# Patient Record
Sex: MALE | Race: WHITE | Employment: OTHER | URBAN - METROPOLITAN AREA
[De-identification: names, ages, dates, MRNs, and addresses within clinical notes are randomized per-mention and may not be internally consistent; named-entity substitution may affect disease eponyms.]

---

## 2017-01-05 ENCOUNTER — HOSPITAL ENCOUNTER (OUTPATIENT)
Dept: VASCULAR LAB | Age: 72
Discharge: HOME | End: 2017-01-05
Attending: FAMILY MEDICINE | Admitting: FAMILY MEDICINE

## 2017-01-05 DIAGNOSIS — I35.9 NONRHEUMATIC AORTIC VALVE DISORDER: ICD-10-CM

## 2017-01-05 LAB
LV EF: 55 %
LVEF MODALITY: NORMAL

## 2017-01-19 ENCOUNTER — OFFICE VISIT (OUTPATIENT)
Dept: CARDIOLOGY CLINIC | Age: 72
End: 2017-01-19

## 2017-01-19 VITALS
SYSTOLIC BLOOD PRESSURE: 122 MMHG | HEART RATE: 67 BPM | BODY MASS INDEX: 31.56 KG/M2 | DIASTOLIC BLOOD PRESSURE: 76 MMHG | WEIGHT: 233 LBS | HEIGHT: 72 IN | OXYGEN SATURATION: 97 %

## 2017-01-19 DIAGNOSIS — J44.9 CHRONIC AIRWAY OBSTRUCTION, NOT ELSEWHERE CLASSIFIED: Chronic | ICD-10-CM

## 2017-01-19 DIAGNOSIS — I35.0 NONRHEUMATIC AORTIC VALVE STENOSIS: Primary | ICD-10-CM

## 2017-01-19 DIAGNOSIS — I10 ESSENTIAL HYPERTENSION: ICD-10-CM

## 2017-01-19 PROCEDURE — 99204 OFFICE O/P NEW MOD 45 MIN: CPT | Performed by: INTERNAL MEDICINE

## 2017-02-03 ENCOUNTER — HOSPITAL ENCOUNTER (OUTPATIENT)
Dept: PULMONOLOGY | Age: 72
Discharge: HOME | End: 2017-02-03
Attending: INTERNAL MEDICINE | Admitting: INTERNAL MEDICINE

## 2017-02-03 VITALS — HEART RATE: 56 BPM | OXYGEN SATURATION: 96 %

## 2017-02-03 DIAGNOSIS — I35.0 NONRHEUMATIC AORTIC VALVE STENOSIS: ICD-10-CM

## 2017-02-03 RX ORDER — ALBUTEROL SULFATE 90 UG/1
4 AEROSOL, METERED RESPIRATORY (INHALATION) ONCE
Status: COMPLETED | OUTPATIENT
Start: 2017-02-03 | End: 2017-02-03

## 2017-02-03 RX ADMIN — ALBUTEROL SULFATE 4 PUFF: 90 AEROSOL, METERED RESPIRATORY (INHALATION) at 09:01

## 2017-03-06 ENCOUNTER — OFFICE VISIT (OUTPATIENT)
Dept: FAMILY MEDICINE CLINIC | Age: 72
End: 2017-03-06

## 2017-03-06 ENCOUNTER — HOSPITAL ENCOUNTER (OUTPATIENT)
Dept: OTHER | Age: 72
Discharge: HOME | End: 2017-03-06
Attending: FAMILY MEDICINE | Admitting: FAMILY MEDICINE

## 2017-03-06 VITALS
BODY MASS INDEX: 32.41 KG/M2 | TEMPERATURE: 97.8 F | SYSTOLIC BLOOD PRESSURE: 130 MMHG | WEIGHT: 239 LBS | DIASTOLIC BLOOD PRESSURE: 90 MMHG

## 2017-03-06 DIAGNOSIS — L70.9 ACNE, UNSPECIFIED ACNE TYPE: Primary | ICD-10-CM

## 2017-03-06 LAB
A/G RATIO: 1.2 (ref 1.1–2.2)
ALBUMIN SERPL-MCNC: 3.8 G/DL (ref 3.4–5)
ALP BLD-CCNC: 105 U/L (ref 40–129)
ALT SERPL-CCNC: 20 U/L (ref 10–40)
ANION GAP SERPL CALCULATED.3IONS-SCNC: 13 MMOL/L (ref 3–16)
AST SERPL-CCNC: 18 U/L (ref 15–37)
BILIRUB SERPL-MCNC: 0.6 MG/DL (ref 0–1)
BUN BLDV-MCNC: 12 MG/DL (ref 7–20)
CALCIUM SERPL-MCNC: 9.4 MG/DL (ref 8.3–10.6)
CHLORIDE BLD-SCNC: 103 MMOL/L (ref 99–110)
CHOLESTEROL, TOTAL: 195 MG/DL (ref 0–199)
CO2: 25 MMOL/L (ref 21–32)
CREAT SERPL-MCNC: 1 MG/DL (ref 0.8–1.3)
GFR AFRICAN AMERICAN: >60
GFR NON-AFRICAN AMERICAN: >60
GLOBULIN: 3.3 G/DL
GLUCOSE BLD-MCNC: 105 MG/DL (ref 70–99)
HDLC SERPL-MCNC: 57 MG/DL (ref 40–60)
LDL CHOLESTEROL CALCULATED: 112 MG/DL
POTASSIUM SERPL-SCNC: 4.9 MMOL/L (ref 3.5–5.1)
SODIUM BLD-SCNC: 141 MMOL/L (ref 136–145)
TOTAL PROTEIN: 7.1 G/DL (ref 6.4–8.2)
TRIGL SERPL-MCNC: 129 MG/DL (ref 0–150)
VLDLC SERPL CALC-MCNC: 26 MG/DL

## 2017-03-06 PROCEDURE — 99213 OFFICE O/P EST LOW 20 MIN: CPT | Performed by: PHYSICIAN ASSISTANT

## 2017-03-06 RX ORDER — SILDENAFIL 100 MG/1
100 TABLET, FILM COATED ORAL PRN
Qty: 5 TABLET | Refills: 5 | Status: SHIPPED | OUTPATIENT
Start: 2017-03-06 | End: 2018-03-05

## 2017-03-06 RX ORDER — SULFAMETHOXAZOLE AND TRIMETHOPRIM 800; 160 MG/1; MG/1
1 TABLET ORAL 2 TIMES DAILY
Qty: 20 TABLET | Refills: 0 | Status: SHIPPED | OUTPATIENT
Start: 2017-03-06 | End: 2017-03-16

## 2017-03-06 ASSESSMENT — ENCOUNTER SYMPTOMS
TROUBLE SWALLOWING: 0
SHORTNESS OF BREATH: 0
CHEST TIGHTNESS: 0

## 2017-05-24 RX ORDER — BENAZEPRIL HYDROCHLORIDE 40 MG/1
TABLET, FILM COATED ORAL
Qty: 30 TABLET | Refills: 0 | Status: SHIPPED | OUTPATIENT
Start: 2017-05-24 | End: 2017-09-08 | Stop reason: SDUPTHER

## 2017-07-07 RX ORDER — ATORVASTATIN CALCIUM 80 MG/1
TABLET, FILM COATED ORAL
Qty: 90 TABLET | Refills: 3 | Status: SHIPPED | OUTPATIENT
Start: 2017-07-07 | End: 2018-10-01 | Stop reason: SDUPTHER

## 2017-07-20 ENCOUNTER — HOSPITAL ENCOUNTER (OUTPATIENT)
Dept: NON INVASIVE DIAGNOSTICS | Age: 72
Discharge: HOME | End: 2017-07-20
Attending: INTERNAL MEDICINE | Admitting: INTERNAL MEDICINE

## 2017-07-20 ENCOUNTER — OFFICE VISIT (OUTPATIENT)
Dept: CARDIOLOGY CLINIC | Age: 72
End: 2017-07-20

## 2017-07-20 VITALS
WEIGHT: 232 LBS | BODY MASS INDEX: 31.42 KG/M2 | SYSTOLIC BLOOD PRESSURE: 138 MMHG | HEART RATE: 57 BPM | DIASTOLIC BLOOD PRESSURE: 68 MMHG | HEIGHT: 72 IN

## 2017-07-20 DIAGNOSIS — I35.9 AORTIC VALVE DISORDER: Primary | Chronic | ICD-10-CM

## 2017-07-20 DIAGNOSIS — I35.0 NONRHEUMATIC AORTIC VALVE STENOSIS: ICD-10-CM

## 2017-07-20 LAB
LV EF: 60 %
LVEF MODALITY: NORMAL

## 2017-07-20 PROCEDURE — 99214 OFFICE O/P EST MOD 30 MIN: CPT | Performed by: INTERNAL MEDICINE

## 2017-08-14 ENCOUNTER — TELEPHONE (OUTPATIENT)
Dept: FAMILY MEDICINE CLINIC | Age: 72
End: 2017-08-14

## 2017-08-15 DIAGNOSIS — R73.9 HYPERGLYCEMIA: ICD-10-CM

## 2017-08-15 DIAGNOSIS — Z12.5 SCREENING FOR PROSTATE CANCER: Primary | ICD-10-CM

## 2017-08-15 DIAGNOSIS — I10 ESSENTIAL HYPERTENSION: ICD-10-CM

## 2017-08-15 DIAGNOSIS — E78.5 OTHER AND UNSPECIFIED HYPERLIPIDEMIA: Chronic | ICD-10-CM

## 2017-08-18 RX ORDER — METRONIDAZOLE 7.5 MG/G
GEL TOPICAL
Refills: 0 | OUTPATIENT
Start: 2017-08-18

## 2017-09-05 ENCOUNTER — HOSPITAL ENCOUNTER (OUTPATIENT)
Dept: OTHER | Age: 72
Discharge: HOME | End: 2017-09-05
Attending: FAMILY MEDICINE | Admitting: FAMILY MEDICINE

## 2017-09-05 DIAGNOSIS — R73.9 HYPERGLYCEMIA: ICD-10-CM

## 2017-09-05 DIAGNOSIS — I10 ESSENTIAL HYPERTENSION: ICD-10-CM

## 2017-09-05 DIAGNOSIS — Z12.5 SCREENING FOR PROSTATE CANCER: ICD-10-CM

## 2017-09-05 DIAGNOSIS — E78.5 OTHER AND UNSPECIFIED HYPERLIPIDEMIA: Chronic | ICD-10-CM

## 2017-09-05 LAB
A/G RATIO: 1.1 (ref 1.1–2.2)
ALBUMIN SERPL-MCNC: 3.9 G/DL (ref 3.4–5)
ALP BLD-CCNC: 103 U/L (ref 40–129)
ALT SERPL-CCNC: 23 U/L (ref 10–40)
ANION GAP SERPL CALCULATED.3IONS-SCNC: 14 MMOL/L (ref 3–16)
AST SERPL-CCNC: 18 U/L (ref 15–37)
BILIRUB SERPL-MCNC: 0.8 MG/DL (ref 0–1)
BUN BLDV-MCNC: 13 MG/DL (ref 7–20)
CALCIUM SERPL-MCNC: 9.1 MG/DL (ref 8.3–10.6)
CHLORIDE BLD-SCNC: 101 MMOL/L (ref 99–110)
CHOLESTEROL, TOTAL: 166 MG/DL (ref 0–199)
CO2: 24 MMOL/L (ref 21–32)
CREAT SERPL-MCNC: 1 MG/DL (ref 0.8–1.3)
ESTIMATED AVERAGE GLUCOSE: 125.5 MG/DL
GFR AFRICAN AMERICAN: >60
GFR NON-AFRICAN AMERICAN: >60
GLOBULIN: 3.4 G/DL
GLUCOSE BLD-MCNC: 98 MG/DL (ref 70–99)
HBA1C MFR BLD: 6 %
HDLC SERPL-MCNC: 53 MG/DL (ref 40–60)
LDL CHOLESTEROL CALCULATED: 94 MG/DL
POTASSIUM SERPL-SCNC: 4.6 MMOL/L (ref 3.5–5.1)
PROSTATE SPECIFIC ANTIGEN: 1.24 NG/ML (ref 0–4)
SODIUM BLD-SCNC: 139 MMOL/L (ref 136–145)
TOTAL PROTEIN: 7.3 G/DL (ref 6.4–8.2)
TRIGL SERPL-MCNC: 97 MG/DL (ref 0–150)
VLDLC SERPL CALC-MCNC: 19 MG/DL

## 2017-09-08 ENCOUNTER — TELEPHONE (OUTPATIENT)
Dept: FAMILY MEDICINE CLINIC | Age: 72
End: 2017-09-08

## 2017-09-08 ENCOUNTER — OFFICE VISIT (OUTPATIENT)
Dept: FAMILY MEDICINE CLINIC | Age: 72
End: 2017-09-08

## 2017-09-08 VITALS
DIASTOLIC BLOOD PRESSURE: 74 MMHG | WEIGHT: 231 LBS | BODY MASS INDEX: 31.33 KG/M2 | OXYGEN SATURATION: 98 % | HEART RATE: 63 BPM | SYSTOLIC BLOOD PRESSURE: 130 MMHG

## 2017-09-08 DIAGNOSIS — R29.6 FREQUENT FALLS: ICD-10-CM

## 2017-09-08 DIAGNOSIS — I10 ESSENTIAL HYPERTENSION: ICD-10-CM

## 2017-09-08 DIAGNOSIS — J44.9 CHRONIC OBSTRUCTIVE PULMONARY DISEASE, UNSPECIFIED COPD TYPE (HCC): Primary | ICD-10-CM

## 2017-09-08 DIAGNOSIS — I73.9 PERIPHERAL VASCULAR DISEASE (HCC): ICD-10-CM

## 2017-09-08 DIAGNOSIS — M20.12 HALLUX VALGUS WITH BUNIONS, LEFT: ICD-10-CM

## 2017-09-08 DIAGNOSIS — M21.612 HALLUX VALGUS WITH BUNIONS, LEFT: ICD-10-CM

## 2017-09-08 PROCEDURE — G0008 ADMIN INFLUENZA VIRUS VAC: HCPCS | Performed by: FAMILY MEDICINE

## 2017-09-08 PROCEDURE — 99214 OFFICE O/P EST MOD 30 MIN: CPT | Performed by: FAMILY MEDICINE

## 2017-09-08 PROCEDURE — 90662 IIV NO PRSV INCREASED AG IM: CPT | Performed by: FAMILY MEDICINE

## 2017-09-08 RX ORDER — BENAZEPRIL HYDROCHLORIDE 40 MG/1
TABLET, FILM COATED ORAL
Qty: 90 TABLET | Refills: 1 | Status: SHIPPED | OUTPATIENT
Start: 2017-09-08 | End: 2018-04-19 | Stop reason: SDUPTHER

## 2017-09-08 RX ORDER — METRONIDAZOLE 7.5 MG/G
GEL TOPICAL DAILY
COMMUNITY
End: 2017-09-08 | Stop reason: SDUPTHER

## 2017-09-08 RX ORDER — METRONIDAZOLE 7.5 MG/G
GEL TOPICAL DAILY
Qty: 1 TUBE | Refills: 2 | Status: SHIPPED | OUTPATIENT
Start: 2017-09-08 | End: 2018-09-11

## 2017-09-22 ENCOUNTER — HOSPITAL ENCOUNTER (OUTPATIENT)
Dept: MRI IMAGING | Age: 72
Discharge: HOME | End: 2017-09-22
Attending: FAMILY MEDICINE | Admitting: FAMILY MEDICINE

## 2017-09-22 DIAGNOSIS — R29.6 REPEATED FALLS: ICD-10-CM

## 2017-09-22 DIAGNOSIS — R29.6 FREQUENT FALLS: ICD-10-CM

## 2017-09-25 ENCOUNTER — TELEPHONE (OUTPATIENT)
Dept: FAMILY MEDICINE CLINIC | Age: 72
End: 2017-09-25

## 2017-09-25 DIAGNOSIS — G91.2 NPH (NORMAL PRESSURE HYDROCEPHALUS) (HCC): Primary | ICD-10-CM

## 2017-10-10 ENCOUNTER — OFFICE VISIT (OUTPATIENT)
Dept: NEUROLOGY | Age: 72
End: 2017-10-10

## 2017-10-10 VITALS
HEART RATE: 63 BPM | HEIGHT: 72 IN | BODY MASS INDEX: 31.56 KG/M2 | SYSTOLIC BLOOD PRESSURE: 134 MMHG | WEIGHT: 233 LBS | DIASTOLIC BLOOD PRESSURE: 72 MMHG

## 2017-10-10 DIAGNOSIS — R27.0 ATAXIA: ICD-10-CM

## 2017-10-10 DIAGNOSIS — G60.9 IDIOPATHIC PERIPHERAL NEUROPATHY: ICD-10-CM

## 2017-10-10 DIAGNOSIS — R90.89 ABNORMAL FINDING ON MRI OF BRAIN: ICD-10-CM

## 2017-10-10 DIAGNOSIS — G91.2 NORMAL PRESSURE HYDROCEPHALUS (HCC): Primary | ICD-10-CM

## 2017-10-10 PROCEDURE — 99205 OFFICE O/P NEW HI 60 MIN: CPT | Performed by: PSYCHIATRY & NEUROLOGY

## 2017-10-10 NOTE — LETTER
[facility] Neurology  [address] [facility] [address]  Phone: [phone]  Fax: [phone]    Marielos Allen MD        October 10, 2017       Patient: Yanira Lin   MR Number: [id number]   YOB: 1945   Date of Visit: 10/10/2017       Dear Dr. Radha Min: Thank you for the request for consultation for Tatianna Montes to me for the evaluation of Ataxia and possible NPH. Below are the relevant portions of my assessment and plan of care. NEUROLOGY CONSULTATION     Chief Complaint   Patient presents with    Neurologic Problem     Patient is here today to establish care. Patient had a recent MRI which was abnormal. Patient has been having an abnormal gait as well. HISTORY OF PRESENT ILLNESS :    Tatianna Montes is a 73 y.o. male who is referred by Dr. Radha Min   History was obtained from patient And his wife.   Patient was referred for evaluation of abnormal gait and a possibly abnormal MRI brain  Symptoms started several months ago  Onset was gradual and the symptoms of persistent  According to the patient and his wife he has balance difficulties and tends to stumble a lot  He was seen by a podiatrist who felt that his gait was abnormal and this led to the MRI brain  Patient denies any urinary symptoms  Patient and his family have noticed mild memory impairment especially for short-term events  There is no focal weakness numbness true vertigo or diplopia      REVIEW OF SYSTEMS    Constitutional:     Chills     Fatigue     Fevers     Malaise     Weight loss      Denies all of the above    Eyes:    Double vision     Blurry vision      Denies all of the above    Ears, nose, mouth, throat, and face:    Hearing loss       Hoarseness        Snoring      Tinnitus        Denies all of the above     Respiratory:     Cough      Shortness of breath          Denies all of the above Cardiovascular:     Chest pain      Exertional chest pressure/discomfort            Palpitations      Syncope      Denies all of the above    Gastrointestinal:     Abdominal pain     Constipation      Diarrhea       Dysphagia                       Denies all of the above    Genitourinary:        Frequency     Hematuria       Urinary incontinence            Denies all of the above     Hematologic/lymphatic:    Bleeding      Easy bruising     Anemia   Denies all of the above     Musculoskeletal:    Back pain         Myalgias      Neck pain            Denies all of the above    Neurological: As noted in HPI    Behavioral/Psych:     Anxiety      Depression       Mood swings      Denies all of the above     Endocrine:     Temperature intolerance      Fatigue       Denies all of the above     Allergic/Immunologic:    Hay fever     Denies all of the above     Past Medical History:   Diagnosis Date    Allergic rhinitis     Aortic valve disorders     COPD (chronic obstructive pulmonary disease) (HCC)     Hyperlipidemia     Hypertension     Hypertrophy of prostate without urinary obstruction and other lower urinary tract symptoms (LUTS)     Irritable bowel syndrome     Pancreatitis     Peptic ulcer, unspecified site, unspecified as acute or chronic, without mention of hemorrhage, perforation, or obstruction     Peripheral vascular disease ([facility]      Family History   Problem Relation Age of Onset    Heart Disease Father     Diabetes Father     Alcohol Abuse Father     Alcohol Abuse Mother      Social History     Social History    Marital status:      Spouse name: N/A    Number of children: N/A    Years of education: N/A     Social History Main Topics    Smoking status: Former Smoker     Packs/day: 1.50     Years: 30.00     Types: Cigarettes     Quit date: 12/17/2001    Smokeless tobacco: Never Used      Comment: H.O.smoking at age 25 / smoked up to 1.5p.p.d /quit      Alcohol use 0.0 oz/week

## 2017-10-10 NOTE — PROGRESS NOTES
NEUROLOGY CONSULTATION     Chief Complaint   Patient presents with    Neurologic Problem     Patient is here today to establish care. Patient had a recent MRI which was abnormal. Patient has been having an abnormal gait as well. HISTORY OF PRESENT ILLNESS :    Vlad Flores is a 73 y.o. male who is referred by Dr. Ewelina Hernandez   History was obtained from patient And his wife.   Patient was referred for evaluation of abnormal gait and a possibly abnormal MRI brain  Symptoms started several months ago  Onset was gradual and the symptoms of persistent  According to the patient and his wife he has balance difficulties and tends to stumble a lot  He was seen by a podiatrist who felt that his gait was abnormal and this led to the MRI brain  Patient denies any urinary symptoms  Patient and his family have noticed mild memory impairment especially for short-term events  There is no focal weakness numbness true vertigo or diplopia      REVIEW OF SYSTEMS    Constitutional:  []   Chills   []  Fatigue   []  Fevers   []  Malaise   []  Weight loss     [x] Denies all of the above    Eyes:  []  Double vision   []  Blurry vision     [x] Denies all of the above    Ears, nose, mouth, throat, and face:   [] Hearing loss    []   Hoarseness      []  Snoring    []  Tinnitus       [x] Denies all of the above     Respiratory:   []  Cough    []  Shortness of breath         [x] Denies all of the above     Cardiovascular:   []  Chest pain    []  Exertional chest pressure/discomfort           [] Palpitations    []  Syncope     [x] Denies all of the above    Gastrointestinal:   []  Abdominal pain   []  Constipation    []  Diarrhea    []   Dysphagia                      [x] Denies all of the above    Genitourinary:      []  Frequency   []  Hematuria     []  Urinary incontinence           [x] Denies all of the above     Hematologic/lymphatic:  []  Bleeding    []  Easy bruising   []  Anemia  [x] Denies all of the above     Musculoskeletal:   [] Back pain       []  Myalgias    []  Neck pain           [x] Denies all of the above    Neurological: As noted in HPI    Behavioral/Psych:   [] Anxiety    []  Depression     []  Mood swings     [x] Denies all of the above     Endocrine:   []  Temperature intolerance     [] Fatigue      [x] Denies all of the above     Allergic/Immunologic:   [] Hay fever    [x] Denies all of the above     Past Medical History:   Diagnosis Date    Allergic rhinitis     Aortic valve disorders     [facility]     Hyperlipidemia     Hypertension     Hypertrophy of prostate without urinary obstruction and other lower urinary tract symptoms (LUTS)     Irritable bowel syndrome     Pancreatitis     Peptic ulcer, unspecified site, unspecified as acute or chronic, without mention of hemorrhage, perforation, or obstruction     Peripheral vascular disease ([facility]      Family History   Problem Relation Age of Onset    Heart Disease Father     Diabetes Father     Alcohol Abuse Father     Alcohol Abuse Mother      Social History     Social History    Marital status:      Spouse name: N/A    Number of children: N/A    Years of education: N/A     Social History Main Topics    Smoking status: Former Smoker     Packs/day: 1.50     Years: 30.00     Types: Cigarettes     Quit date: 12/17/2001    Smokeless tobacco: Never Used      Comment: H.O.smoking at age 25 / smoked up to 1.5p.p.d /quit      Alcohol use 0.0 oz/week      Comment: occ    Drug use: No    Sexual activity: Not Asked     Other Topics Concern    None     Social History Narrative    None       PHYSICAL EXAMINATION:  /72   Pulse 63   Ht 6' (1.829 m)   Wt 233 lb (105.7 kg)   BMI 31.60 kg/m²   Appearance: Well appearing, well nourished and in no distress  Mental Status Exam: Patient is alert, oriented to person, place and time.    Recent and

## 2017-10-16 ENCOUNTER — PRE-PROCEDURE TELEPHONE (OUTPATIENT)
Dept: INTERVENTIONAL RADIOLOGY/VASCULAR | Age: 72
End: 2017-10-16

## 2017-10-16 NOTE — PROGRESS NOTES
Message left instructing on procedure, arrival time, npo status, meds with restrictions, and transportation. Office number left for pt to return call.

## 2017-10-23 ENCOUNTER — HOSPITAL ENCOUNTER (OUTPATIENT)
Dept: GENERAL RADIOLOGY | Age: 72
Discharge: HOME | End: 2017-10-23
Admitting: PSYCHIATRY & NEUROLOGY

## 2017-10-23 VITALS
OXYGEN SATURATION: 98 % | BODY MASS INDEX: 31.18 KG/M2 | DIASTOLIC BLOOD PRESSURE: 71 MMHG | WEIGHT: 230.2 LBS | HEART RATE: 58 BPM | RESPIRATION RATE: 14 BRPM | TEMPERATURE: 97 F | SYSTOLIC BLOOD PRESSURE: 158 MMHG | HEIGHT: 72 IN

## 2017-10-23 DIAGNOSIS — G91.2 NORMAL PRESSURE HYDROCEPHALUS (HCC): ICD-10-CM

## 2017-10-23 DIAGNOSIS — G91.2 (IDIOPATHIC) NORMAL PRESSURE HYDROCEPHALUS (HCC): ICD-10-CM

## 2017-10-23 LAB
APPEARANCE CSF: ABNORMAL
APTT: 34 SEC (ref 24.1–34.9)
BASO CSF: 2 %
CLOT EVALUATION CSF: ABNORMAL
COLOR CSF: ABNORMAL
EOS CSF: 6 %
GLUCOSE, CSF: 57 MG/DL (ref 40–80)
INR BLD: 1.06 (ref 0.85–1.15)
LYMPHS CSF: 23 % (ref 40–80)
MONOCYTE, CSF: 8 % (ref 15–45)
NEUTROPHILS, CSF: 61 % (ref 0–6)
NUMBER OF CELLS CSF: 100
PROTEIN CSF: 66 MG/DL (ref 15–45)
PROTHROMBIN TIME: 12 SEC (ref 9.6–13)
RBC CSF: ABNORMAL /CUMM
TUBE NUMBER CSF: ABNORMAL
VOLUME CSF: 4 ML
WBC CSF: 74 /CUMM (ref 0–5)

## 2017-10-23 RX ORDER — BENAZEPRIL HYDROCHLORIDE 10 MG/1
40 TABLET ORAL ONCE
Status: COMPLETED | OUTPATIENT
Start: 2017-10-23 | End: 2017-10-23

## 2017-10-23 RX ORDER — LIDOCAINE HYDROCHLORIDE 10 MG/ML
5 INJECTION, SOLUTION EPIDURAL; INFILTRATION; INTRACAUDAL; PERINEURAL ONCE
Status: COMPLETED | OUTPATIENT
Start: 2017-10-23 | End: 2017-10-23

## 2017-10-23 RX ADMIN — BENAZEPRIL HYDROCHLORIDE 40 MG: 10 TABLET ORAL at 14:33

## 2017-10-23 RX ADMIN — Medication 0.5 MICRO CURIE: at 15:50

## 2017-10-23 RX ADMIN — LIDOCAINE HYDROCHLORIDE 5 ML: 10 INJECTION, SOLUTION EPIDURAL; INFILTRATION; INTRACAUDAL; PERINEURAL at 11:52

## 2017-10-23 ASSESSMENT — PAIN - FUNCTIONAL ASSESSMENT: PAIN_FUNCTIONAL_ASSESSMENT: 0-10

## 2017-10-23 NOTE — PROGRESS NOTES
Pt transferred to PACU from Radiology, vss, pt instructed on lying flat, puncture site with bandaid and intact.

## 2017-10-24 ENCOUNTER — POST-OP TELEPHONE (OUTPATIENT)
Dept: INTERVENTIONAL RADIOLOGY/VASCULAR | Age: 72
End: 2017-10-24

## 2017-10-24 ENCOUNTER — HOSPITAL ENCOUNTER (OUTPATIENT)
Dept: NUCLEAR MEDICINE | Age: 72
Discharge: HOME | End: 2017-10-24
Admitting: PSYCHIATRY & NEUROLOGY

## 2017-10-25 ENCOUNTER — HOSPITAL ENCOUNTER (OUTPATIENT)
Dept: NUCLEAR MEDICINE | Age: 72
Discharge: HOME | End: 2017-10-25
Admitting: PSYCHIATRY & NEUROLOGY

## 2017-11-01 ENCOUNTER — TELEPHONE (OUTPATIENT)
Dept: NEUROLOGY | Age: 72
End: 2017-11-01

## 2017-11-03 ENCOUNTER — NURSE ONLY (OUTPATIENT)
Dept: FAMILY MEDICINE CLINIC | Age: 72
End: 2017-11-03

## 2017-11-03 ENCOUNTER — TELEPHONE (OUTPATIENT)
Dept: FAMILY MEDICINE CLINIC | Age: 72
End: 2017-11-03

## 2017-11-03 VITALS — HEART RATE: 60 BPM | SYSTOLIC BLOOD PRESSURE: 138 MMHG | DIASTOLIC BLOOD PRESSURE: 62 MMHG

## 2017-11-03 DIAGNOSIS — I10 ESSENTIAL HYPERTENSION: Primary | ICD-10-CM

## 2017-11-03 RX ORDER — ALPRAZOLAM 0.25 MG/1
0.25 TABLET ORAL 3 TIMES DAILY PRN
Qty: 30 TABLET | Refills: 0 | OUTPATIENT
Start: 2017-11-03 | End: 2018-12-15

## 2017-11-03 NOTE — TELEPHONE ENCOUNTER
Patient is here for a BP check.   Patient had a recent loss of a close family member and is requesting a small dose of Xanax to get through the visitation as well as the .  Please advise

## 2017-11-07 ENCOUNTER — PROCEDURE VISIT (OUTPATIENT)
Dept: NEUROLOGY | Age: 72
End: 2017-11-07

## 2017-11-07 ENCOUNTER — HOSPITAL ENCOUNTER (OUTPATIENT)
Dept: OTHER | Age: 72
Discharge: HOME | End: 2017-11-07
Attending: PSYCHIATRY & NEUROLOGY | Admitting: PSYCHIATRY & NEUROLOGY

## 2017-11-07 ENCOUNTER — OFFICE VISIT (OUTPATIENT)
Dept: NEUROLOGY | Age: 72
End: 2017-11-07

## 2017-11-07 VITALS
HEART RATE: 54 BPM | BODY MASS INDEX: 31.69 KG/M2 | HEIGHT: 72 IN | WEIGHT: 234 LBS | SYSTOLIC BLOOD PRESSURE: 136 MMHG | DIASTOLIC BLOOD PRESSURE: 72 MMHG

## 2017-11-07 DIAGNOSIS — G91.2 NORMAL PRESSURE HYDROCEPHALUS (HCC): Primary | ICD-10-CM

## 2017-11-07 DIAGNOSIS — G60.9 IDIOPATHIC PERIPHERAL NEUROPATHY: ICD-10-CM

## 2017-11-07 DIAGNOSIS — G60.9 IDIOPATHIC PERIPHERAL NEUROPATHY: Primary | ICD-10-CM

## 2017-11-07 LAB
TSH SERPL DL<=0.05 MIU/L-ACNC: 1.82 UIU/ML (ref 0.27–4.2)
VITAMIN B-12: 413 PG/ML (ref 211–911)

## 2017-11-07 PROCEDURE — 95910 NRV CNDJ TEST 7-8 STUDIES: CPT | Performed by: PSYCHIATRY & NEUROLOGY

## 2017-11-07 PROCEDURE — 99213 OFFICE O/P EST LOW 20 MIN: CPT | Performed by: PSYCHIATRY & NEUROLOGY

## 2017-11-07 PROCEDURE — 95886 MUSC TEST DONE W/N TEST COMP: CPT | Performed by: PSYCHIATRY & NEUROLOGY

## 2017-11-07 NOTE — PROGRESS NOTES
Sandy Champagne M.D. [facility]) Physicians/[place] [facility]  Board Certified in [address] [facility] [address] [facility], [address]    EMG / NERVE CONDUCTION STUDY    PATIENT:     Fiorella Orellana OF EM2017    YOB: 1945       REASON FOR EMG:  Ataxia, probable peripheral neuropathy      REFERRING PHYSICIAN:  Sandy Champagne M.D.    Beti Jonnathan:  Bilateral peroneal motor nerve studies had low amplitude and slow conduction velocities. Bilateral posterior tibial motor nerve studies also had low amplitudes and slow conduction velocities. Bilateral superficial peroneal sensory studies were not recordable  The right sural sensory nerve study was not recordable  Needle EMG of several muscles in both lower extremities was normal     CLINICAL DIAGNOSIS:  Unspecified neuropathy     EMG RESULTS:   This patient has a mild-to-moderate generalized sensorimotor mixed polyneuropathy. _____________________________  Sandy Champagne M.D.   Electromyographer/Neurologist

## 2017-11-07 NOTE — PROGRESS NOTES
Radha Mireles   Neurology followup    Subjective:   CC/HP  History was obtained from the patient and his wife.   Patient is here for follow-up visit and EMG studies   Patient was referred for evaluation of abnormal gait and a possibly abnormal MRI brain  Symptoms started several months ago  Onset was gradual and the symptoms of persistent  According to the patient and his wife he has balance difficulties and tends to stumble a lot  He was seen by a podiatrist who felt that his gait was abnormal and this led to the MRI brain  Patient denies any urinary symptoms  Patient and his family have noticed mild memory impairment especially for short-term events  There is no focal weakness numbness true vertigo or diplopia  Interval history:  Patient states that his gait is improved after the lumbar puncture and cisternogram    REVIEW OF SYSTEMS    Constitutional:  []   Chills   []  Fatigue   []  Fevers   []  Malaise   []  Weight loss     [x] Denies all of the above    Respiratory:   []  Cough    []  Shortness of breath         [x] Denies all of the above     Cardiovascular:   []  Chest pain    []  Exertional chest pressure/discomfort           [] Palpitations    []  Syncope     [x] Denies all of the above        Past Medical History:   Diagnosis Date    Allergic rhinitis     Aortic valve disorders     COPD (chronic obstructive pulmonary disease) ([address]     Hyperlipidemia     Hypertension     Hypertrophy of prostate without urinary obstruction and other lower urinary tract symptoms (LUTS)     Irritable bowel syndrome     Pancreatitis     Peptic ulcer, unspecified site, unspecified as acute or chronic, without mention of hemorrhage, perforation, or obstruction     Peripheral vascular disease ([address]      Family History   Problem Relation Age of Onset    Heart Disease Father     Diabetes Father     Alcohol Abuse Father     Alcohol Abuse Mother      Social History     Social History    Marital status:

## 2017-11-07 NOTE — PATIENT INSTRUCTIONS
Please call with any questions or concerns:   [facility]  @ [phone]. LAB RESULTS:  Please obtain any labs or diagnostic tests as discussed today. You may call the office to check the results. Please allow  3 to 7 days for us to get these results. MEDICATION LIST:  Please bring an accurate list of your medications to every visit. APPOINTMENT CONFIRMATION:  We will call you the day before your scheduled appointment to confirm. If we are unable to reach you, you MUST call back by the end of the day to confirm the appointment or we may be forced to cancel.

## 2017-11-08 LAB
ALBUMIN SERPL-MCNC: 3.2 G/DL (ref 3.1–4.9)
ALPHA-1-GLOBULIN: 0.3 G/DL (ref 0.2–0.4)
ALPHA-2-GLOBULIN: 0.8 G/DL (ref 0.4–1.1)
BETA GLOBULIN: 1.2 G/DL (ref 0.9–1.6)
GAMMA GLOBULIN: 1.1 G/DL (ref 0.6–1.8)
SPE/IFE INTERPRETATION: NORMAL
TOTAL PROTEIN: 6.6 G/DL (ref 6.4–8.2)

## 2017-12-05 ENCOUNTER — OFFICE VISIT (OUTPATIENT)
Dept: FAMILY MEDICINE CLINIC | Age: 72
End: 2017-12-05

## 2017-12-05 VITALS
WEIGHT: 232 LBS | TEMPERATURE: 98.1 F | BODY MASS INDEX: 31.42 KG/M2 | OXYGEN SATURATION: 98 % | SYSTOLIC BLOOD PRESSURE: 130 MMHG | DIASTOLIC BLOOD PRESSURE: 74 MMHG | HEART RATE: 50 BPM | HEIGHT: 72 IN

## 2017-12-05 DIAGNOSIS — G89.29 CHRONIC LEFT SHOULDER PAIN: Primary | ICD-10-CM

## 2017-12-05 DIAGNOSIS — J06.9 UPPER RESPIRATORY TRACT INFECTION, UNSPECIFIED TYPE: ICD-10-CM

## 2017-12-05 DIAGNOSIS — M25.512 CHRONIC LEFT SHOULDER PAIN: Primary | ICD-10-CM

## 2017-12-05 PROCEDURE — 99214 OFFICE O/P EST MOD 30 MIN: CPT | Performed by: PHYSICIAN ASSISTANT

## 2017-12-05 RX ORDER — CEPHALEXIN 500 MG/1
500 CAPSULE ORAL 3 TIMES DAILY
Qty: 30 CAPSULE | Refills: 0 | Status: ON HOLD | OUTPATIENT
Start: 2017-12-05 | End: 2018-01-24 | Stop reason: ALTCHOICE

## 2017-12-05 ASSESSMENT — ENCOUNTER SYMPTOMS
WHEEZING: 0
TROUBLE SWALLOWING: 0
VOMITING: 0
COUGH: 1
EYE PAIN: 0
VOICE CHANGE: 0
NAUSEA: 0
SORE THROAT: 1

## 2017-12-05 NOTE — PROGRESS NOTES
Subjective:      Patient ID: Aravind Coates is a 73 y.o. male. HPI Patient is here today with a 2 year history of left shoulder pain. No known injury but has been worsening lately. No radiating pain or numbness/tingling in UE's. He has taken ibuprofen for it and using a tens unit. Movement worsens it. Resting has no pain. He also has cough and post nasal drip. Cough is dry. No HA, SOB, ear pain, n/v/d. He has a ST as well. This has been going on for 4 days. He has not taken anything otc. Review of Systems   Constitutional: Negative for activity change, fatigue and fever. HENT: Positive for postnasal drip and sore throat. Negative for congestion, ear pain, trouble swallowing and voice change. Eyes: Negative for pain. Respiratory: Positive for cough (dry cough). Negative for wheezing. Cardiovascular: Negative for chest pain. Gastrointestinal: Negative for nausea and vomiting. Musculoskeletal: Positive for myalgias. Negative for arthralgias (left shoulder). Skin: Negative for rash. Neurological: Negative for dizziness, weakness, numbness and headaches. Objective:   Physical Exam   Constitutional: He is oriented to person, place, and time. Vital signs are normal. He appears well-developed and well-nourished. He is cooperative. HENT:   Head: Normocephalic. Right Ear: Tympanic membrane, external ear and ear canal normal.   Left Ear: Tympanic membrane, external ear and ear canal normal.   Nose: Mucosal edema present. Right sinus exhibits no maxillary sinus tenderness and no frontal sinus tenderness. Left sinus exhibits no maxillary sinus tenderness and no frontal sinus tenderness. Mouth/Throat: Uvula is midline, oropharynx is clear and moist and mucous membranes are normal.   Neck: Neck supple. Cardiovascular: Normal rate, regular rhythm and normal heart sounds. Pulmonary/Chest: Effort normal and breath sounds normal. No respiratory distress. He has no decreased breath sounds.

## 2018-01-02 ENCOUNTER — TELEPHONE (OUTPATIENT)
Dept: FAMILY MEDICINE CLINIC | Age: 73
End: 2018-01-02

## 2018-01-02 DIAGNOSIS — I73.9 PERIPHERAL VASCULAR DISEASE (HCC): Primary | ICD-10-CM

## 2018-01-02 NOTE — TELEPHONE ENCOUNTER
Patient wife calling about her  and seeing what they have to do about scheduling a doppler on left leg. He had this done before and not sure if needs to get this done again. He is having cramping in the leg. cb them to discuss.

## 2018-01-05 DIAGNOSIS — R60.0 EDEMA OF BOTH LEGS: ICD-10-CM

## 2018-01-08 RX ORDER — FUROSEMIDE 40 MG/1
TABLET ORAL
Qty: 90 TABLET | Refills: 0 | Status: SHIPPED | OUTPATIENT
Start: 2018-01-08 | End: 2018-04-24 | Stop reason: SDUPTHER

## 2018-01-12 ENCOUNTER — HOSPITAL ENCOUNTER (OUTPATIENT)
Dept: VASCULAR LAB | Age: 73
Discharge: HOME | End: 2018-01-12
Attending: FAMILY MEDICINE | Admitting: FAMILY MEDICINE

## 2018-01-12 DIAGNOSIS — I73.9 PERIPHERAL VASCULAR DISEASE (HCC): Primary | ICD-10-CM

## 2018-01-12 DIAGNOSIS — I65.29 OCCLUSION AND STENOSIS OF CAROTID ARTERY: ICD-10-CM

## 2018-01-18 ENCOUNTER — HOSPITAL ENCOUNTER (OUTPATIENT)
Dept: NON INVASIVE DIAGNOSTICS | Age: 73
Discharge: HOME | End: 2018-01-18
Attending: INTERNAL MEDICINE | Admitting: INTERNAL MEDICINE

## 2018-01-18 ENCOUNTER — OFFICE VISIT (OUTPATIENT)
Dept: CARDIOLOGY CLINIC | Age: 73
End: 2018-01-18

## 2018-01-18 VITALS
BODY MASS INDEX: 31.29 KG/M2 | HEIGHT: 72 IN | DIASTOLIC BLOOD PRESSURE: 61 MMHG | SYSTOLIC BLOOD PRESSURE: 130 MMHG | WEIGHT: 231 LBS | HEART RATE: 61 BPM

## 2018-01-18 DIAGNOSIS — E78.00 HYPERCHOLESTEREMIA: ICD-10-CM

## 2018-01-18 DIAGNOSIS — I35.0 NONRHEUMATIC AORTIC VALVE STENOSIS: ICD-10-CM

## 2018-01-18 DIAGNOSIS — I10 ESSENTIAL HYPERTENSION: ICD-10-CM

## 2018-01-18 DIAGNOSIS — I35.9 NONRHEUMATIC AORTIC VALVE DISORDER: ICD-10-CM

## 2018-01-18 DIAGNOSIS — I35.9 AORTIC VALVE DISORDER: Chronic | ICD-10-CM

## 2018-01-18 DIAGNOSIS — I35.0 NONRHEUMATIC AORTIC VALVE STENOSIS: Primary | ICD-10-CM

## 2018-01-18 LAB
LV EF: 60 %
LVEF MODALITY: NORMAL

## 2018-01-18 PROCEDURE — 99214 OFFICE O/P EST MOD 30 MIN: CPT | Performed by: INTERNAL MEDICINE

## 2018-01-22 ENCOUNTER — TELEPHONE (OUTPATIENT)
Dept: CARDIOLOGY CLINIC | Age: 73
End: 2018-01-22

## 2018-01-25 ENCOUNTER — OFFICE VISIT (OUTPATIENT)
Dept: NEUROLOGY | Age: 73
End: 2018-01-25

## 2018-01-25 VITALS
HEIGHT: 72 IN | WEIGHT: 233 LBS | SYSTOLIC BLOOD PRESSURE: 138 MMHG | HEART RATE: 66 BPM | BODY MASS INDEX: 31.56 KG/M2 | DIASTOLIC BLOOD PRESSURE: 60 MMHG

## 2018-01-25 DIAGNOSIS — R27.0 ATAXIA: ICD-10-CM

## 2018-01-25 DIAGNOSIS — G31.84 MILD COGNITIVE IMPAIRMENT: ICD-10-CM

## 2018-01-25 DIAGNOSIS — G91.2 NORMAL PRESSURE HYDROCEPHALUS (HCC): Primary | ICD-10-CM

## 2018-01-25 PROCEDURE — 99214 OFFICE O/P EST MOD 30 MIN: CPT | Performed by: PSYCHIATRY & NEUROLOGY

## 2018-01-25 NOTE — PROGRESS NOTES
[facility]   Neurology followup    Subjective:   CC/HP  History was obtained from the patient and his wife.     Patient is here for follow-up visit   Patient was referred for evaluation of abnormal gait and a possibly abnormal MRI brain  Symptoms started several months ago  Onset was gradual and the symptoms of persistent  According to the patient and his wife he has balance difficulties and tends to stumble a lot  He was seen by a podiatrist who felt that his gait was abnormal and this led to the MRI brain  Patient denies any urinary symptoms  Patient and his family have noticed mild memory impairment especially for short-term events  There is no focal weakness numbness true vertigo or diplopia  Interval history:  Patient still has some gait difficulty and some memory problems and cognitive issues as well  He improved somewhat after the last cisternogram and release of spinal fluid    REVIEW OF SYSTEMS    Constitutional:  []   Chills   []  Fatigue   []  Fevers   []  Malaise   []  Weight loss     [x] Denies all of the above    Respiratory:   []  Cough    []  Shortness of breath         [x] Denies all of the above     Cardiovascular:   []  Chest pain    []  Exertional chest pressure/discomfort           [] Palpitations    []  Syncope     [x] Denies all of the above        Past Medical History:   Diagnosis Date    Allergic rhinitis     Aortic valve disorders     COPD (chronic obstructive pulmonary disease) ([address]     Hyperlipidemia     Hypertension     Hypertrophy of prostate without urinary obstruction and other lower urinary tract symptoms (LUTS)     Irritable bowel syndrome     Pancreatitis     Peptic ulcer, unspecified site, unspecified as acute or chronic, without mention of hemorrhage, perforation, or obstruction     Peripheral vascular disease ([address]      Family History   Problem Relation Age of Onset    Heart Disease Father     Diabetes Father     Alcohol Abuse Father     Alcohol Abuse Mother CALCIUM 9.1 09/05/2017    GFRAA >60 01/24/2018    GFRAA >60 10/05/2011    LABGLOM 60 01/24/2018    LABGLOM 78 12/20/2013    GLUCOSE 98 09/05/2017     RADIOLOGY REVIEW:  I have reviewed radiology image(s) and reports(s) of:  MRI brain    Impression :  Normal pressure hydrocephalus  MRI brain images showed enlarged ventricles  Isotope cisternogram was also positive for NPH  EMG and nerve conduction studies showed peripheral neuropathy, etiology not clear  TSH B12 and serum protein immunofixation were normal    Plan :  Discussed with patient and his wife  Patient apparently has aortic valve disease and needs valve replacement surgery  They are considering open-heart surgery versus the less invasive TAVR  Procedure  Given his gait difficulties and cognitive impairment because of the NPH, if both procedures are equally effective then from a neurological standpoint I would favor the less invasive procedure. They will talk further with the cardiac surgeon regarding this. Return in 4 months     Please note a portion of  this chart was generated using dragon dictation software. Although every effort was made to ensure the accuracy of this automated transcription, some errors in transcription may have occurred.

## 2018-01-29 ENCOUNTER — OFFICE VISIT (OUTPATIENT)
Dept: FAMILY MEDICINE CLINIC | Age: 73
End: 2018-01-29

## 2018-01-29 VITALS
DIASTOLIC BLOOD PRESSURE: 76 MMHG | HEART RATE: 62 BPM | SYSTOLIC BLOOD PRESSURE: 130 MMHG | OXYGEN SATURATION: 98 % | BODY MASS INDEX: 31.41 KG/M2 | WEIGHT: 231.6 LBS

## 2018-01-29 DIAGNOSIS — I35.9 NONRHEUMATIC AORTIC VALVE DISORDER: Primary | Chronic | ICD-10-CM

## 2018-01-29 DIAGNOSIS — I73.9 PERIPHERAL VASCULAR DISEASE (HCC): ICD-10-CM

## 2018-01-29 PROCEDURE — 99213 OFFICE O/P EST LOW 20 MIN: CPT | Performed by: FAMILY MEDICINE

## 2018-01-29 NOTE — PROGRESS NOTES
Subjective:      Patient ID: Mike Blanton is a 81 y.o. male. HPI Patient is here to discuss different options for heart surgery. Patient is been found to have significant aortic stenosis and is here with his wife to discuss options for his valve replacement. He has in addition normal pressure hydrocephalus. He has mild PAD. Has mild cognitive impairment likely from the NPH. Review of Systems    Objective:   Physical Exam    Assessment:      1. Nonrheumatic aortic valve disorder     2. Peripheral vascular disease ([address]             Plan:      I reviewed the chart in regard to the findings of the cardiac catheter the echocardiogram the consults from Dr. Anaya Arreola. I discussed with patient that a TAVR be most appropriate for him. Counseling was the totality of the 20 minute visit with patient and his wife.  Also reviewed his recent arterial studies

## 2018-02-08 ENCOUNTER — OFFICE VISIT (OUTPATIENT)
Dept: CARDIOTHORACIC SURGERY | Age: 73
End: 2018-02-08

## 2018-02-08 VITALS
DIASTOLIC BLOOD PRESSURE: 78 MMHG | OXYGEN SATURATION: 97 % | HEIGHT: 72 IN | WEIGHT: 231 LBS | BODY MASS INDEX: 31.29 KG/M2 | HEART RATE: 58 BPM | SYSTOLIC BLOOD PRESSURE: 122 MMHG | TEMPERATURE: 97.7 F

## 2018-02-08 DIAGNOSIS — I35.9 NONRHEUMATIC AORTIC VALVE DISORDER: Primary | ICD-10-CM

## 2018-02-08 PROCEDURE — 99202 OFFICE O/P NEW SF 15 MIN: CPT | Performed by: THORACIC SURGERY (CARDIOTHORACIC VASCULAR SURGERY)

## 2018-02-12 ENCOUNTER — TELEPHONE (OUTPATIENT)
Dept: CARDIOLOGY | Age: 73
End: 2018-02-12

## 2018-02-12 DIAGNOSIS — I35.9 NONRHEUMATIC AORTIC VALVE DISORDER: Primary | Chronic | ICD-10-CM

## 2018-02-12 NOTE — PROGRESS NOTES
dorsalis pedis N/A, Right posterior tibial 2, Left Posterior tibial N/A, Right Femoral 2, Left Femoral N/A, Right radial 2, and Left radial 2; bilat groin scars c/w h/o ABF bypass; good cappillary refill in left foot    Abdomen:  Soft, normal bowel sounds, non-tender, no hepatosplenomegaly, aorta normal and bruits absent    Musculoskeletal:  Back is straight and non-tender, full ROM of upper and lower extremities. Extremities:   No clubbing, cyanosis, or edema     Skin: warm and normal turgor, no ulcers, infections, or rashes, no jaundice    Neurological: awake, alert and oriented x 3, slightly slowed verbal responses, gait wide, motor 5/5 bilateral upper and lower extremities, sensation grossly intact    Psychiatric: Mood and affect appear appropriate      ASSESSMENT AND PLAN:    Severe symptomatic AS, PVD with h/o ABF with likely diseased left limb, moderate COPD, HTN, HLD, NPH    Patient has an STS mortality of ~3.4% for SAVR. With his unsteady gait, best option is likely TAVR unless access options are limited by his PVD in which case SAVR could still be considered. I discussed this at length with the patient and his wife. We will continue TAVR work-up especially in regard to vascular access.     Elke Yang MD

## 2018-02-16 ENCOUNTER — HOSPITAL ENCOUNTER (OUTPATIENT)
Dept: CT IMAGING | Age: 73
Discharge: HOME | End: 2018-02-16
Attending: FAMILY MEDICINE | Admitting: INTERNAL MEDICINE

## 2018-02-16 DIAGNOSIS — I35.0 NONRHEUMATIC AORTIC VALVE STENOSIS: ICD-10-CM

## 2018-02-16 DIAGNOSIS — I35.9 NONRHEUMATIC AORTIC VALVE DISORDER: Chronic | ICD-10-CM

## 2018-03-05 ENCOUNTER — OFFICE VISIT (OUTPATIENT)
Dept: CARDIOTHORACIC SURGERY | Age: 73
End: 2018-03-05

## 2018-03-05 VITALS
SYSTOLIC BLOOD PRESSURE: 142 MMHG | BODY MASS INDEX: 33.05 KG/M2 | TEMPERATURE: 97.5 F | HEIGHT: 72 IN | HEART RATE: 60 BPM | WEIGHT: 244 LBS | DIASTOLIC BLOOD PRESSURE: 76 MMHG | OXYGEN SATURATION: 98 %

## 2018-03-05 DIAGNOSIS — I35.9 NONRHEUMATIC AORTIC VALVE DISORDER: ICD-10-CM

## 2018-03-05 DIAGNOSIS — J44.9 CHRONIC OBSTRUCTIVE PULMONARY DISEASE, UNSPECIFIED COPD TYPE (HCC): ICD-10-CM

## 2018-03-05 DIAGNOSIS — I73.9 PERIPHERAL VASCULAR DISEASE (HCC): ICD-10-CM

## 2018-03-05 DIAGNOSIS — G31.84 MILD COGNITIVE IMPAIRMENT: ICD-10-CM

## 2018-03-05 DIAGNOSIS — R27.0 ATAXIA: Primary | ICD-10-CM

## 2018-03-05 PROCEDURE — 99214 OFFICE O/P EST MOD 30 MIN: CPT | Performed by: THORACIC SURGERY (CARDIOTHORACIC VASCULAR SURGERY)

## 2018-03-06 NOTE — PROGRESS NOTES
Consultation H&P    Date of Admission:  No admission date for patient encounter. Date of Consultation:  3/6/2018    PCP:  Julieta Sales MD    Cardiologist: Arthuro Button  Chief Complaint:     History of Present Illness: We are asked to see this patient in consultation by Dr. Ginger Casanova regarding 2nd TAVR nicky Baltazar is a 81 y.o. male who has a history of normal pressure hydrocephalus, peripheral vascular disease, moderate COPD whose had increasing for this fatigue and shortness of breath and aortic stenosis over the past several months. He does have some gait instability and fell at [place] time. He walks with a cane occasionally. Symptom Y/N Episodes Duration Severity Modifying Assoc Sx Other   CP                SOB           Dizzy           Syncope                Palpitations                Other                         Past Medical History:  Past Medical History:   Diagnosis Date    Allergic rhinitis     Aortic valve disorders     COPD (chronic obstructive pulmonary disease) (HCC)     Hydrocephalus     normal pressure,    Hyperlipidemia     Hypertension     Hypertrophy of prostate without urinary obstruction and other lower urinary tract symptoms (LUTS)     Irritable bowel syndrome     Pancreatitis     Peptic ulcer, unspecified site, unspecified as acute or chronic, without mention of hemorrhage, perforation, or obstruction     Peripheral vascular disease ([address]        Past Surgical History:  Past Surgical History:   Procedure Laterality Date    ABDOMINAL AORTIC ANEURYSM REPAIR N/A 2001    ANGIOPLASTY      RT femoral artery    CHOLECYSTECTOMY      FEMORAL BYPASS      bifemoral bypass    FOOT SURGERY Right 12/2/15    HERNIA REPAIR Left     OTHER SURGICAL HISTORY  10/23/2017    lumbar puncture    TONSILLECTOMY         Home Medications:   Prior to Admission medications    Medication Sig Start Date End Date Taking?  Authorizing Provider   furosemide (LASIX) 40 deviated septum. Integumentary:  No dermatitis, itching, rash. Pos rosacea  Cardiovascular:  No arrhythmias, previous MI. Respiratory:  No SOB, emphysema, asthma. GI:  No PUD, heartburn. :  No kidney stones, frequent UTIs  Vascular:  Pos claudication, varicosities. Hematologic:  No bleeding, pos easy bruising. Immunologic:  No known cancer, steroid therapies. Musculoskeletal:  No arthritis, gout. Endocrine: No diabetes, thyroid issues. Physical Examination:    BP (!) 142/76 (Site: Left Arm, Position: Sitting, Cuff Size: Medium Adult)   Pulse 60   Temp 97.5 °F (36.4 °C) (Oral)   Ht 6' (1.829 m)   Wt 244 lb (110.7 kg)   SpO2 98%   BMI 33.09 kg/m²    BP RUE:  BP LUE:   Admission Weight: 244 lb (110.7 kg)   Hand dominance:    General appearance: NAD  Eyes: PERRLA  Neck: no JVD, no lymphadenopathy. Respiratory: effort is unlabored, no crackles, wheezes or rubs. Cardiovascular: regular, no murmur. No carotid bruits. No edema or varicosities. Abdominal aorta cannot be appreciated given body habitus. Pulses:    carotid brachial radial femoral popliteal DP PT   RIGHT 2 2 2 2 2 2 2   LEFT 2 2 2 2 2 2 2   GI: abdomen soft, nondistended, no organomegaly. Musculoskeletal: strength and tone normal.  Extremities: warm and pink. Skin: no dermatitis or ulceration. Neuro/psychiatric: grossly intact. MEDICAL DECISION MAKING/TESTING Personally reviewed    Cath:1/24/18   LM       Short, normal              LAD     40% mid               Cx        20% distal              RCA     Nondominant, normal              LVG     Not performed              EDP     Not obtained    Echo: 1/18/18     Conclusions      Summary     Normal left ventricle size and systolic function with an EF of 60%.     Mild concentric left ventricular hypertrophy.     Severe aortic stenosis with mean gradient of 45mmHg     Mild aortic insufficiency. Pressure half time of 548msec.     CXR:     CT:   EXAMINATION:   CTA OF THE CHEST, ABDOMEN AND NPH and unsteady gait    Plan:   His STS risk stratification is not high, however, given his neurcognitive issues, namely his unsteady gait and mild forgetfulness, I would consider him an appropriate TAVR candidate. Pt and family are very motivated to fix his AoV via this technique. Discused all risks, benefits,  and alternatives to both TAVR and SAVR with pt. Will plan to discuss the potential pitfalls of transfemoral access in the face of his ABF graft with the group. Typical periop/postop course reviewed including initial limitations on driving/heavy lifting. Risks, benefits and postoperative complications discussed including bleeding, infection, stroke, death, postop pulmonary and renal issues.

## 2018-03-15 ENCOUNTER — OFFICE VISIT (OUTPATIENT)
Dept: CARDIOLOGY CLINIC | Age: 73
End: 2018-03-15

## 2018-03-15 ENCOUNTER — HOSPITAL ENCOUNTER (OUTPATIENT)
Dept: OTHER | Age: 73
Discharge: HOME | End: 2018-03-15
Attending: INTERNAL MEDICINE | Admitting: INTERNAL MEDICINE

## 2018-03-15 VITALS
BODY MASS INDEX: 32.37 KG/M2 | SYSTOLIC BLOOD PRESSURE: 130 MMHG | HEART RATE: 60 BPM | WEIGHT: 239 LBS | DIASTOLIC BLOOD PRESSURE: 74 MMHG | HEIGHT: 72 IN

## 2018-03-15 DIAGNOSIS — E78.00 HYPERCHOLESTEREMIA: ICD-10-CM

## 2018-03-15 DIAGNOSIS — I35.0 NONRHEUMATIC AORTIC VALVE STENOSIS: Primary | ICD-10-CM

## 2018-03-15 DIAGNOSIS — I10 ESSENTIAL HYPERTENSION: ICD-10-CM

## 2018-03-15 DIAGNOSIS — Z01.811 PRE-OP CHEST EXAM: ICD-10-CM

## 2018-03-15 LAB
ABO/RH: NORMAL
ALBUMIN SERPL-MCNC: 4 G/DL (ref 3.4–5)
ALP BLD-CCNC: 105 U/L (ref 40–129)
ALT SERPL-CCNC: 17 U/L (ref 10–40)
ANION GAP SERPL CALCULATED.3IONS-SCNC: 10 MMOL/L (ref 3–16)
ANTIBODY SCREEN: NORMAL
AST SERPL-CCNC: 15 U/L (ref 15–37)
BASOPHILS ABSOLUTE: 0.1 K/UL (ref 0–0.2)
BASOPHILS RELATIVE PERCENT: 1.3 %
BILIRUB SERPL-MCNC: 0.7 MG/DL (ref 0–1)
BILIRUBIN DIRECT: <0.2 MG/DL (ref 0–0.3)
BILIRUBIN URINE: NEGATIVE
BILIRUBIN, INDIRECT: NORMAL MG/DL (ref 0–1)
BLOOD, URINE: NEGATIVE
BUN BLDV-MCNC: 12 MG/DL (ref 7–20)
CALCIUM SERPL-MCNC: 9.4 MG/DL (ref 8.3–10.6)
CHLORIDE BLD-SCNC: 104 MMOL/L (ref 99–110)
CLARITY: CLEAR
CO2: 27 MMOL/L (ref 21–32)
COLOR: YELLOW
CREAT SERPL-MCNC: 0.9 MG/DL (ref 0.8–1.3)
EOSINOPHILS ABSOLUTE: 0.6 K/UL (ref 0–0.6)
EOSINOPHILS RELATIVE PERCENT: 7.3 %
GFR AFRICAN AMERICAN: >60
GFR NON-AFRICAN AMERICAN: >60
GLUCOSE BLD-MCNC: 89 MG/DL (ref 70–99)
GLUCOSE URINE: NEGATIVE MG/DL
HCT VFR BLD CALC: 42.3 % (ref 40.5–52.5)
HEMOGLOBIN: 14.3 G/DL (ref 13.5–17.5)
INR BLD: 1.04 (ref 0.85–1.15)
KETONES, URINE: NEGATIVE MG/DL
LEUKOCYTE ESTERASE, URINE: NEGATIVE
LYMPHOCYTES ABSOLUTE: 1.8 K/UL (ref 1–5.1)
LYMPHOCYTES RELATIVE PERCENT: 20.7 %
MAGNESIUM: 2.1 MG/DL (ref 1.8–2.4)
MCH RBC QN AUTO: 30 PG (ref 26–34)
MCHC RBC AUTO-ENTMCNC: 33.7 G/DL (ref 31–36)
MCV RBC AUTO: 89 FL (ref 80–100)
MICROSCOPIC EXAMINATION: NORMAL
MONOCYTES ABSOLUTE: 0.7 K/UL (ref 0–1.3)
MONOCYTES RELATIVE PERCENT: 8 %
NEUTROPHILS ABSOLUTE: 5.3 K/UL (ref 1.7–7.7)
NEUTROPHILS RELATIVE PERCENT: 62.7 %
NITRITE, URINE: NEGATIVE
PDW BLD-RTO: 14.7 % (ref 12.4–15.4)
PH UA: 5.5
PLATELET # BLD: 229 K/UL (ref 135–450)
PMV BLD AUTO: 9.4 FL (ref 5–10.5)
POTASSIUM SERPL-SCNC: 3.9 MMOL/L (ref 3.5–5.1)
PRO-BNP: 311 PG/ML (ref 0–124)
PROTEIN UA: NEGATIVE MG/DL
PROTHROMBIN TIME: 11.8 SEC (ref 9.6–13)
RBC # BLD: 4.76 M/UL (ref 4.2–5.9)
SODIUM BLD-SCNC: 141 MMOL/L (ref 136–145)
SPECIFIC GRAVITY UA: 1.01
TOTAL PROTEIN: 6.9 G/DL (ref 6.4–8.2)
TROPONIN: <0.01 NG/ML
URINE REFLEX TO CULTURE: NORMAL
URINE TYPE: NORMAL
UROBILINOGEN, URINE: 0.2 E.U./DL
WBC # BLD: 8.5 K/UL (ref 4–11)

## 2018-03-15 PROCEDURE — 99214 OFFICE O/P EST MOD 30 MIN: CPT | Performed by: INTERNAL MEDICINE

## 2018-03-16 LAB
EKG ATRIAL RATE: 56 BPM
EKG DIAGNOSIS: NORMAL
EKG P AXIS: 0 DEGREES
EKG P-R INTERVAL: 232 MS
EKG Q-T INTERVAL: 388 MS
EKG QRS DURATION: 88 MS
EKG QTC CALCULATION (BAZETT): 374 MS
EKG R AXIS: 52 DEGREES
EKG T AXIS: 106 DEGREES
EKG VENTRICULAR RATE: 56 BPM

## 2018-03-16 PROCEDURE — 93010 ELECTROCARDIOGRAM REPORT: CPT | Performed by: INTERNAL MEDICINE

## 2018-03-20 PROBLEM — I35.0 NONRHEUMATIC AORTIC VALVE STENOSIS: Status: ACTIVE | Noted: 2018-03-20

## 2018-03-22 ENCOUNTER — CARE COORDINATION (OUTPATIENT)
Dept: CASE MANAGEMENT | Age: 73
End: 2018-03-22

## 2018-03-22 DIAGNOSIS — I35.0 NONRHEUMATIC AORTIC VALVE STENOSIS: Primary | ICD-10-CM

## 2018-03-22 PROCEDURE — 1111F DSCHRG MED/CURRENT MED MERGE: CPT | Performed by: FAMILY MEDICINE

## 2018-03-23 DIAGNOSIS — Z98.890 STATUS POST SURGERY: Primary | ICD-10-CM

## 2018-03-23 RX ORDER — OXYCODONE HYDROCHLORIDE AND ACETAMINOPHEN 5; 325 MG/1; MG/1
1 TABLET ORAL EVERY 4 HOURS PRN
Qty: 30 TABLET | Refills: 0 | Status: SHIPPED | OUTPATIENT
Start: 2018-03-23 | End: 2018-04-23

## 2018-03-29 ENCOUNTER — CARE COORDINATION (OUTPATIENT)
Dept: CASE MANAGEMENT | Age: 73
End: 2018-03-29

## 2018-03-29 NOTE — CARE COORDINATION
[facility] Transitions Follow Up Call    3/29/2018    Patient: Hola Roth  Patient : 1945   MRN: [id number]  Reason for Admission: TAVR  Discharge Date: 3/21/18 RARS: Risk Score: 17.5       Spoke with: pt's wife    Care Transitions Subsequent and Final Call    Subsequent and Final Calls  Do you have any ongoing symptoms?:  No  Have your medications changed?:  No  Do you have any questions related to your medications?:  No  Do you currently have any active services?:  No  Do you have any needs or concerns that I can assist you with?:  No  Identified Barriers:  None  Care Transitions Interventions  No Identified Needs  Other Interventions:          Pt's wife states pt is doing pretty well, finally getting strength back. Sees Dr. Irina Rivera next week. . Agreed to more CTC f/u calls      Follow Up  Future Appointments  Date Time Provider Pawan Renner   2018 11:30 AM Uziel Arevalo MD FF Cardio [facility]   2018 10:15 AM Anabel Worthy MD CVTS [facility]   2018 2:20 PM Stevan Sanchez MD FF NEURO [facility]       Cole Briseno RN

## 2018-04-05 ENCOUNTER — OFFICE VISIT (OUTPATIENT)
Dept: CARDIOLOGY CLINIC | Age: 73
End: 2018-04-05

## 2018-04-05 ENCOUNTER — CARE COORDINATION (OUTPATIENT)
Dept: CASE MANAGEMENT | Age: 73
End: 2018-04-05

## 2018-04-05 VITALS
WEIGHT: 230 LBS | HEART RATE: 68 BPM | HEIGHT: 72 IN | BODY MASS INDEX: 31.15 KG/M2 | SYSTOLIC BLOOD PRESSURE: 130 MMHG | DIASTOLIC BLOOD PRESSURE: 70 MMHG

## 2018-04-05 DIAGNOSIS — E78.00 HYPERCHOLESTEREMIA: ICD-10-CM

## 2018-04-05 DIAGNOSIS — I73.9 PERIPHERAL VASCULAR DISEASE (HCC): ICD-10-CM

## 2018-04-05 DIAGNOSIS — Z95.3 STATUS POST TRANSCATHETER AORTIC VALVE REPLACEMENT (TAVR) USING BIOPROSTHESIS: Primary | ICD-10-CM

## 2018-04-05 DIAGNOSIS — I35.0 NONRHEUMATIC AORTIC VALVE STENOSIS: ICD-10-CM

## 2018-04-05 DIAGNOSIS — I10 ESSENTIAL HYPERTENSION: ICD-10-CM

## 2018-04-05 PROCEDURE — 99214 OFFICE O/P EST MOD 30 MIN: CPT | Performed by: INTERNAL MEDICINE

## 2018-04-05 PROCEDURE — 93000 ELECTROCARDIOGRAM COMPLETE: CPT | Performed by: INTERNAL MEDICINE

## 2018-04-19 ENCOUNTER — OFFICE VISIT (OUTPATIENT)
Dept: CARDIOLOGY CLINIC | Age: 73
End: 2018-04-19

## 2018-04-19 ENCOUNTER — HOSPITAL ENCOUNTER (OUTPATIENT)
Dept: NON INVASIVE DIAGNOSTICS | Age: 73
Discharge: HOME | End: 2018-04-19
Attending: INTERNAL MEDICINE | Admitting: INTERNAL MEDICINE

## 2018-04-19 VITALS
HEIGHT: 72 IN | SYSTOLIC BLOOD PRESSURE: 110 MMHG | HEART RATE: 62 BPM | DIASTOLIC BLOOD PRESSURE: 60 MMHG | BODY MASS INDEX: 30.61 KG/M2 | WEIGHT: 226 LBS

## 2018-04-19 DIAGNOSIS — I10 ESSENTIAL HYPERTENSION: ICD-10-CM

## 2018-04-19 DIAGNOSIS — I35.0 NONRHEUMATIC AORTIC VALVE STENOSIS: ICD-10-CM

## 2018-04-19 DIAGNOSIS — I35.0 NONRHEUMATIC AORTIC VALVE STENOSIS: Primary | ICD-10-CM

## 2018-04-19 DIAGNOSIS — E78.00 HYPERCHOLESTEREMIA: ICD-10-CM

## 2018-04-19 LAB
LV EF: 60 %
LVEF MODALITY: NORMAL

## 2018-04-19 PROCEDURE — 99214 OFFICE O/P EST MOD 30 MIN: CPT | Performed by: INTERNAL MEDICINE

## 2018-04-19 RX ORDER — PRASUGREL 10 MG/1
10 TABLET, FILM COATED ORAL DAILY
Qty: 30 TABLET | Refills: 5 | Status: ON HOLD | OUTPATIENT
Start: 2018-04-19 | End: 2018-09-20 | Stop reason: HOSPADM

## 2018-04-19 RX ORDER — BENAZEPRIL HYDROCHLORIDE 20 MG/1
TABLET ORAL
Qty: 90 TABLET | Refills: 3 | Status: SHIPPED | OUTPATIENT
Start: 2018-04-19 | End: 2018-07-12 | Stop reason: ALTCHOICE

## 2018-04-19 RX ORDER — RANITIDINE 150 MG/1
150 TABLET ORAL 2 TIMES DAILY
COMMUNITY
End: 2018-09-11

## 2018-04-24 DIAGNOSIS — R60.0 EDEMA OF BOTH LEGS: ICD-10-CM

## 2018-04-24 RX ORDER — FUROSEMIDE 40 MG/1
TABLET ORAL
Qty: 90 TABLET | Refills: 0 | Status: SHIPPED | OUTPATIENT
Start: 2018-04-24 | End: 2018-08-01 | Stop reason: SDUPTHER

## 2018-05-02 ENCOUNTER — TELEPHONE (OUTPATIENT)
Dept: CARDIOLOGY CLINIC | Age: 73
End: 2018-05-02

## 2018-06-19 RX ORDER — FLUTICASONE PROPIONATE 50 MCG
SPRAY, SUSPENSION (ML) NASAL
Qty: 1 BOTTLE | Refills: 0 | Status: SHIPPED | OUTPATIENT
Start: 2018-06-19 | End: 2018-12-15

## 2018-07-11 NOTE — PATIENT INSTRUCTIONS
Stop Benazepril    You can stop the Effient after 9/20/18    Call Eloy Torres with any questions.  [phone]

## 2018-07-12 ENCOUNTER — OFFICE VISIT (OUTPATIENT)
Dept: CARDIOLOGY CLINIC | Age: 73
End: 2018-07-12

## 2018-07-12 VITALS
HEIGHT: 72 IN | WEIGHT: 227 LBS | SYSTOLIC BLOOD PRESSURE: 112 MMHG | DIASTOLIC BLOOD PRESSURE: 56 MMHG | HEART RATE: 64 BPM | BODY MASS INDEX: 30.75 KG/M2

## 2018-07-12 DIAGNOSIS — I73.9 PERIPHERAL VASCULAR DISEASE (HCC): ICD-10-CM

## 2018-07-12 DIAGNOSIS — E78.00 HYPERCHOLESTEREMIA: ICD-10-CM

## 2018-07-12 DIAGNOSIS — I35.0 NONRHEUMATIC AORTIC VALVE STENOSIS: Primary | ICD-10-CM

## 2018-07-12 DIAGNOSIS — I10 ESSENTIAL HYPERTENSION: ICD-10-CM

## 2018-07-12 PROCEDURE — 99214 OFFICE O/P EST MOD 30 MIN: CPT | Performed by: INTERNAL MEDICINE

## 2018-07-31 ENCOUNTER — NURSE ONLY (OUTPATIENT)
Dept: FAMILY MEDICINE CLINIC | Age: 73
End: 2018-07-31

## 2018-07-31 VITALS — SYSTOLIC BLOOD PRESSURE: 136 MMHG | DIASTOLIC BLOOD PRESSURE: 70 MMHG

## 2018-08-01 DIAGNOSIS — R60.0 EDEMA OF BOTH LEGS: ICD-10-CM

## 2018-08-01 RX ORDER — FUROSEMIDE 40 MG/1
TABLET ORAL
Qty: 90 TABLET | Refills: 0 | Status: SHIPPED | OUTPATIENT
Start: 2018-08-01 | End: 2018-11-06 | Stop reason: SDUPTHER

## 2018-09-11 ENCOUNTER — OFFICE VISIT (OUTPATIENT)
Dept: FAMILY MEDICINE CLINIC | Age: 73
End: 2018-09-11

## 2018-09-11 VITALS
DIASTOLIC BLOOD PRESSURE: 70 MMHG | SYSTOLIC BLOOD PRESSURE: 140 MMHG | WEIGHT: 235 LBS | HEART RATE: 71 BPM | BODY MASS INDEX: 31.87 KG/M2 | OXYGEN SATURATION: 98 %

## 2018-09-11 DIAGNOSIS — Z95.2 ENCOUNTER FOR FOLLOW-UP FOR AORTIC VALVE REPLACEMENT: ICD-10-CM

## 2018-09-11 DIAGNOSIS — Z09 ENCOUNTER FOR FOLLOW-UP FOR AORTIC VALVE REPLACEMENT: ICD-10-CM

## 2018-09-11 DIAGNOSIS — K59.04 CHRONIC IDIOPATHIC CONSTIPATION: Primary | ICD-10-CM

## 2018-09-11 DIAGNOSIS — I10 ESSENTIAL HYPERTENSION: ICD-10-CM

## 2018-09-11 PROCEDURE — 99213 OFFICE O/P EST LOW 20 MIN: CPT | Performed by: FAMILY MEDICINE

## 2018-09-11 RX ORDER — BENAZEPRIL HYDROCHLORIDE 10 MG/1
10 TABLET ORAL DAILY
Qty: 30 TABLET | Refills: 3 | Status: SHIPPED | OUTPATIENT
Start: 2018-09-11 | End: 2018-12-15 | Stop reason: SDUPTHER

## 2018-09-11 RX ORDER — AMOXICILLIN 500 MG/1
CAPSULE ORAL
Qty: 4 CAPSULE | Refills: 0 | Status: ON HOLD | OUTPATIENT
Start: 2018-09-11 | End: 2018-09-20 | Stop reason: HOSPADM

## 2018-09-11 RX ORDER — POLYETHYLENE GLYCOL 3350 17 G/17G
17 POWDER, FOR SOLUTION ORAL DAILY
Qty: 510 G | Refills: 3 | COMMUNITY
Start: 2018-09-11 | End: 2018-10-11

## 2018-09-12 ENCOUNTER — NURSE ONLY (OUTPATIENT)
Dept: FAMILY MEDICINE CLINIC | Age: 73
End: 2018-09-12

## 2018-09-12 DIAGNOSIS — Z23 NEED FOR PROPHYLACTIC VACCINATION AND INOCULATION AGAINST INFLUENZA: Primary | ICD-10-CM

## 2018-09-12 PROCEDURE — 90662 IIV NO PRSV INCREASED AG IM: CPT | Performed by: FAMILY MEDICINE

## 2018-09-12 PROCEDURE — G0008 ADMIN INFLUENZA VIRUS VAC: HCPCS | Performed by: FAMILY MEDICINE

## 2018-09-12 NOTE — PROGRESS NOTES
Vaccine Information Sheet, \"Influenza - Inactivated\"  given to Anand Mckeon, or parent/legal guardian of  Anand Mckeon and verbalized understanding. Patient responses:    Have you ever had a reaction to a flu vaccine? No  Are you able to eat eggs without adverse effects? Yes  Do you have any current illness? No  Have you ever had Guillian Orrum Syndrome? No    Flu vaccine given per order. Please see immunization tab.     Immunization(s) given during visit:  flu

## 2018-09-14 ENCOUNTER — TELEPHONE (OUTPATIENT)
Dept: FAMILY MEDICINE CLINIC | Age: 73
End: 2018-09-14

## 2018-09-14 NOTE — TELEPHONE ENCOUNTER
Would recommend appointment if not feeling better tomorrow. Sometimes flu shot can make people feel run down, but shouldn't cause fever. Needs to be seen if not better.

## 2018-09-14 NOTE — TELEPHONE ENCOUNTER
Patient's wife states if patient is not feeling better in the morning, she will call in the morning to schedule an appointment. Pt this morning seen in the activity room eating breakfast with peers.  Pt reports not sleeping well last night having trouble staying asleep.  Pt c/o mid-back pain rating 3/10 (10 worst) in which she was using stretching and deep breathing to help alleviate pain.  She denies AVH.  She reports anxiety and depression rating mood 3/10 (10 best).  She reports + SI, no plan denies HI safety plan reviewed.  She appears sad, flat, depressed making minimal conversation and poor eye contact with writer.  She is up ad viri on the unit, minimally social with peers, able to make needs known.  She did attend AM group, will continue to monitor.     05/10/17 0800    MENTAL STATUS    Mental Status Assessment WDL Except   -Mental Assessment Frequency BID   Attention 1;2   Speech Guarded   Thought Content Poor historian;Perservation/Rumination   Thought Process Circumstantial   Insight Poor   Judgment Poor   Reliability Appears to exaggerate;Appears to be untruthful   Memory Inability to learn new material   Sleep/Wake Cycle Difficulty falling asleep   Affect/Behavior Flat;Depressed;Poor eye contact;Sad;Isolative;Suicidal/suicidal ideation   Mood Depressed   Appearance/Dress Dishelved appearance

## 2018-09-18 ENCOUNTER — OFFICE VISIT (OUTPATIENT)
Dept: FAMILY MEDICINE CLINIC | Age: 73
End: 2018-09-18

## 2018-09-18 VITALS
WEIGHT: 230.8 LBS | OXYGEN SATURATION: 96 % | HEIGHT: 72 IN | SYSTOLIC BLOOD PRESSURE: 142 MMHG | BODY MASS INDEX: 31.26 KG/M2 | HEART RATE: 81 BPM | DIASTOLIC BLOOD PRESSURE: 77 MMHG | TEMPERATURE: 98.5 F

## 2018-09-18 DIAGNOSIS — K92.1 MELENA: Primary | ICD-10-CM

## 2018-09-18 PROBLEM — K92.2 GI HEMORRHAGE: Status: ACTIVE | Noted: 2018-09-18

## 2018-09-18 PROCEDURE — 99214 OFFICE O/P EST MOD 30 MIN: CPT | Performed by: FAMILY MEDICINE

## 2018-09-18 ASSESSMENT — ENCOUNTER SYMPTOMS
BLOATING: 0
CONSTIPATION: 1
BACK PAIN: 0

## 2018-09-18 NOTE — PROGRESS NOTES
the last 6 months with recent diarrhea over last week) and melena. Negative for bloating and dysphagia. Musculoskeletal: Negative for back pain. OBJECTIVE:    BP (!) 142/77 (Site: Left Upper Arm, Position: Standing, Cuff Size: Large Adult)   Pulse 81   Temp 98.5 °F (36.9 °C) (Tympanic)   Ht 6' (1.829 m)   Wt 230 lb 12.8 oz (104.7 kg)   SpO2 96%   BMI 31.30 kg/m²    Vitals:    09/18/18 1139 09/18/18 1205 09/18/18 1206 09/18/18 1208   BP: 116/70 (!) 141/76 136/69 (!) 142/77   Site: Left Upper Arm Right Upper Arm Left Upper Arm Left Upper Arm   Position: Sitting Sitting Supine Standing   Cuff Size: Large Adult Large Adult Large Adult Large Adult   Pulse: 73 70 72 81   Temp: 98.5 °F (36.9 °C)      TempSrc: Tympanic      SpO2: 98% 95% 94% 96%   Weight: 230 lb 12.8 oz (104.7 kg)      Height: 6' (1.829 m)          Physical Exam   Constitutional: He is oriented to person, place, and time. He appears well-developed and well-nourished. HENT:   Head: Normocephalic and atraumatic. Right Ear: External ear normal.   Left Ear: External ear normal.   Mouth/Throat: Oropharynx is clear and moist.   Eyes: Pupils are equal, round, and reactive to light. Conjunctivae are normal.   Neck: Normal range of motion. Neck supple. No tracheal deviation present. No thyromegaly present. Cardiovascular: Normal rate, regular rhythm, normal heart sounds and intact distal pulses. Exam reveals no gallop and no friction rub. No murmur heard. Pulmonary/Chest: Effort normal and breath sounds normal. No respiratory distress. He has no wheezes. He has no rales. He exhibits no tenderness. Abdominal: Soft. He exhibits no distension and no mass. Bowel sounds are increased. There is no tenderness. There is no rebound and no guarding. Genitourinary: Rectal exam shows guaiac positive stool (stool black in color. ). Rectal exam shows no mass, no tenderness and anal tone normal.   Musculoskeletal: Normal range of motion.  He exhibits edema (trace lower extremity edema. ). He exhibits no tenderness or deformity. Lymphadenopathy:     He has no cervical adenopathy. Neurological: He is alert and oriented to person, place, and time. He has normal reflexes. No cranial nerve deficit. Coordination normal.   Skin: Skin is warm and dry. No rash noted. No erythema. Psychiatric: He has a normal mood and affect. His behavior is normal. Judgment and thought content normal.       ASSESSMENT/PLAN:    Nayana Clayton was seen today for diarrhea. Diagnoses and all orders for this visit:    Melena    Patient is discussed with Dr. Laura Monroe with [address] With patient currently taking Effient and multiple co morbidities feel that the patient would best be served by going to the emergency room at [place] [address] to continue his evaluation and have Dr. Laura Monroe consulted from there. Return for regularly scheduled follow-up. Please note portions of this note were completed with a voice recognition program.  Efforts were made to edit the dictations but occasionally words are mis-transcribed.

## 2018-09-18 NOTE — PROGRESS NOTES
to 1.5p.p.d /quit      Alcohol use 1.2 oz/week     2 Cans of beer per week      Comment: occasional beer    Drug use: No    Sexual activity: Not on file     Other Topics Concern    Not on file     Social History Narrative    No narrative on file     No Known Allergies  Current Outpatient Prescriptions   Medication Sig Dispense Refill    benazepril (LOTENSIN) 10 MG tablet Take 1 tablet by mouth daily 30 tablet 3    furosemide (LASIX) 40 MG tablet TAKE 1 TABLET DAILY 90 tablet 0    fluticasone (FLONASE) 50 MCG/ACT nasal spray INSTILL 2 SPRAYS IN EACH NOSTRILL DAILY 1 Bottle 0    prasugrel (EFFIENT) 10 MG TABS Take 1 tablet by mouth daily 30 tablet 5    aspirin 81 MG EC tablet Take 1 tablet by mouth daily 30 tablet 3    ALPRAZolam (XANAX) 0.25 MG tablet Take 1 tablet by mouth 3 times daily as needed for Anxiety 30 tablet 0    atorvastatin (LIPITOR) 80 MG tablet TAKE 1 TABLET DAILY 90 tablet 3    polyethylene glycol (MIRALAX) powder Take 17 g by mouth daily 510 g 3    bisacodyl (DULCOLAX) 5 MG EC tablet Take 1 tablet by mouth daily as needed for Constipation      amoxicillin (AMOXIL) 500 MG capsule 4 prior to dental appt 4 capsule 0     No current facility-administered medications for this visit. Review of Systems    OBJECTIVE:    /70 (Site: Left Upper Arm, Position: Sitting, Cuff Size: Large Adult)   Pulse 73   Temp 98.5 °F (36.9 °C) (Tympanic)   Ht 6' (1.829 m)   Wt 230 lb 12.8 oz (104.7 kg)   SpO2 98%   BMI 31.30 kg/m²    Physical Exam    ASSESSMENT/PLAN:    There are no diagnoses linked to this encounter. No Follow-up on file. Please note portions of this note were completed with a voice recognition program.  Efforts were made to edit the dictations but occasionally words are mis-transcribed.

## 2018-09-19 PROBLEM — I35.0 NONRHEUMATIC AORTIC VALVE STENOSIS: Status: ACTIVE | Noted: 2018-09-19

## 2018-10-01 RX ORDER — ATORVASTATIN CALCIUM 80 MG/1
TABLET, FILM COATED ORAL
Qty: 90 TABLET | Refills: 0 | Status: SHIPPED | OUTPATIENT
Start: 2018-10-01 | End: 2019-01-02 | Stop reason: SDUPTHER

## 2018-11-06 DIAGNOSIS — R60.0 EDEMA OF BOTH LEGS: ICD-10-CM

## 2018-11-06 RX ORDER — FUROSEMIDE 40 MG/1
TABLET ORAL
Qty: 90 TABLET | Refills: 0 | Status: SHIPPED | OUTPATIENT
Start: 2018-11-06 | End: 2019-01-02 | Stop reason: SDUPTHER

## 2018-11-14 NOTE — PROGRESS NOTES
piercing jewelry must be removed. 11. If you have ___dentures, they will be removed before going to the OR; we will provide you a container. If you wear ___contact lenses or ___glasses, they will be removed; please bring a case for them. 12. Please see your family doctor/pediatrician for a history & physical and/or concerning medications. Bring any test results/reports from your physician's office. PCP__________________Phone___________H&P Appt. Date________             13 If you  have a Living Will and Durable Power of  for Healthcare, please bring in a copy. 15. Notify your Surgeon if you develop any illness between now and surgery  time, cough, cold, fever, sore throat, nausea, vomiting, etc.  Please notify your surgeon if you experience dizziness, shortness of breath or blurred vision between now & the time of your surgery             15. DO NOT shave your operative site 96 hours prior to surgery. For face & neck surgery, men may use an electric razor 48 hours prior to surgery. 16. Shower the night before surgery with ___Antibacterial soap ___Hibiclens             17. To provide excellent care visitors will be limited to one in the room at any given time. 18.  Please bring picture ID and insurance card. 19.  Visit our web site for additional information:  [email or web address]              20.During flu season no children under the age of 15 are permitted in the hospital for the safety of all patients. 21. If you take a long acting insulin in the evening only  take half of your usual  dose the night  before your procedure              22. If you use a c-pap please bring DOS if staying overnight,             23.For your convenience [address] [facility] has a pharmacy on site to fill your prescriptions.              24. If you use oxygen and have a portable tank please bring it  with you the DOS             25. Bring a complete list of all your medications with name and dose include any supplements. 26. Other__________________________________________   *Please call pre admission testing if you any further questions   [facility]   [address] [place] [address]  [phone]   [address] [place] [address]       All above information reviewed with patient in person or by phone. Patient verbalizes understanding. All questions and concerns addressed.                                                                                                  Patient/Rep____________________                                                                                                                                    PRE OP INSTRUCTIONS

## 2018-11-15 ENCOUNTER — ANESTHESIA EVENT (OUTPATIENT)
Dept: ENDOSCOPY | Age: 73
End: 2018-11-15
Payer: COMMERCIAL

## 2018-11-19 ENCOUNTER — TELEPHONE (OUTPATIENT)
Dept: FAMILY MEDICINE CLINIC | Age: 73
End: 2018-11-19

## 2018-12-04 ENCOUNTER — HOSPITAL ENCOUNTER (OUTPATIENT)
Age: 73
Setting detail: OUTPATIENT SURGERY
Discharge: HOME | End: 2018-12-04
Attending: INTERNAL MEDICINE | Admitting: INTERNAL MEDICINE
Payer: COMMERCIAL

## 2018-12-04 ENCOUNTER — ANESTHESIA (OUTPATIENT)
Dept: ENDOSCOPY | Age: 73
End: 2018-12-04
Payer: COMMERCIAL

## 2018-12-04 VITALS — SYSTOLIC BLOOD PRESSURE: 182 MMHG | OXYGEN SATURATION: 97 % | DIASTOLIC BLOOD PRESSURE: 124 MMHG

## 2018-12-04 VITALS
RESPIRATION RATE: 16 BRPM | HEIGHT: 72 IN | OXYGEN SATURATION: 100 % | TEMPERATURE: 97.8 F | BODY MASS INDEX: 31.42 KG/M2 | HEART RATE: 55 BPM | DIASTOLIC BLOOD PRESSURE: 67 MMHG | SYSTOLIC BLOOD PRESSURE: 143 MMHG | WEIGHT: 232 LBS

## 2018-12-04 PROCEDURE — 6360000002 HC RX W HCPCS: Performed by: NURSE ANESTHETIST, CERTIFIED REGISTERED

## 2018-12-04 PROCEDURE — 2500000003 HC RX 250 WO HCPCS: Performed by: NURSE ANESTHETIST, CERTIFIED REGISTERED

## 2018-12-04 PROCEDURE — 3609017100 HC EGD: Performed by: INTERNAL MEDICINE

## 2018-12-04 PROCEDURE — 3700000000 HC ANESTHESIA ATTENDED CARE: Performed by: INTERNAL MEDICINE

## 2018-12-04 PROCEDURE — 7100000011 HC PHASE II RECOVERY - ADDTL 15 MIN: Performed by: INTERNAL MEDICINE

## 2018-12-04 PROCEDURE — 7100000010 HC PHASE II RECOVERY - FIRST 15 MIN: Performed by: INTERNAL MEDICINE

## 2018-12-04 PROCEDURE — 2580000003 HC RX 258: Performed by: FAMILY MEDICINE

## 2018-12-04 PROCEDURE — 2709999900 HC NON-CHARGEABLE SUPPLY: Performed by: INTERNAL MEDICINE

## 2018-12-04 RX ORDER — SODIUM CHLORIDE 0.9 % (FLUSH) 0.9 %
10 SYRINGE (ML) INJECTION PRN
Status: DISCONTINUED | OUTPATIENT
Start: 2018-12-04 | End: 2018-12-04 | Stop reason: HOSPADM

## 2018-12-04 RX ORDER — SODIUM CHLORIDE 9 MG/ML
INJECTION, SOLUTION INTRAVENOUS CONTINUOUS
Status: DISCONTINUED | OUTPATIENT
Start: 2018-12-04 | End: 2018-12-04 | Stop reason: HOSPADM

## 2018-12-04 RX ORDER — PROPOFOL 10 MG/ML
INJECTION, EMULSION INTRAVENOUS PRN
Status: DISCONTINUED | OUTPATIENT
Start: 2018-12-04 | End: 2018-12-04 | Stop reason: SDUPTHER

## 2018-12-04 RX ORDER — ATORVASTATIN CALCIUM 40 MG/1
40 TABLET, FILM COATED ORAL DAILY
COMMUNITY
End: 2018-12-15

## 2018-12-04 RX ORDER — SODIUM CHLORIDE 0.9 % (FLUSH) 0.9 %
10 SYRINGE (ML) INJECTION EVERY 12 HOURS SCHEDULED
Status: DISCONTINUED | OUTPATIENT
Start: 2018-12-04 | End: 2018-12-04 | Stop reason: HOSPADM

## 2018-12-04 RX ORDER — LIDOCAINE HYDROCHLORIDE 20 MG/ML
INJECTION, SOLUTION EPIDURAL; INFILTRATION; INTRACAUDAL; PERINEURAL PRN
Status: DISCONTINUED | OUTPATIENT
Start: 2018-12-04 | End: 2018-12-04 | Stop reason: SDUPTHER

## 2018-12-04 RX ADMIN — PROPOFOL 10 MG: 10 INJECTION, EMULSION INTRAVENOUS at 09:33

## 2018-12-04 RX ADMIN — SODIUM CHLORIDE: 9 INJECTION, SOLUTION INTRAVENOUS at 09:23

## 2018-12-04 RX ADMIN — PROPOFOL 20 MG: 10 INJECTION, EMULSION INTRAVENOUS at 09:36

## 2018-12-04 RX ADMIN — PROPOFOL 10 MG: 10 INJECTION, EMULSION INTRAVENOUS at 09:35

## 2018-12-04 RX ADMIN — LIDOCAINE HYDROCHLORIDE 100 MG: 20 INJECTION, SOLUTION EPIDURAL; INFILTRATION; INTRACAUDAL; PERINEURAL at 09:31

## 2018-12-04 RX ADMIN — PROPOFOL 50 MG: 10 INJECTION, EMULSION INTRAVENOUS at 09:31

## 2018-12-04 RX ADMIN — PROPOFOL 10 MG: 10 INJECTION, EMULSION INTRAVENOUS at 09:34

## 2018-12-04 ASSESSMENT — PAIN SCALES - GENERAL: PAINLEVEL_OUTOF10: 0

## 2018-12-04 ASSESSMENT — PAIN - FUNCTIONAL ASSESSMENT: PAIN_FUNCTIONAL_ASSESSMENT: 0-10

## 2018-12-04 NOTE — ANESTHESIA POSTPROCEDURE EVALUATION
Department of Anesthesiology  Postprocedure Note    Patient: Rodríguez Salazar  MRN: [id number]  YOB: 1945  Date of evaluation: 12/4/2018  Time:  9:45 AM     Procedure Summary     Date:  12/04/18 Room / Location:  [facility] ENDO 02 / [facility] ENDOSCOPY    Anesthesia Start:  0930 Anesthesia Stop:  0286    Procedure:  EGD (N/A ) Diagnosis:  (DUODENAL ULCER K26.9)    Surgeon:  Pavan Vera MD Responsible Provider:  Brenden Plasencia MD    Anesthesia Type:  MAC ASA Status:  3          Anesthesia Type: MAC    Jelani Phase I: Jelani Score: 10    Jelani Phase II:      Last vitals: Reviewed and per EMR flowsheets.        Anesthesia Post Evaluation    Patient location during evaluation: PACU  Patient participation: complete - patient participated  Level of consciousness: awake  Airway patency: patent  Nausea & Vomiting: no vomiting and no nausea  Complications: no  Cardiovascular status: hemodynamically stable  Respiratory status: acceptable  Hydration status: stable

## 2018-12-04 NOTE — PROGRESS NOTES
Discharge instructions reviewed with patient/responsible adult. All home medications have been reviewed, questions answered and patient verbalized understanding. Discharge instructions signed and copies given. Patient discharged per wheelchair with belongings.

## 2018-12-04 NOTE — ANESTHESIA PRE PROCEDURE
Answered      Vital Signs (Current):   Vitals:    11/14/18 1015   Weight: 230 lb (104.3 kg)   Height: 6' (1.829 m)                                              BP Readings from Last 3 Encounters:   09/20/18 (!) 158/80   09/18/18 (!) 142/77   09/11/18 (!) 140/70       NPO Status:                                                                                 BMI:   Wt Readings from Last 3 Encounters:   11/14/18 230 lb (104.3 kg)   09/18/18 230 lb (104.3 kg)   09/18/18 230 lb 12.8 oz (104.7 kg)     Body mass index is 31.19 kg/m². CBC:   Lab Results   Component Value Date    WBC 7.9 09/20/2018    RBC 3.06 09/20/2018    HGB 9.0 09/20/2018    HCT 28.4 09/20/2018    MCV 92.5 09/20/2018    RDW 14.8 09/20/2018     09/20/2018       CMP:   Lab Results   Component Value Date     09/20/2018    K 4.1 09/20/2018    K 3.6 03/21/2018     09/20/2018    CO2 20 09/20/2018    BUN 7 09/20/2018    CREATININE 0.9 09/20/2018    GFRAA >60 09/20/2018    GFRAA >60 10/05/2011    AGRATIO 1.2 09/18/2018    LABGLOM >60 09/20/2018    LABGLOM 78 12/20/2013    GLUCOSE 96 09/20/2018    PROT 7.1 09/18/2018    PROT 7.4 11/27/2012    CALCIUM 8.4 09/20/2018    BILITOT 0.6 09/18/2018    ALKPHOS 103 09/18/2018    AST 18 09/18/2018    ALT 19 09/18/2018       POC Tests: No results for input(s): POCGLU, POCNA, POCK, POCCL, POCBUN, POCHEMO, POCHCT in the last 72 hours.     Coags:   Lab Results   Component Value Date    PROTIME 12.3 09/18/2018    INR 1.08 09/18/2018    APTT 34.0 09/18/2018       HCG (If Applicable): No results found for: PREGTESTUR, PREGSERUM, HCG, HCGQUANT     ABGs:   Lab Results   Component Value Date    PHART 7.399 03/20/2018    PO2ART 398.7 03/20/2018    YEH3UZW 37.6 03/20/2018    DLG7BSU 23.2 03/20/2018    BEART -2 03/20/2018    O6RJUUXP 100 03/20/2018        Type & Screen (If Applicable):  No results found for: LABABO, [address]    Anesthesia Evaluation  Patient summary reviewed and Nursing notes reviewed  Airway:

## 2018-12-14 ENCOUNTER — TELEPHONE (OUTPATIENT)
Dept: FAMILY MEDICINE CLINIC | Age: 73
End: 2018-12-14

## 2018-12-14 ENCOUNTER — NURSE TRIAGE (OUTPATIENT)
Dept: OTHER | Facility: CLINIC | Age: 73
End: 2018-12-14

## 2018-12-14 NOTE — TELEPHONE ENCOUNTER
Pt's spouse was transferred from [address] [place] [address]   C/O balance issues needs to be seen today per Triage Nurse and due to possible availability at the time of call may need to go to the nearest ER per Triage Nurse     Spouse states declined to go to the nearest ER and request a calll back today to be seen possible tomorrow /Saturday 12.15.18    Please advise Napoleon Raysal

## 2018-12-15 ENCOUNTER — OFFICE VISIT (OUTPATIENT)
Dept: FAMILY MEDICINE CLINIC | Age: 73
End: 2018-12-15
Payer: COMMERCIAL

## 2018-12-15 VITALS
BODY MASS INDEX: 31.19 KG/M2 | WEIGHT: 230 LBS | HEART RATE: 58 BPM | DIASTOLIC BLOOD PRESSURE: 70 MMHG | OXYGEN SATURATION: 97 % | SYSTOLIC BLOOD PRESSURE: 118 MMHG

## 2018-12-15 DIAGNOSIS — J44.9 CHRONIC OBSTRUCTIVE PULMONARY DISEASE, UNSPECIFIED COPD TYPE (HCC): Primary | ICD-10-CM

## 2018-12-15 DIAGNOSIS — R60.1 GENERALIZED EDEMA: ICD-10-CM

## 2018-12-15 DIAGNOSIS — Z12.5 SCREENING FOR MALIGNANT NEOPLASM OF PROSTATE: ICD-10-CM

## 2018-12-15 DIAGNOSIS — R27.0 ATAXIA: ICD-10-CM

## 2018-12-15 DIAGNOSIS — I10 ESSENTIAL HYPERTENSION: ICD-10-CM

## 2018-12-15 DIAGNOSIS — E78.00 HYPERCHOLESTEREMIA: Chronic | ICD-10-CM

## 2018-12-15 PROCEDURE — 99214 OFFICE O/P EST MOD 30 MIN: CPT | Performed by: FAMILY MEDICINE

## 2018-12-17 RX ORDER — BENAZEPRIL HYDROCHLORIDE 10 MG/1
TABLET ORAL
Qty: 30 TABLET | Refills: 0 | Status: SHIPPED | OUTPATIENT
Start: 2018-12-17 | End: 2019-02-15 | Stop reason: SDUPTHER

## 2018-12-19 RX ORDER — BENAZEPRIL HYDROCHLORIDE 10 MG/1
TABLET ORAL
Qty: 90 TABLET | Refills: 2 | Status: SHIPPED | OUTPATIENT
Start: 2018-12-19 | End: 2019-02-04 | Stop reason: CLARIF

## 2019-01-02 DIAGNOSIS — R60.0 EDEMA OF BOTH LEGS: ICD-10-CM

## 2019-01-02 RX ORDER — FUROSEMIDE 40 MG/1
TABLET ORAL
Qty: 90 TABLET | Refills: 0 | Status: SHIPPED | OUTPATIENT
Start: 2019-01-02 | End: 2019-02-15 | Stop reason: SDUPTHER

## 2019-01-02 RX ORDER — ATORVASTATIN CALCIUM 80 MG/1
TABLET, FILM COATED ORAL
Qty: 90 TABLET | Refills: 0 | Status: SHIPPED | OUTPATIENT
Start: 2019-01-02 | End: 2019-02-15 | Stop reason: SDUPTHER

## 2019-01-23 ENCOUNTER — TELEPHONE (OUTPATIENT)
Dept: CARDIOLOGY CLINIC | Age: 74
End: 2019-01-23

## 2019-01-23 DIAGNOSIS — I35.0 NONRHEUMATIC AORTIC VALVE STENOSIS: Primary | ICD-10-CM

## 2019-01-28 ENCOUNTER — HOSPITAL ENCOUNTER (OUTPATIENT)
Age: 74
Discharge: HOME | End: 2019-01-28
Payer: COMMERCIAL

## 2019-01-28 DIAGNOSIS — Z12.5 SCREENING FOR MALIGNANT NEOPLASM OF PROSTATE: ICD-10-CM

## 2019-01-28 DIAGNOSIS — E78.00 HYPERCHOLESTEREMIA: Chronic | ICD-10-CM

## 2019-01-28 LAB
A/G RATIO: 1.4 (ref 1.1–2.2)
ALBUMIN SERPL-MCNC: 4.2 G/DL (ref 3.4–5)
ALP BLD-CCNC: 122 U/L (ref 40–129)
ALT SERPL-CCNC: 16 U/L (ref 10–40)
ANION GAP SERPL CALCULATED.3IONS-SCNC: 11 MMOL/L (ref 3–16)
AST SERPL-CCNC: 18 U/L (ref 15–37)
BASOPHILS ABSOLUTE: 0.2 K/UL (ref 0–0.2)
BASOPHILS RELATIVE PERCENT: 1.8 %
BILIRUB SERPL-MCNC: 0.7 MG/DL (ref 0–1)
BUN BLDV-MCNC: 14 MG/DL (ref 7–20)
CALCIUM SERPL-MCNC: 9.2 MG/DL (ref 8.3–10.6)
CHLORIDE BLD-SCNC: 107 MMOL/L (ref 99–110)
CHOLESTEROL, FASTING: 151 MG/DL (ref 0–199)
CO2: 26 MMOL/L (ref 21–32)
CREAT SERPL-MCNC: 1.1 MG/DL (ref 0.8–1.3)
EOSINOPHILS ABSOLUTE: 0.9 K/UL (ref 0–0.6)
EOSINOPHILS RELATIVE PERCENT: 8.4 %
GFR AFRICAN AMERICAN: >60
GFR NON-AFRICAN AMERICAN: >60
GLOBULIN: 3 G/DL
GLUCOSE FASTING: 104 MG/DL (ref 70–99)
HCT VFR BLD CALC: 39 % (ref 40.5–52.5)
HDLC SERPL-MCNC: 48 MG/DL (ref 40–60)
HEMOGLOBIN: 12.7 G/DL (ref 13.5–17.5)
LDL CHOLESTEROL CALCULATED: 84 MG/DL
LYMPHOCYTES ABSOLUTE: 2.4 K/UL (ref 1–5.1)
LYMPHOCYTES RELATIVE PERCENT: 22.7 %
MCH RBC QN AUTO: 26 PG (ref 26–34)
MCHC RBC AUTO-ENTMCNC: 32.7 G/DL (ref 31–36)
MCV RBC AUTO: 79.6 FL (ref 80–100)
MONOCYTES ABSOLUTE: 0.9 K/UL (ref 0–1.3)
MONOCYTES RELATIVE PERCENT: 8.5 %
NEUTROPHILS ABSOLUTE: 6.3 K/UL (ref 1.7–7.7)
NEUTROPHILS RELATIVE PERCENT: 58.6 %
PDW BLD-RTO: 17.2 % (ref 12.4–15.4)
PLATELET # BLD: 311 K/UL (ref 135–450)
PMV BLD AUTO: 9.7 FL (ref 5–10.5)
POTASSIUM SERPL-SCNC: 4.9 MMOL/L (ref 3.5–5.1)
PROSTATE SPECIFIC ANTIGEN: 1.72 NG/ML (ref 0–4)
RBC # BLD: 4.9 M/UL (ref 4.2–5.9)
SODIUM BLD-SCNC: 144 MMOL/L (ref 136–145)
TOTAL PROTEIN: 7.2 G/DL (ref 6.4–8.2)
TRIGLYCERIDE, FASTING: 94 MG/DL (ref 0–150)
TSH REFLEX: 2.18 UIU/ML (ref 0.27–4.2)
VLDLC SERPL CALC-MCNC: 19 MG/DL
WBC # BLD: 10.7 K/UL (ref 4–11)

## 2019-01-28 PROCEDURE — 80053 COMPREHEN METABOLIC PANEL: CPT

## 2019-01-28 PROCEDURE — 36415 COLL VENOUS BLD VENIPUNCTURE: CPT

## 2019-01-28 PROCEDURE — 80061 LIPID PANEL: CPT

## 2019-01-28 PROCEDURE — 85025 COMPLETE CBC W/AUTO DIFF WBC: CPT

## 2019-01-28 PROCEDURE — 84443 ASSAY THYROID STIM HORMONE: CPT

## 2019-01-28 PROCEDURE — 84153 ASSAY OF PSA TOTAL: CPT

## 2019-02-04 ENCOUNTER — OFFICE VISIT (OUTPATIENT)
Dept: NEUROLOGY | Age: 74
End: 2019-02-04
Payer: COMMERCIAL

## 2019-02-04 VITALS
SYSTOLIC BLOOD PRESSURE: 126 MMHG | WEIGHT: 236 LBS | BODY MASS INDEX: 31.97 KG/M2 | DIASTOLIC BLOOD PRESSURE: 72 MMHG | HEART RATE: 67 BPM | HEIGHT: 72 IN

## 2019-02-04 DIAGNOSIS — G60.9 PERIPHERAL NEUROPATHY, IDIOPATHIC: ICD-10-CM

## 2019-02-04 DIAGNOSIS — G91.2 NORMAL PRESSURE HYDROCEPHALUS (HCC): Primary | ICD-10-CM

## 2019-02-04 DIAGNOSIS — G31.84 MILD COGNITIVE IMPAIRMENT: ICD-10-CM

## 2019-02-04 DIAGNOSIS — R27.0 ATAXIA: ICD-10-CM

## 2019-02-04 PROCEDURE — 99214 OFFICE O/P EST MOD 30 MIN: CPT | Performed by: PSYCHIATRY & NEUROLOGY

## 2019-02-15 ENCOUNTER — OFFICE VISIT (OUTPATIENT)
Dept: FAMILY MEDICINE CLINIC | Age: 74
End: 2019-02-15
Payer: COMMERCIAL

## 2019-02-15 VITALS
DIASTOLIC BLOOD PRESSURE: 80 MMHG | SYSTOLIC BLOOD PRESSURE: 120 MMHG | OXYGEN SATURATION: 97 % | HEART RATE: 64 BPM | WEIGHT: 237 LBS | BODY MASS INDEX: 32.14 KG/M2

## 2019-02-15 DIAGNOSIS — Z23 NEED FOR SHINGLES VACCINE: Primary | ICD-10-CM

## 2019-02-15 DIAGNOSIS — R60.0 EDEMA OF BOTH LEGS: ICD-10-CM

## 2019-02-15 DIAGNOSIS — R73.9 HYPERGLYCEMIA: ICD-10-CM

## 2019-02-15 DIAGNOSIS — I10 ESSENTIAL HYPERTENSION: ICD-10-CM

## 2019-02-15 DIAGNOSIS — E78.00 HYPERCHOLESTEREMIA: Chronic | ICD-10-CM

## 2019-02-15 PROCEDURE — 99214 OFFICE O/P EST MOD 30 MIN: CPT | Performed by: FAMILY MEDICINE

## 2019-02-15 RX ORDER — ATORVASTATIN CALCIUM 80 MG/1
TABLET, FILM COATED ORAL
Qty: 90 TABLET | Refills: 3 | Status: SHIPPED | OUTPATIENT
Start: 2019-02-15 | End: 2020-04-14

## 2019-02-15 RX ORDER — FUROSEMIDE 40 MG/1
TABLET ORAL
Qty: 90 TABLET | Refills: 3 | Status: SHIPPED | OUTPATIENT
Start: 2019-02-15 | End: 2020-03-13

## 2019-02-15 RX ORDER — BENAZEPRIL HYDROCHLORIDE 10 MG/1
TABLET ORAL
Qty: 90 TABLET | Refills: 3 | Status: SHIPPED | OUTPATIENT
Start: 2019-02-15 | End: 2019-04-04

## 2019-02-15 ASSESSMENT — ENCOUNTER SYMPTOMS
DIARRHEA: 0
CONSTIPATION: 0
BACK PAIN: 0
SHORTNESS OF BREATH: 0
RHINORRHEA: 0
APNEA: 0

## 2019-04-01 NOTE — PROGRESS NOTES
[address]       H+P // CONSULT // OUTPATIENT VISIT // Kendra Gaitan     Referring Doctor Juan Phillips MD   Encounter Type Followup     CHIEF COMPLAINT     VisitType [] Acute [x] Chronic     Symptom [x] None [] CP [] SOB [] Dizzy [] Palps [] Fatigue     Problems AS, HTN, CHOL      HISTORY OF PRESENT ILLNESS     Doing well. No cp, sob. Feeling well. Symptom Y N Frequency Duration Severity Modifying Assoc Sx Other   CP [] [x]         SOB [] [x]         Dizzy [] [x]         Syncope [] [x]         Palpitations [] [x]           COMPLIANCE     Category Meds Diet Salt Exercise Tobacco Alcohol Drugs   Compliant [x] [x] [x] [] [x] [x] [x]   [x]Counseling given on all above above categories    HISTORY/ALLERGIES/ROS     MedHx:  has a past medical history of Allergic rhinitis, Aortic valve disorders, COPD (chronic obstructive pulmonary disease) ([facility] [address] Hydrocephalus, Hyperlipidemia, Hypertension, Hypertrophy of prostate without urinary obstruction and other lower urinary tract symptoms (LUTS), Irritable bowel syndrome, Pancreatitis, Peptic ulcer, unspecified site, unspecified as acute or chronic, without mention of hemorrhage, perforation, or obstruction, and Peripheral vascular disease ([facility] [address] 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 SocHx:   reports that he quit smoking about 17 years ago. His smoking use included cigarettes. He has a 45.00 pack-year smoking history. He has never used smokeless tobacco. He reports that he drinks about 1.2 oz of alcohol per week.  He reports that he does not use drugs. FamHx: family history includes Alcohol Abuse in his father; Diabetes in his father; Heart Disease in his father; Other in his mother. Allergies: Patient has no known allergies. ROS:  [x]Full ROS obtained and negative except as mentioned in HPI     MEDICATIONS      Current Outpatient Medications   Medication Sig Dispense Refill    furosemide (LASIX) 40 MG tablet TAKE 1 TABLET DAILY 90 tablet 3    atorvastatin (LIPITOR) 80 MG tablet TAKE 1 TABLET DAILY 90 tablet 3    benazepril (LOTENSIN) 10 MG tablet TAKE ONE TABLET BY MOUTH DAILY 90 tablet 3    aspirin 81 MG EC tablet Take 1 tablet by mouth daily 30 tablet 3     No current facility-administered medications for this visit.       Reviewed with patient and will remain unchanged except as mentioned in A/P  PHYSICAL EXAM     Vitals:    04/04/19 1254   BP: 120/80   Pulse: 66      Gen Alert, coop, no distress Heart  RRR, no MRG, nl apical impulse   Head NC, AT, no abnorm Abd  Soft, NT, +BS, no mass, no OM   Eyes PER, conj/corn clear Ext  Ext nl, AT, no C/C/E   Nose Nares nl, no drain, NT Pulse 2+ and symmetric   Throat Lips, mucosa, tongue nl Skin Col/text/turg nl, no vis rash/les   Neck S/S, TM, NT, no bruit/JVD Psych Nl mood and affect   Lung CTA-B, unlabored, no DTP Lymph   No cervical or axillary LA   Ch wall NT, no deform Neuro  Nl gross M/S exam     ASSESSMENT AND PLAN    ~AS   Date EF Detail   Sx   No concerning   Hx 3/18  LSC TAVR Lamas S3 26mm   NYHA   [x]I         []II           []III          []IV   [facility] 1/18  Nonobstructive   TTE 6/10  11/15  1/17  7/17  3/18  4/18  4/19 50%  60%  55%  55%  65%  60%  60% AS MG 15  AS MG 23  AS MG 35  AS MG 38  TAVR  well seated, MG 14, no AI  TAVR MG 10, no AI  TAVR MG 14   Plan   Doing well post TAVR  Continue current medications listed above   ~HTN  Today BP [x] Controlled [] Borderline [] Uncontrolled   Counseling [x] Diet/Salt [x] Exercise [x] Weight    Plan Continue current medications

## 2019-04-04 ENCOUNTER — HOSPITAL ENCOUNTER (OUTPATIENT)
Dept: NON INVASIVE DIAGNOSTICS | Age: 74
Discharge: HOME | End: 2019-04-04
Payer: COMMERCIAL

## 2019-04-04 ENCOUNTER — OFFICE VISIT (OUTPATIENT)
Dept: CARDIOLOGY CLINIC | Age: 74
End: 2019-04-04
Payer: COMMERCIAL

## 2019-04-04 VITALS
HEART RATE: 66 BPM | HEIGHT: 72 IN | SYSTOLIC BLOOD PRESSURE: 120 MMHG | BODY MASS INDEX: 31.97 KG/M2 | DIASTOLIC BLOOD PRESSURE: 80 MMHG | WEIGHT: 236 LBS

## 2019-04-04 DIAGNOSIS — E78.00 HYPERCHOLESTEREMIA: ICD-10-CM

## 2019-04-04 DIAGNOSIS — I35.0 NONRHEUMATIC AORTIC VALVE STENOSIS: Primary | ICD-10-CM

## 2019-04-04 DIAGNOSIS — I10 ESSENTIAL HYPERTENSION: ICD-10-CM

## 2019-04-04 DIAGNOSIS — I73.9 PERIPHERAL VASCULAR DISEASE (HCC): ICD-10-CM

## 2019-04-04 DIAGNOSIS — I35.0 NONRHEUMATIC AORTIC VALVE STENOSIS: ICD-10-CM

## 2019-04-04 LAB
LEFT VENTRICULAR EJECTION FRACTION HIGH VALUE: 60 %
LEFT VENTRICULAR EJECTION FRACTION MODE: NORMAL
LV EF: 60 %
LVEF MODALITY: NORMAL

## 2019-04-04 PROCEDURE — 93306 TTE W/DOPPLER COMPLETE: CPT

## 2019-04-04 PROCEDURE — 6360000004 HC RX CONTRAST MEDICATION: Performed by: INTERNAL MEDICINE

## 2019-04-04 PROCEDURE — 99214 OFFICE O/P EST MOD 30 MIN: CPT | Performed by: INTERNAL MEDICINE

## 2019-04-04 RX ORDER — BENAZEPRIL HYDROCHLORIDE 5 MG/1
TABLET, FILM COATED ORAL
Qty: 90 TABLET | Refills: 3 | Status: SHIPPED | OUTPATIENT
Start: 2019-04-04 | End: 2019-08-15

## 2019-04-04 RX ADMIN — PERFLUTREN 1.65 MG: 6.52 INJECTION, SUSPENSION INTRAVENOUS at 12:00

## 2019-05-09 ENCOUNTER — HOSPITAL ENCOUNTER (OUTPATIENT)
Age: 74
Discharge: HOME | End: 2019-05-09
Payer: COMMERCIAL

## 2019-05-09 DIAGNOSIS — R73.9 HYPERGLYCEMIA: ICD-10-CM

## 2019-05-09 DIAGNOSIS — E78.00 HYPERCHOLESTEREMIA: Chronic | ICD-10-CM

## 2019-05-09 LAB
A/G RATIO: 1.1 (ref 1.1–2.2)
ALBUMIN SERPL-MCNC: 3.7 G/DL (ref 3.4–5)
ALP BLD-CCNC: 112 U/L (ref 40–129)
ALT SERPL-CCNC: 18 U/L (ref 10–40)
ANION GAP SERPL CALCULATED.3IONS-SCNC: 11 MMOL/L (ref 3–16)
AST SERPL-CCNC: 17 U/L (ref 15–37)
BASOPHILS ABSOLUTE: 0.1 K/UL (ref 0–0.2)
BASOPHILS RELATIVE PERCENT: 1 %
BILIRUB SERPL-MCNC: 0.8 MG/DL (ref 0–1)
BUN BLDV-MCNC: 12 MG/DL (ref 7–20)
CALCIUM SERPL-MCNC: 9.3 MG/DL (ref 8.3–10.6)
CHLORIDE BLD-SCNC: 102 MMOL/L (ref 99–110)
CHOLESTEROL, FASTING: 134 MG/DL (ref 0–199)
CO2: 27 MMOL/L (ref 21–32)
CREAT SERPL-MCNC: 1.3 MG/DL (ref 0.8–1.3)
EOSINOPHILS ABSOLUTE: 0.6 K/UL (ref 0–0.6)
EOSINOPHILS RELATIVE PERCENT: 7.4 %
ESTIMATED AVERAGE GLUCOSE: 131.2 MG/DL
GFR AFRICAN AMERICAN: >60
GFR NON-AFRICAN AMERICAN: 54
GLOBULIN: 3.3 G/DL
GLUCOSE FASTING: 106 MG/DL (ref 70–99)
HBA1C MFR BLD: 6.2 %
HCT VFR BLD CALC: 42.2 % (ref 40.5–52.5)
HDLC SERPL-MCNC: 43 MG/DL (ref 40–60)
HEMOGLOBIN: 14.4 G/DL (ref 13.5–17.5)
LDL CHOLESTEROL CALCULATED: 61 MG/DL
LYMPHOCYTES ABSOLUTE: 1.9 K/UL (ref 1–5.1)
LYMPHOCYTES RELATIVE PERCENT: 24.3 %
MCH RBC QN AUTO: 29.9 PG (ref 26–34)
MCHC RBC AUTO-ENTMCNC: 34.1 G/DL (ref 31–36)
MCV RBC AUTO: 87.7 FL (ref 80–100)
MONOCYTES ABSOLUTE: 0.7 K/UL (ref 0–1.3)
MONOCYTES RELATIVE PERCENT: 9 %
NEUTROPHILS ABSOLUTE: 4.5 K/UL (ref 1.7–7.7)
NEUTROPHILS RELATIVE PERCENT: 58.3 %
PDW BLD-RTO: 16.5 % (ref 12.4–15.4)
PLATELET # BLD: 230 K/UL (ref 135–450)
PMV BLD AUTO: 9.4 FL (ref 5–10.5)
POTASSIUM SERPL-SCNC: 5 MMOL/L (ref 3.5–5.1)
RBC # BLD: 4.81 M/UL (ref 4.2–5.9)
SODIUM BLD-SCNC: 140 MMOL/L (ref 136–145)
TOTAL PROTEIN: 7 G/DL (ref 6.4–8.2)
TRIGLYCERIDE, FASTING: 148 MG/DL (ref 0–150)
VLDLC SERPL CALC-MCNC: 30 MG/DL
WBC # BLD: 7.7 K/UL (ref 4–11)

## 2019-05-09 PROCEDURE — 83036 HEMOGLOBIN GLYCOSYLATED A1C: CPT

## 2019-05-09 PROCEDURE — 80053 COMPREHEN METABOLIC PANEL: CPT

## 2019-05-09 PROCEDURE — 36415 COLL VENOUS BLD VENIPUNCTURE: CPT

## 2019-05-09 PROCEDURE — 85025 COMPLETE CBC W/AUTO DIFF WBC: CPT

## 2019-05-09 PROCEDURE — 80061 LIPID PANEL: CPT

## 2019-05-15 ENCOUNTER — OFFICE VISIT (OUTPATIENT)
Dept: FAMILY MEDICINE CLINIC | Age: 74
End: 2019-05-15
Payer: COMMERCIAL

## 2019-05-15 VITALS
OXYGEN SATURATION: 97 % | DIASTOLIC BLOOD PRESSURE: 76 MMHG | BODY MASS INDEX: 32.41 KG/M2 | WEIGHT: 239 LBS | HEART RATE: 59 BPM | SYSTOLIC BLOOD PRESSURE: 136 MMHG

## 2019-05-15 DIAGNOSIS — L43.0 LICHEN PLANUS HYPERTROPHICUS: ICD-10-CM

## 2019-05-15 DIAGNOSIS — R60.0 LOCALIZED EDEMA: Primary | ICD-10-CM

## 2019-05-15 DIAGNOSIS — G91.2 NORMAL PRESSURE HYDROCEPHALUS (HCC): ICD-10-CM

## 2019-05-15 DIAGNOSIS — I10 ESSENTIAL HYPERTENSION: ICD-10-CM

## 2019-05-15 DIAGNOSIS — R60.1 GENERALIZED EDEMA: ICD-10-CM

## 2019-05-15 PROCEDURE — G8510 SCR DEP NEG, NO PLAN REQD: HCPCS | Performed by: FAMILY MEDICINE

## 2019-05-15 PROCEDURE — 3288F FALL RISK ASSESSMENT DOCD: CPT | Performed by: FAMILY MEDICINE

## 2019-05-15 PROCEDURE — 99214 OFFICE O/P EST MOD 30 MIN: CPT | Performed by: FAMILY MEDICINE

## 2019-05-15 ASSESSMENT — ENCOUNTER SYMPTOMS
SINUS PAIN: 0
CONSTIPATION: 0
RHINORRHEA: 0
SHORTNESS OF BREATH: 0
BLOOD IN STOOL: 0
DIARRHEA: 0
ABDOMINAL PAIN: 0
EYE PAIN: 0
WHEEZING: 0
CHEST TIGHTNESS: 0
ANAL BLEEDING: 0
EYES NEGATIVE: 1
EYE REDNESS: 0
EYE DISCHARGE: 0
NAUSEA: 0

## 2019-05-15 ASSESSMENT — PATIENT HEALTH QUESTIONNAIRE - PHQ9: DEPRESSION UNABLE TO ASSESS: FUNCTIONAL CAPACITY MOTIVATION LIMITS ACCURACY

## 2019-05-15 NOTE — PROGRESS NOTES
Subjective:      Patient ID: Gurwinder Snow is a 72 y.o. male. Hyperlipidemia   This is a chronic problem. The current episode started more than 1 year ago. The problem is controlled. Recent lipid tests were reviewed and are normal. Pertinent negatives include no chest pain, focal weakness or shortness of breath. Current antihyperlipidemic treatment includes statins. The current treatment provides significant improvement of lipids. Risk factors for coronary artery disease include dyslipidemia, hypertension, male sex and a sedentary lifestyle. Anemia  Patient had a GI bleed in September of last year. Esophagogastroduodenoscopy showed 10 mm antral and 15 duodenal bulb ulcers. He was started on a PPI. He has stopped taking his Effient after seeing cardiology. Patient denies any further episodes of black stools or diarrhea. He does complain of significant fatigue. Labs done today revealed a hemoglobin of 14.4 with hematocrit of 42.2 and MCV was low at 87.7. These are improved from January after starting oral iron sulfate. Hypertension   This is a chronic problem. The current episode started more than 1 year ago. The problem is controlled. Pertinent negatives include no chest pain or shortness of breath. Risk factors for coronary artery disease include obesity. Past treatments include ACE inhibitors. The current treatment provides significant improvement. Compliance problems include exercise. Checks BP at home: No  BP numbers: 120/80 at last cardiology appointment  Taking medication daily: Yes  Side Effects of medication: yes - increased urination with lasix, otherwise none. Patient's medications, allergies, past medical, surgical, social and family histories were reviewed and updated in the EHR as appropriate.     No CP, no palpitations, no sob, no edema, no cough, no change in bowel or bladder,  no nausea, no vomiting, no abdominal pain    NPH  Patient sees Dr. Luisana Johnson, last seen 3 months ago,next appointment within next 2 months. Feels that his gait has been worse lately and more off balance. Urinary incontinence has become more frequent as well. RASH:  Itchy, red rash at base of back/top of buttocks present for 2 weeks. Has been applying alcohol to it, which patient thinks may be improving symptoms. No fevers, rash not present anywhere else. Has not experienced this before. Review of Systems   Constitutional: Positive for fatigue. HENT: Negative for congestion, ear discharge, ear pain, postnasal drip, rhinorrhea, sinus pain and sneezing. Eyes: Negative. Negative for pain, discharge and redness. Respiratory: Negative for chest tightness, shortness of breath and wheezing. Cardiovascular: Positive for leg swelling. Negative for chest pain and palpitations. Gastrointestinal: Negative for abdominal pain, anal bleeding, blood in stool, constipation, diarrhea and nausea. Endocrine: Positive for polyuria. Negative for polydipsia and polyphagia. Genitourinary: Positive for difficulty urinating, frequency and urgency. Negative for hematuria. Musculoskeletal: Negative for arthralgias and myalgias. Skin: Positive for rash (lower back for 2 weeks). Neurological: Positive for numbness and headaches. Negative for dizziness, syncope and light-headedness. Psychiatric/Behavioral: Negative. Objective:   Physical Exam    Vitals:    05/15/19 1006 05/15/19 1040   BP: (!) 140/70 136/76   Pulse: 59    SpO2: 97%    Weight: 239 lb (108.4 kg)      Wt Readings from Last 3 Encounters:   05/15/19 239 lb (108.4 kg)   04/04/19 236 lb (107 kg)   02/15/19 237 lb (107.5 kg)     Body mass index is 32.41 kg/m². GENERAL Alert and oriented x 4 NAD, no acute distress, well hydrated, well developed.   NECK:supple and non tender without mass, no thyromegaly or thyroid nodules, no cervical lymphadenopathy  HEENT: TM clear bilaterally, nares pink and moist, OP clear, PERRL, EOMI  LUNG:clear to auscultation bilaterally with normal respiratory effort  CV: Normal heart sounds, regular rate and rhythm without murmurs  ABD: soft, NT, ND, no masses, no HSM  EXTREMETY: no loss of hair, no edema, normal pedal pulses bilaterally  NEURO: CN grossly intact, moving all extremities equally, no gross deficits  SKIN: two dry, Slightly raised, erythematous patches present at top of buttocks/base of back. No excoriations    Assessment:      ASSESSMENT AND PLAN:       Bina Matthews was seen today for follow-up. Diagnoses and all orders for this visit:    Localized edema  -     Basic Metabolic Panel, Fasting; Future    Normal pressure hydrocephalus  Followed by Dr. Wu Re hypertension  Stable on current medications  Generalized edema  Continue Lasix 40 mg daily and may take an additional half tablet every 2 to 3 days. Lichen planus hypertrophicus  -     fluocinonide-emollient (LIDEX-E) 0.05 % cream; Apply topically 2 times daily. Quality & Risk Score Accuracy    Last edited 05/15/19 12:57 EDT by Aimee Lew MD         Return in about 3 months (around 8/15/2019).        Aimee Lew MD

## 2019-07-26 ENCOUNTER — OFFICE VISIT (OUTPATIENT)
Dept: NEUROLOGY | Age: 74
End: 2019-07-26
Payer: COMMERCIAL

## 2019-07-26 VITALS
WEIGHT: 242 LBS | DIASTOLIC BLOOD PRESSURE: 63 MMHG | SYSTOLIC BLOOD PRESSURE: 131 MMHG | BODY MASS INDEX: 32.82 KG/M2 | HEART RATE: 62 BPM

## 2019-07-26 DIAGNOSIS — G31.84 MILD COGNITIVE IMPAIRMENT: ICD-10-CM

## 2019-07-26 DIAGNOSIS — G60.9 PERIPHERAL NEUROPATHY, IDIOPATHIC: ICD-10-CM

## 2019-07-26 DIAGNOSIS — R27.0 ATAXIA: ICD-10-CM

## 2019-07-26 DIAGNOSIS — G91.2 NORMAL PRESSURE HYDROCEPHALUS (HCC): Primary | ICD-10-CM

## 2019-07-26 PROCEDURE — 99214 OFFICE O/P EST MOD 30 MIN: CPT | Performed by: PSYCHIATRY & NEUROLOGY

## 2019-07-26 NOTE — PROGRESS NOTES
there is any dramatic increase in the size of ventricles we will consider lumbar puncture with large volume CSF tap. Otherwise we will continue to monitor him. I will see him back in 6 months for follow-up    Please note a portion of  this chart was generated using dragon dictation software. Although every effort was made to ensure the accuracy of this automated transcription, some errors in transcription may have occurred.

## 2019-08-05 ENCOUNTER — HOSPITAL ENCOUNTER (OUTPATIENT)
Dept: MRI IMAGING | Age: 74
Discharge: HOME | End: 2019-08-05
Payer: COMMERCIAL

## 2019-08-05 DIAGNOSIS — G31.84 MILD COGNITIVE IMPAIRMENT: ICD-10-CM

## 2019-08-05 DIAGNOSIS — R27.0 ATAXIA: ICD-10-CM

## 2019-08-05 DIAGNOSIS — G91.2 NORMAL PRESSURE HYDROCEPHALUS (HCC): ICD-10-CM

## 2019-08-05 PROCEDURE — 70551 MRI BRAIN STEM W/O DYE: CPT

## 2019-08-09 ENCOUNTER — HOSPITAL ENCOUNTER (OUTPATIENT)
Age: 74
Discharge: HOME | End: 2019-08-09
Payer: COMMERCIAL

## 2019-08-09 DIAGNOSIS — R60.1 GENERALIZED EDEMA: ICD-10-CM

## 2019-08-09 LAB
ANION GAP SERPL CALCULATED.3IONS-SCNC: 12 MMOL/L (ref 3–16)
BUN BLDV-MCNC: 13 MG/DL (ref 7–20)
CALCIUM SERPL-MCNC: 9.2 MG/DL (ref 8.3–10.6)
CHLORIDE BLD-SCNC: 102 MMOL/L (ref 99–110)
CO2: 27 MMOL/L (ref 21–32)
CREAT SERPL-MCNC: 1 MG/DL (ref 0.8–1.3)
GFR AFRICAN AMERICAN: >60
GFR NON-AFRICAN AMERICAN: >60
GLUCOSE FASTING: 107 MG/DL (ref 70–99)
POTASSIUM SERPL-SCNC: 4.6 MMOL/L (ref 3.5–5.1)
SODIUM BLD-SCNC: 141 MMOL/L (ref 136–145)

## 2019-08-09 PROCEDURE — 80048 BASIC METABOLIC PNL TOTAL CA: CPT

## 2019-08-09 PROCEDURE — 36415 COLL VENOUS BLD VENIPUNCTURE: CPT

## 2019-08-12 ENCOUNTER — TELEPHONE (OUTPATIENT)
Dept: NEUROLOGY | Age: 74
End: 2019-08-12

## 2019-08-15 ENCOUNTER — OFFICE VISIT (OUTPATIENT)
Dept: FAMILY MEDICINE CLINIC | Age: 74
End: 2019-08-15
Payer: COMMERCIAL

## 2019-08-15 VITALS
DIASTOLIC BLOOD PRESSURE: 80 MMHG | HEART RATE: 63 BPM | WEIGHT: 239 LBS | BODY MASS INDEX: 32.41 KG/M2 | SYSTOLIC BLOOD PRESSURE: 128 MMHG | OXYGEN SATURATION: 97 %

## 2019-08-15 DIAGNOSIS — E78.00 HYPERCHOLESTEREMIA: Chronic | ICD-10-CM

## 2019-08-15 DIAGNOSIS — R39.9 LOWER URINARY TRACT SYMPTOMS (LUTS): ICD-10-CM

## 2019-08-15 DIAGNOSIS — R73.9 HYPERGLYCEMIA: ICD-10-CM

## 2019-08-15 DIAGNOSIS — I10 ESSENTIAL HYPERTENSION: ICD-10-CM

## 2019-08-15 DIAGNOSIS — J44.9 CHRONIC OBSTRUCTIVE PULMONARY DISEASE, UNSPECIFIED COPD TYPE (HCC): Primary | ICD-10-CM

## 2019-08-15 PROCEDURE — 99214 OFFICE O/P EST MOD 30 MIN: CPT | Performed by: FAMILY MEDICINE

## 2019-08-15 RX ORDER — BENAZEPRIL HYDROCHLORIDE 10 MG/1
10 TABLET ORAL DAILY
COMMUNITY
End: 2019-12-23 | Stop reason: SDUPTHER

## 2019-08-15 ASSESSMENT — ENCOUNTER SYMPTOMS
CONSTIPATION: 0
WHEEZING: 0
APNEA: 0
RHINORRHEA: 0
DIARRHEA: 0
ABDOMINAL PAIN: 0
SORE THROAT: 0
SHORTNESS OF BREATH: 0

## 2019-08-15 ASSESSMENT — COPD QUESTIONNAIRES: COPD: 1

## 2019-08-15 NOTE — PROGRESS NOTES
other lower urinary tract symptoms (LUTS)     Irritable bowel syndrome     Pancreatitis     Peptic ulcer, unspecified site, unspecified as acute or chronic, without mention of hemorrhage, perforation, or obstruction     Peripheral vascular disease (HCC)      Past Surgical History:   Procedure Laterality Date    ABDOMINAL AORTIC ANEURYSM REPAIR N/A     ANGIOPLASTY      RT femoral artery    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REPAIR  2018    Jordyn Atkinson    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REPAIR      Aortic valve replacement    AORTIC VALVE REPLACEMENT      CHOLECYSTECTOMY      FEMORAL BYPASS      bifemoral bypass    FOOT SURGERY Right 12/2/15    HERNIA REPAIR Left     OTHER SURGICAL HISTORY  10/23/2017    lumbar puncture    MT ESOPHAGOGASTRODUODENOSCOPY TRANSORAL DIAGNOSTIC N/A 2018    EGD performed by Windy Grimaldo MD at [id number] [facility] ENDOSCOPY  2018    WITH BIOPSY     Family History   Problem Relation Age of Onset    Heart Disease Father     Diabetes Father     Alcohol Abuse Father     Other Mother      Social History     Socioeconomic History    Marital status:      Spouse name: dalia    Number of children: 2    Years of education: Not on file    Highest education level: Not on file   Occupational History    Not on file   Social Needs    Financial resource strain: Not on file    Food insecurity:     Worry: Not on file     Inability: Not on file    Transportation needs:     Medical: Not on file     Non-medical: Not on file   Tobacco Use    Smoking status: Former Smoker     Packs/day: 1.50     Years: 30.00     Pack years: 45.00     Types: Cigarettes     Last attempt to quit: 2001     Years since quittin.6    Smokeless tobacco: Never Used    Tobacco comment: H.O.smoking at age 25 / smoked up to 1.5p.p.d /quit     Substance and Sexual Activity    Alcohol use:  Yes     Alcohol/week: 2.0 standard drinks     Types: 2 Cans of beer per week     Comment: occasional beer    Drug use: No    Sexual activity: Not on file   Lifestyle    Physical activity:     Days per week: Not on file     Minutes per session: Not on file    Stress: Not on file   Relationships    Social connections:     Talks on phone: Not on file     Gets together: Not on file     Attends Evangelical service: Not on file     Active member of club or organization: Not on file     Attends meetings of clubs or organizations: Not on file     Relationship status: Not on file    Intimate partner violence:     Fear of current or ex partner: Not on file     Emotionally abused: Not on file     Physically abused: Not on file     Forced sexual activity: Not on file   Other Topics Concern    Not on file   Social History Narrative    Not on file     No Known Allergies  Current Outpatient Medications   Medication Sig Dispense Refill    benazepril (LOTENSIN) 10 MG tablet Take 10 mg by mouth daily      fluocinonide-emollient (LIDEX-E) 0.05 % cream Apply topically 2 times daily. 60 g 1    furosemide (LASIX) 40 MG tablet TAKE 1 TABLET DAILY 90 tablet 3    atorvastatin (LIPITOR) 80 MG tablet TAKE 1 TABLET DAILY 90 tablet 3    aspirin 81 MG EC tablet Take 1 tablet by mouth daily 30 tablet 3     No current facility-administered medications for this visit. Review of Systems   Constitutional: Negative for weight loss. HENT: Negative for rhinorrhea, sneezing and sore throat. Respiratory: Negative for apnea, shortness of breath and wheezing. Cardiovascular: Negative for chest pain, dyspnea on exertion, palpitations and PND. Gastrointestinal: Negative for abdominal pain, constipation and diarrhea. Genitourinary: Positive for difficulty urinating, frequency, nocturia (x1) and urgency. Negative for incomplete emptying. Musculoskeletal: Positive for gait problem. Negative for arthralgias.        OBJECTIVE:    /80   Pulse 63   Wt 239 lb (108.4 kg)   SpO2 97%   BMI 32.41 kg/m²    Physical Exam   Constitutional: He is oriented to person, place, and time. He appears well-developed and well-nourished. HENT:   Head: Normocephalic and atraumatic. Right Ear: External ear normal.   Left Ear: External ear normal.   Nose: Nose normal.   Mouth/Throat: Oropharynx is clear and moist.   Eyes: Conjunctivae and EOM are normal.   Neck: Normal range of motion. Neck supple. No JVD present. No tracheal deviation present. No thyromegaly present. Cardiovascular: Normal rate, regular rhythm and normal heart sounds. Pulmonary/Chest: Effort normal and breath sounds normal. No respiratory distress. He has no rales. Musculoskeletal: He exhibits edema ( Bilateral lower extremity edema improved from last visit). Lymphadenopathy:     He has no cervical adenopathy. Neurological: He is alert and oriented to person, place, and time. Skin: Skin is warm and dry. Psychiatric: He has a normal mood and affect. ASSESSMENT/PLAN:    Luma Gaitan was seen today for follow-up. Diagnoses and all orders for this visit:    Chronic obstructive pulmonary disease, unspecified COPD type ([address]  Stable    Essential hypertension  Stable on current medications    Hypercholesteremia  -     Comprehensive Metabolic Panel, Fasting; Future  -     CBC Auto Differential; Future  -     Lipid, Fasting; Future    Lower urinary tract symptoms (LUTS)  -     LAUREEN Mederos MD, [facility], [facility]    Hyperglycemia  -     Hemoglobin A1C; Future        Return in about 3 months (around 11/15/2019). Please note portions of this note were completed with a voicerecognition program.  Efforts were made to edit the dictations but occasionally words are mis-transcribed.

## 2019-10-02 PROBLEM — Z95.2 S/P TAVR (TRANSCATHETER AORTIC VALVE REPLACEMENT): Status: ACTIVE | Noted: 2019-10-02

## 2019-10-10 ENCOUNTER — OFFICE VISIT (OUTPATIENT)
Dept: CARDIOLOGY CLINIC | Age: 74
End: 2019-10-10
Payer: COMMERCIAL

## 2019-10-10 VITALS
OXYGEN SATURATION: 94 % | SYSTOLIC BLOOD PRESSURE: 120 MMHG | DIASTOLIC BLOOD PRESSURE: 70 MMHG | WEIGHT: 238 LBS | HEART RATE: 72 BPM | BODY MASS INDEX: 32.23 KG/M2 | HEIGHT: 72 IN

## 2019-10-10 DIAGNOSIS — E78.00 HYPERCHOLESTEREMIA: ICD-10-CM

## 2019-10-10 DIAGNOSIS — I10 ESSENTIAL HYPERTENSION: ICD-10-CM

## 2019-10-10 DIAGNOSIS — I73.9 PERIPHERAL VASCULAR DISEASE (HCC): ICD-10-CM

## 2019-10-10 DIAGNOSIS — Z95.2 S/P TAVR (TRANSCATHETER AORTIC VALVE REPLACEMENT): ICD-10-CM

## 2019-10-10 DIAGNOSIS — I35.0 NONRHEUMATIC AORTIC VALVE STENOSIS: Primary | ICD-10-CM

## 2019-10-10 DIAGNOSIS — I73.9 PERIPHERAL VASCULAR DISEASE (HCC): Primary | ICD-10-CM

## 2019-10-10 PROCEDURE — 99214 OFFICE O/P EST MOD 30 MIN: CPT | Performed by: INTERNAL MEDICINE

## 2019-10-10 RX ORDER — AMOXICILLIN 500 MG/1
CAPSULE ORAL
Qty: 4 CAPSULE | Refills: 5 | Status: SHIPPED | OUTPATIENT
Start: 2019-10-10 | End: 2019-11-18 | Stop reason: ALTCHOICE

## 2019-10-29 ENCOUNTER — OFFICE VISIT (OUTPATIENT)
Dept: VASCULAR SURGERY | Age: 74
End: 2019-10-29
Payer: COMMERCIAL

## 2019-10-29 VITALS
DIASTOLIC BLOOD PRESSURE: 80 MMHG | WEIGHT: 240 LBS | BODY MASS INDEX: 32.51 KG/M2 | SYSTOLIC BLOOD PRESSURE: 148 MMHG | HEIGHT: 72 IN

## 2019-10-29 DIAGNOSIS — I73.9 PAD (PERIPHERAL ARTERY DISEASE) (HCC): Primary | ICD-10-CM

## 2019-10-29 DIAGNOSIS — I70.213 ATHEROSCLEROSIS OF NATIVE ARTERIES OF EXTREMITIES WITH INTERMITTENT CLAUDICATION, BILATERAL LEGS (HCC): ICD-10-CM

## 2019-10-29 PROCEDURE — 99203 OFFICE O/P NEW LOW 30 MIN: CPT | Performed by: SURGERY

## 2019-10-29 ASSESSMENT — ENCOUNTER SYMPTOMS
RESPIRATORY NEGATIVE: 1
GASTROINTESTINAL NEGATIVE: 1
ALLERGIC/IMMUNOLOGIC NEGATIVE: 1
EYES NEGATIVE: 1

## 2019-11-04 ENCOUNTER — HOSPITAL ENCOUNTER (OUTPATIENT)
Dept: VASCULAR LAB | Age: 74
Discharge: HOME | End: 2019-11-04
Payer: COMMERCIAL

## 2019-11-04 DIAGNOSIS — I70.213 ATHEROSCLEROSIS OF NATIVE ARTERY OF BOTH LOWER EXTREMITIES WITH INTERMITTENT CLAUDICATION (HCC): Primary | ICD-10-CM

## 2019-11-04 PROCEDURE — 93925 LOWER EXTREMITY STUDY: CPT

## 2019-11-13 ENCOUNTER — TELEPHONE (OUTPATIENT)
Dept: VASCULAR SURGERY | Age: 74
End: 2019-11-13

## 2019-11-18 ENCOUNTER — OFFICE VISIT (OUTPATIENT)
Dept: FAMILY MEDICINE CLINIC | Age: 74
End: 2019-11-18
Payer: COMMERCIAL

## 2019-11-18 VITALS
TEMPERATURE: 98.8 F | OXYGEN SATURATION: 98 % | WEIGHT: 240.2 LBS | SYSTOLIC BLOOD PRESSURE: 118 MMHG | BODY MASS INDEX: 32.58 KG/M2 | HEART RATE: 65 BPM | DIASTOLIC BLOOD PRESSURE: 66 MMHG

## 2019-11-18 DIAGNOSIS — J40 BRONCHITIS: Primary | ICD-10-CM

## 2019-11-18 PROCEDURE — G8510 SCR DEP NEG, NO PLAN REQD: HCPCS | Performed by: PHYSICIAN ASSISTANT

## 2019-11-18 PROCEDURE — 99213 OFFICE O/P EST LOW 20 MIN: CPT | Performed by: PHYSICIAN ASSISTANT

## 2019-11-18 RX ORDER — CEPHALEXIN 500 MG/1
500 CAPSULE ORAL 3 TIMES DAILY
Qty: 30 CAPSULE | Refills: 0 | Status: SHIPPED | OUTPATIENT
Start: 2019-11-18 | End: 2019-11-29

## 2019-11-18 ASSESSMENT — ENCOUNTER SYMPTOMS
COUGH: 1
DIARRHEA: 0
VOMITING: 0
SINUS PAIN: 1
SHORTNESS OF BREATH: 0
SORE THROAT: 0
WHEEZING: 0
EYE PAIN: 0
NAUSEA: 0

## 2019-11-18 ASSESSMENT — PATIENT HEALTH QUESTIONNAIRE - PHQ9
SUM OF ALL RESPONSES TO PHQ9 QUESTIONS 1 & 2: 0
1. LITTLE INTEREST OR PLEASURE IN DOING THINGS: 0
SUM OF ALL RESPONSES TO PHQ QUESTIONS 1-9: 0
SUM OF ALL RESPONSES TO PHQ QUESTIONS 1-9: 0
2. FEELING DOWN, DEPRESSED OR HOPELESS: 0

## 2019-11-21 ENCOUNTER — HOSPITAL ENCOUNTER (OUTPATIENT)
Age: 74
Discharge: HOME | End: 2019-11-21
Payer: COMMERCIAL

## 2019-11-21 DIAGNOSIS — R73.9 HYPERGLYCEMIA: ICD-10-CM

## 2019-11-21 DIAGNOSIS — E78.00 HYPERCHOLESTEREMIA: Chronic | ICD-10-CM

## 2019-11-21 LAB
A/G RATIO: 1.2 (ref 1.1–2.2)
ALBUMIN SERPL-MCNC: 3.8 G/DL (ref 3.4–5)
ALP BLD-CCNC: 102 U/L (ref 40–129)
ALT SERPL-CCNC: 17 U/L (ref 10–40)
ANION GAP SERPL CALCULATED.3IONS-SCNC: 11 MMOL/L (ref 3–16)
AST SERPL-CCNC: 18 U/L (ref 15–37)
BASOPHILS ABSOLUTE: 0.1 K/UL (ref 0–0.2)
BASOPHILS RELATIVE PERCENT: 1.4 %
BILIRUB SERPL-MCNC: 0.6 MG/DL (ref 0–1)
BUN BLDV-MCNC: 9 MG/DL (ref 7–20)
CALCIUM SERPL-MCNC: 9.4 MG/DL (ref 8.3–10.6)
CHLORIDE BLD-SCNC: 102 MMOL/L (ref 99–110)
CHOLESTEROL, FASTING: 135 MG/DL (ref 0–199)
CO2: 26 MMOL/L (ref 21–32)
CREAT SERPL-MCNC: 0.9 MG/DL (ref 0.8–1.3)
EOSINOPHILS ABSOLUTE: 0.8 K/UL (ref 0–0.6)
EOSINOPHILS RELATIVE PERCENT: 9.1 %
ESTIMATED AVERAGE GLUCOSE: 128.4 MG/DL
GFR AFRICAN AMERICAN: >60
GFR NON-AFRICAN AMERICAN: >60
GLOBULIN: 3.2 G/DL
GLUCOSE FASTING: 101 MG/DL (ref 70–99)
HBA1C MFR BLD: 6.1 %
HCT VFR BLD CALC: 43.5 % (ref 40.5–52.5)
HDLC SERPL-MCNC: 54 MG/DL (ref 40–60)
HEMOGLOBIN: 14.5 G/DL (ref 13.5–17.5)
LDL CHOLESTEROL CALCULATED: 58 MG/DL
LYMPHOCYTES ABSOLUTE: 1.8 K/UL (ref 1–5.1)
LYMPHOCYTES RELATIVE PERCENT: 21.3 %
MCH RBC QN AUTO: 29.8 PG (ref 26–34)
MCHC RBC AUTO-ENTMCNC: 33.3 G/DL (ref 31–36)
MCV RBC AUTO: 89.5 FL (ref 80–100)
MONOCYTES ABSOLUTE: 0.7 K/UL (ref 0–1.3)
MONOCYTES RELATIVE PERCENT: 8.3 %
NEUTROPHILS ABSOLUTE: 5.1 K/UL (ref 1.7–7.7)
NEUTROPHILS RELATIVE PERCENT: 59.9 %
PDW BLD-RTO: 13.9 % (ref 12.4–15.4)
PLATELET # BLD: 300 K/UL (ref 135–450)
PMV BLD AUTO: 9.4 FL (ref 5–10.5)
POTASSIUM SERPL-SCNC: 4.4 MMOL/L (ref 3.5–5.1)
RBC # BLD: 4.86 M/UL (ref 4.2–5.9)
SODIUM BLD-SCNC: 139 MMOL/L (ref 136–145)
TOTAL PROTEIN: 7 G/DL (ref 6.4–8.2)
TRIGLYCERIDE, FASTING: 113 MG/DL (ref 0–150)
VLDLC SERPL CALC-MCNC: 23 MG/DL
WBC # BLD: 8.5 K/UL (ref 4–11)

## 2019-11-21 PROCEDURE — 80061 LIPID PANEL: CPT

## 2019-11-21 PROCEDURE — 85025 COMPLETE CBC W/AUTO DIFF WBC: CPT

## 2019-11-21 PROCEDURE — 80053 COMPREHEN METABOLIC PANEL: CPT

## 2019-11-21 PROCEDURE — 83036 HEMOGLOBIN GLYCOSYLATED A1C: CPT

## 2019-11-21 PROCEDURE — 36415 COLL VENOUS BLD VENIPUNCTURE: CPT

## 2019-11-29 ENCOUNTER — OFFICE VISIT (OUTPATIENT)
Dept: FAMILY MEDICINE CLINIC | Age: 74
End: 2019-11-29
Payer: COMMERCIAL

## 2019-11-29 VITALS
OXYGEN SATURATION: 98 % | WEIGHT: 243.4 LBS | HEART RATE: 60 BPM | BODY MASS INDEX: 33.01 KG/M2 | DIASTOLIC BLOOD PRESSURE: 78 MMHG | SYSTOLIC BLOOD PRESSURE: 128 MMHG

## 2019-11-29 DIAGNOSIS — I10 ESSENTIAL HYPERTENSION: ICD-10-CM

## 2019-11-29 DIAGNOSIS — J44.9 CHRONIC OBSTRUCTIVE PULMONARY DISEASE, UNSPECIFIED COPD TYPE (HCC): ICD-10-CM

## 2019-11-29 DIAGNOSIS — G91.2 NORMAL PRESSURE HYDROCEPHALUS (HCC): ICD-10-CM

## 2019-11-29 DIAGNOSIS — Z12.5 SCREENING FOR MALIGNANT NEOPLASM OF PROSTATE: ICD-10-CM

## 2019-11-29 DIAGNOSIS — R73.9 HYPERGLYCEMIA: ICD-10-CM

## 2019-11-29 DIAGNOSIS — E78.00 HYPERCHOLESTEREMIA: Primary | Chronic | ICD-10-CM

## 2019-11-29 PROCEDURE — 99214 OFFICE O/P EST MOD 30 MIN: CPT | Performed by: FAMILY MEDICINE

## 2019-11-29 ASSESSMENT — ENCOUNTER SYMPTOMS
DIARRHEA: 0
SHORTNESS OF BREATH: 0
ABDOMINAL PAIN: 0
SINUS PAIN: 0
WHEEZING: 0
RHINORRHEA: 0
CHEST TIGHTNESS: 0
CONSTIPATION: 0

## 2019-12-23 ENCOUNTER — OFFICE VISIT (OUTPATIENT)
Dept: FAMILY MEDICINE CLINIC | Age: 74
End: 2019-12-23
Payer: COMMERCIAL

## 2019-12-23 VITALS
TEMPERATURE: 99.2 F | WEIGHT: 240 LBS | DIASTOLIC BLOOD PRESSURE: 72 MMHG | HEART RATE: 68 BPM | OXYGEN SATURATION: 96 % | SYSTOLIC BLOOD PRESSURE: 118 MMHG | BODY MASS INDEX: 32.55 KG/M2

## 2019-12-23 DIAGNOSIS — J40 BRONCHITIS: Primary | ICD-10-CM

## 2019-12-23 PROCEDURE — 99213 OFFICE O/P EST LOW 20 MIN: CPT | Performed by: FAMILY MEDICINE

## 2019-12-23 RX ORDER — PREDNISONE 20 MG/1
60 TABLET ORAL DAILY
Qty: 15 TABLET | Refills: 0 | Status: SHIPPED | OUTPATIENT
Start: 2019-12-23 | End: 2019-12-28

## 2019-12-23 RX ORDER — AMOXICILLIN 500 MG/1
1000 TABLET, FILM COATED ORAL 2 TIMES DAILY
Qty: 40 TABLET | Refills: 0 | Status: SHIPPED | OUTPATIENT
Start: 2019-12-23 | End: 2020-01-02

## 2019-12-23 RX ORDER — ALBUTEROL SULFATE 90 UG/1
2 AEROSOL, METERED RESPIRATORY (INHALATION) EVERY 4 HOURS PRN
Qty: 1 INHALER | Refills: 1 | Status: SHIPPED | OUTPATIENT
Start: 2019-12-23 | End: 2021-07-28 | Stop reason: SDUPTHER

## 2019-12-23 RX ORDER — BENAZEPRIL HYDROCHLORIDE 10 MG/1
10 TABLET ORAL DAILY
Qty: 90 TABLET | Refills: 3 | Status: SHIPPED | OUTPATIENT
Start: 2019-12-23 | End: 2020-02-06 | Stop reason: ALTCHOICE

## 2020-01-02 ENCOUNTER — OFFICE VISIT (OUTPATIENT)
Dept: FAMILY MEDICINE CLINIC | Age: 75
End: 2020-01-02
Payer: COMMERCIAL

## 2020-01-02 VITALS
WEIGHT: 238 LBS | TEMPERATURE: 98.8 F | DIASTOLIC BLOOD PRESSURE: 70 MMHG | SYSTOLIC BLOOD PRESSURE: 120 MMHG | OXYGEN SATURATION: 97 % | BODY MASS INDEX: 32.28 KG/M2 | HEART RATE: 68 BPM

## 2020-01-02 PROCEDURE — 99213 OFFICE O/P EST LOW 20 MIN: CPT | Performed by: FAMILY MEDICINE

## 2020-01-02 RX ORDER — PREDNISONE 10 MG/1
TABLET ORAL
Qty: 30 TABLET | Refills: 0 | Status: SHIPPED | OUTPATIENT
Start: 2020-01-02 | End: 2020-01-15

## 2020-01-02 RX ORDER — BENZONATATE 200 MG/1
200 CAPSULE ORAL 3 TIMES DAILY PRN
Qty: 30 CAPSULE | Refills: 0 | Status: SHIPPED | OUTPATIENT
Start: 2020-01-02 | End: 2020-01-09

## 2020-01-02 ASSESSMENT — ENCOUNTER SYMPTOMS
WHEEZING: 1
COUGH: 1
RHINORRHEA: 1
SHORTNESS OF BREATH: 1

## 2020-01-02 NOTE — PROGRESS NOTES
EGD performed by Rupa Silverman MD at [place] [address]. UPPER GASTROINTESTINAL ENDOSCOPY  2018    WITH BIOPSY     Family History   Problem Relation Age of Onset    Heart Disease Father     Diabetes Father     Alcohol Abuse Father     Other Mother      Social History     Tobacco Use    Smoking status: Former Smoker     Packs/day: 1.50     Years: 30.00     Pack years: 45.00     Types: Cigarettes     Last attempt to quit: 2001     Years since quittin.0    Smokeless tobacco: Never Used    Tobacco comment: H.O.smoking at age 25 / smoked up to 1.5p.p.d /quit     Substance Use Topics    Alcohol use: Yes     Alcohol/week: 2.0 standard drinks     Types: 2 Cans of beer per week     Comment: occasional beer      No Known Allergies  Current Outpatient Medications on File Prior to Visit   Medication Sig Dispense Refill    benazepril (LOTENSIN) 10 MG tablet Take 1 tablet by mouth daily 90 tablet 3    albuterol sulfate  (90 Base) MCG/ACT inhaler Inhale 2 puffs into the lungs every 4 hours as needed for Wheezing 1 Inhaler 1    Amoxicillin 500 MG TABS Take 1,000 mg by mouth 2 times daily for 10 days 40 tablet 0    furosemide (LASIX) 40 MG tablet TAKE 1 TABLET DAILY 90 tablet 3    atorvastatin (LIPITOR) 80 MG tablet TAKE 1 TABLET DAILY 90 tablet 3    aspirin 81 MG EC tablet Take 1 tablet by mouth daily 30 tablet 3     No current facility-administered medications on file prior to visit. Review of Systems   Constitutional: Negative for fever. HENT: Positive for postnasal drip and rhinorrhea. Respiratory: Positive for cough, shortness of breath and wheezing. OBJECTIVE:    /70   Pulse 68   Temp 98.8 °F (37.1 °C)   Wt 238 lb (108 kg)   SpO2 97%   BMI 32.28 kg/m²    Physical Exam  Constitutional:       General: He is not in acute distress. Appearance: Normal appearance.    HENT:      Right Ear: Tympanic membrane normal.      Left Ear: Tympanic membrane normal.      Nose: Nose normal.      Mouth/Throat:      Mouth: Mucous membranes are moist.      Pharynx: Uvula midline. No oropharyngeal exudate or posterior oropharyngeal erythema. Neck:      Musculoskeletal: Neck supple. Cardiovascular:      Rate and Rhythm: Normal rate and regular rhythm. Pulmonary:      Effort: Pulmonary effort is normal.      Breath sounds: Wheezing ( End expiratory) present. No decreased breath sounds. Musculoskeletal:      Right lower leg: No edema. Left lower leg: No edema. Lymphadenopathy:      Cervical: No cervical adenopathy. Skin:     General: Skin is warm. Findings: No rash. Neurological:      General: No focal deficit present. Mental Status: He is alert and oriented to person, place, and time. Psychiatric:         Mood and Affect: Mood normal.         Behavior: Behavior normal.         ASSESSMENT/PLAN:    Bon Roman was seen today for cough. Diagnoses and all orders for this visit:    Bronchitis  -     HYDROcodone-homatropine (HYCODAN) 5-1.5 MG/5ML syrup; Take 5 mLs by mouth 4 times daily as needed (cough) for up to 7 days. -     benzonatate (TESSALON) 200 MG capsule; Take 1 capsule by mouth 3 times daily as needed for Cough  -     predniSONE (DELTASONE) 10 MG tablet; Take 4 tablets daily for 3 days, then 3 tablets daily for 3 days, then 2 tablets daily for 3 days then 1 tablet daily until finished. Precautions are given that if symptoms worsen he is to call and an order would be sent for a chest x-ray at that time. Reassured him that I thought that what he has is of viral etiology and he needed more symptomatic treatment as well as the longer course of prednisone. Return for regularly scheduled follow-up. Please note portions of this note were completed with a voicerecognition program.  Efforts were made to edit the dictations but occasionally words are mis-transcribed.

## 2020-01-06 ENCOUNTER — TELEPHONE (OUTPATIENT)
Dept: FAMILY MEDICINE CLINIC | Age: 75
End: 2020-01-06

## 2020-01-07 ENCOUNTER — HOSPITAL ENCOUNTER (OUTPATIENT)
Dept: GENERAL RADIOLOGY | Age: 75
Discharge: HOME | End: 2020-01-07
Payer: COMMERCIAL

## 2020-01-07 ENCOUNTER — HOSPITAL ENCOUNTER (OUTPATIENT)
Age: 75
Discharge: HOME | End: 2020-01-07
Payer: COMMERCIAL

## 2020-01-07 PROCEDURE — 71046 X-RAY EXAM CHEST 2 VIEWS: CPT

## 2020-01-15 ENCOUNTER — OFFICE VISIT (OUTPATIENT)
Dept: FAMILY MEDICINE CLINIC | Age: 75
End: 2020-01-15
Payer: COMMERCIAL

## 2020-01-15 VITALS
OXYGEN SATURATION: 95 % | HEART RATE: 102 BPM | WEIGHT: 228 LBS | BODY MASS INDEX: 30.92 KG/M2 | TEMPERATURE: 98.4 F | SYSTOLIC BLOOD PRESSURE: 128 MMHG | DIASTOLIC BLOOD PRESSURE: 80 MMHG

## 2020-01-15 PROCEDURE — 99213 OFFICE O/P EST LOW 20 MIN: CPT | Performed by: FAMILY MEDICINE

## 2020-01-15 RX ORDER — BENZONATATE 100 MG/1
100-200 CAPSULE ORAL 3 TIMES DAILY PRN
Qty: 60 CAPSULE | Refills: 0 | Status: SHIPPED | OUTPATIENT
Start: 2020-01-15 | End: 2020-01-22

## 2020-01-15 RX ORDER — LEVOFLOXACIN 500 MG/1
500 TABLET, FILM COATED ORAL DAILY
Qty: 10 TABLET | Refills: 0 | Status: SHIPPED | OUTPATIENT
Start: 2020-01-15 | End: 2020-01-25

## 2020-01-15 ASSESSMENT — ENCOUNTER SYMPTOMS
SPUTUM PRODUCTION: 1
RHINORRHEA: 1
WHEEZING: 1
COUGH: 1

## 2020-01-15 ASSESSMENT — COPD QUESTIONNAIRES: COPD: 1

## 2020-01-15 NOTE — PROGRESS NOTES
appearance. HENT:      Head: Normocephalic and atraumatic. Right Ear: Tympanic membrane normal.      Left Ear: Tympanic membrane normal.      Nose: Nose normal.      Mouth/Throat:      Mouth: Mucous membranes are moist.      Pharynx: No posterior oropharyngeal erythema. Neck:      Musculoskeletal: Normal range of motion and neck supple. No muscular tenderness. Cardiovascular:      Rate and Rhythm: Normal rate and regular rhythm. Heart sounds: No murmur. Pulmonary:      Effort: Pulmonary effort is normal.      Breath sounds: Normal breath sounds. Musculoskeletal:      Right lower leg: No edema. Left lower leg: No edema. Lymphadenopathy:      Cervical: No cervical adenopathy. Neurological:      General: No focal deficit present. Mental Status: He is alert and oriented to person, place, and time. Gait: Gait abnormal.   Psychiatric:         Mood and Affect: Mood normal.         Behavior: Behavior normal.         ASSESSMENT/PLAN:    Florin Rivas was seen today for uri. Diagnoses and all orders for this visit:    Cough in adult  -     Full PFT Study With Bronchodilator; Future  -     Julianna Swann MD, Pulmonary, [facility]    Bronchitis  -     levofloxacin (LEVAQUIN) 500 MG tablet; Take 1 tablet by mouth daily for 10 days  -     benzonatate (TESSALON) 100 MG capsule; Take 1-2 capsules by mouth 3 times daily as needed for Cough        Return for regularly scheduled follow-up. Please note portions of this note were completed with a voicerecognition program.  Efforts were made to edit the dictations but occasionally words are mis-transcribed.

## 2020-01-16 ENCOUNTER — TELEPHONE (OUTPATIENT)
Dept: FAMILY MEDICINE CLINIC | Age: 75
End: 2020-01-16

## 2020-01-16 NOTE — TELEPHONE ENCOUNTER
Patient was just evaluated yesterday by Dr. Charmaine Phillips. I would recommend that we give the new antibiotic Levaquin 48 hours to be effective. If his respiratory status has significantly worsened since yesterday though he certainly can go to emergency room for additional evaluation.   He just had a chest x-ray within the last 10 days as well which was normal.

## 2020-01-16 NOTE — TELEPHONE ENCOUNTER
Wife called states patient was seen 1/15/20 and was given an antibiotic. She states he is getting worse. Patient is not eating, has a fever, dehydrated and still has a bad cough.       She wants to know if patient should go to ER      Provider out of office    Please advise

## 2020-01-20 ENCOUNTER — HOSPITAL ENCOUNTER (OUTPATIENT)
Dept: PULMONOLOGY | Age: 75
Discharge: HOME | End: 2020-01-20
Payer: COMMERCIAL

## 2020-01-20 VITALS — RESPIRATION RATE: 18 BRPM | HEART RATE: 92 BPM | OXYGEN SATURATION: 97 %

## 2020-01-20 LAB
DLCO %PRED: 74 %
DLCO PRED: NORMAL
DLCO/VA %PRED: NORMAL
DLCO/VA PRED: NORMAL
DLCO/VA: NORMAL
DLCO: NORMAL
EXPIRATORY TIME-POST: NORMAL
EXPIRATORY TIME: NORMAL
FEF 25-75% %CHNG: NORMAL
FEF 25-75% %PRED-POST: NORMAL
FEF 25-75% %PRED-PRE: NORMAL
FEF 25-75% PRED: NORMAL
FEF 25-75%-POST: NORMAL
FEF 25-75%-PRE: NORMAL
FEV1 %PRED-POST: 48 %
FEV1 %PRED-PRE: 43 %
FEV1 PRED: NORMAL
FEV1-POST: NORMAL
FEV1-PRE: NORMAL
FEV1/FVC %PRED-POST: NORMAL
FEV1/FVC %PRED-PRE: NORMAL
FEV1/FVC PRED: NORMAL
FEV1/FVC-POST: 73 %
FEV1/FVC-PRE: 76 %
FVC %PRED-POST: NORMAL
FVC %PRED-PRE: NORMAL
FVC PRED: NORMAL
FVC-POST: NORMAL
FVC-PRE: NORMAL
GAW %PRED: NORMAL
GAW PRED: NORMAL
GAW: NORMAL
IC %PRED: NORMAL
IC PRED: NORMAL
IC: NORMAL
MEP: NORMAL
MIP: NORMAL
MVV %PRED-PRE: NORMAL
MVV PRED: NORMAL
MVV-PRE: NORMAL
PEF %PRED-POST: NORMAL
PEF %PRED-PRE: NORMAL
PEF PRED: NORMAL
PEF%CHNG: NORMAL
PEF-POST: NORMAL
PEF-PRE: NORMAL
RAW %PRED: NORMAL
RAW PRED: NORMAL
RAW: NORMAL
RV %PRED: NORMAL
RV PRED: NORMAL
RV: NORMAL
SVC %PRED: NORMAL
SVC PRED: NORMAL
SVC: NORMAL
TLC %PRED: 94 %
TLC PRED: NORMAL
TLC: NORMAL
VA %PRED: NORMAL
VA PRED: NORMAL
VA: NORMAL
VTG %PRED: NORMAL
VTG PRED: NORMAL
VTG: NORMAL

## 2020-01-20 PROCEDURE — 94200 LUNG FUNCTION TEST (MBC/MVV): CPT

## 2020-01-20 PROCEDURE — 94760 N-INVAS EAR/PLS OXIMETRY 1: CPT

## 2020-01-20 PROCEDURE — 94729 DIFFUSING CAPACITY: CPT

## 2020-01-20 PROCEDURE — 6370000000 HC RX 637 (ALT 250 FOR IP): Performed by: FAMILY MEDICINE

## 2020-01-20 PROCEDURE — 94060 EVALUATION OF WHEEZING: CPT

## 2020-01-20 PROCEDURE — 94726 PLETHYSMOGRAPHY LUNG VOLUMES: CPT

## 2020-01-20 RX ORDER — ALBUTEROL SULFATE 90 UG/1
4 AEROSOL, METERED RESPIRATORY (INHALATION) ONCE
Status: COMPLETED | OUTPATIENT
Start: 2020-01-20 | End: 2020-01-20

## 2020-01-20 RX ADMIN — Medication 4 PUFF: at 10:13

## 2020-01-20 ASSESSMENT — PULMONARY FUNCTION TESTS
FEV1_PERCENT_PREDICTED_PRE: 43
FEV1/FVC_PRE: 76
FEV1_PERCENT_PREDICTED_POST: 48
FEV1/FVC_POST: 73

## 2020-01-21 NOTE — PROCEDURES
Pulmonary Function Testing      Patient name:  Joel Guerrier     [facility] Unit #:   [id number]   Date of test:  1/20/2020  Date of interpretation:   1/21/2020    Mr. Joel Guerrier is a 69y.o. year-old former smoker. The spirometry data were acceptable and reproducible. Spirometry:  Flow volume loops were normal. The FEV-1/FVC ratio was normal. The FEV-1 was 1.47 liters (43% of predicted), which was severely decreased. The FVC was 1.95 liters (41% of predicted), which was decreased. Response to inhaled bronchodilators (albuterol) was significant. Lung volumes:  Lung volumes were tested by plethysmography. The total lung capacity was 6.53 liters (94% of predicted), which was normal. The residual volume was 4.0 liters (148% of predicted), which was increased. The ratio of residual volume to total lung capacity (RV/TLC) was 61, which was increased. Diffusion capacity was found to be mildly decreased. Interpretation:  Could consider pseudorestriction due to increased RV. Compared to study done in 2/2017, FEV1 has decreased by 37%, FVC decreased by 38% and DLCO has decreased by 28%.      Comments:

## 2020-01-28 ENCOUNTER — TELEPHONE (OUTPATIENT)
Dept: CARDIOLOGY CLINIC | Age: 75
End: 2020-01-28

## 2020-01-28 NOTE — TELEPHONE ENCOUNTER
Can you get Mr. Chata Reyes scheduled for an echo prior to his OV with Dr. Lucía Nunez on 2/6/20? Same day if possible but if not, earlier in the week is fine. Please call his wife Nilda Cruz to confirm date/time for echo. She is aware of the DCE OV date/time.    Thank you, Cyndy PARKER

## 2020-02-03 ENCOUNTER — OFFICE VISIT (OUTPATIENT)
Dept: PULMONOLOGY | Age: 75
End: 2020-02-03
Payer: COMMERCIAL

## 2020-02-03 VITALS
WEIGHT: 227 LBS | HEART RATE: 71 BPM | SYSTOLIC BLOOD PRESSURE: 94 MMHG | OXYGEN SATURATION: 94 % | BODY MASS INDEX: 30.79 KG/M2 | DIASTOLIC BLOOD PRESSURE: 62 MMHG | RESPIRATION RATE: 16 BRPM

## 2020-02-03 PROCEDURE — 99203 OFFICE O/P NEW LOW 30 MIN: CPT | Performed by: INTERNAL MEDICINE

## 2020-02-03 ASSESSMENT — ENCOUNTER SYMPTOMS
SHORTNESS OF BREATH: 1
ABDOMINAL DISTENTION: 0
COUGH: 1
VOICE CHANGE: 0
CHEST TIGHTNESS: 0
RHINORRHEA: 0
BLOOD IN STOOL: 0
SINUS PRESSURE: 0
DIARRHEA: 0
ANAL BLEEDING: 0
APNEA: 0
CONSTIPATION: 0
BACK PAIN: 0
ABDOMINAL PAIN: 0
WHEEZING: 0
SORE THROAT: 0
STRIDOR: 0
CHOKING: 0

## 2020-02-03 NOTE — PROGRESS NOTES
[address]     H+P // CONSULT // OUTPATIENT VISIT // Luis Santiago     Referring Doctor Sasha Landaverde MD   Encounter Type Followup     CHIEF COMPLAINT     Visit Type Chronic   Symptoms None   Problems AS s/p TAVR, HTN, CHOL     HISTORY OF PRESENT ILLNESS      GEN - Doing well. No new concerns.  AS - s/p TAVR. Denies cp, sob, dizziness, syncope, palpitations.  HTN - Ambulatory BP readings in good range. No HA or dizziness.  CHOL - Last cholesterol reviewed and in good range. Tolerating statin without side effects.  MED - Compliant with CV meds listed below without notable side effects. HISTORY/ALLERGIES/ROS     MedHx:   has a past medical history of Allergic rhinitis, Aortic valve disorders, COPD (chronic obstructive pulmonary disease) (HCC), Hydrocephalus ([address] Hyperlipidemia, Hypertension, Hypertrophy of prostate without urinary obstruction and other lower urinary tract symptoms (LUTS), Irritable bowel syndrome, Pancreatitis, Peptic ulcer, unspecified site, unspecified as acute or chronic, without mention of hemorrhage, perforation, or obstruction, and Peripheral vascular disease ([facility] 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 SocHx:   reports that he quit smoking about 18 years ago. His smoking use included cigarettes. He has a 45.00 pack-year smoking history. He has never used smokeless tobacco. He reports current alcohol use of about 2.0 standard drinks of alcohol per week. He reports that he does not use drugs.    FamHx:  family history includes Alcohol Abuse in his father; Diabetes in his father; Heart Disease in his father; Other in his mother. Allergies: Patient has no known allergies. ROS:  [x]Full ROS obtained and negative except as mentioned in HPI     MEDICATIONS      Current Outpatient Medications   Medication Sig Dispense Refill    benazepril (LOTENSIN) 10 MG tablet Take 1 tablet by mouth daily 90 tablet 3    albuterol sulfate  (90 Base) MCG/ACT inhaler Inhale 2 puffs into the lungs every 4 hours as needed for Wheezing 1 Inhaler 1    furosemide (LASIX) 40 MG tablet TAKE 1 TABLET DAILY 90 tablet 3    atorvastatin (LIPITOR) 80 MG tablet TAKE 1 TABLET DAILY 90 tablet 3    aspirin 81 MG EC tablet Take 1 tablet by mouth daily 30 tablet 3     No current facility-administered medications for this visit.       Reviewed with patient and will remain unchanged except as mentioned in A/P  PHYSICAL EXAM     Vitals:    02/06/20 0834   BP: 132/80   Pulse: 81   SpO2: 94%      Gen Alert, coop, no distress Heart  Rrr, no mrg   Head NC, AT, no abnorm Abd  Soft, NT, +BS, no mass, no OM   Eyes PER, conj/corn clear Ext  Ext nl, AT, no C/C/E   Nose Nares nl, no drain, NT Pulse decr bilat   Throat Lips, mucosa, tongue nl Skin Col/text/turg nl, no vis rash/les   Neck S/S, TM, NT, no bruit/JVD Psych Nl mood and affect   Lung CTA-B, unlabored, no DTP Lymph   No cervical or axillary LA   Ch wall NT, no deform Neuro  Nl gross M/S exam     ASSESSMENT AND PLAN     ~AS   Date EF Detail   Sx   No concerning   Hx 3/18  LSC TAVR Lam[place] S3 26mm   NYHA   [x]I         []II           []III          []IV   [facility] 1/18  Nonobstructive   TTE 7/17  3/18  4/18  4/19  2/20 55%  65%  60%  60%  65% AS MG 38  TAVR  well seated, MG 14, no AI  TAVR MG 10, no AI  TAVR MG 14, no AI  TAVR MG 12, no AI   Plan   Doing well post TAVR  Continue current medications listed above  Echo yearly   ~HTN  Today BP Controlled   Counseling Counseled on diet/salt, exercise and ideal body

## 2020-02-03 NOTE — PROGRESS NOTES
Gastrointestinal: Negative for abdominal distention, abdominal pain, anal bleeding, blood in stool, constipation and diarrhea. Musculoskeletal: Negative for arthralgias, back pain and gait problem. Skin: Negative for pallor and rash. Allergic/Immunologic: Negative for environmental allergies. Neurological: Negative for dizziness, tremors, seizures, syncope, speech difficulty, weakness, light-headedness, numbness and headaches. Hematological: Negative for adenopathy. Does not bruise/bleed easily. Psychiatric/Behavioral: Negative for sleep disturbance. Vitals:    02/03/20 1510   BP: 94/62   Pulse: 71   Resp: 16   SpO2: 94%   Weight: 227 lb (103 kg)     Body mass index is 30.79 kg/m². Wt Readings from Last 3 Encounters:   02/03/20 227 lb (103 kg)   01/15/20 228 lb (103.4 kg)   01/02/20 238 lb (108 kg)     BP Readings from Last 3 Encounters:   02/03/20 94/62   01/15/20 128/80   01/02/20 120/70         Physical Exam  Constitutional:       General: He is not in acute distress. Appearance: He is well-developed. He is not diaphoretic. HENT:      Mouth/Throat:      Pharynx: No oropharyngeal exudate. Cardiovascular:      Rate and Rhythm: Normal rate and regular rhythm. Heart sounds: Normal heart sounds. No murmur. Pulmonary:      Effort: No respiratory distress. Breath sounds: Normal breath sounds. No wheezing or rales. Chest:      Chest wall: No tenderness. Abdominal:      General: There is no distension. Palpations: There is no mass. Tenderness: There is no abdominal tenderness. There is no guarding or rebound. Musculoskeletal:         General: Swelling present. No tenderness or deformity. Skin:     Coloration: Skin is not pale. Findings: No erythema or rash. Neurological:      Mental Status: He is alert and oriented to person, place, and time. Cranial Nerves: No cranial nerve deficit. Motor: No abnormal muscle tone.       Coordination:

## 2020-02-06 ENCOUNTER — OFFICE VISIT (OUTPATIENT)
Dept: CARDIOLOGY CLINIC | Age: 75
End: 2020-02-06
Payer: COMMERCIAL

## 2020-02-06 ENCOUNTER — HOSPITAL ENCOUNTER (OUTPATIENT)
Dept: NON INVASIVE DIAGNOSTICS | Age: 75
Discharge: HOME | End: 2020-02-06
Payer: COMMERCIAL

## 2020-02-06 VITALS
OXYGEN SATURATION: 94 % | HEIGHT: 72 IN | WEIGHT: 227 LBS | DIASTOLIC BLOOD PRESSURE: 80 MMHG | SYSTOLIC BLOOD PRESSURE: 132 MMHG | HEART RATE: 81 BPM | BODY MASS INDEX: 30.75 KG/M2

## 2020-02-06 LAB
LV EF: 65 %
LVEF MODALITY: NORMAL

## 2020-02-06 PROCEDURE — 93306 TTE W/DOPPLER COMPLETE: CPT

## 2020-02-06 PROCEDURE — 99214 OFFICE O/P EST MOD 30 MIN: CPT | Performed by: INTERNAL MEDICINE

## 2020-02-06 NOTE — LETTER
[address] Cardiology [address] [address]  Phone: [phone]  Fax: [phone]    Celestina Mao MD        February 6, 2020     Aria Jiménez MD  [address] [facility] [address]    Patient: Ranjit Vyas  MR Number: [id number]  YOB: 1945  Date of Visit: 2/6/2020    Dear Dr. Aria Jiménez:      [address]     H+P // CONSULT // OUTPATIENT VISIT // Ulysses Calkins     Referring Doctor Aria Jiménez MD   Encounter Type Followup     CHIEF COMPLAINT     Visit Type Chronic   Symptoms None   Problems AS s/p TAVR, HTN, CHOL     HISTORY OF PRESENT ILLNESS     ? GEN - Doing well. No new concerns. ? AS - s/p TAVR. Denies cp, sob, dizziness, syncope, palpitations. ? HTN - Ambulatory BP readings in good range. No HA or dizziness. ? CHOL - Last cholesterol reviewed and in good range. Tolerating statin without side effects. ? MED - Compliant with CV meds listed below without notable side effects. HISTORY/ALLERGIES/ROS     MedHx:   has a past medical history of Allergic rhinitis, Aortic valve disorders, COPD (chronic obstructive pulmonary disease) (HCC), Hydrocephalus ([address] Hyperlipidemia, Hypertension, Hypertrophy of prostate without urinary obstruction and other lower urinary tract symptoms (LUTS), Irritable bowel syndrome, Pancreatitis, Peptic ulcer, unspecified site, unspecified as acute or chronic, without mention of hemorrhage, perforation, or obstruction, and Peripheral vascular disease ([address] 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

## 2020-02-07 ENCOUNTER — HOSPITAL ENCOUNTER (OUTPATIENT)
Dept: CT IMAGING | Age: 75
Discharge: HOME | End: 2020-02-07
Payer: COMMERCIAL

## 2020-02-07 PROCEDURE — 71250 CT THORAX DX C-: CPT

## 2020-02-10 ENCOUNTER — TELEPHONE (OUTPATIENT)
Dept: PULMONOLOGY | Age: 75
End: 2020-02-10

## 2020-02-10 NOTE — TELEPHONE ENCOUNTER
Pt's wife Majel Severance called in stating that they had just missed a call from our office. No notes in Epic, CT results possibly?     Pt # [phone]

## 2020-02-10 NOTE — ADDENDUM NOTE
Addended by: [facility] on: 2/10/2020 05:55 PM     Modules accepted: Orders

## 2020-02-10 NOTE — TELEPHONE ENCOUNTER
Pt given his CT Chest results - the pt is taking Stiolto which is costing him over $100 - Anoro would only be $45 would you like to switch to the Anoro?   Please advise

## 2020-03-13 ENCOUNTER — HOSPITAL ENCOUNTER (OUTPATIENT)
Age: 75
Discharge: HOME | End: 2020-03-13
Payer: COMMERCIAL

## 2020-03-13 ENCOUNTER — OFFICE VISIT (OUTPATIENT)
Dept: FAMILY MEDICINE CLINIC | Age: 75
End: 2020-03-13
Payer: COMMERCIAL

## 2020-03-13 VITALS
SYSTOLIC BLOOD PRESSURE: 128 MMHG | OXYGEN SATURATION: 98 % | HEART RATE: 68 BPM | DIASTOLIC BLOOD PRESSURE: 58 MMHG | BODY MASS INDEX: 30.79 KG/M2 | WEIGHT: 227 LBS | TEMPERATURE: 98.9 F

## 2020-03-13 LAB
A/G RATIO: 1 (ref 1.1–2.2)
ALBUMIN SERPL-MCNC: 3.8 G/DL (ref 3.4–5)
ALP BLD-CCNC: 115 U/L (ref 40–129)
ALT SERPL-CCNC: 19 U/L (ref 10–40)
ANION GAP SERPL CALCULATED.3IONS-SCNC: 14 MMOL/L (ref 3–16)
AST SERPL-CCNC: 20 U/L (ref 15–37)
BILIRUB SERPL-MCNC: 0.7 MG/DL (ref 0–1)
BUN BLDV-MCNC: 8 MG/DL (ref 7–20)
CALCIUM SERPL-MCNC: 9.6 MG/DL (ref 8.3–10.6)
CHLORIDE BLD-SCNC: 100 MMOL/L (ref 99–110)
CO2: 27 MMOL/L (ref 21–32)
CREAT SERPL-MCNC: 0.9 MG/DL (ref 0.8–1.3)
CREATININE URINE: 85.2 MG/DL (ref 39–259)
D DIMER: 477 NG/ML DDU (ref 0–229)
GFR AFRICAN AMERICAN: >60
GFR NON-AFRICAN AMERICAN: >60
GLOBULIN: 3.8 G/DL
GLUCOSE BLD-MCNC: 93 MG/DL (ref 70–99)
MICROALBUMIN UR-MCNC: <1.2 MG/DL
MICROALBUMIN/CREAT UR-RTO: NORMAL MG/G (ref 0–30)
POTASSIUM SERPL-SCNC: 4.2 MMOL/L (ref 3.5–5.1)
PRO-BNP: 494 PG/ML (ref 0–124)
SEDIMENTATION RATE, ERYTHROCYTE: 25 MM/HR (ref 0–20)
SODIUM BLD-SCNC: 141 MMOL/L (ref 136–145)
TOTAL PROTEIN: 7.6 G/DL (ref 6.4–8.2)

## 2020-03-13 PROCEDURE — 99213 OFFICE O/P EST LOW 20 MIN: CPT | Performed by: NURSE PRACTITIONER

## 2020-03-13 PROCEDURE — 85652 RBC SED RATE AUTOMATED: CPT

## 2020-03-13 PROCEDURE — 83880 ASSAY OF NATRIURETIC PEPTIDE: CPT

## 2020-03-13 PROCEDURE — 82043 UR ALBUMIN QUANTITATIVE: CPT

## 2020-03-13 PROCEDURE — 85379 FIBRIN DEGRADATION QUANT: CPT

## 2020-03-13 PROCEDURE — 80053 COMPREHEN METABOLIC PANEL: CPT

## 2020-03-13 PROCEDURE — 82570 ASSAY OF URINE CREATININE: CPT

## 2020-03-13 RX ORDER — FUROSEMIDE 40 MG/1
TABLET ORAL
Qty: 90 TABLET | Refills: 3 | Status: SHIPPED | OUTPATIENT
Start: 2020-03-13 | End: 2020-04-29 | Stop reason: ALTCHOICE

## 2020-03-13 RX ORDER — BENAZEPRIL HYDROCHLORIDE 10 MG/1
10 TABLET ORAL
COMMUNITY
End: 2020-05-20 | Stop reason: SINTOL

## 2020-03-13 ASSESSMENT — ENCOUNTER SYMPTOMS
WHEEZING: 0
COUGH: 1
CONSTIPATION: 0
CHEST TIGHTNESS: 0
COLOR CHANGE: 0
NAUSEA: 0
SHORTNESS OF BREATH: 0
DIARRHEA: 0

## 2020-03-13 NOTE — PROGRESS NOTES
[place]  : 1945  Encounter date: 3/13/2020    This lamont 69 y.o. male who presents with  Chief Complaint   Patient presents with    Swelling     c/o swelling in both feet and ankles x 1 week. States it is itchy. painful while walking. History of present illness:    HPI Pt is 69year old male with bilateral ankles x 1 week. Reports skin is itchy and painful. Pt with COPD and essential hypertension. Pt reports seeing cardiologist and pulmonologist.  Pt reports stopping lisinopril due to low blood pressure. Pt reports dry cough. Denies significant weight gain. Pt is taking lasix 40 once daily. Pt has not received labs, due in 2020. Pt is taking 81 mg aspirin. Received ECHO 2020 EF- 65%. Pt had aortic valve replacement. Chest xray showed atelectasis with airspace consolidation. Pt received labs in 2019 showing normal kidney function, last K= 4.4. Denies recent ABX use. Current Outpatient Medications on File Prior to Visit   Medication Sig Dispense Refill    umeclidinium-vilanterol (ANORO ELLIPTA) 62.5-25 MCG/INH AEPB inhaler Inhale 1 puff into the lungs daily 1 each 2    albuterol sulfate  (90 Base) MCG/ACT inhaler Inhale 2 puffs into the lungs every 4 hours as needed for Wheezing 1 Inhaler 1    atorvastatin (LIPITOR) 80 MG tablet TAKE 1 TABLET DAILY 90 tablet 3    aspirin 81 MG EC tablet Take 1 tablet by mouth daily 30 tablet 3    benazepril (LOTENSIN) 10 MG tablet Take 10 mg by mouth       No current facility-administered medications on file prior to visit.        No Known Allergies  Past Medical History:   Diagnosis Date    Allergic rhinitis     Aortic valve disorders     COPD (chronic obstructive pulmonary disease) (HCC)     Hydrocephalus (HCC)     normal pressure,    Hyperlipidemia     Hypertension     Hypertrophy of prostate without urinary obstruction and other lower urinary tract symptoms (LUTS)     Irritable bowel syndrome     Pancreatitis Negative for color change and rash. Allergic/Immunologic: Positive for immunocompromised state. Neurological: Negative for headaches. Hematological: Bruises/bleeds easily. Objective:    BP (!) 128/58 (Site: Right Upper Arm, Position: Sitting, Cuff Size: Medium Adult)   Pulse 68   Temp 98.9 °F (37.2 °C) (Tympanic)   Wt 227 lb (103 kg)   SpO2 98%   BMI 30.79 kg/m²   Weight: 227 lb (103 kg)     BP Readings from Last 3 Encounters:   03/13/20 (!) 128/58   02/06/20 132/80   02/03/20 94/62     Wt Readings from Last 3 Encounters:   03/13/20 227 lb (103 kg)   02/06/20 227 lb (103 kg)   02/03/20 227 lb (103 kg)     BMI Readings from Last 3 Encounters:   03/13/20 30.79 kg/m²   02/06/20 30.79 kg/m²   02/03/20 30.79 kg/m²       Physical Exam  Vitals signs reviewed. Constitutional:       Appearance: Normal appearance. He is well-developed. He is obese. Cardiovascular:      Rate and Rhythm: Normal rate and regular rhythm. Heart sounds: Normal heart sounds. No murmur. Pulmonary:      Effort: Pulmonary effort is normal.      Breath sounds: Normal breath sounds. No wheezing or rhonchi. Chest:      Chest wall: No tenderness. Abdominal:      General: Bowel sounds are normal. There is distension. Palpations: Abdomen is soft. Tenderness: There is no abdominal tenderness. Musculoskeletal:         General: No tenderness or signs of injury. Right lower leg: Edema present. Left lower leg: Edema present. Skin:     General: Skin is warm and dry. Capillary Refill: Capillary refill takes less than 2 seconds. Neurological:      Mental Status: He is alert and oriented to person, place, and time. Assessment/Plan    1. Localized swelling of both lower legs  Advised compression stockings  Elevate legs  Increase lasix 40 mg BID  Limit salt intake  Follow up with cardiologist  Discussed possible imaging/doppler  - Comprehensive Metabolic Panel;  Future  - Microalbumin / Creatinine

## 2020-03-25 PROBLEM — I35.0 NONRHEUMATIC AORTIC VALVE STENOSIS: Status: RESOLVED | Noted: 2018-03-20 | Resolved: 2020-03-24

## 2020-04-14 RX ORDER — ATORVASTATIN CALCIUM 80 MG/1
TABLET, FILM COATED ORAL
Qty: 90 TABLET | Refills: 2 | Status: SHIPPED | OUTPATIENT
Start: 2020-04-14 | End: 2021-01-27

## 2020-04-27 ENCOUNTER — TELEPHONE (OUTPATIENT)
Dept: FAMILY MEDICINE CLINIC | Age: 75
End: 2020-04-27

## 2020-04-29 ENCOUNTER — OFFICE VISIT (OUTPATIENT)
Dept: CARDIOLOGY CLINIC | Age: 75
End: 2020-04-29
Payer: COMMERCIAL

## 2020-04-29 VITALS
HEART RATE: 70 BPM | SYSTOLIC BLOOD PRESSURE: 139 MMHG | BODY MASS INDEX: 32.28 KG/M2 | WEIGHT: 238 LBS | DIASTOLIC BLOOD PRESSURE: 68 MMHG

## 2020-04-29 PROCEDURE — 99214 OFFICE O/P EST MOD 30 MIN: CPT | Performed by: INTERNAL MEDICINE

## 2020-04-29 RX ORDER — TORSEMIDE 20 MG/1
40 TABLET ORAL 2 TIMES DAILY
Qty: 60 TABLET | Refills: 11 | Status: SHIPPED | OUTPATIENT
Start: 2020-04-29 | End: 2021-03-25 | Stop reason: DRUGHIGH

## 2020-04-29 RX ORDER — POTASSIUM CHLORIDE 20 MEQ/1
20 TABLET, EXTENDED RELEASE ORAL DAILY
Qty: 30 TABLET | Refills: 11 | Status: SHIPPED | OUTPATIENT
Start: 2020-04-29 | End: 2021-05-17

## 2020-04-29 NOTE — PROGRESS NOTES
alcohol per week. He reports that he does not use drugs. FamHx:  family history includes Alcohol Abuse in his father; Diabetes in his father; Heart Disease in his father; Other in his mother. Allergies: Patient has no known allergies. ROS:  [x]Full ROS obtained and negative except as mentioned in HPI     MEDICATIONS      Current Outpatient Medications   Medication Sig Dispense Refill    atorvastatin (LIPITOR) 80 MG tablet TAKE ONE TABLET BY MOUTH DAILY 90 tablet 2    benazepril (LOTENSIN) 10 MG tablet Take 10 mg by mouth      furosemide (LASIX) 40 MG tablet TAKE 1 TABLET twice daily (Patient taking differently: 40 mg 2 times daily TAKE 1 TABLET twice daily) 90 tablet 3    umeclidinium-vilanterol (ANORO ELLIPTA) 62.5-25 MCG/INH AEPB inhaler Inhale 1 puff into the lungs daily 1 each 2    albuterol sulfate  (90 Base) MCG/ACT inhaler Inhale 2 puffs into the lungs every 4 hours as needed for Wheezing 1 Inhaler 1    aspirin 81 MG EC tablet Take 1 tablet by mouth daily 30 tablet 3     No current facility-administered medications for this visit.       Reviewed with patient and will remain unchanged except as mentioned in A/P  PHYSICAL EXAM     Vitals:    04/29/20 1317   BP: 139/68   Pulse: 70      Gen Alert, coop, no distress Heart  RRR, no MRG   Head NC, AT, no abnorm Abd  Soft, NT, +BS, no mass, no OM   Eyes PER, conj/corn clear Ext  Ext nl, AT, no C/C/2+ edema   Nose Nares nl, no drain, NT Pulse 2+ and symmetric   Throat Lips, mucosa, tongue nl Skin Col/text/turg nl, no vis rash/les   Neck S/S, TM, NT, no bruit/JVD Psych Nl mood and affect   Lung CTA-B, unlabored, no DTP Lymph   No cervical or axillary LA   Ch wall NT, no deform Neuro  Nl gross M/S exam     ASSESSMENT AND PLAN     ~AS   Date EF Detail   Sx   No concerning   Hx 3/18  [facility] TAVR Lamas S3 26mm   [facility] 1/18  Nonobstructive   TTE 7/17  3/18  4/18  4/19  2/20 55%  65%  60%  60%  65% AS MG 38  TAVR  well seated, MG 14, no AI  TAVR MG 10, no

## 2020-05-01 ENCOUNTER — TELEPHONE (OUTPATIENT)
Dept: CARDIOLOGY CLINIC | Age: 75
End: 2020-05-01

## 2020-05-08 ENCOUNTER — HOSPITAL ENCOUNTER (OUTPATIENT)
Age: 75
Discharge: HOME | End: 2020-05-08
Payer: COMMERCIAL

## 2020-05-08 LAB
ALBUMIN SERPL-MCNC: 3.9 G/DL (ref 3.4–5)
ANION GAP SERPL CALCULATED.3IONS-SCNC: 11 MMOL/L (ref 3–16)
BUN BLDV-MCNC: 14 MG/DL (ref 7–20)
CALCIUM SERPL-MCNC: 9.4 MG/DL (ref 8.3–10.6)
CHLORIDE BLD-SCNC: 102 MMOL/L (ref 99–110)
CO2: 24 MMOL/L (ref 21–32)
CREAT SERPL-MCNC: 1.1 MG/DL (ref 0.8–1.3)
GFR AFRICAN AMERICAN: >60
GFR NON-AFRICAN AMERICAN: >60
GLUCOSE BLD-MCNC: 108 MG/DL (ref 70–99)
PHOSPHORUS: 3.5 MG/DL (ref 2.5–4.9)
POTASSIUM SERPL-SCNC: 4.6 MMOL/L (ref 3.5–5.1)
SODIUM BLD-SCNC: 137 MMOL/L (ref 136–145)

## 2020-05-08 PROCEDURE — 36415 COLL VENOUS BLD VENIPUNCTURE: CPT

## 2020-05-08 PROCEDURE — 80069 RENAL FUNCTION PANEL: CPT

## 2020-05-21 ENCOUNTER — OFFICE VISIT (OUTPATIENT)
Dept: CARDIOLOGY CLINIC | Age: 75
End: 2020-05-21
Payer: COMMERCIAL

## 2020-05-21 VITALS
HEIGHT: 72 IN | SYSTOLIC BLOOD PRESSURE: 154 MMHG | DIASTOLIC BLOOD PRESSURE: 84 MMHG | OXYGEN SATURATION: 96 % | BODY MASS INDEX: 31.72 KG/M2 | WEIGHT: 234.2 LBS | HEART RATE: 71 BPM

## 2020-05-21 PROCEDURE — 99214 OFFICE O/P EST MOD 30 MIN: CPT | Performed by: INTERNAL MEDICINE

## 2020-05-21 NOTE — LETTER
[address] Cardiology [facility]  [address] [address]  Phone: [phone]  Fax: [phone]    Triston Bazan MD        May 21, 2020     Marcelina Gray MD  [address] [facility] [address]    Patient: Sanaz Boles  MR Number: [id number]  YOB: 1945  Date of Visit: 5/21/2020    Dear Dr. Marcelina Gray:      [address]     H+P // CONSULT // OUTPATIENT VISIT // Wilfredmary Wilson     Referring Doctor Marcelina Gray MD   Encounter Type Followup     CHIEF COMPLAINT     Visit Type Chronic   Symptoms Edema   Problems AS s/p TAVR, HTN, CHOL, edema     HISTORY OF PRESENT ILLNESS     ? GEN - Doing much better in terms of LE edema. Taking torsemide 40qd with 20meq kcl. ? AS - s/p TAVR. Denies cp, sob, dizziness, syncope, palpitations. ? HTN - Ambulatory BP readings in good range. No HA or dizziness. ? CHOL - Last cholesterol reviewed and in good range. Tolerating statin without side effects. ? Edema - much improved on torsemide. ? MED - Compliant with CV meds listed below without notable side effects. HISTORY/ALLERGIES/ROS     MedHx:   has a past medical history of Allergic rhinitis, Aortic valve disorders, COPD (chronic obstructive pulmonary disease) (HCC), Hydrocephalus ([address] Hyperlipidemia, Hypertension, Hypertrophy of prostate without urinary obstruction and other lower urinary tract symptoms (LUTS), Irritable bowel syndrome, Pancreatitis, Peptic ulcer, unspecified site, unspecified as acute or chronic, without mention of hemorrhage, perforation, or obstruction, and Peripheral vascular disease ([address] SurgHx:  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); Anomalous venous return repair; Upper gastrointestinal endoscopy (09/19/2018); Aortic valve replacement; and pr esophagogastroduodenoscopy transoral diagnostic (N/A, 12/4/2018). SocHx:   reports that he quit smoking about 18 years ago. His smoking use included cigarettes. He has a 45.00 pack-year smoking history. He has never used smokeless tobacco. He reports current alcohol use of about 2.0 standard drinks of alcohol per week. He reports that he does not use drugs. FamHx:  family history includes Alcohol Abuse in his father; Diabetes in his father; Heart Disease in his father; Other in his mother. Allergies: Patient has no known allergies. ROS:  [x]Full ROS obtained and negative except as mentioned in HPI     MEDICATIONS      Current Outpatient Medications   Medication Sig Dispense Refill    torsemide (DEMADEX) 20 MG tablet Take 2 tablets by mouth 2 times daily 60 tablet 11    potassium chloride (KLOR-CON M) 20 MEQ extended release tablet Take 1 tablet by mouth daily 30 tablet 11    atorvastatin (LIPITOR) 80 MG tablet TAKE ONE TABLET BY MOUTH DAILY 90 tablet 2    benazepril (LOTENSIN) 10 MG tablet Take 10 mg by mouth      umeclidinium-vilanterol (ANORO ELLIPTA) 62.5-25 MCG/INH AEPB inhaler Inhale 1 puff into the lungs daily 1 each 2    albuterol sulfate  (90 Base) MCG/ACT inhaler Inhale 2 puffs into the lungs every 4 hours as needed for Wheezing 1 Inhaler 1    aspirin 81 MG EC tablet Take 1 tablet by mouth daily 30 tablet 3     No current facility-administered medications for this visit.       Reviewed with patient and will remain unchanged except as mentioned in A/P  PHYSICAL EXAM     Vitals:    05/21/20 0900   BP: (!) 146/80   Pulse: 71   SpO2: 96%      Gen Alert, coop, no distress Heart  Rrr, no mrg   Head NC, AT, no abnorm Abd  Soft, NT, +BS, no mass, no OM   Eyes PER, conj/corn clear Ext  Ext nl, AT, no C/C, mild edema R>L

## 2020-06-29 ENCOUNTER — OFFICE VISIT (OUTPATIENT)
Dept: PULMONOLOGY | Age: 75
End: 2020-06-29
Payer: COMMERCIAL

## 2020-06-29 VITALS
HEART RATE: 76 BPM | BODY MASS INDEX: 31.69 KG/M2 | HEIGHT: 72 IN | OXYGEN SATURATION: 98 % | SYSTOLIC BLOOD PRESSURE: 128 MMHG | DIASTOLIC BLOOD PRESSURE: 70 MMHG | WEIGHT: 234 LBS

## 2020-06-29 PROCEDURE — 99213 OFFICE O/P EST LOW 20 MIN: CPT | Performed by: INTERNAL MEDICINE

## 2020-06-29 RX ORDER — UMECLIDINIUM BROMIDE AND VILANTEROL TRIFENATATE 62.5; 25 UG/1; UG/1
1 POWDER RESPIRATORY (INHALATION) DAILY
Qty: 1 EACH | Refills: 3 | Status: SHIPPED | OUTPATIENT
Start: 2020-06-29 | End: 2022-03-10

## 2020-06-29 ASSESSMENT — ENCOUNTER SYMPTOMS
CHOKING: 0
CONSTIPATION: 0
VOICE CHANGE: 0
COUGH: 1
ABDOMINAL PAIN: 0
WHEEZING: 0
BACK PAIN: 0
CHEST TIGHTNESS: 0
BLOOD IN STOOL: 0
DIARRHEA: 0
APNEA: 0
STRIDOR: 0
SHORTNESS OF BREATH: 0
SORE THROAT: 0
ANAL BLEEDING: 0
ABDOMINAL DISTENTION: 0
RHINORRHEA: 0
SINUS PRESSURE: 0

## 2020-06-29 NOTE — PROGRESS NOTES
Pamela Thomas    YOB: 1945     Date of Service:  6/29/2020     Chief Complaint   Patient presents with    COPD         HPI patient has been accompanied by his wife to our office today. States that he has no shortness of breath, occasional cough only-thinks that Anoro Ellipta is working good. Leg edema better on torsemide.     No Known Allergies  Outpatient Medications Marked as Taking for the 6/29/20 encounter (Office Visit) with Konrad Dale MD   Medication Sig Dispense Refill    umeclidinium-vilanterol (ANORO ELLIPTA) 62.5-25 MCG/INH AEPB inhaler Inhale 1 puff into the lungs daily 1 each 3    torsemide (DEMADEX) 20 MG tablet Take 2 tablets by mouth 2 times daily (Patient taking differently: Take 40 mg by mouth 2 times daily taking one tab daily) 60 tablet 11    potassium chloride (KLOR-CON M) 20 MEQ extended release tablet Take 1 tablet by mouth daily 30 tablet 11    atorvastatin (LIPITOR) 80 MG tablet TAKE ONE TABLET BY MOUTH DAILY 90 tablet 2    albuterol sulfate  (90 Base) MCG/ACT inhaler Inhale 2 puffs into the lungs every 4 hours as needed for Wheezing 1 Inhaler 1    aspirin 81 MG EC tablet Take 1 tablet by mouth daily 30 tablet 3       Immunization History   Administered Date(s) Administered    Influenza Whole 10/01/2015    Influenza, High Dose (Fluzone 65 yrs and older) 11/25/2014, 09/08/2017, 09/12/2018    Influenza, Intradermal, Preservative free 12/05/2012    Pneumococcal Conjugate 13-valent (Diazuyt22) 05/04/2016    Pneumococcal Polysaccharide ([id number]) 12/19/2002, 06/24/2013    Td, unspecified formulation 12/01/2006       Past Medical History:   Diagnosis Date    Allergic rhinitis     Aortic valve disorders     COPD (chronic obstructive pulmonary disease) (HCC)     Hydrocephalus (HCC)     normal pressure,    Hyperlipidemia     Hypertension     Hypertrophy of prostate without urinary obstruction and other lower urinary tract symptoms (LUTS)     Irritable bowel syndrome     Pancreatitis     Peptic ulcer, unspecified site, unspecified as acute or chronic, without mention of hemorrhage, perforation, or obstruction     Peripheral vascular disease (HCC)      Past Surgical History:   Procedure Laterality Date    ABDOMINAL AORTIC ANEURYSM REPAIR N/A 2001    ANGIOPLASTY      RT femoral artery    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    [facility] Barragan    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REPAIR      Aortic valve replacement    AORTIC VALVE REPLACEMENT      CHOLECYSTECTOMY      FEMORAL BYPASS      bifemoral bypass    FOOT SURGERY Right 12/2/15    HERNIA REPAIR Left     OTHER SURGICAL HISTORY  10/23/2017    lumbar puncture    VT ESOPHAGOGASTRODUODENOSCOPY TRANSORAL DIAGNOSTIC N/A 12/4/2018    EGD performed by Sawyer Viramontes MD at [address] [facility] ENDOSCOPY  09/19/2018    WITH BIOPSY     Family History   Problem Relation Age of Onset    Heart Disease Father     Diabetes Father     Alcohol Abuse Father     Other Mother        Review of Systems:  Review of Systems   Constitutional: Negative for activity change, appetite change, fatigue and fever. HENT: Negative for congestion, ear discharge, ear pain, postnasal drip, rhinorrhea, sinus pressure, sneezing, sore throat, tinnitus and voice change. Respiratory: Positive for cough. Negative for apnea, choking, chest tightness, shortness of breath, wheezing and stridor. Cardiovascular: Negative for chest pain, palpitations and leg swelling. Gastrointestinal: Negative for abdominal distention, abdominal pain, anal bleeding, blood in stool, constipation and diarrhea. Musculoskeletal: Negative for arthralgias, back pain and gait problem. Skin: Negative for pallor and rash. Allergic/Immunologic: Negative for environmental allergies.    Neurological: Negative for dizziness, tremors, seizures, syncope, speech difficulty, weakness, light-headedness, numbness

## 2020-08-17 ENCOUNTER — TELEPHONE (OUTPATIENT)
Dept: CARDIOLOGY CLINIC | Age: 75
End: 2020-08-17

## 2020-08-17 NOTE — TELEPHONE ENCOUNTER
Patient and wife have been exposed to covid . 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 Patient will cancel 8/20/20 appt with DCE , when can he be seen later in the year ?

## 2020-08-18 ENCOUNTER — OFFICE VISIT (OUTPATIENT)
Dept: PRIMARY CARE CLINIC | Age: 75
End: 2020-08-18
Payer: COMMERCIAL

## 2020-08-18 PROCEDURE — 99211 OFF/OP EST MAY X REQ PHY/QHP: CPT | Performed by: NURSE PRACTITIONER

## 2020-08-18 NOTE — PROGRESS NOTES
Krystle White received a viral test for COVID-19. They were educated on isolation and quarantine as appropriate. For any symptoms, they were directed to seek care from their PCP, given contact information to establish with a doctor, directed to an urgent care or the emergency room.

## 2020-08-18 NOTE — PATIENT INSTRUCTIONS

## 2020-08-19 LAB — SARS-COV-2, NAA: NOT DETECTED

## 2020-09-29 NOTE — PROGRESS NOTES
[address]     H+P // CONSULT // OUTPATIENT VISIT // Genna Acosta     Referring Doctor Saskia Rosas MD   Encounter Type Followup     CHIEF COMPLAINT     Visit Type Chronic   Symptoms Edema   Problems AS s/p TAVR, HTN, CHOL, edema     HISTORY OF PRESENT ILLNESS      GEN - Doing great. No concerns. Edema improved. Taking torsemide 40qd.  AS - s/p TAVR. Denies cp, sob, dizziness, syncope, palpitations.  HTN - Ambulatory BP readings in good range. No HA or dizziness.  CHOL - Last cholesterol reviewed and in good range. Tolerating statin without side effects.  Edema - much improved on torsemide.  MED - Compliant with CV meds listed below without notable side effects. HISTORY/ALLERGIES/ROS     MedHx:   has a past medical history of Allergic rhinitis, Aortic valve disorders, COPD (chronic obstructive pulmonary disease) ([facility]), Hydrocephalus ([address] Hyperlipidemia, Hypertension, Hypertrophy of prostate without urinary obstruction and other lower urinary tract symptoms (LUTS), Irritable bowel syndrome, Pancreatitis, Peptic ulcer, unspecified site, unspecified as acute or chronic, without mention of hemorrhage, perforation, or obstruction, and Peripheral vascular disease ([facility] 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 SocHx:   reports that he quit smoking about 18 years ago. His smoking use included cigarettes. He has a 45.00 pack-year smoking history.  He has never used smokeless tobacco. He reports current alcohol use of about 2.0 standard drinks of alcohol per week. He reports that he does not use drugs. FamHx:  family history includes Alcohol Abuse in his father; Diabetes in his father; Heart Disease in his father; Other in his mother. Allergies: Patient has no known allergies. ROS:  [x]Full ROS obtained and negative except as mentioned in HPI     MEDICATIONS      Current Outpatient Medications   Medication Sig Dispense Refill    umeclidinium-vilanterol (ANORO ELLIPTA) 62.5-25 MCG/INH AEPB inhaler Inhale 1 puff into the lungs daily 1 each 3    torsemide (DEMADEX) 20 MG tablet Take 2 tablets by mouth 2 times daily (Patient taking differently: Take 40 mg by mouth 2 times daily taking one tab daily) 60 tablet 11    potassium chloride (KLOR-CON M) 20 MEQ extended release tablet Take 1 tablet by mouth daily 30 tablet 11    atorvastatin (LIPITOR) 80 MG tablet TAKE ONE TABLET BY MOUTH DAILY 90 tablet 2    albuterol sulfate  (90 Base) MCG/ACT inhaler Inhale 2 puffs into the lungs every 4 hours as needed for Wheezing 1 Inhaler 1    aspirin 81 MG EC tablet Take 1 tablet by mouth daily 30 tablet 3     No current facility-administered medications for this visit.       Reviewed with patient and will remain unchanged except as mentioned in A/P  PHYSICAL EXAM     Vitals:    10/01/20 0927   BP: 138/60   Pulse: 82   SpO2: 97%      Gen Alert, coop, no distress Heart  Rrr, no mrg   Head NC, AT, no abnorm Abd  Soft, NT, +BS, no mass, no OM   Eyes PER, conj/corn clear Ext  Ext nl, AT, no C/C, mild edema   Nose Nares nl, no drain, NT Pulse decr   Throat Lips, mucosa, tongue nl Skin Col/text/turg nl, no vis rash/les   Neck S/S, TM, NT, no bruit/JVD Psych Nl mood and affect   Lung CTA-B, unlabored, no DTP Lymph   No cervical or axillary LA   Ch wall NT, no deform Neuro  Nl gross M/S exam     ASSESSMENT AND PLAN     *AS   Date EF Detail   Sx   No concerning   Hx 3/18  [facility] TAVR Lamas S3 26mm   [facility] 1/18  Nonobstructive   TTE 7/17  3/18  4/18  4/19  2/20

## 2020-10-01 ENCOUNTER — HOSPITAL ENCOUNTER (OUTPATIENT)
Age: 75
Discharge: HOME | End: 2020-10-01
Payer: COMMERCIAL

## 2020-10-01 ENCOUNTER — OFFICE VISIT (OUTPATIENT)
Dept: CARDIOLOGY CLINIC | Age: 75
End: 2020-10-01
Payer: COMMERCIAL

## 2020-10-01 VITALS
WEIGHT: 242 LBS | BODY MASS INDEX: 32.78 KG/M2 | DIASTOLIC BLOOD PRESSURE: 60 MMHG | HEART RATE: 82 BPM | SYSTOLIC BLOOD PRESSURE: 138 MMHG | HEIGHT: 72 IN | OXYGEN SATURATION: 97 %

## 2020-10-01 LAB
A/G RATIO: 1.4 (ref 1.1–2.2)
ALBUMIN SERPL-MCNC: 4.1 G/DL (ref 3.4–5)
ALP BLD-CCNC: 110 U/L (ref 40–129)
ALT SERPL-CCNC: 20 U/L (ref 10–40)
ANION GAP SERPL CALCULATED.3IONS-SCNC: 8 MMOL/L (ref 3–16)
AST SERPL-CCNC: 19 U/L (ref 15–37)
BILIRUB SERPL-MCNC: 0.8 MG/DL (ref 0–1)
BUN BLDV-MCNC: 15 MG/DL (ref 7–20)
CALCIUM SERPL-MCNC: 9.5 MG/DL (ref 8.3–10.6)
CHLORIDE BLD-SCNC: 100 MMOL/L (ref 99–110)
CO2: 30 MMOL/L (ref 21–32)
CREAT SERPL-MCNC: 1.1 MG/DL (ref 0.8–1.3)
GFR AFRICAN AMERICAN: >60
GFR NON-AFRICAN AMERICAN: >60
GLOBULIN: 3 G/DL
GLUCOSE BLD-MCNC: 104 MG/DL (ref 70–99)
POTASSIUM SERPL-SCNC: 4.1 MMOL/L (ref 3.5–5.1)
SODIUM BLD-SCNC: 138 MMOL/L (ref 136–145)
TOTAL PROTEIN: 7.1 G/DL (ref 6.4–8.2)

## 2020-10-01 PROCEDURE — 99214 OFFICE O/P EST MOD 30 MIN: CPT | Performed by: INTERNAL MEDICINE

## 2020-10-01 PROCEDURE — 36415 COLL VENOUS BLD VENIPUNCTURE: CPT

## 2020-10-01 PROCEDURE — 80053 COMPREHEN METABOLIC PANEL: CPT

## 2020-10-01 NOTE — LETTER
[address] Cardiology [facility]  [address] [facility] [address]  Phone: [phone]  Fax: [phone]    Abigail Hinds MD        October 1, 2020     Peri Elizabeth MD  [address] [place] [id number]    Patient: Austin Cranker  MR Number: [id number]  YOB: 1945  Date of Visit: 10/1/2020    Dear Dr. Peri Eilzabeth:      [address]     H+P // CONSULT // OUTPATIENT VISIT // Karen Tan     Referring Doctor Peri Elizabeth MD   Encounter Type Followup     CHIEF COMPLAINT     Visit Type Chronic   Symptoms Edema   Problems AS s/p TAVR, HTN, CHOL, edema     HISTORY OF PRESENT ILLNESS     ? GEN - Doing great. No concerns. Edema improved. Taking torsemide 40qd. ? AS - s/p TAVR. Denies cp, sob, dizziness, syncope, palpitations. ? HTN - Ambulatory BP readings in good range. No HA or dizziness. ? CHOL - Last cholesterol reviewed and in good range. Tolerating statin without side effects. ? Edema - much improved on torsemide. ? MED - Compliant with CV meds listed below without notable side effects. HISTORY/ALLERGIES/ROS     MedHx:   has a past medical history of Allergic rhinitis, Aortic valve disorders, COPD (chronic obstructive pulmonary disease) (HCC), Hydrocephalus ([address] Hyperlipidemia, Hypertension, Hypertrophy of prostate without urinary obstruction and other lower urinary tract symptoms (LUTS), Irritable bowel syndrome, Pancreatitis, Peptic ulcer, unspecified site, unspecified as acute or chronic, without mention of hemorrhage, perforation, or obstruction, and Peripheral vascular disease ([address] SurgHx:  has a past surgical history that includes femoral bypass; Cholecystectomy; hernia repair (Left); Tonsillectomy; angioplasty; Abdominal aortic aneurysm repair (N/A, 2001); Foot surgery (Right, 12/2/15); other surgical history (10/23/2017);  Anomalous venous return Nose Nares nl, no drain, NT Pulse decr   Throat Lips, mucosa, tongue nl Skin Col/text/turg nl, no vis rash/les   Neck S/S, TM, NT, no bruit/JVD Psych Nl mood and affect   Lung CTA-B, unlabored, no DTP Lymph   No cervical or axillary LA   Ch wall NT, no deform Neuro  Nl gross M/S exam     ASSESSMENT AND PLAN     *AS   Date EF Detail   Sx   No concerning   Hx 3/18  [address] TAVR [place] S3 26mm   [facility] 1/18  Nonobstructive   TTE 7/17  3/18  4/18  4/19  2/20 55%  65%  60%  60%  65% AS MG 38  TAVR  well seated, MG 14, no AI  TAVR MG 10, no AI  TAVR MG 14, no AI  TAVR MG 12, no AI   Plan   Continue current medications listed above  Echo 6 months   *HTN  Status Controlled  Plan Counseled on diet/salt/exercise/weight, continue meds at doses above  *CHOL  Status  Controlled with last LDL of 58 (goal <70) and HDL of 54 (11/19)  Plan Counseled on diet/exercise/weight, continue statin, lipid/liver surveillance per PCP  *EDEMA  Status improved  Plan Continue torsemide 40mg daily with KCL   Check renal  *PVD  s/p Aortobifem  Plan Per VS  *COMPLIANCE  Status Compliant  Plan Discussed importance of compliance with meds/diet/salt/exercise; avoid tob/alc/drugs; patient verbalized understanding  *FOLLOWUP  6 months    [facility] [place] Farideh Sales, am scribing for and in the presence of Surinder Gomez MD.   SignedFarideh 09/29/20 11:46 AM   Provider Lai Candelario is working as a scribe for and in the presence of me (Surinder Gomez MD). Working as a scribe, Farideh Robert may have prepopulated components of this note with my historical  intellectual property under my direct supervision. Any additions to this intellectual property were performed in my presence and at my direction. Furthermore, the content and accuracy of this note have been reviewed by me Surinder Gomez MD). 10/1/2020 7:30 AM        If you have questions, please do not hesitate to call me. I look forward to following Kwadwo David along with you. Sincerely,        Jose F Michaud MD

## 2020-11-19 ENCOUNTER — OFFICE VISIT (OUTPATIENT)
Dept: FAMILY MEDICINE CLINIC | Age: 75
End: 2020-11-19
Payer: COMMERCIAL

## 2020-11-19 VITALS
SYSTOLIC BLOOD PRESSURE: 142 MMHG | WEIGHT: 244 LBS | TEMPERATURE: 98.4 F | BODY MASS INDEX: 33.05 KG/M2 | HEART RATE: 67 BPM | HEIGHT: 72 IN | OXYGEN SATURATION: 98 % | DIASTOLIC BLOOD PRESSURE: 82 MMHG

## 2020-11-19 PROCEDURE — 90715 TDAP VACCINE 7 YRS/> IM: CPT | Performed by: FAMILY MEDICINE

## 2020-11-19 PROCEDURE — G0438 PPPS, INITIAL VISIT: HCPCS | Performed by: FAMILY MEDICINE

## 2020-11-19 PROCEDURE — 90471 IMMUNIZATION ADMIN: CPT | Performed by: FAMILY MEDICINE

## 2020-11-19 SDOH — HEALTH STABILITY: MENTAL HEALTH: HOW OFTEN DO YOU HAVE A DRINK CONTAINING ALCOHOL?: MONTHLY OR LESS

## 2020-11-19 SDOH — HEALTH STABILITY: MENTAL HEALTH: HOW MANY STANDARD DRINKS CONTAINING ALCOHOL DO YOU HAVE ON A TYPICAL DAY?: 1 OR 2

## 2020-11-19 ASSESSMENT — PATIENT HEALTH QUESTIONNAIRE - PHQ9
2. FEELING DOWN, DEPRESSED OR HOPELESS: 0
SUM OF ALL RESPONSES TO PHQ QUESTIONS 1-9: 0
SUM OF ALL RESPONSES TO PHQ9 QUESTIONS 1 & 2: 0
SUM OF ALL RESPONSES TO PHQ QUESTIONS 1-9: 0
1. LITTLE INTEREST OR PLEASURE IN DOING THINGS: 0
SUM OF ALL RESPONSES TO PHQ QUESTIONS 1-9: 0

## 2020-11-19 NOTE — PATIENT INSTRUCTIONS
Patient Education        Well Visit, Over 72: Care Instructions  Your Care Instructions     Physical exams can help you stay healthy. Your doctor has checked your overall health and may have suggested ways to take good care of yourself. He or she also may have recommended tests. At home, you can help prevent illness with healthy eating, regular exercise, and other steps. Follow-up care is a key part of your treatment and safety. Be sure to make and go to all appointments, and call your doctor if you are having problems. It's also a good idea to know your test results and keep a list of the medicines you take. How can you care for yourself at home? · Reach and stay at a healthy weight. This will lower your risk for many problems, such as obesity, diabetes, heart disease, and high blood pressure. · Get at least 30 minutes of exercise on most days of the week. Walking is a good choice. You also may want to do other activities, such as running, swimming, cycling, or playing tennis or team sports. · Do not smoke. Smoking can make health problems worse. If you need help quitting, talk to your doctor about stop-smoking programs and medicines. These can increase your chances of quitting for good. · Protect your skin from too much sun. When you're outdoors from 10 a.m. to 4 p.m., stay in the shade or cover up with clothing and a hat with a wide brim. Wear sunglasses that block UV rays. Even when it's cloudy, put broad-spectrum sunscreen (SPF 30 or higher) on any exposed skin. · See a dentist one or two times a year for checkups and to have your teeth cleaned. · Wear a seat belt in the car. Follow your doctor's advice about when to have certain tests. These tests can spot problems early. For men and women  · Cholesterol. Your doctor will tell you how often to have this done based on your overall health and other things that can increase your risk for heart attack and stroke. · Blood pressure.  Have your blood decide whether to have this test. Some experts say that men ages 79 and older no longer need testing. · Abdominal aortic aneurysm. Ask your doctor whether you should have a test to check for an aneurysm. You may need a test if you ever smoked or if your parent, brother, sister, or child has had an aneurysm. When should you call for help? Watch closely for changes in your health, and be sure to contact your doctor if you have any problems or symptoms that concern you. Where can you learn more? Go to [email or web address] org and sign in to your [employer] account. Enter D601 in the [facility] box to learn more about \"Well Visit, Over 65: Care Instructions. \"     If you do not have an account, please click on the \"Sign Up Now\" link. Current as of: May 27, 2020               Content Version: 12.6  © 9838-7558 [employer]. Care instructions adapted under license by [facility]"[employer] [facility] ([facility]). If you have questions about a medical condition or this instruction, always ask your healthcare professional. [address] any warranty or liability for your use of this information. Exercise and Seniors  Is it safe for me to exercise? It is safe for most adults older than 72years of age to exercise. Even patients who have chronic illnesses such as heart disease, high blood pressure, diabetes and arthritis can exercise safely. Many of these conditions are improved with exercise. If you are not sure if exercise is safe for you or if you are currently inactive, ask your doctor. How do I get started? It is important to wear loose, comfortable clothing and well-fitting, sturdy shoes. Your shoes should have a good arch support, and an elevated and cushioned heel to absorb shock. If you are not already active, begin slowly. Start with exercises that you are already comfortable doing. Starting slowly makes it less likely that you will injure yourself.  Starting slowly also helps prevent soreness. The saying no pain, no gain is not true for older or elderly adults. You do not have to exercise at a high intensity to get most health benefits. For example, walking is an excellent activity to start with. As you become used to exercising, or if you are already active, you can slowly increase the intensity of your exercise program.    What type of exercise should I do? There are several types of exercise that you should do. You will want to do some type of aerobic activity for at least 30 minutes on most days of the week. Examples are walking, swimming and bicycling. You should also do resistance (also called strength training) 2 days per week. Warm up for 5 minutes before each exercise session. Walking slowly and then stretching are good warm-up activities. You should also cool down with more stretching for 5 minutes when you finish exercising. Cool down longer in warmer weather. Exercise is only good for you if you are feeling well. Wait to exercise until you feel better if you have a cold, the flu or another illness. If you miss exercise for more  than 2 weeks, be sure to start slowly again. When should I call my doctor? If your muscles or joints are sore the day after exercising, you may have done too much. Next time, exercise at a lower intensity. If the pain or discomfort persists, you should talk to your doctor. You should also talk to your doctor if you have any of the following symptoms while exercising:  -Chest pain or pressure  -Trouble breathing or excessive shortness of breath  -lightheadedness or dizziness  -difficulty with balance  -nausea    What are some specific exercises I can do? The following shows some simple strength exercises that you can do at home. Each exercise should be done 8 to 10 times for 2 sets.   Remember to:   -Complete all movements in a slow, controlled fashion  -Dont hold your breath  -Stop if you feel pain   -Stretch each muscle after your workout. Wall Pushups  Place hands flat against the wall. Slowly lower body to the wall. Push body away from wall to return to starting position. Chair Squats  Begin by sitting in the chair. Lean slightly forward and stand up from the chair. Try not to favor one side or use your hands to help you. Bicep Curls  Hold a weight in each hand with your arms at your sides. Bending your arms at the elbows, lift the weights to your shoulders and then lower them to your sides. Shoulder Shrugs  Hold a weight in each hand with your arms at your side. Shrug your shoulders up toward your ears and then lower them back down. Citations  Promoting and Prescribing Exercise for the Elderly by Teresa Muir M.D., and Ariel Grossman, Ph.D.(02/01/02, http://www. aafp.org/afp/[email or web address].html)    Last Updated: January 2011  This article was contributed by: familydoctor. org editorial staff  Copyright © American Academy [employer]  This information provides a general overview and may not apply to everyone. Talk to your family doctor to find out if this information applies to you and to get more information on this subject. Please bring in a copy of your living will and healthcare power of  to put in your chart. Link to forms: [email or web address]. pdf    Advance Directives, DNR, and MOLST    Decision making at the end of life is difficult for patients, families and health care providers. Since the early 1990s, a number of forms have been developed to help people express their wishes in advance. What are \"advance directives\"? Advance directives are documents that can help you remain in charge of your health care even after you can no longer make decisions for yourself. The two most common forms of written advance directives are the living will and durable power of  for healthcare.  Some people seek an s services to complete these documents; however this is not required. You can complete these documents yourself and have them either notarized or witnessed by two people who are over 25 and not related to you by blood or marriage. What is a \"living will\"? A living will is a document that tells your doctor how you want to be treated if when you are determined to be terminally ill or permanently unconscious and you cannot make decisions for yourself. You can use a living will if you want to avoid life-prolonging treatments such as cardiopulmonary resuscitation (CPR), kidney dialysis or breathing machines. You can use your living will to tell your doctor that you just want to be pain free at the end of our life. In [place], the living will is sometimes called a \"declaration\". A living will form can be obtained from attorneys, [employer], and healthcare facilities. This signed form must be notarized or witnessed. What is a \"durable power of  for healthcare\"? A medical power of  (medical POA) is another type of advance directive that allows you to name a person to make health care decisions for you if and when you become unable to make them for yourself. The person you name to make decisions on your behalf is some times called your health care surrogate, agent, proxy or -in-fact. The person who holds your medical POA can respond to medical situations you might not have anticipated and make decisions for you empowered by knowledge of your values and wishes. The medical POA form can be obtained from attorneys, [employer], and healthcare facilities. This signed form must be notarized or witnessed. The medical POA document is different from the power of  form that authorizes someone to make financial transactions for you. What is cardiopulmonary resuscitation (CPR)?  CPR is a technique useful in many emergencies, including heart attack or near drowning, in which someone's breathing or heartbeat has stopped. CPR may include chest compression, mouth-to-mouth or other rescue breathing and/or electric shock. What is a DNR -Comfort Care form (a.k.a. [facility])? DNR means do not resuscitate. A DNR is a medical order given by a physician or other legally authorized prescriber. It addresses the various methods used to revive people whose hearts have stopped beating /or who have stopped breathing. If a person has a [facility] order, he will receive care that eases pain and suffering but no cardio-pulmonary resuscitation (CPR) to save or prolong life. The [facility] becomes active as soon as it is signed by the doctor or advanced practice nurse. The [facility] is a standard form which can be obtained from the [address] [facility] or [facility] facilities. What is DNR Comfort Care - Arrest (a.k.a. [address])? The [address] is similar to the [facility] but it only becomes active if and when the person has a cardiac and/or respiratory arrest (i.e. the person stops breathing or his heart stops beating). The [address] is a standard form which can be obtained from the [address] [facility] or healthcare facilities. What is a MOLST? MOLST stands for Medical Orders for Life Sustaining Treatment. Dasie Riser is a medical order which specifies different treatment options for individuals who are seriously ill or frail and elderly. The MOLST allows patients or their surrogate to discuss care options they do and do not want to receive at the end of life, and then have their physician or other prescriber convey them into orders. This form would be available through [address] [facility] and healthcare facilities. Patient Education        Preventing Falls: Care Instructions  Your Care Instructions     Getting around your home safely can be a challenge if you have injuries or health problems that make it easy for you to fall.  Loose rugs and furniture in walkways are among the dangers for many older people who have problems walking or who have poor eyesight. People who have conditions such as arthritis, osteoporosis, or dementia also have to be careful not to fall. You can make your home safer with a few simple measures. Follow-up care is a key part of your treatment and safety. Be sure to make and go to all appointments, and call your doctor if you are having problems. It's also a good idea to know your test results and keep a list of the medicines you take. How can you care for yourself at home? Taking care of yourself  · You may get dizzy if you do not drink enough water. To prevent dehydration, drink plenty of fluids, enough so that your urine is light yellow or clear like water. Choose water and other caffeine-free clear liquids. If you have kidney, heart, or liver disease and have to limit fluids, talk with your doctor before you increase the amount of fluids you drink. · Exercise regularly to improve your strength, muscle tone, and balance. Walk if you can. Swimming may be a good choice if you cannot walk easily. · Have your vision and hearing checked each year or any time you notice a change. If you have trouble seeing and hearing, you might not be able to avoid objects and could lose your balance. · Know the side effects of the medicines you take. Ask your doctor or pharmacist whether the medicines you take can affect your balance. Sleeping pills or sedatives can affect your balance. · Limit the amount of alcohol you drink. Alcohol can impair your balance and other senses. · Ask your doctor whether calluses or corns on your feet need to be removed. If you wear loose-fitting shoes because of calluses or corns, you can lose your balance and fall. · Talk to your doctor if you have numbness in your feet. Preventing falls at home  · Remove raised doorway thresholds, throw rugs, and clutter. Repair loose carpet or raised areas in the floor.   · Move furniture and electrical cords to keep them out of walking paths. · Use nonskid floor wax, and wipe up spills right away, especially on ceramic tile floors. · If you use a walker or cane, put rubber tips on it. If you use crutches, clean the bottoms of them regularly with an abrasive pad, such as steel wool. · Keep your house well lit, especially [facility], and outside walkways. Use night-lights in areas such as hallways and bathrooms. Add extra light switches or use remote switches (such as switches that go on or off when you clap your hands) to make it easier to turn lights on if you have to get up during the night. · Install sturdy handrails on stairways. · Move items in your cabinets so that the things you use a lot are on the lower shelves (about waist level). · Keep a cordless phone and a flashlight with new batteries by your bed. If possible, put a phone in each of the main rooms of your house, or carry a cell phone in case you fall and cannot reach a phone. Or, you can wear a device around your neck or wrist. You push a button that sends a signal for help. · Wear low-heeled shoes that fit well and give your feet good support. Use footwear with nonskid soles. Check the heels and soles of your shoes for wear. Repair or replace worn heels or soles. · Do not wear socks without shoes on wood floors. · Walk on the grass when the sidewalks are slippery. If you live in an area that gets snow and ice in the winter, sprinkle salt on slippery steps and sidewalks. Preventing falls in the bath  · Install grab bars and nonskid mats inside and outside your shower or tub and near the toilet and sinks. · Use shower chairs and bath benches. · Use a hand-held shower head that will allow you to sit while showering.   · Get into a tub or shower by putting the weaker leg in first. Get out of a tub or shower with your strong side first.  · Repair loose toilet seats and consider installing a raised toilet seat to make getting 10months of age and older get vaccinated every flu season. Children 6 months through 6years of age may need 2 doses during a single flu season. Everyone else needs only 1 dose each flu season. It takes about 2 weeks for protection to develop after vaccination. There are many flu viruses, and they are always changing. Each year a new flu vaccine is made to protect against three or four viruses that are likely to cause disease in the upcoming flu season. Even when the vaccine doesn't exactly match these viruses, it may still provide some protection. Influenza vaccine does not cause flu. Influenza vaccine may be given at the same time as other vaccines. Talk with your health care provider  Tell your vaccine provider if the person getting the vaccine:  · Has had an allergic reaction after a previous dose of influenza vaccine, or has any severe, life-threatening allergies. · Has ever had Guillain-Barré Syndrome (also called GBS). In some cases, your health care provider may decide to postpone influenza vaccination to a future visit. People with minor illnesses, such as a cold, may be vaccinated. People who are moderately or severely ill should usually wait until they recover before getting influenza vaccine. Your health care provider can give you more information. Risks of a vaccine reaction  · Soreness, redness, and swelling where shot is given, fever, muscle aches, and headache can happen after influenza vaccine. · There may be a very small increased risk of Guillain-Barré Syndrome (GBS) after inactivated influenza vaccine (the flu shot). [place] Full[place] children who get the flu shot along with pneumococcal vaccine (PCV13), and/or DTaP vaccine at the same time might be slightly more likely to have a seizure caused by fever. Tell your health care provider if a child who is getting flu vaccine has ever had a seizure. People sometimes faint after medical procedures, including vaccination.  Tell your provider if you feel dizzy or have vision changes or ringing in the ears. As with any medicine, there is a very remote chance of a vaccine causing a severe allergic reaction, other serious injury, or death. What if there is a serious problem? An allergic reaction could occur after the vaccinated person leaves the clinic. If you see signs of a severe allergic reaction (hives, swelling of the face and throat, difficulty breathing, a fast heartbeat, dizziness, or weakness), call 9-1-1 and get the person to the nearest hospital.  For other signs that concern you, call your health care provider. Adverse reactions should be reported to the Vaccine Adverse Event Reporting System (VAERS). Your health care provider will usually file this report, or you can do it yourself. Visit the VAERS website at www.vaers. hhs.gov or call 3-643.130.6785. VAERS is only for reporting reactions, and VAERS staff do not give medical advice. The National Vaccine Injury Compensation Program  The National Vaccine Injury Compensation Program (VICP) is a federal program that was created to compensate people who may have been injured by certain vaccines. Visit the VICP website at www.hrsa.gov/vaccinecompensation or call 9-841.724.2985 to learn about the program and about filing a claim. There is a time limit to file a claim for compensation. How can I learn more? · Ask your healthcare provider. · Call your local or state health department. · Contact the Centers for Disease Control and Prevention (CDC):  ? Call 5-331.741.6176 (1-800-CDC-INFO) or  ? Visit CDC's website at www.cdc.gov/flu  Vaccine Information Statement (Interim)  Inactivated Influenza Vaccine  8/15/2019  [address] [address]  Department of Health and Human Services  Centers for Disease Control and Prevention  Many Vaccine Information Statements are available in Thai and other languages. See www.immunize.org/vis.   Muchas hojas de información sobre vacunas están disponibles en español y en john benitez. Visite www.immunize.org/vis. Care instructions adapted under license by [facility] ([facility]). If you have questions about a medical condition or this instruction, always ask your healthcare professional. [place] [address] any warranty or liability for your use of this information. Patient Education        Tdap (Tetanus, Diphtheria, Pertussis) Vaccine: What You Need to Know  Why get vaccinated? Tdap vaccine can prevent tetanus, diphtheria, and pertussis. Diphtheria and pertussis spread from person to person. Tetanus enters the body through cuts or wounds. · TETANUS (T) causes painful stiffening of the muscles. Tetanus can lead to serious health problems, including being unable to open the mouth, having trouble swallowing and breathing, or death. · DIPHTHERIA (D) can lead to difficulty breathing, heart failure, paralysis, or death. · PERTUSSIS (aP), also known as \"whooping cough,\" can cause uncontrollable, violent coughing which makes it hard to breathe, eat, or drink. Pertussis can be extremely serious in babies and young children, causing pneumonia, convulsions, brain damage, or death. In teens and adults, it can cause weight loss, loss of bladder control, passing out, and rib fractures from severe coughing. Tdap vaccine  Tdap is only for children 7 years and older, adolescents, and adults. Adolescents should receive a single dose of Tdap, preferably at age 6 or 15 years. Pregnant women should get a dose of Tdap during every pregnancy, to protect the  from pertussis. Infants are most at risk for severe, life threatening complications from pertussis. Adults who have never received Tdap should get a dose of Tdap. Also, adults should receive a booster dose every 10 years, or earlier in the case of a severe and dirty wound or burn. Booster doses can be either Tdap or Td (a different vaccine that protects against tetanus and diphtheria but not pertussis).   Tdap may be given at the same time as other vaccines. Talk with your health care provider  Tell your vaccine provider if the person getting the vaccine:  · Has had an allergic reaction after a previous dose of any vaccine that protects against tetanus, diphtheria, or pertussis, or has any severe, life threatening allergies. · Has had a coma, decreased level of consciousness, or prolonged seizures within 7 days after a previous dose of any pertussis vaccine (DTP, DTaP, or Tdap). · Has seizures or another nervous system problem. · Has ever had Guillain-Barré Syndrome (also called GBS). · Has had severe pain or swelling after a previous dose of any vaccine that protects against tetanus or diphtheria. In some cases, your health care provider may decide to postpone Tdap vaccination to a future visit. People with minor illnesses, such as a cold, may be vaccinated. People who are moderately or severely ill should usually wait until they recover before getting Tdap vaccine. Your health care provider can give you more information. Risks of a vaccine reaction  · Pain, redness, or swelling where the shot was given, mild fever, headache, feeling tired, and nausea, vomiting, diarrhea, or stomachache sometimes happen after Tdap vaccine. People sometimes faint after medical procedures, including vaccination. Tell your provider if you feel dizzy or have vision changes or ringing in the ears. As with any medicine, there is a very remote chance of a vaccine causing a severe allergic reaction, other serious injury, or death. What if there is a serious problem? An allergic reaction could occur after the vaccinated person leaves the clinic. If you see signs of a severe allergic reaction (hives, swelling of the face and throat, difficulty breathing, a fast heartbeat, dizziness, or weakness), call 9-1-1 and get the person to the nearest hospital.  For other signs that concern you, call your health care provider.   Adverse reactions should include a comprehensive review of your medical history including lifestyle, illnesses that may run in your family, and various assessments and screenings as appropriate. After reviewing your medical record and screening and assessments performed today your provider may have ordered immunizations, labs, imaging, and/or referrals for you. A list of these orders (if applicable) as well as your Preventive Care list are included within your After Visit Summary for your review. Other Preventive Recommendations:    · A preventive eye exam performed by an eye specialist is recommended every 1-2 years to screen for glaucoma; cataracts, macular degeneration, and other eye disorders. · A preventive dental visit is recommended every 6 months. · Try to get at least 150 minutes of exercise per week or 10,000 steps per day on a pedometer . · Order or download the FREE \"Exercise & Physical Activity: Your Everyday Guide\" from [employer]. Call 7-427.950.6340 or search [employer] online. · You need 1544-2806 mg of calcium and 6604-4098 IU of vitamin D per day. It is possible to meet your calcium requirement with diet alone, but a vitamin D supplement is usually necessary to meet this goal.  · When exposed to the sun, use a sunscreen that protects against both UVA and UVB radiation with an SPF of 30 or greater. Reapply every 2 to 3 hours or after sweating, drying off with a towel, or swimming. · Always wear a seat belt when traveling in a car. Always wear a helmet when riding a bicycle or motorcycle.

## 2020-11-19 NOTE — PROGRESS NOTES
Immunization(s) given during visit:     Immunizations Administered     Name Date Dose Route    Tdap (Boostrix, Adacel) 11/19/2020 0.5 mL Intramuscular    Site: Deltoid- Left    Lot: [id number]    NDC: 66131-099-98           Patient instructed to remain in clinic for 20 minutes after injection and was advised to report any adverse reaction to me immediately.

## 2020-11-19 NOTE — PROGRESS NOTES
Medicare Annual Wellness Visit  Name: Joe Smallwood Date: 2020   MRN: [id number] Sex: Male   Age: 69 y.o. Ethnicity: Non-/Non    : 1945 Race: Bret Lazaro is here for Medicare AWV    Screenings for behavioral, psychosocial and functional/safety risks, and cognitive dysfunction are all negative except as indicated below. These results, as well as other patient data from the [address] form, are documented in Flowsheets linked to this Encounter. No Known Allergies      Prior to Visit Medications    Medication Sig Taking?  Authorizing Provider   umeclidinium-vilanterol (ANORO ELLIPTA) 62.5-25 MCG/INH AEPB inhaler Inhale 1 puff into the lungs daily Yes Prachi Hanna MD   torsemide (DEMADEX) 20 MG tablet Take 2 tablets by mouth 2 times daily  Patient taking differently: Take 40 mg by mouth 2 times daily taking one tab daily Yes Sathya Mcdaniel MD   potassium chloride (KLOR-CON M) 20 MEQ extended release tablet Take 1 tablet by mouth daily Yes Sathya Mcdaniel MD   atorvastatin (LIPITOR) 80 MG tablet TAKE ONE TABLET BY MOUTH DAILY Yes Elsworth Galeazzi, APRN - CNP   albuterol sulfate  (90 Base) MCG/ACT inhaler Inhale 2 puffs into the lungs every 4 hours as needed for Wheezing Yes Victor Manuel Wood MD   aspirin 81 MG EC tablet Take 1 tablet by mouth daily Yes Sathya Mcdaniel MD         Past Medical History:   Diagnosis Date    Allergic rhinitis     Aortic valve disorders     [facility]     Hydrocephalus (HCC)     normal pressure,    Hyperlipidemia     Hypertension     Hypertrophy of prostate without urinary obstruction and other lower urinary tract symptoms (LUTS)     Irritable bowel syndrome     Pancreatitis     Peptic ulcer, unspecified site, unspecified as acute or chronic, without mention of hemorrhage, perforation, or obstruction     Peripheral vascular disease ([facility] Past Surgical History:   Procedure Laterality Date    ABDOMINAL AORTIC ANEURYSM REPAIR N/A 2001    ANGIOPLASTY      RT femoral artery    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    [facility] 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REPAIR      Aortic valve replacement    AORTIC VALVE REPLACEMENT      CHOLECYSTECTOMY      FEMORAL BYPASS      bifemoral bypass    FOOT SURGERY Right 12/2/15    HERNIA REPAIR Left     OTHER SURGICAL HISTORY  10/23/2017    lumbar puncture    [place] ESOPHAGOGASTRODUODENOSCOPY TRANSORAL DIAGNOSTIC N/A 12/4/2018    EGD performed by Wong Hopson MD at [address] [facility] ENDOSCOPY  09/19/2018    WITH BIOPSY         Family History   Problem Relation Age of Onset    Heart Disease Father     Diabetes Father     Alcohol Abuse Father     Other Mother     Cancer Brother         liver       CareTeam (Including outside providers/suppliers regularly involved in providing care):   Patient Care Team:  Justo Dominguez MD as PCP - General (Family Medicine)  Justo Dominguez MD as PCP - [address] [place] [address] Provider  Dinesh Ford MD as Consulting Physician (Vascular Surgery)  Eli Curry MD (Interventional Cardiology)  Shaunna Hernandez MD as Consulting Physician (Neurology)    Wt Readings from Last 3 Encounters:   11/19/20 244 lb (110.7 kg)   10/01/20 242 lb (109.8 kg)   06/29/20 234 lb (106.1 kg)     Vitals:    11/19/20 1130   BP: (!) 142/82   Site: Left Upper Arm   Position: Sitting   Cuff Size: Large Adult   Pulse: 67   Temp: 98.4 °F (36.9 °C)   TempSrc: Tympanic   SpO2: 98%   Weight: 244 lb (110.7 kg)   Height: 6' (1.829 m)     Body mass index is 33.09 kg/m². Based upon direct observation of the patient, evaluation of cognition reveals remote memory intact, recent memory impaired.     General Appearance: alert and oriented to person, place and time, well developed and well- nourished, in no acute distress  Skin: warm and dry, no rash Will?: (!) No  Advance Directives     Power of  Living Will ACP-Advance Directive ACP-Power of     Not on File Filed on 01/25/18 Filed Not on File      General Health Risk Interventions:  · No Living Will: Patient declines ACP discussion/assistance    Health Habits/Nutrition:  Health Habits/Nutrition  Do you exercise for at least 20 minutes 2-3 times per week?: (!) No  Have you lost any weight without trying in the past 3 months?: No  Do you eat fewer than 2 meals per day?: No  Have you seen a dentist within the past year?: Yes  Body mass index: (!) 33.09  Health Habits/Nutrition Interventions:  · Inadequate physical activity:  He is encouraged to do more walking in his home. They are taking up carpeting and throw rugs. And redoing hardwood floors to help make his environment more safe. Safety:  Safety  Do you have working smoke detectors?: (!) No  Have all throw rugs been removed or fastened?: Yes  Do you have non-slip mats or surfaces in all bathtubs/showers?: Yes  Do all of your stairways have a railing or banister?: Yes  Are your doorways, halls and stairs free of clutter?: Yes  Do you always fasten your seatbelt when you are in a car?: Yes  Safety Interventions:  · Home safety tips provided    ADL:  ADLs  In the past 7 days, did you need help from others to perform any of the following everyday activities? Eating, dressing, grooming, bathing, toileting, or walking/balance?: (!) Walking/Balance  In the past 7 days, did you need help from others to take care of any of the following? Laundry, housekeeping, banking/finances, shopping, telephone use, food preparation, transportation, or taking medications?: None  ADL Interventions:  · Patient declines any further evaluation/treatment for this issue  · Patient is followed by neurology for normal pressure hydrocephalus. His difficulty with gait and balance tends to wax and wane. He does decline any therapy at this time.   He sees neurology about

## 2020-11-20 ENCOUNTER — HOSPITAL ENCOUNTER (OUTPATIENT)
Age: 75
Discharge: HOME | End: 2020-11-20
Payer: COMMERCIAL

## 2020-11-20 LAB
BASOPHILS ABSOLUTE: 0.1 K/UL (ref 0–0.2)
BASOPHILS RELATIVE PERCENT: 1.3 %
CHOLESTEROL, FASTING: 189 MG/DL (ref 0–199)
EOSINOPHILS ABSOLUTE: 0.7 K/UL (ref 0–0.6)
EOSINOPHILS RELATIVE PERCENT: 8 %
HCT VFR BLD CALC: 45.5 % (ref 40.5–52.5)
HDLC SERPL-MCNC: 56 MG/DL (ref 40–60)
HEMOGLOBIN: 15.5 G/DL (ref 13.5–17.5)
LDL CHOLESTEROL CALCULATED: 111 MG/DL
LYMPHOCYTES ABSOLUTE: 1.9 K/UL (ref 1–5.1)
LYMPHOCYTES RELATIVE PERCENT: 20.9 %
MCH RBC QN AUTO: 30.6 PG (ref 26–34)
MCHC RBC AUTO-ENTMCNC: 34 G/DL (ref 31–36)
MCV RBC AUTO: 89.9 FL (ref 80–100)
MONOCYTES ABSOLUTE: 0.8 K/UL (ref 0–1.3)
MONOCYTES RELATIVE PERCENT: 9 %
NEUTROPHILS ABSOLUTE: 5.5 K/UL (ref 1.7–7.7)
NEUTROPHILS RELATIVE PERCENT: 60.8 %
PDW BLD-RTO: 13.6 % (ref 12.4–15.4)
PLATELET # BLD: 289 K/UL (ref 135–450)
PMV BLD AUTO: 9.5 FL (ref 5–10.5)
PROSTATE SPECIFIC ANTIGEN: 2.98 NG/ML (ref 0–4)
RBC # BLD: 5.06 M/UL (ref 4.2–5.9)
TRIGLYCERIDE, FASTING: 112 MG/DL (ref 0–150)
TSH REFLEX: 2.8 UIU/ML (ref 0.27–4.2)
VLDLC SERPL CALC-MCNC: 22 MG/DL
WBC # BLD: 9.1 K/UL (ref 4–11)

## 2020-11-20 PROCEDURE — 36415 COLL VENOUS BLD VENIPUNCTURE: CPT

## 2020-11-20 PROCEDURE — 80061 LIPID PANEL: CPT

## 2020-11-20 PROCEDURE — 84153 ASSAY OF PSA TOTAL: CPT

## 2020-11-20 PROCEDURE — 84443 ASSAY THYROID STIM HORMONE: CPT

## 2020-11-20 PROCEDURE — 85025 COMPLETE CBC W/AUTO DIFF WBC: CPT

## 2020-12-22 RX ORDER — AMOXICILLIN 500 MG/1
CAPSULE ORAL
Qty: 4 CAPSULE | Refills: 4 | Status: SHIPPED | OUTPATIENT
Start: 2020-12-22 | End: 2020-12-30 | Stop reason: SDUPTHER

## 2020-12-23 ENCOUNTER — TELEPHONE (OUTPATIENT)
Dept: CARDIOLOGY CLINIC | Age: 75
End: 2020-12-23

## 2020-12-30 RX ORDER — AMOXICILLIN 500 MG/1
CAPSULE ORAL
Qty: 4 CAPSULE | Refills: 4 | Status: ON HOLD | OUTPATIENT
Start: 2020-12-30 | End: 2022-02-14 | Stop reason: HOSPADM

## 2021-01-25 DIAGNOSIS — E78.00 HYPERCHOLESTEREMIA: Chronic | ICD-10-CM

## 2021-01-27 RX ORDER — ATORVASTATIN CALCIUM 80 MG/1
TABLET, FILM COATED ORAL
Qty: 90 TABLET | Refills: 3 | Status: SHIPPED | OUTPATIENT
Start: 2021-01-27 | End: 2022-03-08

## 2021-03-23 NOTE — PROGRESS NOTES
[address]     H+P // CONSULT // OUTPATIENT VISIT // Aguilar Burch     Referring Doctor Nazia Torres MD   Encounter Type Followup     CHIEF COMPLAINT     Visit Type Chronic   Symptoms No concerning   Problems AS s/p TAVR, HTN, CHOL, edema     HISTORY OF PRESENT ILLNESS      GEN - Doing great. No new concerns.  AS - s/p TAVR. Denies cp, sob, dizziness, syncope, palpitations.  HTN - Ambulatory BP readings in good range. No HA or dizziness.  CHOL - Last cholesterol reviewed and in good range. Tolerating statin without side effects.  Edema - improved and stable.  MED - Compliant with CV meds listed below without notable side effects. HISTORY/ALLERGIES/ROS     MedHx:   has a past medical history of Allergic rhinitis, Aortic valve disorders, COPD (chronic obstructive pulmonary disease) ([facility]), Hydrocephalus ([address] Hyperlipidemia, Hypertension, Hypertrophy of prostate without urinary obstruction and other lower urinary tract symptoms (LUTS), Irritable bowel syndrome, Pancreatitis, Peptic ulcer, unspecified site, unspecified as acute or chronic, without mention of hemorrhage, perforation, or obstruction, and Peripheral vascular disease ([address] 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 SocHx:   reports that he quit smoking about 19 years ago. His smoking use included cigarettes. He has a 45.00 pack-year smoking history. He has never used smokeless tobacco. He reports current alcohol use of about 2.0 standard drinks of alcohol per week.  He reports that he does not use drugs. FamHx:  family history includes Alcohol Abuse in his father; Cancer in his brother; Diabetes in his father; Heart Disease in his father; Other in his mother. Allergies: Patient has no known allergies. ROS:  [x]Full ROS obtained and negative except as mentioned in HPI     MEDICATIONS      Current Outpatient Medications   Medication Sig Dispense Refill    atorvastatin (LIPITOR) 80 MG tablet TAKE ONE TABLET BY MOUTH DAILY 90 tablet 3    amoxicillin (AMOXIL) 500 MG capsule TAKE FOUR CAPSULES BY MOUTH 1 HOUR PRIOR TO DENTAL PROCEDURE 4 capsule 4    umeclidinium-vilanterol (ANORO ELLIPTA) 62.5-25 MCG/INH AEPB inhaler Inhale 1 puff into the lungs daily 1 each 3    torsemide (DEMADEX) 20 MG tablet Take 2 tablets by mouth 2 times daily (Patient taking differently: Take 40 mg by mouth 2 times daily taking one tab daily) 60 tablet 11    potassium chloride (KLOR-CON M) 20 MEQ extended release tablet Take 1 tablet by mouth daily 30 tablet 11    albuterol sulfate  (90 Base) MCG/ACT inhaler Inhale 2 puffs into the lungs every 4 hours as needed for Wheezing 1 Inhaler 1    aspirin 81 MG EC tablet Take 1 tablet by mouth daily 30 tablet 3     No current facility-administered medications for this visit.       Reviewed with patient and will remain unchanged except as mentioned in A/P  PHYSICAL EXAM     Vitals:    03/25/21 0856   BP: (!) 152/74   Pulse:    SpO2:       Gen Alert, coop, no distress Heart  Rrr, no mrg   Head NC, AT, no abnorm Abd  Soft, NT, +BS, no mass, no OM   Eyes PER, conj/corn clear Ext  Ext nl, AT, no C/C/E   Nose Nares nl, no drain, NT Pulse 2+ and symmetric   Throat Lips, mucosa, tongue nl Skin Col/text/turg nl, no vis rash/les   Neck S/S, TM, NT, no bruit/JVD Psych Nl mood and affect   Lung CTA-B, unlabored, no DTP Lymph   No cervical or axillary LA   Ch wall NT, no deform Neuro  Nl gross M/S exam     ASSESSMENT AND PLAN     *AS   Date EF Detail   Sx   No concerning   Hx 3/18 [facility] TAVR [place] S3 26mm   [facility] 1/18  Nonobstructive   TTE 7/17  3/18  4/18  4/19  2/20  3/21 55%  65%  60%  60%  65%  60% AS MG 38  TAVR  well seated, MG 14, no AI  TAVR MG 10, no AI  TAVR MG 14, no AI  TAVR MG 12, no AI  TAVR MG 9, no AI   Plan   Continue current medications listed above  Echo yearly   *HTN  Status Controlled  Plan Counseled on diet/salt/exercise/weight, continue meds at doses above  *CHOL  Status  Uncontrolled with last LDL of 111 (goal <70) and HDL of 56 (11/20)  Plan Counseled on diet/exercise/weight, continue statin, lipid/liver surveillance per PCP  *EDEMA  Status improved  Plan Continue torsemide 40mg daily with KCL   Check renal  *PVD  s/p Aortobifem, no leg pain  Plan Per VS  *COMPLIANCE  Status Compliant  Plan Discussed importance of compliance with meds/diet/salt/exercise; avoid tob/alc/drugs; patient verbalized understanding  *FOLLOWUP  6 months    [address] [place] [address] Vannesa Sales, am scribing for and in the presence of Eliseo Crouch MD.   SignedVannesa 03/23/21 1:03 PM   Provider Mayte Figueredo is working as a scribe for and in the presence of me (Eliseo Crouch MD). Working as a scribe, Vannesa Galeana may have prepopulated components of this note with my historical  intellectual property under my direct supervision. Any additions to this intellectual property were performed in my presence and at my direction.   Furthermore, the content and accuracy of this note have been reviewed by me Eliseo Crouch MD).  3/25/2021 9:14 AM    CODING     Category Diagnosis   Stable chronic illness  (97648/66623 - 2 or more) AS, HTN, CHOL, edema, PVD   Chronic illness with: Exac, progr or SA of Tx  (31145/35679 - 1 or more)    Undiagnosed new problem with: uncertain prognosis  (36125/81282 - 1 or more)    Acute illness with systemic Sx  (11779/05547 - 1 or more)    Acute, complicated injury  (79442/99475 - 1 or more)    31913 1 or more chronic illness with exacerbation, progression or SA of treatment    Time  30-39 minutes spent preparing to see patient including reviewing patient history/prior tests/prior consults, performing a medical exam, counseling and educating patient/family/caregiver, ordering medications/tests/procedures, referring and communicating with PCPs and other pertinent consultants, documenting information in the EMR, independently interpreting results and communicating to family and coordination of patient care.

## 2021-03-25 ENCOUNTER — OFFICE VISIT (OUTPATIENT)
Dept: CARDIOLOGY CLINIC | Age: 76
End: 2021-03-25
Payer: COMMERCIAL

## 2021-03-25 ENCOUNTER — HOSPITAL ENCOUNTER (OUTPATIENT)
Dept: NON INVASIVE DIAGNOSTICS | Age: 76
Discharge: HOME | End: 2021-03-25
Payer: COMMERCIAL

## 2021-03-25 ENCOUNTER — HOSPITAL ENCOUNTER (OUTPATIENT)
Age: 76
Discharge: HOME | End: 2021-03-25
Payer: COMMERCIAL

## 2021-03-25 VITALS
SYSTOLIC BLOOD PRESSURE: 152 MMHG | WEIGHT: 240.6 LBS | HEART RATE: 65 BPM | BODY MASS INDEX: 32.59 KG/M2 | OXYGEN SATURATION: 95 % | DIASTOLIC BLOOD PRESSURE: 74 MMHG | HEIGHT: 72 IN

## 2021-03-25 DIAGNOSIS — I35.0 NONRHEUMATIC AORTIC VALVE STENOSIS: Primary | ICD-10-CM

## 2021-03-25 DIAGNOSIS — Z95.2 S/P TAVR (TRANSCATHETER AORTIC VALVE REPLACEMENT): ICD-10-CM

## 2021-03-25 DIAGNOSIS — I10 ESSENTIAL HYPERTENSION: ICD-10-CM

## 2021-03-25 DIAGNOSIS — I73.9 PVD (PERIPHERAL VASCULAR DISEASE) (HCC): ICD-10-CM

## 2021-03-25 DIAGNOSIS — E78.00 HYPERCHOLESTEREMIA: ICD-10-CM

## 2021-03-25 DIAGNOSIS — I35.0 NONRHEUMATIC AORTIC VALVE STENOSIS: ICD-10-CM

## 2021-03-25 LAB
ANION GAP SERPL CALCULATED.3IONS-SCNC: 10 MMOL/L (ref 3–16)
BUN BLDV-MCNC: 14 MG/DL (ref 7–20)
CALCIUM SERPL-MCNC: 9.6 MG/DL (ref 8.3–10.6)
CHLORIDE BLD-SCNC: 103 MMOL/L (ref 99–110)
CO2: 29 MMOL/L (ref 21–32)
CREAT SERPL-MCNC: 1.2 MG/DL (ref 0.8–1.3)
GFR AFRICAN AMERICAN: >60
GFR NON-AFRICAN AMERICAN: 59
GLUCOSE BLD-MCNC: 106 MG/DL (ref 70–99)
LV EF: 60 %
LVEF MODALITY: NORMAL
POTASSIUM SERPL-SCNC: 4.8 MMOL/L (ref 3.5–5.1)
SODIUM BLD-SCNC: 142 MMOL/L (ref 136–145)

## 2021-03-25 PROCEDURE — 99214 OFFICE O/P EST MOD 30 MIN: CPT | Performed by: INTERNAL MEDICINE

## 2021-03-25 PROCEDURE — 80048 BASIC METABOLIC PNL TOTAL CA: CPT

## 2021-03-25 PROCEDURE — 93306 TTE W/DOPPLER COMPLETE: CPT

## 2021-03-25 PROCEDURE — 36415 COLL VENOUS BLD VENIPUNCTURE: CPT

## 2021-03-25 RX ORDER — TORSEMIDE 20 MG/1
40 TABLET ORAL DAILY
Qty: 60 TABLET | Refills: 11 | Status: SHIPPED
Start: 2021-03-25 | End: 2021-05-17

## 2021-03-25 NOTE — LETTER
[facility]  [address]  [address]  [address]  Phone: [phone]  Fax: [phone]    Tmair Puente MD        March 25, 2021     Alexandr Greenwood MD  . [facility] [employer]    Patient: Prince Robert  MR Number: [id number]  YOB: 1945  Date of Visit: 3/25/2021    Dear Dr. Alexandr Greenwood:    [address]     H+P // CONSULT // OUTPATIENT VISIT // Aileen Lopez     Referring Doctor Alexandr Greenwood MD   Encounter Type Followup     CHIEF COMPLAINT     Visit Type Chronic   Symptoms No concerning   Problems AS s/p TAVR, HTN, CHOL, edema     HISTORY OF PRESENT ILLNESS      GEN - Doing great. No new concerns.  AS - s/p TAVR. Denies cp, sob, dizziness, syncope, palpitations.  HTN - Ambulatory BP readings in good range. No HA or dizziness.  CHOL - Last cholesterol reviewed and in good range. Tolerating statin without side effects.  Edema - improved and stable.  MED - Compliant with CV meds listed below without notable side effects. HISTORY/ALLERGIES/ROS     MedHx:   has a past medical history of Allergic rhinitis, Aortic valve disorders, COPD (chronic obstructive pulmonary disease) (HCC), Hydrocephalus ([address] Hyperlipidemia, Hypertension, Hypertrophy of prostate without urinary obstruction and other lower urinary tract symptoms (LUTS), Irritable bowel syndrome, Pancreatitis, Peptic ulcer, unspecified site, unspecified as acute or chronic, without mention of hemorrhage, perforation, or obstruction, and Peripheral vascular disease ([address] 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 SocHx:   reports that he quit smoking about 19 years ago. His smoking use included cigarettes. He has a 45.00 pack-year smoking history. He has never used smokeless tobacco. He reports current alcohol use of about 2.0 standard drinks of alcohol per week. He reports that he does not use drugs. FamHx:  family history includes Alcohol Abuse in his father; Cancer in his brother; Diabetes in his father; Heart Disease in his father; Other in his mother. Allergies: Patient has no known allergies. ROS:  [x]Full ROS obtained and negative except as mentioned in HPI     MEDICATIONS      Current Outpatient Medications   Medication Sig Dispense Refill    atorvastatin (LIPITOR) 80 MG tablet TAKE ONE TABLET BY MOUTH DAILY 90 tablet 3    amoxicillin (AMOXIL) 500 MG capsule TAKE FOUR CAPSULES BY MOUTH 1 HOUR PRIOR TO DENTAL PROCEDURE 4 capsule 4    umeclidinium-vilanterol (ANORO ELLIPTA) 62.5-25 MCG/INH AEPB inhaler Inhale 1 puff into the lungs daily 1 each 3    torsemide (DEMADEX) 20 MG tablet Take 2 tablets by mouth 2 times daily (Patient taking differently: Take 40 mg by mouth 2 times daily taking one tab daily) 60 tablet 11    potassium chloride (KLOR-CON M) 20 MEQ extended release tablet Take 1 tablet by mouth daily 30 tablet 11    albuterol sulfate  (90 Base) MCG/ACT inhaler Inhale 2 puffs into the lungs every 4 hours as needed for Wheezing 1 Inhaler 1    aspirin 81 MG EC tablet Take 1 tablet by mouth daily 30 tablet 3     No current facility-administered medications for this visit.       Reviewed with patient and will remain unchanged except as mentioned in A/P  PHYSICAL EXAM     Vitals:    03/25/21 0856   BP: (!) 152/74   Pulse:    SpO2:       Gen Alert, coop, no distress Heart  Rrr, no mrg   Head NC, AT, no abnorm Abd  Soft, NT, +BS, no mass, no OM   Eyes PER, conj/corn clear Ext  Ext nl, AT, no C/C/E   Nose Nares nl, no drain, NT Pulse 2+ and symmetric   Throat Lips, mucosa, tongue nl Skin Col/text/turg nl, no vis rash/les   Neck S/S, TM, NT, no bruit/JVD Psych Nl mood and affect   Lung CTA-B, unlabored, no DTP Lymph   No cervical or axillary LA   Ch wall NT, no deform Neuro  Nl gross M/S exam     ASSESSMENT AND PLAN     *AS   Date EF Detail   Sx   No concerning   Hx 3/18  [facility] TAVR Lam[place] S3 26mm   [facility] 1/18  Nonobstructive   TTE 7/17  3/18  4/18  4/19  2/20  3/21 55%  65%  60%  60%  65%  60% AS MG 38  TAVR  well seated, MG 14, no AI  TAVR MG 10, no AI  TAVR MG 14, no AI  TAVR MG 12, no AI  TAVR MG 9, no AI   Plan   Continue current medications listed above  Echo yearly   *HTN  Status Controlled  Plan Counseled on diet/salt/exercise/weight, continue meds at doses above  *CHOL  Status  Uncontrolled with last LDL of 111 (goal <70) and HDL of 56 (11/20)  Plan Counseled on diet/exercise/weight, continue statin, lipid/liver surveillance per PCP  *EDEMA  Status improved  Plan Continue torsemide 40mg daily with KCL   Check renal  *PVD  s/p Aortobifem, no leg pain  Plan Per VS  *COMPLIANCE  Status Compliant  Plan Discussed importance of compliance with meds/diet/salt/exercise; avoid tob/alc/drugs; patient verbalized understanding  *FOLLOWUP  6 months    [employer] [place] Agustin Sales, am scribing for and in the presence of Ora Guadalupe MD.   Agustin Sanchez 03/23/21 1:03 PM   Provider Michael John is working as a scribe for and in the presence of me (Ora Guadalupe MD). Working as a scribe, Agustin Khan may have prepopulated components of this note with my historical  intellectual property under my direct supervision. Any additions to this intellectual property were performed in my presence and at my direction.   Furthermore, the content and accuracy of this note have been reviewed by me Ora Guadalupe MD).  3/25/2021 9:14 AM    CODING     Category Diagnosis   Stable chronic illness  (19583/71259 - 2 or more) AS, HTN, CHOL, edema, PVD   Chronic illness with: Exac, progr or SA of Tx  (06928/07625 - 1 or more)    Undiagnosed new problem with: uncertain prognosis  (25015/74354 - 1 or more)    Acute illness with systemic Sx  (21474/94647 - 1 or more)    Acute, complicated injury  (47264/88597 - 1 or more)    15243 1 or more chronic illness with exacerbation, progression or SA of treatment    Time  30-39 minutes spent preparing to see patient including reviewing patient history/prior tests/prior consults, performing a medical exam, counseling and educating patient/family/caregiver, ordering medications/tests/procedures, referring and communicating with PCPs and other pertinent consultants, documenting information in the EMR, independently interpreting results and communicating to family and coordination of patient care. If you have questions, please do not hesitate to call me. I look forward to following Rnea Guzmán along with you.     Sincerely,        Lizeth Steinberg MD

## 2021-04-26 ENCOUNTER — NURSE TRIAGE (OUTPATIENT)
Dept: OTHER | Facility: CLINIC | Age: 76
End: 2021-04-26

## 2021-04-26 NOTE — TELEPHONE ENCOUNTER
Received call from Melia Ruiz at pre-service center [facility] with Red Flag Complaint. Brief description of triage: Patient's wife \"Chandni\" calling for concerns for diarrhea over the past couple weeks which has worsened over the past three days. Scheduled for colonoscopy on 05/04/2021. Patient is present with wife and able to answer questions. Triage indicates for patient to see in office within 3 days. Care advice provided, patient verbalizes understanding; denies any other questions or concerns; instructed to call back for any new or worsening symptoms. Writer provided warm transfer to [facility] for appointment scheduling. Attention Provider: Thank you for allowing me to participate in the care of your patient. The patient was connected to triage in response to information provided to the [facility]. Please do not respond through this encounter as the response is not directed to a shared pool. Reason for Disposition   MILD diarrhea (e.g., 1-3 or more stools than normal in past 24 hours) diarrhea without known cause and present > 7 days    Answer Assessment - Initial Assessment Questions  1. DIARRHEA SEVERITY: \"How bad is the diarrhea? \" \"How many extra stools have you had in the past 24 hours than normal?\"     - NO DIARRHEA (SCALE 0)    - MILD (SCALE 1-3): Few loose or mushy BMs; increase of 1-3 stools over normal daily number of stools; mild increase in ostomy output. -  MODERATE (SCALE 4-7): Increase of 4-6 stools daily over normal; moderate increase in ostomy output. * SEVERE (SCALE 8-10; OR 'WORST POSSIBLE'): Increase of 7 or more stools daily over normal; moderate increase in ostomy output; incontinence. Mild    2. ONSET: \"When did the diarrhea begin? \"       A couple weeks ago    3. BM CONSISTENCY: \"How loose or watery is the diarrhea? \"       Watery    4. VOMITING: \"Are you also vomiting? \" If so, ask: \"How many times in the past 24 hours? \"       No    5. ABDOMINAL PAIN: Brand Lynn you having any abdominal pain? \" If yes: \"What does it feel like? \" (e.g., crampy, dull, intermittent, constant)       No    6. ABDOMINAL PAIN SEVERITY: If present, ask: \"How bad is the pain? \"  (e.g., Scale 1-10; mild, moderate, or severe)    - MILD (1-3): doesn't interfere with normal activities, abdomen soft and not tender to touch     - MODERATE (4-7): interferes with normal activities or awakens from sleep, tender to touch     - SEVERE (8-10): excruciating pain, doubled over, unable to do any normal activities        N/a    7. ORAL INTAKE: If vomiting, \"Have you been able to drink liquids? \" \"How much fluids have you had in the past 24 hours? \"      Yes, Coke 3-4 12 ounce cans    8. HYDRATION: \"Any signs of dehydration? \" (e.g., dry mouth [not just dry lips], too weak to stand, dizziness, new weight loss) \"When did you last urinate? \"      Dry mouth, this morning:  Not much though    9. EXPOSURE: \"Have you traveled to a foreign country recently? \" \"Have you been exposed to anyone with diarrhea? \" \"Could you have eaten any food that was spoiled? \"      No travel, no spoiled food, no exposure    10. ANTIBIOTIC USE: \"Are you taking antibiotics now or have you taken antibiotics in the past 2 months? \"       Just 4 Amoxicillin before procedure last week    11. OTHER SYMPTOMS: \"Do you have any other symptoms? \" (e.g., fever, blood in stool)        No fever, no blood in stool    12. PREGNANCY: \"Is there any chance you are pregnant? \" \"When was your last menstrual period? \"        N/a    Protocols used: [id number]

## 2021-04-27 ENCOUNTER — HOSPITAL ENCOUNTER (OUTPATIENT)
Age: 76
Discharge: HOME | End: 2021-04-27
Payer: COMMERCIAL

## 2021-04-27 ENCOUNTER — OFFICE VISIT (OUTPATIENT)
Dept: FAMILY MEDICINE CLINIC | Age: 76
End: 2021-04-27
Payer: COMMERCIAL

## 2021-04-27 VITALS — OXYGEN SATURATION: 96 % | TEMPERATURE: 99.1 F | HEART RATE: 76 BPM

## 2021-04-27 DIAGNOSIS — R19.7 DIARRHEA, UNSPECIFIED TYPE: ICD-10-CM

## 2021-04-27 DIAGNOSIS — R19.7 DIARRHEA, UNSPECIFIED TYPE: Primary | ICD-10-CM

## 2021-04-27 LAB
A/G RATIO: 1.3 (ref 1.1–2.2)
ALBUMIN SERPL-MCNC: 4.2 G/DL (ref 3.4–5)
ALP BLD-CCNC: 114 U/L (ref 40–129)
ALT SERPL-CCNC: 16 U/L (ref 10–40)
ANION GAP SERPL CALCULATED.3IONS-SCNC: 11 MMOL/L (ref 3–16)
AST SERPL-CCNC: 20 U/L (ref 15–37)
BASOPHILS ABSOLUTE: 0.1 K/UL (ref 0–0.2)
BASOPHILS RELATIVE PERCENT: 1.1 %
BILIRUB SERPL-MCNC: 0.9 MG/DL (ref 0–1)
BUN BLDV-MCNC: 10 MG/DL (ref 7–20)
CALCIUM SERPL-MCNC: 9.2 MG/DL (ref 8.3–10.6)
CHLORIDE BLD-SCNC: 99 MMOL/L (ref 99–110)
CO2: 29 MMOL/L (ref 21–32)
CREAT SERPL-MCNC: 1.4 MG/DL (ref 0.8–1.3)
EOSINOPHILS ABSOLUTE: 0.5 K/UL (ref 0–0.6)
EOSINOPHILS RELATIVE PERCENT: 5.9 %
GFR AFRICAN AMERICAN: >60
GFR NON-AFRICAN AMERICAN: 50
GLOBULIN: 3.2 G/DL
GLUCOSE BLD-MCNC: 86 MG/DL (ref 70–99)
HCT VFR BLD CALC: 43.4 % (ref 40.5–52.5)
HEMOGLOBIN: 14.7 G/DL (ref 13.5–17.5)
LYMPHOCYTES ABSOLUTE: 2.6 K/UL (ref 1–5.1)
LYMPHOCYTES RELATIVE PERCENT: 29.3 %
MCH RBC QN AUTO: 30.4 PG (ref 26–34)
MCHC RBC AUTO-ENTMCNC: 33.8 G/DL (ref 31–36)
MCV RBC AUTO: 89.9 FL (ref 80–100)
MONOCYTES ABSOLUTE: 0.8 K/UL (ref 0–1.3)
MONOCYTES RELATIVE PERCENT: 8.9 %
NEUTROPHILS ABSOLUTE: 4.9 K/UL (ref 1.7–7.7)
NEUTROPHILS RELATIVE PERCENT: 54.8 %
PDW BLD-RTO: 13.8 % (ref 12.4–15.4)
PLATELET # BLD: 317 K/UL (ref 135–450)
PMV BLD AUTO: 9.6 FL (ref 5–10.5)
POTASSIUM SERPL-SCNC: 4.7 MMOL/L (ref 3.5–5.1)
RBC # BLD: 4.83 M/UL (ref 4.2–5.9)
SODIUM BLD-SCNC: 139 MMOL/L (ref 136–145)
TOTAL PROTEIN: 7.4 G/DL (ref 6.4–8.2)
TSH REFLEX: 2.42 UIU/ML (ref 0.27–4.2)
WBC # BLD: 8.9 K/UL (ref 4–11)

## 2021-04-27 PROCEDURE — 36415 COLL VENOUS BLD VENIPUNCTURE: CPT

## 2021-04-27 PROCEDURE — 85025 COMPLETE CBC W/AUTO DIFF WBC: CPT

## 2021-04-27 PROCEDURE — 84443 ASSAY THYROID STIM HORMONE: CPT

## 2021-04-27 PROCEDURE — 80053 COMPREHEN METABOLIC PANEL: CPT

## 2021-04-27 PROCEDURE — 99213 OFFICE O/P EST LOW 20 MIN: CPT | Performed by: FAMILY MEDICINE

## 2021-04-27 NOTE — PROGRESS NOTES
Patient _X__ reached   _____not reached-preop instructions left on voice mail_____________      DATE___5/4/21_____ TIME___0830_____ARRIVAL__0700  FEC_______      Nothing to eat or drink after midnight the night before,except for what the prep instructions call for. If you do not have the instructions or do not understand them please contact your doctors office. Follow any instructions your doctors office has given you including what medications to take the AM of your procedure and which ones to hold. You may use your inhalers. If you take a long acting insulin the margret prior please cut the dose in half and take no diabetic medications that AM.Follow specific doctors office instructions regarding blood thinners and if they want you to hold and for how long. If you are on a blood thinner and have no instructions please contact the office and ask. Dress comfortably,bring your insurance card,picture ID,and a complete list of medications, including supplements. You must have a responsible adult to stay with you during the procedure,drive you home and stay with you. [facility] phone number [phone] for any questions. OTHER INTRUCTIONS(if applicable)_____take anoro am of procedrue____________________________________________________      COVID TEST         _____ Done ___ where ____       _____ Scheduled ___ where ____       __X___Other__instructed to test here by 3/43/45_______________      VISITOR POLICY(subject to change)    There is a one visitor policy at [facility] for all surgeries and endoscopies. Whether the visitor can stay or will be asked to wait in the car will depend on the current policy and if social distancing can be maintained. The policy is subject to change at any time. Please make sure the visitor has a cell phone that is on,charged and able to accept calls, as this may be the way that the staff communicates with them. Pain management is NO VISITOR policyThe patients ride is expected to remain in the

## 2021-04-28 ENCOUNTER — OFFICE VISIT (OUTPATIENT)
Dept: PRIMARY CARE CLINIC | Age: 76
End: 2021-04-28
Payer: COMMERCIAL

## 2021-04-28 DIAGNOSIS — Z20.828 EXPOSURE TO SARS-ASSOCIATED CORONAVIRUS: Primary | ICD-10-CM

## 2021-04-28 LAB — SARS-COV-2: NOT DETECTED

## 2021-04-28 PROCEDURE — 99211 OFF/OP EST MAY X REQ PHY/QHP: CPT | Performed by: NURSE PRACTITIONER

## 2021-04-28 NOTE — PROGRESS NOTES
Donna Nate received a viral test for COVID-19. They were educated on isolation and quarantine as appropriate. For any symptoms, they were directed to seek care from their PCP, given contact information to establish with a doctor, directed to an urgent care or the emergency room.

## 2021-04-28 NOTE — PATIENT INSTRUCTIONS

## 2021-05-04 ENCOUNTER — ANESTHESIA (OUTPATIENT)
Dept: ENDOSCOPY | Age: 76
End: 2021-05-04
Payer: COMMERCIAL

## 2021-05-04 ENCOUNTER — ANESTHESIA EVENT (OUTPATIENT)
Dept: ENDOSCOPY | Age: 76
End: 2021-05-04
Payer: COMMERCIAL

## 2021-05-04 ENCOUNTER — HOSPITAL ENCOUNTER (OUTPATIENT)
Age: 76
Setting detail: OUTPATIENT SURGERY
Discharge: HOME | End: 2021-05-04
Attending: INTERNAL MEDICINE | Admitting: INTERNAL MEDICINE
Payer: COMMERCIAL

## 2021-05-04 VITALS
OXYGEN SATURATION: 99 % | DIASTOLIC BLOOD PRESSURE: 66 MMHG | RESPIRATION RATE: 18 BRPM | SYSTOLIC BLOOD PRESSURE: 131 MMHG

## 2021-05-04 VITALS
HEART RATE: 64 BPM | SYSTOLIC BLOOD PRESSURE: 143 MMHG | DIASTOLIC BLOOD PRESSURE: 59 MMHG | OXYGEN SATURATION: 94 % | WEIGHT: 240 LBS | RESPIRATION RATE: 18 BRPM | HEIGHT: 72 IN | TEMPERATURE: 98.3 F | BODY MASS INDEX: 32.51 KG/M2

## 2021-05-04 PROCEDURE — 2500000003 HC RX 250 WO HCPCS: Performed by: REGISTERED NURSE

## 2021-05-04 PROCEDURE — 7100000010 HC PHASE II RECOVERY - FIRST 15 MIN: Performed by: INTERNAL MEDICINE

## 2021-05-04 PROCEDURE — 2580000003 HC RX 258: Performed by: ANESTHESIOLOGY

## 2021-05-04 PROCEDURE — 6360000002 HC RX W HCPCS: Performed by: REGISTERED NURSE

## 2021-05-04 PROCEDURE — 3609010600 HC COLONOSCOPY POLYPECTOMY SNARE/COLD BIOPSY: Performed by: INTERNAL MEDICINE

## 2021-05-04 PROCEDURE — 7100000011 HC PHASE II RECOVERY - ADDTL 15 MIN: Performed by: INTERNAL MEDICINE

## 2021-05-04 PROCEDURE — 88305 TISSUE EXAM BY PATHOLOGIST: CPT

## 2021-05-04 PROCEDURE — 6370000000 HC RX 637 (ALT 250 FOR IP): Performed by: INTERNAL MEDICINE

## 2021-05-04 PROCEDURE — 3700000000 HC ANESTHESIA ATTENDED CARE: Performed by: INTERNAL MEDICINE

## 2021-05-04 PROCEDURE — 2709999900 HC NON-CHARGEABLE SUPPLY: Performed by: INTERNAL MEDICINE

## 2021-05-04 PROCEDURE — 3700000001 HC ADD 15 MINUTES (ANESTHESIA): Performed by: INTERNAL MEDICINE

## 2021-05-04 RX ORDER — PROPOFOL 10 MG/ML
INJECTION, EMULSION INTRAVENOUS PRN
Status: DISCONTINUED | OUTPATIENT
Start: 2021-05-04 | End: 2021-05-04 | Stop reason: SDUPTHER

## 2021-05-04 RX ORDER — SODIUM CHLORIDE 9 MG/ML
INJECTION, SOLUTION INTRAVENOUS CONTINUOUS
Status: DISCONTINUED | OUTPATIENT
Start: 2021-05-04 | End: 2021-05-04 | Stop reason: HOSPADM

## 2021-05-04 RX ORDER — LIDOCAINE HYDROCHLORIDE 20 MG/ML
INJECTION, SOLUTION EPIDURAL; INFILTRATION; INTRACAUDAL; PERINEURAL PRN
Status: DISCONTINUED | OUTPATIENT
Start: 2021-05-04 | End: 2021-05-04 | Stop reason: SDUPTHER

## 2021-05-04 RX ORDER — PROPOFOL 10 MG/ML
INJECTION, EMULSION INTRAVENOUS CONTINUOUS PRN
Status: DISCONTINUED | OUTPATIENT
Start: 2021-05-04 | End: 2021-05-04 | Stop reason: SDUPTHER

## 2021-05-04 RX ADMIN — SODIUM CHLORIDE: 9 INJECTION, SOLUTION INTRAVENOUS at 08:25

## 2021-05-04 RX ADMIN — LIDOCAINE HYDROCHLORIDE 100 MG: 20 INJECTION, SOLUTION EPIDURAL; INFILTRATION; INTRACAUDAL; PERINEURAL at 08:28

## 2021-05-04 RX ADMIN — SODIUM CHLORIDE: 9 INJECTION, SOLUTION INTRAVENOUS at 07:40

## 2021-05-04 RX ADMIN — PROPOFOL 100 MG: 10 INJECTION, EMULSION INTRAVENOUS at 08:28

## 2021-05-04 RX ADMIN — PROPOFOL 100 MCG/KG/MIN: 10 INJECTION, EMULSION INTRAVENOUS at 08:28

## 2021-05-04 ASSESSMENT — ENCOUNTER SYMPTOMS: SHORTNESS OF BREATH: 0

## 2021-05-04 ASSESSMENT — COPD QUESTIONNAIRES: CAT_SEVERITY: MILD

## 2021-05-04 NOTE — BRIEF OP NOTE
Brief Postoperative Note      Patient: Dalia Rivera  YOB: 1945  MRN: [id number]    Date of Procedure: 5/4/2021    Pre-Op Diagnosis: SCREEN FOR COLON CANCER Z12.11        Procedure(s):  COLONOSCOPY POLYPECTOMY SNARE/COLD BIOPSY    Surgeon(s):  Laureen Zhu MD    Anesthesia: Monitor Anesthesia Care    Estimated Blood Loss (mL): Minimal    Complications: None    Specimens:   ID Type Source Tests Collected by Time Destination   A : ascending colon polyp Tissue Colon SURGICAL PATHOLOGY Laureen Zhu MD 5/4/2021 7740      Findings:   Extremely difficult colonoscopy due to colon tortuosity, Two nurses applied abdmoinal pressure to reach the cecum. 6 mm ascending colon polyp, cold snared. Enlarged hemorrhoids. Mild mucosal tear noted at 25 cm. Plans:  Await bx. Increase fiber intake (>30 grams/day). Recall colonoscopy in 5 years.      Electronically signed by Laureen Zhu MD on 5/4/2021 at 8:53 AM

## 2021-05-04 NOTE — PROGRESS NOTES
Received from Endo Procedure Room to Phase 2 Recovery. Drowsy, responds easily to verbal stimuli. Respirations easy on room air. VSS. Denies any discomfort or nausea. Occasional congested, non-productive cough.

## 2021-05-04 NOTE — PROGRESS NOTES
Assisted to dress and then discharged to home with wife. Has instructions, cane, jacket and all belongings. Fluids given to go.

## 2021-05-04 NOTE — ANESTHESIA POSTPROCEDURE EVALUATION
Department of Anesthesiology  Postprocedure Note    Patient: Diana Aiken  MRN: [id number]  YOB: 1945  Date of evaluation: 5/4/2021  Time:  8:58 AM     Procedure Summary     Date: 05/04/21 Room / Location: [address] [place] [address]    Anesthesia Start: [address] Anesthesia Stop: 8225    Procedure: COLONOSCOPY POLYPECTOMY SNARE/COLD BIOPSY (N/A ) Diagnosis: (SCREEN FOR COLON CANCER Z12.11)    Surgeons: Enmanuel Woods MD Responsible Provider: Amber Gray MD    Anesthesia Type: MAC ASA Status: 3          Anesthesia Type: MAC    Jelani Phase I: Jelani Score: 10    Jelani Phase II:      Last vitals: Reviewed and per EMR flowsheets.        Anesthesia Post Evaluation    Patient location during evaluation: PACU  Level of consciousness: awake  Airway patency: patent  Complications: no  Cardiovascular status: hemodynamically stable  Respiratory status: acceptable

## 2021-05-04 NOTE — PROGRESS NOTES
Discharge instructions reviewed with patient and wife (at bedside), signed and copy given. All questions answered and patient and/or responsible adult verbalizes understanding.

## 2021-05-04 NOTE — H&P
Pre-operative History and Physical    Patient: Margaret Felder  : 1945  Acct#:     History Obtained From:  patient    HISTORY OF PRESENT ILLNESS:    The patient is a 70 y.o. male presents with for screening colonoscopy. Past Medical History:        Diagnosis Date    Allergic rhinitis     Aortic valve disorders     COPD (chronic obstructive pulmonary disease) (HCC)     Hydrocephalus (HCC)     normal pressure,    Hyperlipidemia     Hypertension     Hypertrophy of prostate without urinary obstruction and other lower urinary tract symptoms (LUTS)     Irritable bowel syndrome     Pancreatitis     Peptic ulcer, unspecified site, unspecified as acute or chronic, without mention of hemorrhage, perforation, or obstruction     Peripheral vascular disease ([facility] [address]      Past Surgical History:        Procedure Laterality Date    ABDOMINAL AORTIC ANEURYSM REPAIR N/A     ANGIOPLASTY      RT femoral artery    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REPAIR  [place]    [place]    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REPAIR      Aortic valve replacement    AORTIC VALVE REPLACEMENT  2018    CHOLECYSTECTOMY      FEMORAL BYPASS      bifemoral bypass    FOOT SURGERY Right 12/2/15    HERNIA REPAIR Left     OTHER SURGICAL HISTORY  10/23/2017    lumbar puncture    [place] ESOPHAGOGASTRODUODENOSCOPY TRANSORAL DIAGNOSTIC N/A 2018    EGD performed by Cierra Barrera MD at [address] [place] [address]  2018    WITH BIOPSY     Medications Prior to Admission:   No current facility-administered medications on file prior to encounter.       Current Outpatient Medications on File Prior to Encounter   Medication Sig Dispense Refill    torsemide (DEMADEX) 20 MG tablet Take 2 tablets by mouth daily 60 tablet 11    atorvastatin (LIPITOR) 80 MG tablet TAKE ONE TABLET BY MOUTH DAILY 90 tablet 3    amoxicillin (AMOXIL) 500 MG capsule TAKE FOUR CAPSULES BY MOUTH 1 HOUR PRIOR TO DENTAL PROCEDURE (Patient not taking: Reported on 3/25/2021) 4 capsule 4    umeclidinium-vilanterol (ANORO ELLIPTA) 62.5-25 MCG/INH AEPB inhaler Inhale 1 puff into the lungs daily 1 each 3    potassium chloride (KLOR-CON M) 20 MEQ extended release tablet Take 1 tablet by mouth daily 30 tablet 11    albuterol sulfate  (90 Base) MCG/ACT inhaler Inhale 2 puffs into the lungs every 4 hours as needed for Wheezing 1 Inhaler 1    aspirin 81 MG EC tablet Take 1 tablet by mouth daily 30 tablet 3     Allergies:  Patient has no known allergies. History of allergic reaction to anesthesia:  No    Social History:   U/R  Family History:   U/R    PHYSICAL EXAM:      BP (!) 159/90   Pulse 73   Temp 97.1 °F (36.2 °C) (Temporal)   Resp 16   Ht 6' (1.829 m)   Wt 240 lb (108.9 kg)   SpO2 97%   BMI 32.55 kg/m²  I        Heart:  Normal apical impulse, regular rate and rhythm, normal S1 and S2, no S3 or S4, and no murmur noted    Lungs:  No increased work of breathing, good air exchange, clear to auscultation bilaterally, no crackles or wheezing    Abdomen:  No scars, normal bowel sounds, soft, non-distended, non-tender, no masses palpated, no hepatosplenomegally      ASA Grade:  ASA 2 - Patient with mild systemic disease with no functional limitations    Mallampati Class:  Class I: Soft palate, uvula, fauces, pillars visible  __________  Class II: Soft palate, uvula, fauces visible  __________   Class III: Soft palate, base of uvula visible  ____X______  Class IV: Hard palate only visible   __________      ASSESSMENT AND PLAN:    1. Patient is a 70 y.o. male here for COLONOSCOPY with deep sedation  2. Procedure options, risks and benefits reviewed with patient. Patient expresses understanding.       MD Precious Jc  05/04/21

## 2021-05-04 NOTE — ANESTHESIA PRE PROCEDURE
Department of Anesthesiology  Preprocedure Note       Name:  Diana Guardian   Age:  70 y.o.  :  1945                                          MRN:  [id number]         Date:  2021      Surgeon: Noelle Weathers):  Enmanuel Woods MD    Procedure: Procedure(s):  COLONOSCOPY    Medications prior to admission:   Prior to Admission medications    Medication Sig Start Date End Date Taking?  Authorizing Provider   torsemide (DEMADEX) 20 MG tablet Take 2 tablets by mouth daily 3/25/21   Paul Ontiveros MD   atorvastatin (LIPITOR) 80 MG tablet TAKE ONE TABLET BY MOUTH DAILY 21   Herminia Rivero MD   amoxicillin (AMOXIL) 500 MG capsule TAKE FOUR CAPSULES BY MOUTH 1 HOUR PRIOR TO DENTAL PROCEDURE  Patient not taking: Reported on 3/25/2021 12/30/20   Paul Ontiveros MD   umeclidinium-vilanterol [facility]) 62.5-25 MCG/INH AEPB inhaler Inhale 1 puff into the lungs daily 20   Maine Ma MD   potassium chloride (KLOR-CON M) 20 MEQ extended release tablet Take 1 tablet by mouth daily 20   Paul Ontiveros MD   albuterol sulfate  (90 Base) MCG/ACT inhaler Inhale 2 puffs into the lungs every 4 hours as needed for Wheezing 19   Marlene Kyle MD   aspirin 81 MG EC tablet Take 1 tablet by mouth daily 3/21/18   Paul Ontiveros MD       Current medications:    Current Facility-Administered Medications   Medication Dose Route Frequency Provider Last Rate Last Admin    0.9 % sodium chloride infusion   Intravenous Continuous Amber Gray MD           Allergies:  No Known Allergies    Problem List:    Patient Active Problem List   Diagnosis Code    Hypercholesteremia E78.00    Essential hypertension I10    Nonrheumatic aortic valve disorder I35.9    Hypertrophy of prostate without urinary obstruction and other lower urinary tract symptoms (LUTS) N40.0    Peptic ulcer K27.9    PVD (peripheral vascular disease) (HCC) I73.9    Allergic rhinitis J30.9    COPD (chronic obstructive pulmonary disease) ([facility]) J44.9    Edema R60.9    Hallux valgus with bunions M20.10, M21.619    Carotid stenosis I65.29    Rosacea L71.9    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 I65.23    Normal pressure hydrocephalus (HCC) G91.2    Ataxia R27.0    Mild cognitive impairment G31.84    Chronic idiopathic constipation K59.04    Encounter for follow-up for aortic valve replacement Z09, Z95.2    GI hemorrhage K92.2    Nonrheumatic aortic valve stenosis I35.0    Anticoagulated Z79.01    S/P TAVR (transcatheter aortic valve replacement) Z95.2    Hyperglycemia R73.9       Past Medical History:        Diagnosis Date    Allergic rhinitis     Aortic valve disorders     COPD (chronic obstructive pulmonary disease) ([facility])     Hydrocephalus ([facility])     normal pressure,    Hyperlipidemia     Hypertension     Hypertrophy of prostate without urinary obstruction and other lower urinary tract symptoms (LUTS)     Irritable bowel syndrome     Pancreatitis     Peptic ulcer, unspecified site, unspecified as acute or chronic, without mention of hemorrhage, perforation, or obstruction     Peripheral vascular disease ([facility] [address]        Past Surgical History:        Procedure Laterality Date    ABDOMINAL AORTIC ANEURYSM REPAIR N/A 2001    ANGIOPLASTY      RT femoral artery    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    [place]    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REPAIR      Aortic valve replacement    AORTIC VALVE REPLACEMENT  2018    CHOLECYSTECTOMY      FEMORAL BYPASS      bifemoral bypass    FOOT SURGERY Right 12/2/15    HERNIA REPAIR Left     OTHER SURGICAL HISTORY  10/23/2017    lumbar puncture    [place] ESOPHAGOGASTRODUODENOSCOPY TRANSORAL DIAGNOSTIC N/A 12/4/2018    EGD performed by Sage Metzger MD at [address] [facility] ENDOSCOPY  09/19/2018    WITH BIOPSY       Social History:    Social History     Tobacco Use    Smoking status: Former Smoker     Packs/day: 1.50     Years: 30.00     Pack years: 45.00     Types: Cigarettes     Quit date: 2001     Years since quittin.3    Smokeless tobacco: Never Used    Tobacco comment: H.O.smoking at age 25 / smoked up to 1.5p.p.d /quit     Substance Use Topics    Alcohol use: Yes     Alcohol/week: 2.0 standard drinks     Types: 2 Cans of beer per week     Frequency: Monthly or less     Drinks per session: 1 or 2     Binge frequency: Never     Comment: occasional beer                                Counseling given: Not Answered  Comment: H.O.smoking at age 25 / smoked up to 1.5p.p.d /quit        Vital Signs (Current):   Vitals:    21 1437   Weight: 240 lb (108.9 kg)   Height: 6' (1.829 m)                                              BP Readings from Last 3 Encounters:   21 (!) 152/74   20 (!) 142/82   10/01/20 138/60       NPO Status:                                                                                 BMI:   Wt Readings from Last 3 Encounters:   21 240 lb (108.9 kg)   21 240 lb 9.6 oz (109.1 kg)   20 244 lb (110.7 kg)     Body mass index is 32.55 kg/m².     CBC:   Lab Results   Component Value Date    WBC 8.9 2021    RBC 4.83 2021    HGB 14.7 2021    HCT 43.4 2021    MCV 89.9 2021    RDW 13.8 2021     2021       CMP:   Lab Results   Component Value Date     2021    K 4.7 2021    K 3.6 2018    CL 99 2021    CO2 29 2021    BUN 10 2021    CREATININE 1.4 2021    GFRAA >60 2021    GFRAA >60 10/05/2011    AGRATIO 1.3 2021    LABGLOM 50 2021    LABGLOM 78 2013    GLUCOSE 86 2021    PROT 7.4 2021    PROT 7.4 2012    CALCIUM 9.2 2021    BILITOT 0.9 2021    ALKPHOS 114 2021    AST 20 2021    ALT 16 2021       POC Tests: No results for input(s): POCGLU, POCNA, POCK, POCCL, POCBUN, POCHEMO, 5/4/2021

## 2021-05-17 DIAGNOSIS — I10 ESSENTIAL HYPERTENSION: Primary | ICD-10-CM

## 2021-05-24 ENCOUNTER — HOSPITAL ENCOUNTER (OUTPATIENT)
Age: 76
Discharge: HOME | End: 2021-05-24
Payer: COMMERCIAL

## 2021-05-24 DIAGNOSIS — I10 ESSENTIAL HYPERTENSION: ICD-10-CM

## 2021-05-24 LAB
ALBUMIN SERPL-MCNC: 4.1 G/DL (ref 3.4–5)
ANION GAP SERPL CALCULATED.3IONS-SCNC: 11 MMOL/L (ref 3–16)
BUN BLDV-MCNC: 13 MG/DL (ref 7–20)
CALCIUM SERPL-MCNC: 9.1 MG/DL (ref 8.3–10.6)
CHLORIDE BLD-SCNC: 103 MMOL/L (ref 99–110)
CO2: 25 MMOL/L (ref 21–32)
CREAT SERPL-MCNC: 1 MG/DL (ref 0.8–1.3)
GFR AFRICAN AMERICAN: >60
GFR NON-AFRICAN AMERICAN: >60
GLUCOSE BLD-MCNC: 99 MG/DL (ref 70–99)
PHOSPHORUS: 3 MG/DL (ref 2.5–4.9)
POTASSIUM SERPL-SCNC: 4.3 MMOL/L (ref 3.5–5.1)
SODIUM BLD-SCNC: 139 MMOL/L (ref 136–145)

## 2021-05-24 PROCEDURE — 80069 RENAL FUNCTION PANEL: CPT

## 2021-05-24 PROCEDURE — 36415 COLL VENOUS BLD VENIPUNCTURE: CPT

## 2021-06-03 ENCOUNTER — TELEPHONE (OUTPATIENT)
Dept: CARDIOLOGY CLINIC | Age: 76
End: 2021-06-03

## 2021-07-28 RX ORDER — ALBUTEROL SULFATE 90 UG/1
2 AEROSOL, METERED RESPIRATORY (INHALATION) EVERY 4 HOURS PRN
Qty: 1 INHALER | Refills: 1 | Status: SHIPPED | OUTPATIENT
Start: 2021-07-28 | End: 2022-05-19 | Stop reason: ALTCHOICE

## 2021-07-28 NOTE — TELEPHONE ENCOUNTER
Medication:   Requested Prescriptions     Pending Prescriptions Disp Refills    albuterol sulfate  (90 Base) MCG/ACT inhaler 1 Inhaler 1     Sig: Inhale 2 puffs into the lungs every 4 hours as needed for Wheezing        Last Filled: 12/23/2019     Patient Phone Number: [phone] (home) [phone] (work)    Last appt: 11/19/2020   Next appt: Visit date not found    Last OARRS: No flowsheet data found.

## 2021-07-28 NOTE — TELEPHONE ENCOUNTER
albuterol sulfate  (90 Base) MCG/ACT inhaler 1 Inhaler 1 12/23/2019     Sig - Route: Inhale 2 puffs into the lungs every 4 hours as needed for Wheezing - Inhalation      singh in chart

## 2021-08-25 NOTE — PROGRESS NOTES
[address]   H+P // CONSULT // OUTPATIENT VISIT // Dirk Horowitz MD   ENC TYPE Followup     CHIEF COMPLAINT     TYPE Chronic   SX None   PROBS AS s/p TAVR, HTN, CHOL, EDEMA     HISTORY OF PRESENT ILLNESS     GEN Doing well. No new concerns. AS S/p TAVR. Denies cp, sob, dizziness, syncope. HTN Ambulatory BP in good range, no ha or dizziness. CHOL Last chol in good range, tolerating statin without sa. MED Compliant with CV meds listed below without reported side effects. HISTORY/ALLERGIES/ROS     MedHx:   has a past medical history of Allergic rhinitis, Aortic valve disorders, COPD (chronic obstructive pulmonary disease) (HCC), Hydrocephalus ([address] Hyperlipidemia, Hypertension, Hypertrophy of prostate without urinary obstruction and other lower urinary tract symptoms (LUTS), Irritable bowel syndrome, Pancreatitis, Peptic ulcer, unspecified site, unspecified as acute or chronic, without mention of hemorrhage, perforation, or obstruction, and Peripheral vascular disease ([facility] [address] SurgHx:  has a past surgical history that includes femoral bypass; Cholecystectomy; hernia repair (Left); Tonsillectomy; angioplasty; Abdominal aortic aneurysm repair (N/A, 2001); Foot surgery (Right, 12/2/15); other surgical history (10/23/2017); Anomalous venous return repair (03/20/2018); Anomalous venous return repair; Upper gastrointestinal endoscopy (09/19/2018); Aortic valve replacement (2018); pr esophagogastroduodenoscopy transoral diagnostic (N/A, 12/4/2018); and Colonoscopy (N/A, 5/4/2021). SocHx:   reports that he quit smoking about 19 years ago. His smoking use included cigarettes. He has a 45.00 pack-year smoking history. He has never used smokeless tobacco. He reports current alcohol use of about 2.0 standard drinks of alcohol per week. He reports that he does not use drugs.    FamHx:  family history includes Alcohol Abuse in his father; Cancer in his brother; Diabetes in his father; Heart Disease in his father; Other in his mother. Allergies: Patient has no known allergies. ROS:  [x]Full ROS obtained and negative except as mentioned in HPI     MEDICATIONS      Current Outpatient Medications   Medication Sig Dispense Refill    albuterol sulfate  (90 Base) MCG/ACT inhaler Inhale 2 puffs into the lungs every 4 hours as needed for Wheezing 1 Inhaler 1    KLOR-CON M20 20 MEQ extended release tablet TAKE ONE TABLET BY MOUTH DAILY 30 tablet 6    torsemide (DEMADEX) 20 MG tablet Take 2 tablets by mouth daily 60 tablet 5    atorvastatin (LIPITOR) 80 MG tablet TAKE ONE TABLET BY MOUTH DAILY 90 tablet 3    amoxicillin (AMOXIL) 500 MG capsule TAKE FOUR CAPSULES BY MOUTH 1 HOUR PRIOR TO DENTAL PROCEDURE (Patient not taking: Reported on 3/25/2021) 4 capsule 4    umeclidinium-vilanterol (ANORO ELLIPTA) 62.5-25 MCG/INH AEPB inhaler Inhale 1 puff into the lungs daily 1 each 3    aspirin 81 MG EC tablet Take 1 tablet by mouth daily 30 tablet 3     No current facility-administered medications for this visit.      Reviewed with patient and will remain unchanged except as mentioned in A/P  PHYSICAL EXAM     Vitals:    09/23/21 1107   BP: 118/64   Pulse: 68   SpO2: 98%      Gen Alert, coop, no distress Heart  Rrr, no mrg   Head NC, AT, no abnorm Abd  Soft, NT, +BS, no mass, no OM   Eyes PER, conj/corn clear Ext  Ext nl, AT, no C/C/E   Nose Nares nl, no drain, NT Pulse 2+ and symmetric   Throat Lips, mucosa, tongue nl Skin Col/text/turg nl, no vis rash/les   Neck S/S, TM, NT, no bruit/JVD Psych Nl mood and affect   Lung CTA-B, unlabored, no DTP Lymph   No cervical or axillary LA   Ch wall NT, no deform Neuro  Nl gross M/S exam     ASSESSMENT AND PLAN     *AS   Date EF Detail   Sx   No concerning   Hx 3/18  [facility] TAVR [place] S3 26mm   [facility] 1/18  Nonobstructive   TTE 7/17  3/18  4/18  4/19  2/20  3/21 55%  65%  60%  60%  65%  60% AS MG 38  TAVR  well seated, MG 14, no AI  TAVR MG 10, no AI  TAVR MG 14, no AI  TAVR MG 12, no AI  TAVR MG 9, no AI   Plan   Continue current medications listed above  Echo yearly   *HTN  Status Controlled  Plan Counseled on diet/salt/exercise/weight, continue meds at doses above  *CHOL  Status  Uncontrolled with last LDL of 111 (goal <70) and HDL of 56 (11/20)  Plan Counseled on diet/exercise/weight, continue statin, lipid/liver surveillance per PCP  *EDEMA  Status improved  Plan Continue torsemide 40mg daily with KCL   Check renal  *PVD  s/p Aortobifem, no leg pain  Plan Per VS  *COMPLIANCE  Status Compliant  Plan Discussed importance of compliance with meds/diet/salt/exercise; avoid tob/alc/drugs; patient verbalized understanding  *FOLLOWUP  6 months with echo    [address] Regla Sales, am scribing for and in the presence of Francisco Vuong MD.   SignedRegla 08/25/21 8:32 AM   Provider Chasity Loyola is working as a scribe for and in the presence of me (Francisco Vuong MD). Working as a scribe, Regla English may have prepopulated components of this note with my historical  intellectual property under my direct supervision. Any additions to this intellectual property were performed in my presence and at my direction.   Furthermore, the content and accuracy of this note have been reviewed by me Francisco Vuong MD).  9/23/2021 7:29 AM    CODING     Category Diagnosis   Stable chronic illness  (30903/01972 - 2 or more) AS, HTN, CHOL, edema, PVD   Chronic illness with: Exac, progr or SA of Tx  (03656/52832 - 1 or more)    Undiagnosed new problem with: uncertain prognosis  (98509/37622 - 1 or more)    Acute illness with systemic Sx  (72582/68140 - 1 or more)    Acute, complicated injury  (47216/01966 - 1 or more)    44693 1 or more chronic illness with exacerbation, progression or SA of treatment    Time  30-39 minutes spent preparing to see patient including reviewing patient history/prior tests/prior consults, performing a medical exam, counseling and educating patient/family/caregiver, ordering medications/tests/procedures, referring and communicating with PCPs and other pertinent consultants, documenting information in the EMR, independently interpreting results and communicating to family and coordination of patient care.

## 2021-09-01 ENCOUNTER — OFFICE VISIT (OUTPATIENT)
Dept: FAMILY MEDICINE CLINIC | Age: 76
End: 2021-09-01
Payer: COMMERCIAL

## 2021-09-01 VITALS — HEART RATE: 78 BPM | OXYGEN SATURATION: 96 % | TEMPERATURE: 99.2 F

## 2021-09-01 DIAGNOSIS — J20.9 ACUTE BRONCHITIS DUE TO INFECTION: Primary | ICD-10-CM

## 2021-09-01 PROCEDURE — 99213 OFFICE O/P EST LOW 20 MIN: CPT | Performed by: NURSE PRACTITIONER

## 2021-09-01 RX ORDER — ALBUTEROL SULFATE 90 UG/1
2 AEROSOL, METERED RESPIRATORY (INHALATION) 4 TIMES DAILY PRN
Qty: 18 G | Refills: 0 | Status: SHIPPED
Start: 2021-09-01 | End: 2021-09-23 | Stop reason: SDUPTHER

## 2021-09-01 RX ORDER — PREDNISONE 20 MG/1
TABLET ORAL
Qty: 18 TABLET | Refills: 0 | Status: SHIPPED | OUTPATIENT
Start: 2021-09-01 | End: 2021-09-23 | Stop reason: ALTCHOICE

## 2021-09-01 RX ORDER — AZITHROMYCIN 250 MG/1
250 TABLET, FILM COATED ORAL SEE ADMIN INSTRUCTIONS
Qty: 6 TABLET | Refills: 0 | Status: SHIPPED | OUTPATIENT
Start: 2021-09-01 | End: 2021-09-06

## 2021-09-01 NOTE — PROGRESS NOTES
Left []  Billateral    [x] Oropharynx [x] Clear [] Red [] Exudate [] Swollen    [x] No adenopathy [] Adenopathy __________    [] Lungs clear with good movement and effort  [] Breathing appears normal     [] Speaks in complete sentences  [] Appears tachypneic   [x] Wheezing- throughout           [x] Rhonchi- LLL, RLL   [x] Decreased    [x] CV RRR  [x] No Murmur  [] Murmur  [] Irregular  [] Tachycardic    [] OTHER:  1}      TESTS ORDERED:    [] POCT FLU  [] POCT STREP  [] COVID-19 Test sent  [] Appointment made at testing clinic for patient to get a COVID test.       TEST RESULTS:    POCT FLU test:  [] Positive  [] Negative  POCT STREP test:  [] Positive  [] Negative    ASSESSMENT:  [] Allergic Rhinitis  [] Asthma Exacerbation  [] Bronchitis  [] COPD Exacerbation  [] Gastroenteritis  [] Influenza  [] Sinusitis  [] Strep Throat [] Sore Throat  [] Viral URI   [] Possible COVID-19   [] Exposure to COVID -19  [] Positive for COVID  [] Screening for Viral Disease (COVID test no sx)        Mingo Delgado was seen today for cough. Diagnoses and all orders for this visit:    Acute bronchitis due to infection  Monitor very closely - advised if any fevers needs to report to ED or follow up with office.   -     azithromycin (ZITHROMAX) 250 MG tablet; Take 1 tablet by mouth See Admin Instructions for 5 days 500mg on day 1 followed by 250mg on days 2 - 5  -     predniSONE (DELTASONE) 20 MG tablet; Take 3 tabs for 3 days, 2 tabs for 3 days and 1 tab for 3 days  -     albuterol sulfate HFA (VENTOLIN HFA) 108 (90 Base) MCG/ACT inhaler;  Inhale 2 puffs into the lungs 4 times daily as needed for Wheezing              [] Low risk for complications from COVID 19  [] Moderate risk for complications from COVID 19  [x] High risk for complications from COVID 19    PLAN:    [x] Discharge home with written instructions for:  [] Flu management  [] Strep throat management  [] Viral respiratory illness management  [] Sinusitis management  [] Bronchitis Management  [] Possible COVID-19 infection with self-quarantine and management of symptoms  [x] Follow-up with primary care physician or emergency department if worsens  [] Note given for work    [] Referred to emergency department for evaluation      IVictor Hugo LPN, am scribing for and in the presence of ROSA MARIA Hernandez CNP.  Electronically signed by Victor Hugo Schmidt LPN on 3/3/90 at 6:60 PM EDT

## 2021-09-23 ENCOUNTER — OFFICE VISIT (OUTPATIENT)
Dept: CARDIOLOGY CLINIC | Age: 76
End: 2021-09-23
Payer: COMMERCIAL

## 2021-09-23 VITALS
DIASTOLIC BLOOD PRESSURE: 64 MMHG | BODY MASS INDEX: 31.97 KG/M2 | WEIGHT: 236 LBS | HEIGHT: 72 IN | SYSTOLIC BLOOD PRESSURE: 118 MMHG | HEART RATE: 68 BPM | OXYGEN SATURATION: 98 %

## 2021-09-23 DIAGNOSIS — I35.0 NONRHEUMATIC AORTIC VALVE STENOSIS: Primary | ICD-10-CM

## 2021-09-23 DIAGNOSIS — I10 ESSENTIAL HYPERTENSION: ICD-10-CM

## 2021-09-23 DIAGNOSIS — E78.00 HYPERCHOLESTEREMIA: ICD-10-CM

## 2021-09-23 DIAGNOSIS — Z95.2 S/P TAVR (TRANSCATHETER AORTIC VALVE REPLACEMENT): ICD-10-CM

## 2021-09-23 DIAGNOSIS — I73.9 PVD (PERIPHERAL VASCULAR DISEASE) (HCC): ICD-10-CM

## 2021-09-23 PROCEDURE — 99214 OFFICE O/P EST MOD 30 MIN: CPT | Performed by: INTERNAL MEDICINE

## 2021-09-29 ENCOUNTER — TELEPHONE (OUTPATIENT)
Dept: FAMILY MEDICINE CLINIC | Age: 76
End: 2021-09-29

## 2021-09-29 ENCOUNTER — OFFICE VISIT (OUTPATIENT)
Dept: NEUROLOGY | Age: 76
End: 2021-09-29
Payer: COMMERCIAL

## 2021-09-29 VITALS
BODY MASS INDEX: 31.97 KG/M2 | HEART RATE: 65 BPM | HEIGHT: 72 IN | DIASTOLIC BLOOD PRESSURE: 87 MMHG | WEIGHT: 236 LBS | SYSTOLIC BLOOD PRESSURE: 146 MMHG

## 2021-09-29 DIAGNOSIS — R27.0 ATAXIA: ICD-10-CM

## 2021-09-29 DIAGNOSIS — G91.2 NORMAL PRESSURE HYDROCEPHALUS (HCC): Primary | ICD-10-CM

## 2021-09-29 DIAGNOSIS — G60.9 IDIOPATHIC PERIPHERAL NEUROPATHY: ICD-10-CM

## 2021-09-29 DIAGNOSIS — R29.6 RECURRENT FALLS: ICD-10-CM

## 2021-09-29 PROCEDURE — 99215 OFFICE O/P EST HI 40 MIN: CPT | Performed by: PSYCHIATRY & NEUROLOGY

## 2021-09-29 NOTE — PROGRESS NOTES
Vidhya Steinberg   Neurology followup    Subjective:   CC/HP  History was obtained from the patient and his wife. Patient is here for follow-up visit   Interval history:  Patient has known NPH as well as peripheral neuropathy. Patient and his wife tell me that his balance is poor. I have seen him last back in July 2019. Since then his balance has gotten worse and he seems to be dragging one leg or the other. There was some concern about a possible stroke as well. Patient has known hypertension hyperlipidemia hyperglycemia and peripheral vascular disease.   Detailed history:  Patient was referred for evaluation of abnormal gait and a possibly abnormal MRI brain  Symptoms started several months ago  Onset was gradual and the symptoms of persistent  According to the patient and his wife he has balance difficulties and tends to stumble a lot  He was seen by a podiatrist who felt that his gait was abnormal and this led to the MRI brain  Patient denies any urinary symptoms  Patient and his family have noticed mild memory impairment especially for short-term events  There is no focal weakness numbness true vertigo or diplopia    REVIEW OF SYSTEMS    Constitutional:  []   Chills   []  Fatigue   []  Fevers   []  Malaise   []  Weight loss     [x] Denies all of the above    Respiratory:   []  Cough    []  Shortness of breath         [x] Denies all of the above     Cardiovascular:   []  Chest pain    []  Exertional chest pressure/discomfort           [] Palpitations    []  Syncope     [x] Denies all of the above        Past Medical History:   Diagnosis Date    Allergic rhinitis     Aortic valve disorders     COPD (chronic obstructive pulmonary disease) ([address]     Hydrocephalus (HCC)     normal pressure,    Hyperlipidemia     Hypertension     Hypertrophy of prostate without urinary obstruction and other lower urinary tract symptoms (LUTS)     Irritable bowel syndrome     Pancreatitis     Peptic ulcer, unspecified site, unspecified as acute or chronic, without mention of hemorrhage, perforation, or obstruction     Peripheral vascular disease ([facility] [address]      Family History   Problem Relation Age of Onset    Heart Disease Father     Diabetes Father     Alcohol Abuse Father     Other Mother     Cancer Brother         liver     Social History     Socioeconomic History    Marital status:      Spouse name: dalia    Number of children: 2    Years of education: Not on file    Highest education level: Not on file   Occupational History    Not on file   Tobacco Use    Smoking status: Former Smoker     Packs/day: 1.50     Years: 30.00     Pack years: 45.00     Types: Cigarettes     Quit date: 2001     Years since quittin.7    Smokeless tobacco: Never Used    Tobacco comment: H.O.smoking at age 25 / smoked up to 1.5p.p.d /quit     Vaping Use    Vaping Use: Never used   Substance and Sexual Activity    Alcohol use: Yes     Alcohol/week: 2.0 standard drinks     Types: 2 Cans of beer per week     Comment: occasional beer    Drug use: Never    Sexual activity: Yes     Partners: Female     Birth control/protection: Post-menopausal   Other Topics Concern    Not on file   Social History Narrative    Not on file     Social Determinants of Health     Financial Resource Strain:     Difficulty of Paying Living Expenses:    Food Insecurity:     Worried About Running Out of Food in the Last Year:     [address] in the Last Year:    Transportation Needs:     Lack of Transportation (Medical):      Lack of Transportation (Non-Medical):    Physical Activity:     Days of Exercise per Week:     Minutes of Exercise per Session:    Stress:     Feeling of Stress :    Social Connections:     Frequency of Communication with Friends and Family:     Frequency of Social Gatherings with Friends and Family:     Attends [employer] Services:     Active Member of Clubs or Organizations:     Attends Club or Organization causing sensory ataxia  MRI brain images in the past showed enlarged ventricles  Isotope cisternogram was also positive for NPH  EMG and nerve conduction studies showed peripheral neuropathy, etiology not clear  TSH B12 and serum protein immunofixation were normal    Plan :  Discussed with patient and his wife   I will repeat his MRI brain   Requested the radiologist to compare the ventricular size from the scan done in 2019. If there is any dramatic increase in the size of ventricles we will consider lumbar puncture with large volume CSF tap. Otherwise we will continue to monitor him. I will see him back in 6 weeks for follow-up    Please note a portion of  this chart was generated using dragon dictation software. Although every effort was made to ensure the accuracy of this automated transcription, some errors in transcription may have occurred.

## 2021-09-29 NOTE — PATIENT INSTRUCTIONS
Test results policy: Once Dr. Thornton [place] has reviewed your test results, our staff will contact you to discuss any abnormal findings. If you have questions about your test results, please contact our office via phone or [phone]

## 2021-09-30 NOTE — TELEPHONE ENCOUNTER
LM advising pts wife we do not have full shipment of flu in yet therefore unable to schedule nurse visit. Can check back in a couple weeks or check with pharmacy.
Diarrhea,   Headache? No  Have you had close contact with someone with COVID-19 in the last 14 days? No  (Service Expert  click yes below to proceed with [employer] As Usual   Scheduling)?  Yes

## 2021-10-11 ENCOUNTER — HOSPITAL ENCOUNTER (OUTPATIENT)
Dept: MRI IMAGING | Age: 76
Discharge: HOME | End: 2021-10-11
Payer: COMMERCIAL

## 2021-10-11 DIAGNOSIS — R27.0 ATAXIA: ICD-10-CM

## 2021-10-11 DIAGNOSIS — G91.2 NORMAL PRESSURE HYDROCEPHALUS (HCC): ICD-10-CM

## 2021-10-11 PROCEDURE — 70551 MRI BRAIN STEM W/O DYE: CPT

## 2021-10-13 ENCOUNTER — OFFICE VISIT (OUTPATIENT)
Dept: FAMILY MEDICINE CLINIC | Age: 76
End: 2021-10-13
Payer: COMMERCIAL

## 2021-10-13 ENCOUNTER — TELEPHONE (OUTPATIENT)
Dept: NEUROLOGY | Age: 76
End: 2021-10-13

## 2021-10-13 VITALS
SYSTOLIC BLOOD PRESSURE: 142 MMHG | DIASTOLIC BLOOD PRESSURE: 62 MMHG | OXYGEN SATURATION: 98 % | TEMPERATURE: 97.4 F | HEART RATE: 78 BPM

## 2021-10-13 DIAGNOSIS — B02.9 HERPES ZOSTER WITHOUT COMPLICATION: Primary | ICD-10-CM

## 2021-10-13 PROCEDURE — 99214 OFFICE O/P EST MOD 30 MIN: CPT | Performed by: NURSE PRACTITIONER

## 2021-10-13 RX ORDER — VALACYCLOVIR HYDROCHLORIDE 1 G/1
1000 TABLET, FILM COATED ORAL 3 TIMES DAILY
Qty: 21 TABLET | Refills: 0 | Status: SHIPPED | OUTPATIENT
Start: 2021-10-13 | End: 2021-10-20

## 2021-10-13 ASSESSMENT — ENCOUNTER SYMPTOMS
COUGH: 0
VOMITING: 0
DIARRHEA: 0
SHORTNESS OF BREATH: 0
NAUSEA: 0

## 2021-10-13 NOTE — TELEPHONE ENCOUNTER
Addendum has been added. Pt's wife advised no enhancement when compared to 2019 study and no intracranial abnormalities reported.

## 2021-10-13 NOTE — PROGRESS NOTES
Kiera Mckeon  : 1945  Encounter date: 10/13/2021    This is a 70 y.o. male who presents with  Chief Complaint   Patient presents with    Headache     History of present illness:    HPI   1. Presents to clinic today with concerns for headaches that started approximately 2 days prior. Per patient recently had an MRI with Dr. Inocente Braxton for monitoring of his hydrocephalus headache started shortly after arriving home from completing the MRI on Monday morning. Has taken Tylenol x 2 with some short term relief. Reports headache is shooting from the front of the scalp to the back and skin is tender to touch. Has had shingles previously - in his 25s. No current rash on scalp. No Known Allergies  Current Outpatient Medications   Medication Sig Dispense Refill    valACYclovir (VALTREX) 1 g tablet Take 1 tablet by mouth 3 times daily for 7 days 21 tablet 0    albuterol sulfate  (90 Base) MCG/ACT inhaler Inhale 2 puffs into the lungs every 4 hours as needed for Wheezing 1 Inhaler 1    KLOR-CON M20 20 MEQ extended release tablet TAKE ONE TABLET BY MOUTH DAILY 30 tablet 6    torsemide (DEMADEX) 20 MG tablet Take 2 tablets by mouth daily (Patient taking differently: Take 20 mg by mouth daily ) 60 tablet 5    atorvastatin (LIPITOR) 80 MG tablet TAKE ONE TABLET BY MOUTH DAILY 90 tablet 3    amoxicillin (AMOXIL) 500 MG capsule TAKE FOUR CAPSULES BY MOUTH 1 HOUR PRIOR TO DENTAL PROCEDURE 4 capsule 4    umeclidinium-vilanterol (ANORO ELLIPTA) 62.5-25 MCG/INH AEPB inhaler Inhale 1 puff into the lungs daily 1 each 3    aspirin 81 MG EC tablet Take 1 tablet by mouth daily 30 tablet 3     No current facility-administered medications for this visit. Review of Systems   Constitutional: Negative for activity change, appetite change, chills, fatigue and fever. Respiratory: Negative for cough and shortness of breath. Cardiovascular: Negative for chest pain and palpitations.    Gastrointestinal: Negative for diarrhea, nausea and vomiting. Neurological: Positive for headaches. Past medical, surgical, family and social history were reviewed and updated with the patient. Objective:    BP (!) 142/62   Pulse 78   Temp 97.4 °F (36.3 °C) (Tympanic)   SpO2 98%         BP Readings from Last 3 Encounters:   10/13/21 (!) 142/62   09/29/21 (!) 146/87   09/23/21 118/64     Wt Readings from Last 3 Encounters:   09/29/21 236 lb (107 kg)   09/23/21 236 lb (107 kg)   05/04/21 240 lb (108.9 kg)     Physical Exam  Constitutional:       General: He is not in acute distress. Appearance: He is well-developed. HENT:      Head: Normocephalic and atraumatic. Cardiovascular:      Rate and Rhythm: Normal rate and regular rhythm. Heart sounds: Normal heart sounds, S1 normal and S2 normal.   Pulmonary:      Effort: Pulmonary effort is normal. No respiratory distress. Breath sounds: Normal breath sounds. Skin:     General: Skin is warm and dry. Comments: No rash   Neurological:      Mental Status: He is alert and oriented to person, place, and time. Psychiatric:         Thought Content: Thought content normal.         Judgment: Judgment normal.       Assessment/Plan    1. Herpes zoster without complication  No rash at time of office visit, but symptoms concerning for shingles with skin sensitivity and burning/shooting pain. Will start Valtrex. Patient to monitor symptoms - call office if symptoms worsen or do not improve. If headaches continue with no rash may consider follow up with Dr. Nikos Finch office.   - valACYclovir (VALTREX) 1 g tablet; Take 1 tablet by mouth 3 times daily for 7 days  Dispense: 21 tablet; Refill: 0     Tivis Notch was counseled regarding symptoms of current diagnosis, course and complications of disease if inadequately treated.   Discussed side effects of medications, diagnosis, treatment options, and prognosis along with risks, benefits, complications, and alternatives of treatment including labs, imaging and other studies/treatment targets and goals. He verbalized understanding of instructions and counseling. Return if symptoms worsen or fail to improve. Medical decision making of moderate complexity.

## 2021-10-13 NOTE — TELEPHONE ENCOUNTER
Spoke to [facility] AT [facility] ([phone]) re: addendum. Dr Nahum Hickey asked to compare MRI Brain from 8/5/2019 for ventricular size which was not noted in report.

## 2021-10-15 RX ORDER — GABAPENTIN 100 MG/1
100 CAPSULE ORAL NIGHTLY
Qty: 30 CAPSULE | Refills: 2 | Status: SHIPPED | OUTPATIENT
Start: 2021-10-15 | End: 2021-10-26 | Stop reason: SDUPTHER

## 2021-10-15 RX ORDER — LEVOCETIRIZINE DIHYDROCHLORIDE 5 MG/1
5 TABLET, FILM COATED ORAL NIGHTLY
Qty: 30 TABLET | Refills: 1 | Status: SHIPPED | OUTPATIENT
Start: 2021-10-15 | End: 2022-03-10

## 2021-10-15 NOTE — TELEPHONE ENCOUNTER
Patient's wife says he has symptoms of shingles. Skin is very tender. Wants to know if something could be prescribed for his pain.     Please advise

## 2021-10-26 ENCOUNTER — TELEPHONE (OUTPATIENT)
Dept: FAMILY MEDICINE CLINIC | Age: 76
End: 2021-10-26

## 2021-10-26 RX ORDER — GABAPENTIN 100 MG/1
200 CAPSULE ORAL NIGHTLY
Qty: 60 CAPSULE | Refills: 2
Start: 2021-10-26 | End: 2022-03-10 | Stop reason: SDUPTHER

## 2021-10-26 NOTE — TELEPHONE ENCOUNTER
Increase the gabapentin to 2 capsules at bedtime. Keep your appointment with Dr. Sagrario Parish on 9 November. Patient can schedule to see me sooner if needed.

## 2021-10-26 NOTE — TELEPHONE ENCOUNTER
Patient's wife is calling because she states that the patient has shingles and he has been taking the medication that he was prescribed but it hasn't been working. Most of the rash is gone but the patient is still having pain and tenderness. He is also having really bad headaches. They would like to know if there is anything that can be done or if there is anything else that he can take.        Please advise

## 2021-11-09 ENCOUNTER — OFFICE VISIT (OUTPATIENT)
Dept: NEUROLOGY | Age: 76
End: 2021-11-09
Payer: COMMERCIAL

## 2021-11-09 VITALS — SYSTOLIC BLOOD PRESSURE: 161 MMHG | HEART RATE: 66 BPM | DIASTOLIC BLOOD PRESSURE: 71 MMHG

## 2021-11-09 DIAGNOSIS — G60.9 IDIOPATHIC PERIPHERAL NEUROPATHY: ICD-10-CM

## 2021-11-09 DIAGNOSIS — R27.0 ATAXIA: ICD-10-CM

## 2021-11-09 DIAGNOSIS — G91.2 NORMAL PRESSURE HYDROCEPHALUS (HCC): Primary | ICD-10-CM

## 2021-11-09 DIAGNOSIS — R29.6 RECURRENT FALLS: ICD-10-CM

## 2021-11-09 PROCEDURE — 99214 OFFICE O/P EST MOD 30 MIN: CPT | Performed by: PSYCHIATRY & NEUROLOGY

## 2021-11-09 NOTE — PROGRESS NOTES
perforation, or obstruction     Peripheral vascular disease ([facility] [address]      Family History   Problem Relation Age of Onset    Heart Disease Father     Diabetes Father     Alcohol Abuse Father     Other Mother     Cancer Brother         liver     Social History     Socioeconomic History    Marital status:      Spouse name: dalia    Number of children: 2    Years of education: None    Highest education level: None   Occupational History    None   Tobacco Use    Smoking status: Former Smoker     Packs/day: 1.50     Years: 30.00     Pack years: 45.00     Types: Cigarettes     Quit date: 2001     Years since quittin.9    Smokeless tobacco: Never Used    Tobacco comment: H.O.smoking at age 25 / smoked up to 1.5p.p.d /quit     Vaping Use    Vaping Use: Never used   Substance and Sexual Activity    Alcohol use: Yes     Alcohol/week: 2.0 standard drinks     Types: 2 Cans of beer per week     Comment: occasional beer    Drug use: Never    Sexual activity: Yes     Partners: Female     Birth control/protection: Post-menopausal   Other Topics Concern    None   Social History Narrative    None     Social Determinants of Health     Financial Resource Strain:     Difficulty of Paying Living Expenses: Not on file   Food Insecurity:     Worried About Running Out of Food in the Last Year: Not on file    Sonal of Food in the Last Year: Not on file   Transportation Needs:     Lack of Transportation (Medical): Not on file    Lack of Transportation (Non-Medical):  Not on file   Physical Activity:     Days of Exercise per Week: Not on file    Minutes of Exercise per Session: Not on file   Stress:     Feeling of Stress : Not on file   Social Connections:     Frequency of Communication with Friends and Family: Not on file    Frequency of Social Gatherings with Friends and Family: Not on file    Attends Alevism Services: Not on file    Active Member of Clubs or Organizations: Not on file   [facility] Attends Club or Organization Meetings: Not on file    Marital Status: Not on file   Intimate Partner Violence:     Fear of Current or Ex-Partner: Not on file    Emotionally Abused: Not on file    Physically Abused: Not on file    Sexually Abused: Not on file   Housing Stability:     Unable to Pay for Housing in the Last Year: Not on file    Number of Abimbolamoclaribel in the Last Year: Not on file    Unstable Housing in the Last Year: Not on file        Objective:  Exam:  BP (!) 161/71   Pulse 66   This is a well-nourished patient in no acute distress  Patient is awake, alert and oriented x3. Speech is normal.  Pupils are equal round reacting to light. Extraocular movements intact. Face symmetrical. Tongue midline. Motor:  Muscle tone and bulk are normal.   Strength is symmetrical 5/5 in all four extremities. Sensory: Decreased to light touch and  pin prick in bilateral distal lower extremities  Coordination:  Normal  Finger to Nose and Heel to Shin bilaterally    . Reflexes:  DTR 1 in the upper extremities and the knees and absent at the ankles   Plantar response: Flexor bilaterally  Gait: Gait is unsteady and wide-based Romberg: negative  Vascular: Soft carotid bruit bilaterally probably conducted from the heart      Data :  LABS:  General Labs:    CBC:   Lab Results   Component Value Date    WBC 8.9 04/27/2021    RBC 4.83 04/27/2021    HGB 14.7 04/27/2021    HCT 43.4 04/27/2021    MCV 89.9 04/27/2021    MCH 30.4 04/27/2021    MCHC 33.8 04/27/2021    RDW 13.8 04/27/2021     04/27/2021    MPV 9.6 04/27/2021     BMP:    Lab Results   Component Value Date     05/24/2021    K 4.3 05/24/2021    K 3.6 03/21/2018     05/24/2021    CO2 25 05/24/2021    BUN 13 05/24/2021    LABALBU 4.1 05/24/2021    CREATININE 1.0 05/24/2021    CALCIUM 9.1 05/24/2021    GFRAA >60 05/24/2021    GFRAA >60 10/05/2011    LABGLOM >60 05/24/2021    LABGLOM 78 12/20/2013    GLUCOSE 99 05/24/2021     RADIOLOGY REVIEW:  I have reviewed radiology image(s) and reports(s) of:  MRI brain    Impression :  Normal pressure hydrocephalus, balance  slightly worse  Ataxia  I think that a part of the worsening balance may be from his peripheral neuropathy causing sensory ataxia  MRI brain images in the past showed enlarged ventricles. However there was no change compared to the previous MRI from 2019. Isotope cisternogram was also positive for NPH  EMG and nerve conduction studies showed peripheral neuropathy, etiology not clear  TSH B12 and serum protein immunofixation were normal    Plan :  Discussed with patient and his wife   We discussed about getting another lumbar puncture with large volume CSF tap. Patient states that the first LP done a few years ago was painful and he wants to think about it before proceeding. Patient had recently developed shingles and is now taking gabapentin for that as well. I will see him back in 6 months for follow-up. I will see him sooner if they decide to go ahead with a lumbar puncture. Please note a portion of  this chart was generated using dragon dictation software. Although every effort was made to ensure the accuracy of this automated transcription, some errors in transcription may have occurred.

## 2022-02-01 ENCOUNTER — TELEPHONE (OUTPATIENT)
Dept: FAMILY MEDICINE CLINIC | Age: 77
End: 2022-02-01

## 2022-02-01 DIAGNOSIS — M54.30 SCIATICA, UNSPECIFIED LATERALITY: Primary | ICD-10-CM

## 2022-02-01 RX ORDER — PREDNISONE 10 MG/1
TABLET ORAL
Qty: 30 TABLET | Refills: 0 | Status: SHIPPED | OUTPATIENT
Start: 2022-02-01 | End: 2022-03-10

## 2022-02-01 NOTE — TELEPHONE ENCOUNTER
Would prefer to send in a tapering dose of prednisone rather than an over-the-counter anti-inflammatory.

## 2022-02-01 NOTE — TELEPHONE ENCOUNTER
Pt woke up this am with back pain and right sciatic pain down leg. Pt would like to know what Provider recommends for tx OTC? Unable to make appt originally scheduled this Thursday due to weather.           [phone] spouse phone

## 2022-02-12 ENCOUNTER — APPOINTMENT (OUTPATIENT)
Dept: CT IMAGING | Age: 77
End: 2022-02-12
Payer: COMMERCIAL

## 2022-02-12 ENCOUNTER — HOSPITAL ENCOUNTER (OUTPATIENT)
Age: 77
Setting detail: OBSERVATION
Discharge: NURSING, REHAB OR LONG-TERM CARE | End: 2022-02-15
Attending: EMERGENCY MEDICINE | Admitting: HOSPITALIST
Payer: COMMERCIAL

## 2022-02-12 ENCOUNTER — APPOINTMENT (OUTPATIENT)
Dept: GENERAL RADIOLOGY | Age: 77
End: 2022-02-12
Payer: COMMERCIAL

## 2022-02-12 DIAGNOSIS — R19.7 NAUSEA VOMITING AND DIARRHEA: Primary | ICD-10-CM

## 2022-02-12 DIAGNOSIS — R11.2 NAUSEA VOMITING AND DIARRHEA: Primary | ICD-10-CM

## 2022-02-12 DIAGNOSIS — K55.1 MESENTERIC ARTERY STENOSIS (HCC): ICD-10-CM

## 2022-02-12 PROBLEM — R10.9 ABDOMINAL PAIN: Status: ACTIVE | Noted: 2022-02-12

## 2022-02-12 LAB
A/G RATIO: 1.2 (ref 1.1–2.2)
ALBUMIN SERPL-MCNC: 4.1 G/DL (ref 3.4–5)
ALP BLD-CCNC: 97 U/L (ref 40–129)
ALT SERPL-CCNC: 40 U/L (ref 10–40)
ANION GAP SERPL CALCULATED.3IONS-SCNC: 14 MMOL/L (ref 3–16)
AST SERPL-CCNC: 28 U/L (ref 15–37)
BASOPHILS ABSOLUTE: 0.1 K/UL (ref 0–0.2)
BASOPHILS RELATIVE PERCENT: 0.9 %
BILIRUB SERPL-MCNC: 1.1 MG/DL (ref 0–1)
BILIRUBIN URINE: NEGATIVE
BLOOD, URINE: NEGATIVE
BUN BLDV-MCNC: 14 MG/DL (ref 7–20)
CALCIUM SERPL-MCNC: 9.2 MG/DL (ref 8.3–10.6)
CHLORIDE BLD-SCNC: 101 MMOL/L (ref 99–110)
CLARITY: CLEAR
CO2: 23 MMOL/L (ref 21–32)
COLOR: YELLOW
CREAT SERPL-MCNC: 1 MG/DL (ref 0.8–1.3)
EOSINOPHILS ABSOLUTE: 0.1 K/UL (ref 0–0.6)
EOSINOPHILS RELATIVE PERCENT: 1.1 %
GFR AFRICAN AMERICAN: >60
GFR NON-AFRICAN AMERICAN: >60
GLUCOSE BLD-MCNC: 129 MG/DL (ref 70–99)
GLUCOSE URINE: NEGATIVE MG/DL
HCT VFR BLD CALC: 47.9 % (ref 40.5–52.5)
HEMOGLOBIN: 15.8 G/DL (ref 13.5–17.5)
KETONES, URINE: NEGATIVE MG/DL
LACTIC ACID, SEPSIS: 1.4 MMOL/L (ref 0.4–1.9)
LEUKOCYTE ESTERASE, URINE: NEGATIVE
LIPASE: 24 U/L (ref 13–60)
LYMPHOCYTES ABSOLUTE: 1.2 K/UL (ref 1–5.1)
LYMPHOCYTES RELATIVE PERCENT: 10.4 %
MCH RBC QN AUTO: 29.7 PG (ref 26–34)
MCHC RBC AUTO-ENTMCNC: 32.9 G/DL (ref 31–36)
MCV RBC AUTO: 90.4 FL (ref 80–100)
MICROSCOPIC EXAMINATION: NORMAL
MONOCYTES ABSOLUTE: 1 K/UL (ref 0–1.3)
MONOCYTES RELATIVE PERCENT: 8.2 %
NEUTROPHILS ABSOLUTE: 9.4 K/UL (ref 1.7–7.7)
NEUTROPHILS RELATIVE PERCENT: 79.4 %
NITRITE, URINE: NEGATIVE
PDW BLD-RTO: 14.5 % (ref 12.4–15.4)
PH UA: 5.5 (ref 5–8)
PLATELET # BLD: 218 K/UL (ref 135–450)
PMV BLD AUTO: 9.1 FL (ref 5–10.5)
POTASSIUM REFLEX MAGNESIUM: 3.7 MMOL/L (ref 3.5–5.1)
PROTEIN UA: NEGATIVE MG/DL
RBC # BLD: 5.3 M/UL (ref 4.2–5.9)
SODIUM BLD-SCNC: 138 MMOL/L (ref 136–145)
SPECIFIC GRAVITY UA: >1.03 (ref 1–1.03)
TOTAL PROTEIN: 7.5 G/DL (ref 6.4–8.2)
TROPONIN: <0.01 NG/ML
URINE REFLEX TO CULTURE: NORMAL
URINE TYPE: NORMAL
UROBILINOGEN, URINE: 0.2 E.U./DL
WBC # BLD: 11.9 K/UL (ref 4–11)

## 2022-02-12 PROCEDURE — 2580000003 HC RX 258: Performed by: PHYSICIAN ASSISTANT

## 2022-02-12 PROCEDURE — 93005 ELECTROCARDIOGRAM TRACING: CPT | Performed by: EMERGENCY MEDICINE

## 2022-02-12 PROCEDURE — 6360000002 HC RX W HCPCS: Performed by: PHYSICIAN ASSISTANT

## 2022-02-12 PROCEDURE — 74177 CT ABD & PELVIS W/CONTRAST: CPT

## 2022-02-12 PROCEDURE — 99283 EMERGENCY DEPT VISIT LOW MDM: CPT

## 2022-02-12 PROCEDURE — 96361 HYDRATE IV INFUSION ADD-ON: CPT

## 2022-02-12 PROCEDURE — 6360000004 HC RX CONTRAST MEDICATION: Performed by: EMERGENCY MEDICINE

## 2022-02-12 PROCEDURE — 36415 COLL VENOUS BLD VENIPUNCTURE: CPT

## 2022-02-12 PROCEDURE — 81003 URINALYSIS AUTO W/O SCOPE: CPT

## 2022-02-12 PROCEDURE — 83690 ASSAY OF LIPASE: CPT

## 2022-02-12 PROCEDURE — 6370000000 HC RX 637 (ALT 250 FOR IP): Performed by: PHYSICIAN ASSISTANT

## 2022-02-12 PROCEDURE — U0003 INFECTIOUS AGENT DETECTION BY NUCLEIC ACID (DNA OR RNA); SEVERE ACUTE RESPIRATORY SYNDROME CORONAVIRUS 2 (SARS-COV-2) (CORONAVIRUS DISEASE [COVID-19]), AMPLIFIED PROBE TECHNIQUE, MAKING USE OF HIGH THROUGHPUT TECHNOLOGIES AS DESCRIBED BY CMS-2020-01-R: HCPCS

## 2022-02-12 PROCEDURE — U0005 INFEC AGEN DETEC AMPLI PROBE: HCPCS

## 2022-02-12 PROCEDURE — 83605 ASSAY OF LACTIC ACID: CPT

## 2022-02-12 PROCEDURE — 71045 X-RAY EXAM CHEST 1 VIEW: CPT

## 2022-02-12 PROCEDURE — G0378 HOSPITAL OBSERVATION PER HR: HCPCS

## 2022-02-12 PROCEDURE — 84484 ASSAY OF TROPONIN QUANT: CPT

## 2022-02-12 PROCEDURE — 96374 THER/PROPH/DIAG INJ IV PUSH: CPT

## 2022-02-12 PROCEDURE — 80053 COMPREHEN METABOLIC PANEL: CPT

## 2022-02-12 PROCEDURE — 85025 COMPLETE CBC W/AUTO DIFF WBC: CPT

## 2022-02-12 RX ORDER — SODIUM CHLORIDE 0.9 % (FLUSH) 0.9 %
5-40 SYRINGE (ML) INJECTION EVERY 12 HOURS SCHEDULED
Status: DISCONTINUED | OUTPATIENT
Start: 2022-02-13 | End: 2022-02-15 | Stop reason: HOSPADM

## 2022-02-12 RX ORDER — POTASSIUM CHLORIDE 7.45 MG/ML
10 INJECTION INTRAVENOUS PRN
Status: DISCONTINUED | OUTPATIENT
Start: 2022-02-12 | End: 2022-02-15 | Stop reason: HOSPADM

## 2022-02-12 RX ORDER — ONDANSETRON 2 MG/ML
4 INJECTION INTRAMUSCULAR; INTRAVENOUS EVERY 6 HOURS PRN
Status: DISCONTINUED | OUTPATIENT
Start: 2022-02-12 | End: 2022-02-15 | Stop reason: HOSPADM

## 2022-02-12 RX ORDER — POLYETHYLENE GLYCOL 3350 17 G/17G
17 POWDER, FOR SOLUTION ORAL DAILY PRN
Status: DISCONTINUED | OUTPATIENT
Start: 2022-02-12 | End: 2022-02-15 | Stop reason: HOSPADM

## 2022-02-12 RX ORDER — TORSEMIDE 20 MG/1
20 TABLET ORAL DAILY
Status: DISCONTINUED | OUTPATIENT
Start: 2022-02-13 | End: 2022-02-15 | Stop reason: HOSPADM

## 2022-02-12 RX ORDER — SODIUM CHLORIDE 9 MG/ML
INJECTION, SOLUTION INTRAVENOUS CONTINUOUS
Status: CANCELLED | OUTPATIENT
Start: 2022-02-12

## 2022-02-12 RX ORDER — SODIUM CHLORIDE 9 MG/ML
25 INJECTION, SOLUTION INTRAVENOUS PRN
Status: DISCONTINUED | OUTPATIENT
Start: 2022-02-12 | End: 2022-02-15 | Stop reason: HOSPADM

## 2022-02-12 RX ORDER — ACETAMINOPHEN 650 MG/1
650 SUPPOSITORY RECTAL EVERY 6 HOURS PRN
Status: DISCONTINUED | OUTPATIENT
Start: 2022-02-12 | End: 2022-02-15 | Stop reason: HOSPADM

## 2022-02-12 RX ORDER — MAGNESIUM SULFATE IN WATER 40 MG/ML
2000 INJECTION, SOLUTION INTRAVENOUS PRN
Status: DISCONTINUED | OUTPATIENT
Start: 2022-02-12 | End: 2022-02-15 | Stop reason: HOSPADM

## 2022-02-12 RX ORDER — ALBUTEROL SULFATE 90 UG/1
2 AEROSOL, METERED RESPIRATORY (INHALATION) EVERY 4 HOURS PRN
Status: DISCONTINUED | OUTPATIENT
Start: 2022-02-12 | End: 2022-02-15 | Stop reason: HOSPADM

## 2022-02-12 RX ORDER — ATORVASTATIN CALCIUM 80 MG/1
80 TABLET, FILM COATED ORAL DAILY
Status: DISCONTINUED | OUTPATIENT
Start: 2022-02-13 | End: 2022-02-15 | Stop reason: HOSPADM

## 2022-02-12 RX ORDER — ONDANSETRON 2 MG/ML
4 INJECTION INTRAMUSCULAR; INTRAVENOUS ONCE
Status: COMPLETED | OUTPATIENT
Start: 2022-02-12 | End: 2022-02-12

## 2022-02-12 RX ORDER — SODIUM CHLORIDE 0.9 % (FLUSH) 0.9 %
5-40 SYRINGE (ML) INJECTION PRN
Status: DISCONTINUED | OUTPATIENT
Start: 2022-02-12 | End: 2022-02-15 | Stop reason: HOSPADM

## 2022-02-12 RX ORDER — OXYCODONE HYDROCHLORIDE AND ACETAMINOPHEN 5; 325 MG/1; MG/1
1 TABLET ORAL ONCE
Status: COMPLETED | OUTPATIENT
Start: 2022-02-12 | End: 2022-02-12

## 2022-02-12 RX ORDER — ACETAMINOPHEN 325 MG/1
650 TABLET ORAL EVERY 6 HOURS PRN
Status: DISCONTINUED | OUTPATIENT
Start: 2022-02-12 | End: 2022-02-15 | Stop reason: HOSPADM

## 2022-02-12 RX ORDER — 0.9 % SODIUM CHLORIDE 0.9 %
500 INTRAVENOUS SOLUTION INTRAVENOUS ONCE
Status: COMPLETED | OUTPATIENT
Start: 2022-02-12 | End: 2022-02-12

## 2022-02-12 RX ORDER — POTASSIUM CHLORIDE 20 MEQ/1
20 TABLET, EXTENDED RELEASE ORAL
Status: DISCONTINUED | OUTPATIENT
Start: 2022-02-13 | End: 2022-02-15 | Stop reason: HOSPADM

## 2022-02-12 RX ORDER — ONDANSETRON 4 MG/1
4 TABLET, ORALLY DISINTEGRATING ORAL EVERY 8 HOURS PRN
Status: DISCONTINUED | OUTPATIENT
Start: 2022-02-12 | End: 2022-02-15 | Stop reason: HOSPADM

## 2022-02-12 RX ORDER — ASPIRIN 81 MG/1
81 TABLET ORAL DAILY
Status: DISCONTINUED | OUTPATIENT
Start: 2022-02-13 | End: 2022-02-15 | Stop reason: HOSPADM

## 2022-02-12 RX ORDER — POTASSIUM CHLORIDE 20 MEQ/1
40 TABLET, EXTENDED RELEASE ORAL PRN
Status: DISCONTINUED | OUTPATIENT
Start: 2022-02-12 | End: 2022-02-15 | Stop reason: HOSPADM

## 2022-02-12 RX ADMIN — SODIUM CHLORIDE 500 ML: 9 INJECTION, SOLUTION INTRAVENOUS at 18:29

## 2022-02-12 RX ADMIN — ONDANSETRON 4 MG: 2 INJECTION INTRAMUSCULAR; INTRAVENOUS at 18:27

## 2022-02-12 RX ADMIN — IOPAMIDOL 75 ML: 755 INJECTION, SOLUTION INTRAVENOUS at 18:58

## 2022-02-12 RX ADMIN — OXYCODONE AND ACETAMINOPHEN 1 TABLET: 5; 325 TABLET ORAL at 23:14

## 2022-02-12 ASSESSMENT — ENCOUNTER SYMPTOMS
WHEEZING: 0
ABDOMINAL PAIN: 1
NAUSEA: 1
COUGH: 0
RHINORRHEA: 0
DIARRHEA: 1
VOMITING: 1
SHORTNESS OF BREATH: 0

## 2022-02-12 ASSESSMENT — PAIN SCALES - GENERAL: PAINLEVEL_OUTOF10: 3

## 2022-02-12 NOTE — ED PROVIDER NOTES
[facility]        Pt Name: Marnie Marin  MRN: [id number]  [place] 1945  Date of evaluation: 2/12/2022  Provider: Kelsy Gutiérrez PA-C  PCP: Bibi Ureña MD  Note Started: 6:00 PM EST        I have seen and evaluated this patient with my supervising physician Ami Forbes, *. CHIEF COMPLAINT       Chief Complaint   Patient presents with    Abdominal Pain    Emesis       HISTORY OF PRESENT ILLNESS   (Location, Timing/Onset, Context/Setting, Quality, Duration, Modifying Factors, Severity, Associated Signs and Symptoms)  Note limiting factors. Chief Complaint: N/V     Marnie Marin is a 78 y.o. male who presents for evaluation of nausea vomiting that started last night. Patient has mild crampy abdominal pain and some diarrhea as well. States that his stools are dark but nonbloody. Upon further chart review, he does have history of an upper GI bleed. Patient states that he thinks that he is on blood thinners. He denies any urinary complaints. No fevers or chills. No chest pain or shortness of breath. No recent travel or known sick contacts with similar symptoms. He has no other complaints or concerns at this time. Nursing Notes were all reviewed and agreed with or any disagreements were addressed in the HPI. REVIEW OF SYSTEMS    (2-9 systems for level 4, 10 or more for level 5)     Review of Systems   Constitutional: Negative for appetite change, chills and fever. HENT: Negative for congestion and rhinorrhea. Respiratory: Negative for cough, shortness of breath and wheezing. Cardiovascular: Negative for chest pain. Gastrointestinal: Positive for abdominal pain, diarrhea, nausea and vomiting. Genitourinary: Negative for difficulty urinating, dysuria and hematuria. Musculoskeletal: Negative for neck pain and neck stiffness. Skin: Negative for rash.    Neurological: Negative for headaches. Positives and Pertinent negatives as per HPI. Except as noted above in the ROS, all other systems were reviewed and negative.        PAST MEDICAL HISTORY     Past Medical History:   Diagnosis Date    Allergic rhinitis     Aortic valve disorders     COPD (chronic obstructive pulmonary disease) (HCC)     Hydrocephalus (HCC)     normal pressure,    Hyperlipidemia     Hypertension     Hypertrophy of prostate without urinary obstruction and other lower urinary tract symptoms (LUTS)     Irritable bowel syndrome     Pancreatitis     Peptic ulcer, unspecified site, unspecified as acute or chronic, without mention of hemorrhage, perforation, or obstruction     Peripheral vascular disease ([facility] [address]          SURGICAL HISTORY     Past Surgical History:   Procedure Laterality Date    ABDOMINAL AORTIC ANEURYSM REPAIR N/A 2001    ANGIOPLASTY      RT femoral artery    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    [place] Late    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REPAIR      Aortic valve replacement    AORTIC VALVE REPLACEMENT  2018    CHOLECYSTECTOMY      COLONOSCOPY N/A 5/4/2021    COLONOSCOPY POLYPECTOMY SNARE/COLD BIOPSY performed by Alka Das MD at [address] [place] [address]      bifemoral bypass    FOOT SURGERY Right 12/2/15    HERNIA REPAIR Left     OTHER SURGICAL HISTORY  10/23/2017    lumbar puncture    [place] ESOPHAGOGASTRODUODENOSCOPY TRANSORAL DIAGNOSTIC N/A 12/4/2018    EGD performed by Alka Das MD at [address] [place] [address]  09/19/2018    WITH BIOPSY         CURRENTMEDICATIONS       Previous Medications    ALBUTEROL SULFATE  (90 BASE) MCG/ACT INHALER    Inhale 2 puffs into the lungs every 4 hours as needed for Wheezing    AMOXICILLIN (AMOXIL) 500 MG CAPSULE    TAKE FOUR CAPSULES BY MOUTH 1 HOUR PRIOR TO DENTAL PROCEDURE    ASPIRIN 81 MG EC TABLET    Take 1 tablet by mouth daily    ATORVASTATIN (LIPITOR) 80 MG TABLET    TAKE ONE TABLET BY MOUTH DAILY    GABAPENTIN (NEURONTIN) 100 MG CAPSULE    Take 2 capsules by mouth nightly for 30 days. Intended supply: 30 days    KLOR-CON M20 20 MEQ EXTENDED RELEASE TABLET    TAKE ONE TABLET BY MOUTH DAILY    LEVOCETIRIZINE (XYZAL) 5 MG TABLET    Take 1 tablet by mouth nightly    PREDNISONE (DELTASONE) 10 MG TABLET    Take 4 tablets daily for 3 days, then 3 tablets daily for 3 days, then 2 tablets daily for 3 days then 1 tablet daily until finished. TORSEMIDE (DEMADEX) 20 MG TABLET    Take 1 tablet by mouth daily    UMECLIDINIUM-VILANTEROL (ANORO ELLIPTA) 62.5-25 MCG/INH AEPB INHALER    Inhale 1 puff into the lungs daily         ALLERGIES     Patient has no known allergies. FAMILYHISTORY       Family History   Problem Relation Age of Onset    Heart Disease Father     Diabetes Father     Alcohol Abuse Father     Other Mother     Cancer Brother         liver          SOCIAL HISTORY       Social History     Tobacco Use    Smoking status: Former Smoker     Packs/day: 1.50     Years: 30.00     Pack years: 45.00     Types: Cigarettes     Quit date: 2001     Years since quittin.1    Smokeless tobacco: Never Used    Tobacco comment: H.O.smoking at age 25 / smoked up to 1.5p.p.d /quit     Vaping Use    Vaping Use: Never used   Substance Use Topics    Alcohol use: Yes     Alcohol/week: 2.0 standard drinks     Types: 2 Cans of beer per week     Comment: occasional beer    Drug use: Never       SCREENINGS             PHYSICAL EXAM    (up to 7 for level 4, 8 or more for level 5)     ED Triage Vitals [22 1740]   BP Temp Temp Source Pulse Resp SpO2 Height Weight   (!) 170/82 98.4 °F (36.9 °C) Oral 84 18 100 % 6' (1.829 m) --       Physical Exam  Vitals and nursing note reviewed. Constitutional:       Appearance: He is well-developed. He is not diaphoretic. HENT:      Head: Normocephalic and atraumatic.       Right Ear: External ear normal.      Left Ear: External ear normal.      Nose: Nose normal.   Eyes:      General:         Right eye: No discharge. Left eye: No discharge. Cardiovascular:      Rate and Rhythm: Normal rate and regular rhythm. Heart sounds: Normal heart sounds. Pulmonary:      Effort: Pulmonary effort is normal. No respiratory distress. Breath sounds: Normal breath sounds. Chest:      Chest wall: No tenderness. Abdominal:      General: There is no distension. Palpations: Abdomen is soft. Tenderness: There is no abdominal tenderness. There is no guarding or rebound. Musculoskeletal:         General: Normal range of motion. Cervical back: Normal range of motion and neck supple. Skin:     General: Skin is warm and dry. Neurological:      Mental Status: He is alert and oriented to person, place, and time.    Psychiatric:         Behavior: Behavior normal.         DIAGNOSTIC RESULTS   LABS:    Labs Reviewed   CBC WITH AUTO DIFFERENTIAL - Abnormal; Notable for the following components:       Result Value    WBC 11.9 (*)     Neutrophils Absolute 9.4 (*)     All other components within normal limits    Narrative:     Performed at:  [facility] [address]   Phone [phone]   COMPREHENSIVE METABOLIC PANEL W/ REFLEX TO MG FOR LOW K - Abnormal; Notable for the following components:    Glucose 129 (*)     Total Bilirubin 1.1 (*)     All other components within normal limits    Narrative:     Performed at:  [facility] [address]   Phone [phone]   LIPASE    Narrative:     Performed at:  [facility] [address]   Phone [phone]   TROPONIN    Narrative:     Performed at:  [facility] [address]   Phone [phone]   URINE RT REFLEX TO CULTURE    Narrative:     Performed at:  [address] [facility]  [address]   Phone [phone]   LACTATE, SEPSIS    Narrative:     Performed at:  [facility]  [address]   Phone [phone]   LACTATE, SEPSIS       When ordered only abnormal lab results are displayed. All other labs were within normal range or not returned as of this dictation. EKG: When ordered, EKG's are interpreted by the Emergency Department Physician in the absence of a cardiologist.  Please see their note for interpretation of EKG. RADIOLOGY:   Non-plain film images such as CT, Ultrasound and MRI are read by the radiologist. Plain radiographic images are visualized and preliminarily interpreted by the ED Provider with the below findings:        Interpretation per the Radiologist below, if available at the time of this note:    CT ABDOMEN PELVIS W IV CONTRAST Additional Contrast? None   Final Result   1. No appendicitis, diverticulitis, or small bowel obstruction. 2. Extensive atherosclerotic calcifications of the aorta and main branches   including apparent severe stenoses or short segment occlusions of the   superior mesenteric and celiac artery origins. Correlate with presentation. 3. Other findings as described. XR CHEST PORTABLE   Final Result   Probable prominent mediastinal fat at the left base. No airspace disease by   radiograph. No results found.         PROCEDURES   Unless otherwise noted below, none     Procedures    CRITICAL CARE TIME       CONSULTS:  IP CONSULT TO VASCULAR SURGERY      EMERGENCY DEPARTMENT COURSE and DIFFERENTIAL DIAGNOSIS/MDM:   Vitals:    Vitals:    02/12/22 1740 02/12/22 1830 02/12/22 1915 02/12/22 1945   BP: (!) 170/82 (!) 166/72 (!) 150/64    Pulse: 84  67 61   Resp: 18  17 14   Temp: 98.4 °F (36.9 °C)      TempSrc: Oral      SpO2: 100% 96% 97% 96%   Height: 6' (1.829 m)          Patient was given the following medications:  Medications   ondansetron (ZOFRAN) injection 4 mg (4 mg IntraVENous Given 2/12/22 1827)   0.9 % sodium chloride bolus (0 mLs IntraVENous Stopped 2/12/22 1945)   iopamidol (ISOVUE-370) 76 % injection 75 mL (75 mLs IntraVENous Given 2/12/22 1858)           Patient presents for evaluation of nausea vomiting diarrhea. On exam, he is resting comfortably in bed no acute distress and nontoxic. He is hypertensive but vitals otherwise stable and he is afebrile. Lungs are clear to auscultation bilaterally, chest is nontender and abdomen is benign with no focal reproducible tenderness peritoneal signs. Patient was given Zofran and gentle fluid resuscitation for symptomatic relief and will be reevaluated. Please see attending note for EKG interpretation. CBC and CMP are relatively unremarkable. Lipase 24. Troponin is negative. Urinalysis is negative. Lactic acid 1.4. CT of the abdomen pelvis shows no evidence of appendicitis diverticulitis or small bowel obstruction, ever, he does have extensive atherosclerotic calcifications of the aorta and main branches with severe stenosis or short segment occlusions of the superior mesenteric and celiac artery origins. My attending spoke with on-call vascular surgery, who will consult the patient upon admission. Chronicity is unknown but patient is asymptomatic at this time. Hospitalist resume care of the patient. Patient family informed and agreeable. He is stable for admission. FINAL IMPRESSION      1. Nausea vomiting and diarrhea    2. Mesenteric artery stenosis ([facility] [address]          DISPOSITION/PLAN   DISPOSITION Admitted 02/12/2022 10:04:02 PM      PATIENT REFERRED TO:  No follow-up provider specified.     DISCHARGE MEDICATIONS:  New Prescriptions    No medications on file       DISCONTINUED MEDICATIONS:  Discontinued Medications    No medications on file              (Please note that portions of this note were completed with a voice recognition program. Efforts were made to edit the dictations but occasionally words are mis-transcribed.)    Khoa Edmond PA-C (electronically signed)            Sage Dunham [facility]  02/12/22 9608

## 2022-02-12 NOTE — ED PROVIDER NOTES
[facility] Emergency Department      Pt Name: Kristel Meonn  MRN: [id number]  [place] 1945  Date of evaluation: 2/12/2022  Provider: Mariana Porter MD  I independently performed a history and physical on Kristel Menon. All diagnostic, treatment, and disposition decisions were made by myself in conjunction with the advanced practice provider. HPI: Kristel Menon presented with   Chief Complaint   Patient presents with    Abdominal Pain    Emesis     Kristel Menon has a past medical history of Allergic rhinitis, Aortic valve disorders, COPD (chronic obstructive pulmonary disease) ([facility] [address] Hydrocephalus ([facility] [address] Hyperlipidemia, Hypertension, Hypertrophy of prostate without urinary obstruction and other lower urinary tract symptoms (LUTS), Irritable bowel syndrome, Pancreatitis, Peptic ulcer, unspecified site, unspecified as acute or chronic, without mention of hemorrhage, perforation, or obstruction, and Peripheral vascular disease ([facility] [address] 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 No current facility-administered medications on file prior to encounter.      Current Outpatient Medications on File Prior to Encounter   Medication Sig Dispense Refill    torsemide (DEMADEX) 20 MG tablet Take 1 tablet by mouth daily 30 tablet 6    albuterol sulfate  (90 Base) MCG/ACT inhaler Inhale 2 puffs into the lungs every 4 hours as needed for Wheezing 1 Inhaler 1    KLOR-CON M20 20 MEQ extended release tablet TAKE ONE TABLET BY MOUTH DAILY 30 tablet 6    atorvastatin (LIPITOR) 80 MG tablet TAKE ONE TABLET BY MOUTH DAILY 90 tablet 3    umeclidinium-vilanterol (ANORO ELLIPTA) 62.5-25 MCG/INH AEPB inhaler Inhale 1 puff into the lungs daily 1 each 3    aspirin 81 MG EC tablet Take 1 tablet by mouth daily 30 tablet 3    predniSONE (DELTASONE) 10 MG tablet Take 4 tablets daily for 3 days, then 3 tablets daily for 3 days, then 2 tablets daily for 3 days then 1 tablet daily until finished. (Patient not taking: Reported on 2/12/2022) 30 tablet 0    gabapentin (NEURONTIN) 100 MG capsule Take 2 capsules by mouth nightly for 30 days. Intended supply: 30 days (Patient not taking: Reported on 2/12/2022) 60 capsule 2    levocetirizine (XYZAL) 5 MG tablet Take 1 tablet by mouth nightly (Patient not taking: Reported on 2/12/2022) 30 tablet 1     PHYSICAL EXAM  Vitals: BP (!) 170/82   Pulse 84   Temp 98.4 °F (36.9 °C) (Oral)   Resp 18   Ht 6' (1.829 m)   SpO2 100%   BMI 32.01 kg/m²   Constitutional:  78 y.o. male alert  HENT:  Atraumatic, oral mucosa moist  Neck:  No visible JVD, supple  Chest/Lungs:  Respiratory effort normal, clear, regular  Abdomen:  Non-distended, soft, NT  Back:  No gross deformity  Extremities:  Normal tone and perfusion, symmetric pedal edema    Medical Decision Making and Plan: Briefly, this is an 78 y.o.male who presented with nausea, vomiting since last night. Abdominal discomfort, denies chest pain. Watery diarrhea, no bleeding. Vascular findings on imaging noted. I consulted with Dr Amie Barber -- she feels likely a chronic abnormality. Can consult as an inpatient. Plan is to admit for further care. For further details of [address] Emergency Department encounter, please see documentation by advanced practice provider YUE Lindsay.     Labs Reviewed   CBC WITH AUTO DIFFERENTIAL - Abnormal; Notable for the following components:       Result Value    WBC 11.9 (*)     Neutrophils Absolute 9.4 (*)     All other components within normal limits    Narrative:     Performed at:  [facility]  [address],  [place], [address]   Phone [phone]   COMPREHENSIVE METABOLIC PANEL W/ REFLEX TO MG FOR LOW K   LIPASE   TROPONIN   URINE RT REFLEX TO CULTURE     RADIOLOGY:     Plain x-rays were viewed by me:   CT ABDOMEN PELVIS W IV CONTRAST Additional Contrast? None   Final Result   1. No appendicitis, diverticulitis, or small bowel obstruction. 2. Extensive atherosclerotic calcifications of the aorta and main branches   including apparent severe stenoses or short segment occlusions of the   superior mesenteric and celiac artery origins. Correlate with presentation. 3. Other findings as described. XR CHEST PORTABLE   Final Result   Probable prominent mediastinal fat at the left base. No airspace disease by   radiograph. EKG:  Read by me in the absence of a cardiologist shows: Sinus rhythm, rate 74, intervals normal, axis 72 degrees, nonspecific ST-T wave abnormality, overall similar to 18 September 2018    Medications administered:  Medications   ondansetron (ZOFRAN) injection 4 mg (has no administration in time range)   0.9 % sodium chloride bolus (has no administration in time range)   iopamidol (ISOVUE-370) 76 % injection 75 mL (has no administration in time range)     FINAL IMPRESSION:    1. Nausea vomiting and diarrhea    2.  Mesenteric artery stenosis ([address]       DISPOSITION Admitted 02/12/2022 10:04:02 PM       Dannie Spence MD  02/14/22 1056

## 2022-02-13 LAB
A/G RATIO: 1.7 (ref 1.1–2.2)
ALBUMIN SERPL-MCNC: 3.8 G/DL (ref 3.4–5)
ALP BLD-CCNC: 81 U/L (ref 40–129)
ALT SERPL-CCNC: 32 U/L (ref 10–40)
ANION GAP SERPL CALCULATED.3IONS-SCNC: 12 MMOL/L (ref 3–16)
AST SERPL-CCNC: 20 U/L (ref 15–37)
BASOPHILS ABSOLUTE: 0.1 K/UL (ref 0–0.2)
BASOPHILS RELATIVE PERCENT: 1 %
BILIRUB SERPL-MCNC: 1.1 MG/DL (ref 0–1)
BUN BLDV-MCNC: 14 MG/DL (ref 7–20)
CALCIUM SERPL-MCNC: 9 MG/DL (ref 8.3–10.6)
CHLORIDE BLD-SCNC: 104 MMOL/L (ref 99–110)
CO2: 24 MMOL/L (ref 21–32)
CREAT SERPL-MCNC: 1 MG/DL (ref 0.8–1.3)
EKG ATRIAL RATE: 74 BPM
EKG DIAGNOSIS: NORMAL
EKG P AXIS: 73 DEGREES
EKG P-R INTERVAL: 202 MS
EKG Q-T INTERVAL: 372 MS
EKG QRS DURATION: 92 MS
EKG QTC CALCULATION (BAZETT): 412 MS
EKG R AXIS: 72 DEGREES
EKG T AXIS: 117 DEGREES
EKG VENTRICULAR RATE: 74 BPM
EOSINOPHILS ABSOLUTE: 0.2 K/UL (ref 0–0.6)
EOSINOPHILS RELATIVE PERCENT: 2.4 %
GFR AFRICAN AMERICAN: >60
GFR NON-AFRICAN AMERICAN: >60
GLUCOSE BLD-MCNC: 110 MG/DL (ref 70–99)
HCT VFR BLD CALC: 44.1 % (ref 40.5–52.5)
HEMOGLOBIN: 14.4 G/DL (ref 13.5–17.5)
LYMPHOCYTES ABSOLUTE: 1.8 K/UL (ref 1–5.1)
LYMPHOCYTES RELATIVE PERCENT: 18.1 %
MCH RBC QN AUTO: 29.9 PG (ref 26–34)
MCHC RBC AUTO-ENTMCNC: 32.7 G/DL (ref 31–36)
MCV RBC AUTO: 91.5 FL (ref 80–100)
MONOCYTES ABSOLUTE: 1.1 K/UL (ref 0–1.3)
MONOCYTES RELATIVE PERCENT: 10.9 %
NEUTROPHILS ABSOLUTE: 6.6 K/UL (ref 1.7–7.7)
NEUTROPHILS RELATIVE PERCENT: 67.6 %
PDW BLD-RTO: 14.8 % (ref 12.4–15.4)
PLATELET # BLD: 179 K/UL (ref 135–450)
PMV BLD AUTO: 9.1 FL (ref 5–10.5)
POTASSIUM REFLEX MAGNESIUM: 4.7 MMOL/L (ref 3.5–5.1)
RBC # BLD: 4.81 M/UL (ref 4.2–5.9)
SODIUM BLD-SCNC: 140 MMOL/L (ref 136–145)
TOTAL PROTEIN: 6 G/DL (ref 6.4–8.2)
WBC # BLD: 9.7 K/UL (ref 4–11)

## 2022-02-13 PROCEDURE — 6370000000 HC RX 637 (ALT 250 FOR IP): Performed by: HOSPITALIST

## 2022-02-13 PROCEDURE — G0378 HOSPITAL OBSERVATION PER HR: HCPCS

## 2022-02-13 PROCEDURE — 93010 ELECTROCARDIOGRAM REPORT: CPT | Performed by: INTERNAL MEDICINE

## 2022-02-13 PROCEDURE — 96372 THER/PROPH/DIAG INJ SC/IM: CPT

## 2022-02-13 PROCEDURE — 85025 COMPLETE CBC W/AUTO DIFF WBC: CPT

## 2022-02-13 PROCEDURE — 36415 COLL VENOUS BLD VENIPUNCTURE: CPT

## 2022-02-13 PROCEDURE — 2580000003 HC RX 258: Performed by: INTERNAL MEDICINE

## 2022-02-13 PROCEDURE — 6370000000 HC RX 637 (ALT 250 FOR IP): Performed by: INTERNAL MEDICINE

## 2022-02-13 PROCEDURE — 94761 N-INVAS EAR/PLS OXIMETRY MLT: CPT

## 2022-02-13 PROCEDURE — 6360000002 HC RX W HCPCS: Performed by: HOSPITALIST

## 2022-02-13 PROCEDURE — 94640 AIRWAY INHALATION TREATMENT: CPT

## 2022-02-13 PROCEDURE — 80053 COMPREHEN METABOLIC PANEL: CPT

## 2022-02-13 RX ORDER — ALBUTEROL SULFATE 90 UG/1
2 AEROSOL, METERED RESPIRATORY (INHALATION) 2 TIMES DAILY
Status: DISCONTINUED | OUTPATIENT
Start: 2022-02-13 | End: 2022-02-15 | Stop reason: HOSPADM

## 2022-02-13 RX ORDER — GABAPENTIN 100 MG/1
200 CAPSULE ORAL 3 TIMES DAILY PRN
Status: DISCONTINUED | OUTPATIENT
Start: 2022-02-13 | End: 2022-02-15 | Stop reason: HOSPADM

## 2022-02-13 RX ORDER — SODIUM CHLORIDE, SODIUM LACTATE, POTASSIUM CHLORIDE, CALCIUM CHLORIDE 600; 310; 30; 20 MG/100ML; MG/100ML; MG/100ML; MG/100ML
INJECTION, SOLUTION INTRAVENOUS CONTINUOUS
Status: ACTIVE | OUTPATIENT
Start: 2022-02-13 | End: 2022-02-14

## 2022-02-13 RX ADMIN — ENOXAPARIN SODIUM 40 MG: 40 INJECTION SUBCUTANEOUS at 08:23

## 2022-02-13 RX ADMIN — TIOTROPIUM BROMIDE AND OLODATEROL 2 PUFF: 3.124; 2.736 SPRAY, METERED RESPIRATORY (INHALATION) at 08:56

## 2022-02-13 RX ADMIN — SODIUM CHLORIDE, POTASSIUM CHLORIDE, SODIUM LACTATE AND CALCIUM CHLORIDE: 600; 310; 30; 20 INJECTION, SOLUTION INTRAVENOUS at 12:50

## 2022-02-13 RX ADMIN — ATORVASTATIN CALCIUM 80 MG: 80 TABLET, FILM COATED ORAL at 08:21

## 2022-02-13 RX ADMIN — Medication 2 PUFF: at 21:50

## 2022-02-13 RX ADMIN — Medication 2 PUFF: at 08:56

## 2022-02-13 RX ADMIN — TORSEMIDE 20 MG: 20 TABLET ORAL at 08:21

## 2022-02-13 RX ADMIN — POTASSIUM CHLORIDE 20 MEQ: 20 TABLET, EXTENDED RELEASE ORAL at 08:21

## 2022-02-13 RX ADMIN — GABAPENTIN 200 MG: 100 CAPSULE ORAL at 12:45

## 2022-02-13 RX ADMIN — ASPIRIN 81 MG: 81 TABLET, COATED ORAL at 08:21

## 2022-02-13 ASSESSMENT — PAIN SCALES - GENERAL
PAINLEVEL_OUTOF10: 0
PAINLEVEL_OUTOF10: 0

## 2022-02-13 NOTE — PLAN OF CARE
Problem: Skin Integrity:  Goal: Will show no infection signs and symptoms  Description: Will show no infection signs and symptoms  Outcome: Ongoing  Goal: Absence of new skin breakdown  Description: Absence of new skin breakdown  Outcome: Ongoing     Problem: Falls - Risk of:  Goal: Will remain free from falls  Description: Will remain free from falls  Outcome: Ongoing  Goal: Absence of physical injury  Description: Absence of physical injury  Outcome: Ongoing     Problem: Activity:  Goal: Ability to tolerate increased activity will improve  Description: Ability to tolerate increased activity will improve  Outcome: Ongoing     Problem: Cardiac:  Goal: Hemodynamic stability will improve  Description: Hemodynamic stability will improve  Outcome: Ongoing  Goal: Complications related to the disease process, condition or treatment will be avoided or minimized  Description: Complications related to the disease process, condition or treatment will be avoided or minimized  Outcome: Ongoing  Goal: Cerebral tissue perfusion will improve  Description: Cerebral tissue perfusion will improve  Outcome: Ongoing     Problem: Coping:  Goal: Ability to identify and develop effective coping behavior will improve  Description: Ability to identify and develop effective coping behavior will improve  Outcome: Ongoing     Problem: Health Behavior:  Goal: Identification of resources available to assist in meeting health care needs will improve  Description: Identification of resources available to assist in meeting health care needs will improve  Outcome: Ongoing     Problem: Nutritional:  Goal: Ability to identify appropriate dietary choices will improve  Description: Ability to identify appropriate dietary choices will improve  Outcome: Ongoing     Problem:  Bowel/Gastric:  Goal: Control of bowel function will improve  Description: Control of bowel function will improve  Outcome: Ongoing  Goal: Ability to achieve a regular elimination pattern will improve  Description: Ability to achieve a regular elimination pattern will improve  Outcome: Ongoing

## 2022-02-13 NOTE — RT PROTOCOL NOTE
RT Inhaler-Nebulizer Bronchodilator Protocol Note    There is a bronchodilator order in the chart from a provider indicating to follow the RT Bronchodilator Protocol and there is an Initiate RT Inhaler-Nebulizer Bronchodilator Protocol order as well (see protocol at bottom of note). CXR Findings:  XR CHEST PORTABLE    Result Date: 2/12/2022  Probable prominent mediastinal fat at the left base. No airspace disease by radiograph. The findings from the last RT Protocol Assessment were as follows:   History Pulmonary Disease: Chronic pulmonary disease  Respiratory Pattern: Regular pattern and RR 12-20 bpm  Breath Sounds: Slightly diminished and/or crackles  Cough: Strong, spontaneous, non-productive  Indication for Bronchodilator Therapy: Decreased or absent breath sounds,On home bronchodilators  Bronchodilator Assessment Score: 4    Aerosolized bronchodilator medication orders have been revised according to the RT Inhaler-Nebulizer Bronchodilator Protocol below. Respiratory Therapist to perform RT Therapy Protocol Assessment initially then follow the protocol. Repeat RT Therapy Protocol Assessment PRN for score 0-3 or on second treatment, BID, and PRN for scores above 3. No Indications - adjust the frequency to every 6 hours PRN wheezing or bronchospasm, if no treatments needed after 48 hours then discontinue using Per Protocol order mode. If indication present, adjust the RT bronchodilator orders based on the Bronchodilator Assessment Score as indicated below. Use Inhaler orders unless patient has one or more of the following: on home nebulizer, not able to hold breath for 10 seconds, is not alert and oriented, cannot activate and use MDI correctly, or respiratory rate 25 breaths per minute or more, then use the equivalent nebulizer order(s) with same Frequency and PRN reasons based on the score.   If a patient is on this medication at home then do not decrease Frequency below that used at home.    0-3 - enter or revise RT bronchodilator order(s) to equivalent RT Bronchodilator order with Frequency of every 4 hours PRN for wheezing or increased work of breathing using Per Protocol order mode. 4-6 - enter or revise RT Bronchodilator order(s) to two equivalent RT bronchodilator orders with one order with BID Frequency and one order with Frequency of every 4 hours PRN wheezing or increased work of breathing using Per Protocol order mode. 7-10 - enter or revise RT Bronchodilator order(s) to two equivalent RT bronchodilator orders with one order with TID Frequency and one order with Frequency of every 4 hours PRN wheezing or increased work of breathing using Per Protocol order mode. 11-13 - enter or revise RT Bronchodilator order(s) to one equivalent RT bronchodilator order with QID Frequency and an Albuterol order with Frequency of every 4 hours PRN wheezing or increased work of breathing using Per Protocol order mode. Greater than 13 - enter or revise RT Bronchodilator order(s) to one equivalent RT bronchodilator order with every 4 hours Frequency and an Albuterol order with Frequency of every 2 hours PRN wheezing or increased work of breathing using Per Protocol order mode. RT to enter RT Home Evaluation for COPD & MDI Assessment order using Per Protocol order mode.     Electronically signed by Nadira Santiago RCP on 2/13/2022 at 1:10 AM

## 2022-02-13 NOTE — PROGRESS NOTES
IntraVENous, PRN  Physical    VITALS:  BP (!) 148/78   Pulse 67   Temp 97.7 °F (36.5 °C) (Oral)   Resp 16   Ht 6' (1.829 m)   Wt 240 lb (108.9 kg)   SpO2 96%   BMI 32.55 kg/m²     General appearance: weak    Eyes:  Sclera clear, pupils equal  ENT:  Dry  mucus membranes, Trachea midline. Cardiovascular: Regular rhythm, normal S1, S2. B/L edema in lower extremities   Respiratory:  Noted Dec AE B/ L . Gastrointestinal:  Abdomen soft,  non-tender,  not distended,    Musculoskeletal:  No cyanosis in digits, neck supple  Neurology:  Cranial nerves grossly intact. Alert and oriented in time, place and person. No speech or motor deficits   Psychiatry:  Appropriate affect. Not agitated  Skin:  Warm, dry, normal turgor, no rash      Data       CT ABDOMEN PELVIS W IV CONTRAST Additional Contrast? None   Final Result   1. No appendicitis, diverticulitis, or small bowel obstruction. 2. Extensive atherosclerotic calcifications of the aorta and main branches   including apparent severe stenoses or short segment occlusions of the   superior mesenteric and celiac artery origins. Correlate with presentation. 3. Other findings as described.           XR CHEST PORTABLE   Final Result   Probable prominent mediastinal fat at the left base. No airspace disease by   radiograph.                 ASSESSMENT AND PLAN      Acute diarrhea  - Likely infectious given acuity of symptoms < 24 hrs   - Check c-dicc pcr and stool culture  - IV hydration  - Monitor electrolytes   - Start CLD and advance as tolerated   - Imodium prn once infection is ruled out     SMA stenosis  - S/p Femoral Bypass Hx   - S/p AAA repair Hx in 2001   - Cont daily asa and statin  - Vascular surgery consulted    Code status: full code  DVT ppx:  Sc lovenox

## 2022-02-13 NOTE — PROGRESS NOTES
02/13/22 0109   RT Protocol   History Pulmonary Disease 2   Respiratory pattern 0   Breath sounds 2   Cough 0   Indications for Bronchodilator Therapy Decreased or absent breath sounds; On home bronchodilators   Bronchodilator Assessment Score 4

## 2022-02-13 NOTE — H&P
_    [facility] HOSPITALIST HISTORY AND PHYSICAL/CONSULT    2/12/2022 10:07 PM    Patient Information:  Leia Flores is a 78 y.o. male [id number]    PCP:  Javad Whitehead MD (Tel: [phone] )    Date of Service:   Pt seen/examined on 2/12/2022   Placed in Observation. Chief complaint:    Chief Complaint   Patient presents with    Abdominal Pain    Emesis       History of Present Illness:  Deny Jamil is a 78 y.o. male who presented with   · Reports acute onset of N/V at home last night and he had recurrent episodes at home and started having bilious vomiting earlier today . Also having multiple loose stools at home . He denies any F/C or Abd pain . Has been unable to take his medications or eat/drink much at home . Wife called his PCP and was recommended to bring him to ED . · He denies having any abd pain w/ food intake in past    · He recently completed a Prednisone course for sciatica   · Denies any GERD s/s   · Denies taking any NSAIDS @ Home recently for back pain  , has been taking tylenol prn       History and pertinent information obtained from   · ED provider   · Prior Chart    · Patient         Notable/Significant ED Findings/VItals :   · HTN   · Afebrile   ·       While in ED  · Patient care Given. · Appropriate Monitoring done. · Pertinent Labs done. See [facility] for details   · Pertinent Acute issues identified and managed . See Epic for details  · Pertinent consults called and case d/w them by ED Provider . See Epic for details. · Imaging  CXR ,  CT,  done in ED . While in ED, Indicated/Pertinent Medications/Care given as below      · ED Chart reviewed. · Medications reviewed w/c were give in ED . See [facility] for details on medications and orders  · IVF X 1   · IV Zofran X 1       · Imaging/Labs/Work up done in ED reviewed and their interpretation/active and acute issues, as mentioned below in Assessment and Plan.        Currently, on my evaluation, patient's condition as below :   · Since arrival, post above Rx, patient is Now AO X 3   · No s/s in ED so far   · No Abd pain reported to me   · No CP   · No SOB   · Wife is at bedside   · No signs of s/o acute Life Threatening distress or acute hemodynamic instability, noted @ time of my evaluation of patient. REVIEW OF SYSTEMS:     Pertinent positives and negatives are noted in the HPI . All other systems were reviewed and are negative. Past Medical History:         has a past medical history of Allergic rhinitis, Aortic valve disorders, COPD (chronic obstructive pulmonary disease) ([address] Hydrocephalus ([address] Hyperlipidemia, Hypertension, Hypertrophy of prostate without urinary obstruction and other lower urinary tract symptoms (LUTS), Irritable bowel syndrome, Pancreatitis, Peptic ulcer, unspecified site, unspecified as acute or chronic, without mention of hemorrhage, perforation, or obstruction, and Peripheral vascular disease ([address] 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 Medications:          Current Outpatient Medications on File Prior to Encounter   Medication Sig Dispense Refill    predniSONE (DELTASONE) 10 MG tablet Take 4 tablets daily for 3 days, then 3 tablets daily for 3 days, then 2 tablets daily for 3 days then 1 tablet daily until finished.  30 tablet 0    torsemide (DEMADEX) 20 MG tablet Take 1 tablet by mouth daily 30 tablet 6    gabapentin (NEURONTIN) 100 MG capsule Take 2 capsules by mouth nightly for 30 days. Intended supply: 30 days 60 capsule 2    levocetirizine (XYZAL) 5 MG tablet Take 1 tablet by mouth nightly 30 tablet 1    albuterol sulfate  (90 Base) MCG/ACT inhaler Inhale 2 puffs into the lungs every 4 hours as needed for Wheezing 1 Inhaler 1    KLOR-CON M20 20 MEQ extended release tablet TAKE ONE TABLET BY MOUTH DAILY 30 tablet 6    atorvastatin (LIPITOR) 80 MG tablet TAKE ONE TABLET BY MOUTH DAILY 90 tablet 3    amoxicillin (AMOXIL) 500 MG capsule TAKE FOUR CAPSULES BY MOUTH 1 HOUR PRIOR TO DENTAL PROCEDURE (Patient not taking: Reported on 11/9/2021) 4 capsule 4    umeclidinium-vilanterol (ANORO ELLIPTA) 62.5-25 MCG/INH AEPB inhaler Inhale 1 puff into the lungs daily 1 each 3    aspirin 81 MG EC tablet Take 1 tablet by mouth daily 30 tablet 3         Allergies:  No Known Allergies       Social History:   reports that he quit smoking about 20 years ago. His smoking use included cigarettes. He has a 45.00 pack-year smoking history. He has never used smokeless tobacco. He reports current alcohol use of about 2.0 standard drinks of alcohol per week. He reports that he does not use drugs. Family History:              Problem Relation Age of Onset    Heart Disease Father     Diabetes Father     Alcohol Abuse Father     Other Mother     Cancer Brother         liver           Physical Exam:  BP (!) 150/64   Pulse 61   Temp 98.4 °F (36.9 °C) (Oral)   Resp 14   Ht 6' (1.829 m)   SpO2 96%   BMI 32.01 kg/m²     General appearance: weak    Eyes:  Sclera clear, pupils equal  ENT:  Dry  mucus membranes, Trachea midline. Cardiovascular: Regular rhythm, normal S1, S2. B/L edema in lower extremities   Respiratory:  Noted Dec AE B/ L . Gastrointestinal:  Abdomen soft,  non-tender,  not distended,    Musculoskeletal:  No cyanosis in digits, neck supple  Neurology:  Cranial nerves grossly intact. Alert and oriented in time, place and person.  No speech or motor deficits Psychiatry:  Appropriate affect. Not agitated  Skin:  Warm, dry, normal turgor, no rash       Labs:     Recent Labs     02/12/22  1754   WBC 11.9*   HGB 15.8   HCT 47.9        Recent Labs     02/12/22  1754      K 3.7      CO2 23   BUN 14   CREATININE 1.0   CALCIUM 9.2     Recent Labs     02/12/22  1754   AST 28   ALT 40   BILITOT 1.1*   ALKPHOS 97     No results for input(s): INR in the last 72 hours. Recent Labs     02/12/22  1754   TROPONINI <0.01         Urinalysis:      Lab Results   Component Value Date    NITRU Negative 02/12/2022    WBCUA 0 09/18/2018    RBCUA 1 09/18/2018    BLOODU Negative 02/12/2022    SPECGRAV >1.030 02/12/2022    GLUCOSEU Negative 02/12/2022         Radiology:     EKG/Telemonitor :    I have reviewed the EKG  NSR     IMAGING :     CT ABDOMEN PELVIS W IV CONTRAST Additional Contrast? None   Final Result   1. No appendicitis, diverticulitis, or small bowel obstruction. 2. Extensive atherosclerotic calcifications of the aorta and main branches   including apparent severe stenoses or short segment occlusions of the   superior mesenteric and celiac artery origins. Correlate with presentation. 3. Other findings as described. XR CHEST PORTABLE   Final Result   Probable prominent mediastinal fat at the left base. No airspace disease by   radiograph.                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 Etiology   · ASVD and stenosis of SMA and Celiac A , POA . Abnormal CT A/P At admission . Normal Lactic acid   · Reactive leukocytosis POA   · HTN Hx    · HLD Hx   · PVD hx   · Is s/p Femoral Bypass Hx   · Is s/p AAA repair Hx in 2001   · TAVR hx in 2018   · LE edema   · Past smoker Hx   · COPD Hx   · IBS Hx   · Allergic Rhinitis Hx   ·         PERTINENT PLAN/ORDERS FOR ACTIVE MEDICAL ISSUES     · Medication received in ED and workup in ED so far Noted and reviewed as above. · Home medications for chronic medical problems Reviewed and Held/Resumed/Changes made, as clinically warranted/Indicated. · Anti emetics Prn Rx   · Send stool studies when able   · Send COVID test and will f/u when back . Pt is vaccinated for COVID though . · Will trend lactic acid in house X 3   · Start CLD and advance as tolerated   · Vascular Sx cs planned in AM for ASVD and stenosis SMA and CA . Resumed Home ASA and statins for now . Vascular called by ED Provider . They will see in AM . No acute interventions or Anticoagulation recommended at time of  Admission per discussion w/ ED Provider            · Please See EPIC Order for detailed plans of care and further Details. · DVT Prophylaxis . Is on Lovenox SQ    ; + SCDs   · Nutrition/Diet. No diet orders on file  · Code Status . Prior  · PT/OT and ambulatory Eval Status. Ambulate As tolerated   · Probable LOS & future Disposition planned post discharge .  Home in 1-2 days       CONSULTS ORDERED @ ADMISSION   IP CONSULT TO VASCULAR SURGERY      Medical Decision Making : HIGH     Total patient  Care [ Direct and Indirect ] time spent in evaluating the patient an discussing plan with appropriate staff/patient/family members is 48 min      Meena Garza MD    Hospitalist, [facility].    2/12/2022 10:36 PM

## 2022-02-14 LAB — SARS-COV-2: NOT DETECTED

## 2022-02-14 PROCEDURE — 6370000000 HC RX 637 (ALT 250 FOR IP): Performed by: HOSPITALIST

## 2022-02-14 PROCEDURE — 2580000003 HC RX 258: Performed by: HOSPITALIST

## 2022-02-14 PROCEDURE — APPSS60 APP SPLIT SHARED TIME 46-60 MINUTES: Performed by: NURSE PRACTITIONER

## 2022-02-14 PROCEDURE — 99221 1ST HOSP IP/OBS SF/LOW 40: CPT | Performed by: SURGERY

## 2022-02-14 PROCEDURE — 94640 AIRWAY INHALATION TREATMENT: CPT

## 2022-02-14 PROCEDURE — G0378 HOSPITAL OBSERVATION PER HR: HCPCS

## 2022-02-14 PROCEDURE — APPNB30 APP NON BILLABLE TIME 0-30 MINS: Performed by: NURSE PRACTITIONER

## 2022-02-14 PROCEDURE — 97166 OT EVAL MOD COMPLEX 45 MIN: CPT

## 2022-02-14 PROCEDURE — 2580000003 HC RX 258: Performed by: INTERNAL MEDICINE

## 2022-02-14 PROCEDURE — 6370000000 HC RX 637 (ALT 250 FOR IP): Performed by: INTERNAL MEDICINE

## 2022-02-14 PROCEDURE — 97530 THERAPEUTIC ACTIVITIES: CPT

## 2022-02-14 PROCEDURE — 97535 SELF CARE MNGMENT TRAINING: CPT

## 2022-02-14 PROCEDURE — 6360000002 HC RX W HCPCS: Performed by: HOSPITALIST

## 2022-02-14 PROCEDURE — 96372 THER/PROPH/DIAG INJ SC/IM: CPT

## 2022-02-14 PROCEDURE — 97162 PT EVAL MOD COMPLEX 30 MIN: CPT

## 2022-02-14 PROCEDURE — 97116 GAIT TRAINING THERAPY: CPT

## 2022-02-14 PROCEDURE — 94761 N-INVAS EAR/PLS OXIMETRY MLT: CPT

## 2022-02-14 RX ADMIN — TORSEMIDE 20 MG: 20 TABLET ORAL at 09:14

## 2022-02-14 RX ADMIN — ATORVASTATIN CALCIUM 80 MG: 80 TABLET, FILM COATED ORAL at 09:14

## 2022-02-14 RX ADMIN — TIOTROPIUM BROMIDE AND OLODATEROL 2 PUFF: 3.124; 2.736 SPRAY, METERED RESPIRATORY (INHALATION) at 08:50

## 2022-02-14 RX ADMIN — Medication 2 PUFF: at 08:50

## 2022-02-14 RX ADMIN — SODIUM CHLORIDE, POTASSIUM CHLORIDE, SODIUM LACTATE AND CALCIUM CHLORIDE: 600; 310; 30; 20 INJECTION, SOLUTION INTRAVENOUS at 04:42

## 2022-02-14 RX ADMIN — Medication 10 ML: at 21:23

## 2022-02-14 RX ADMIN — GABAPENTIN 200 MG: 100 CAPSULE ORAL at 21:23

## 2022-02-14 RX ADMIN — ENOXAPARIN SODIUM 40 MG: 40 INJECTION SUBCUTANEOUS at 09:14

## 2022-02-14 RX ADMIN — POTASSIUM CHLORIDE 20 MEQ: 20 TABLET, EXTENDED RELEASE ORAL at 09:14

## 2022-02-14 RX ADMIN — Medication 2 PUFF: at 20:39

## 2022-02-14 RX ADMIN — ASPIRIN 81 MG: 81 TABLET, COATED ORAL at 09:14

## 2022-02-14 ASSESSMENT — PAIN SCALES - GENERAL
PAINLEVEL_OUTOF10: 0

## 2022-02-14 NOTE — PLAN OF CARE
Problem: Skin Integrity:  Goal: Will show no infection signs and symptoms  Description: Will show no infection signs and symptoms  2/14/2022 0933 by Alan Ortiz RN  Outcome: Ongoing  2/14/2022 0650 by Stephie Maynard RN  Outcome: Ongoing  Goal: Absence of new skin breakdown  Description: Absence of new skin breakdown  2/14/2022 0933 by Alan Ortiz RN  Outcome: Ongoing  2/14/2022 0650 by Stephie Maynard RN  Outcome: Ongoing     Problem: Falls - Risk of:  Goal: Will remain free from falls  Description: Will remain free from falls  2/14/2022 0933 by Alan Ortiz RN  Outcome: Ongoing  2/14/2022 0650 by Stephie Maynard RN  Outcome: Ongoing  Goal: Absence of physical injury  Description: Absence of physical injury  2/14/2022 0933 by Alan Ortiz RN  Outcome: Ongoing  2/14/2022 0650 by Stephie Maynard RN  Outcome: Ongoing     Problem:  Activity:  Goal: Ability to tolerate increased activity will improve  Description: Ability to tolerate increased activity will improve  2/14/2022 0933 by Alan Ortiz RN  Outcome: Ongoing  2/14/2022 0650 by Stephie Maynard RN  Outcome: Ongoing     Problem: Cardiac:  Goal: Hemodynamic stability will improve  Description: Hemodynamic stability will improve  2/14/2022 0933 by Alan Ortiz RN  Outcome: Ongoing  2/14/2022 0650 by Stephie Maynard RN  Outcome: Ongoing  Goal: Complications related to the disease process, condition or treatment will be avoided or minimized  Description: Complications related to the disease process, condition or treatment will be avoided or minimized  2/14/2022 0933 by Alan Ortiz RN  Outcome: Ongoing  2/14/2022 0650 by Stephie Maynard RN  Outcome: Ongoing  Goal: Cerebral tissue perfusion will improve  Description: Cerebral tissue perfusion will improve  2/14/2022 0933 by Alan Ortiz RN  Outcome: Ongoing  2/14/2022 0650 by Stephie Maynard RN  Outcome: Ongoing Problem: Coping:  Goal: Ability to identify and develop effective coping behavior will improve  Description: Ability to identify and develop effective coping behavior will improve  2/14/2022 0933 by Germaine Zavala RN  Outcome: Ongoing  2/14/2022 0650 by Aaron Dc RN  Outcome: Ongoing     Problem: Health Behavior:  Goal: Identification of resources available to assist in meeting health care needs will improve  Description: Identification of resources available to assist in meeting health care needs will improve  2/14/2022 0933 by Germaine Zavala RN  Outcome: Ongoing  2/14/2022 0650 by Aaron Dc RN  Outcome: Ongoing     Problem: Nutritional:  Goal: Ability to identify appropriate dietary choices will improve  Description: Ability to identify appropriate dietary choices will improve  2/14/2022 0933 by Germaine Zavala RN  Outcome: Ongoing  2/14/2022 0650 by Aaron Dc RN  Outcome: Ongoing     Problem:  Bowel/Gastric:  Goal: Control of bowel function will improve  Description: Control of bowel function will improve  2/14/2022 0933 by Germaine Zavala RN  Outcome: Ongoing  2/14/2022 0650 by Aaron Dc RN  Outcome: Ongoing  Goal: Ability to achieve a regular elimination pattern will improve  Description: Ability to achieve a regular elimination pattern will improve  2/14/2022 0933 by Germaine Zavala RN  Outcome: Ongoing  2/14/2022 0650 by Aaron Dc RN  Outcome: Ongoing

## 2022-02-14 NOTE — CONSULTS
[facility]  Consultation Note    Chief Complaint / Reason for Consultation  Abnormal abdominal CT    History of Present Illness  Patient is a 78 y.o. male with past medical history of HTN, HLD, COPD and PVD who presented to the ED with complaints of nausea, vomiting and diarrhea. Patient reports this started on Friday into Saturday and wasn't improving. He denies any abdominal pain or weight loss. He denies any post prandial pain. He has a history of aortobifemoral bypass in 2001 for an abdominal aortic aneurysm. He reports chronic swelling to his lower extremities with neuropathy. He also complains of some cramping and pain in his calves as well as issues with his gait. His last noninvasive studies on his lower extremities were in 2019 that showed no significant arterial disease in BLE. He is on ASA and statin therapy. During this admission he had CT abd/pelvis which showed extensive atherosclerosis of the aorta including SMA and celiac artery stenosis; therefore, we have been consulted for further evaluation and recommendations. He reports his nausea and vomiting is improved. He has not had diarrhea since yesterday. Denies tobacco use. Review of Systems  + nausea, vomiting, diarrhea (improved since admission) Denies fevers, chills, chest pain, shortness of breath, hematemesis, constipation, melena, hematochezia, wt changes, vision problems, blindness, hearing problems, facial droop, slurred speech, extremity weakness, extremity numbness, dysuria.     Past Medical History:   Diagnosis Date    Allergic rhinitis     Aortic valve disorders     COPD (chronic obstructive pulmonary disease) (HCC)     Hydrocephalus (HCC)     normal pressure,    Hyperlipidemia     Hypertension     Hypertrophy of prostate without urinary obstruction and other lower urinary tract symptoms (LUTS)     Irritable bowel syndrome     Pancreatitis     Peptic ulcer, unspecified site, unspecified as acute or chronic, without mention of hemorrhage, perforation, or obstruction     Peripheral vascular disease ([facility] [address]        Past Surgical History:   Procedure Laterality Date    ABDOMINAL AORTIC ANEURYSM REPAIR N/A     ANGIOPLASTY      RT femoral artery    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[place]PA[place]  [place]    [place]    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REPAIR      Aortic valve replacement    AORTIC VALVE REPLACEMENT  2018    CHOLECYSTECTOMY      COLONOSCOPY N/A 2021    COLONOSCOPY POLYPECTOMY SNARE/COLD BIOPSY performed by Norbert Morris MD at [address]      bifemoral bypass    FOOT SURGERY Right 12/2/15    HERNIA REPAIR Left     OTHER SURGICAL HISTORY  10/23/2017    lumbar puncture    IA ESOPHAGOGASTRODUODENOSCOPY TRANSORAL DIAGNOSTIC N/A 2018    EGD performed by Norbert Morris MD at [address] ENDOSCOPY  2018    WITH BIOPSY       No Known Allergies    Social History     Socioeconomic History    Marital status:      Spouse name: dalia    Number of children: 2    Years of education: Not on file    Highest education level: Not on file   Occupational History    Not on file   Tobacco Use    Smoking status: Former Smoker     Packs/day: 1.50     Years: 30.00     Pack years: 45.00     Types: Cigarettes     Quit date: 2001     Years since quittin.1    Smokeless tobacco: Never Used    Tobacco comment: H.O.smoking at age 25 / smoked up to 1.5p.p.d /quit     Vaping Use    Vaping Use: Never used   Substance and Sexual Activity    Alcohol use:  Yes     Alcohol/week: 2.0 standard drinks     Types: 2 Cans of beer per week     Comment: occasional beer    Drug use: Never    Sexual activity: Yes     Partners: Female     Birth control/protection: Post-menopausal   Other Topics Concern    Not on file   Social History Narrative    Not on file     Social Determinants of Health     Financial Resource Strain:    Irving Arguello Difficulty of Paying Living Expenses: Not on file   Food Insecurity:     Worried About Running Out of Food in the Last Year: Not on file    Ran Out of Food in the Last Year: Not on file   Transportation Needs:     Lack of Transportation (Medical): Not on file    Lack of Transportation (Non-Medical):  Not on file   Physical Activity:     Days of Exercise per Week: Not on file    Minutes of Exercise per Session: Not on file   Stress:     Feeling of Stress : Not on file   Social Connections:     Frequency of Communication with Friends and Family: Not on file    Frequency of Social Gatherings with Friends and Family: Not on file    Attends Jewish Services: Not on file    Active Member of [address] [place] [address] [employer] or Organizations: Not on file    Attends Club or Organization Meetings: Not on file    Marital Status: Not on file   Intimate Partner Violence:     Fear of Current or Ex-Partner: Not on file    Emotionally Abused: Not on file    Physically Abused: Not on file    Sexually Abused: Not on file   Housing Stability:     Unable to Pay for Housing in the Last Year: Not on file    Number of [place] in the Last Year: Not on file    Unstable Housing in the Last Year: Not on file       Family History   Problem Relation Age of Onset    Heart Disease Father     Diabetes Father     Alcohol Abuse Father     Other Mother     Cancer Brother         liver     - No history of bleeding or clotting disorders    Vital Signs  Vitals:    02/13/22 2150 02/14/22 0429 02/14/22 0852 02/14/22 0906   BP:  (!) 171/77  (!) 156/87   Pulse:  69  69   Resp: 18 18 16 16   Temp:  97.8 °F (36.6 °C)  97.7 °F (36.5 °C)   TempSrc:  Oral  Oral   SpO2: 97% 96% 96% 96%   Weight:       Height:           Physical Examination  General: no apparent distress, appears stated age  Psychiatric: affect appropriate  Head/Eyes/Ears/Nose/Throat:  Normocephalic, atraumatic, PERRLA, face symmetric  Neck:  no adenopathy, no carotid bruit, no JVD, supple, symmetrical, trachea midline, thyroid not enlarged, no tenderness/mass/nodules  Chest/Lungs: clear to auscultation bilaterally, no accessory muscle use  Cardiac:  regular rate and rhythm, S1, S2 normal, no murmur, click, rub or gallop  Abdomen: soft, nontender, active bowel sounds  Extremities: warm and well perfused, no signs of cyanosis or ischemia, bilateral upper and lower extremity motorsensory intact, 1+ BLE edema   Vascular exam:  - R radial: 2+  - L radial: 2+  - R DP: + doppler  - L DP: + doppler  - R PT: + doppler  - L PT: + doppler    Labs  Lab Results   Component Value Date    WBC 9.7 02/13/2022    HGB 14.4 02/13/2022    HCT 44.1 02/13/2022    MCV 91.5 02/13/2022     02/13/2022     Lab Results   Component Value Date     02/13/2022    K 4.7 02/13/2022     02/13/2022    CO2 24 02/13/2022    PHOS 3.0 05/24/2021    BUN 14 02/13/2022    CREATININE 1.0 02/13/2022      No results found for: GLU    Scheduled Meds:    tiotropium-olodaterol  2 puff Inhalation Daily    albuterol sulfate HFA  2 puff Inhalation BID    aspirin  81 mg Oral Daily    atorvastatin  80 mg Oral Daily    potassium chloride  20 mEq Oral Daily with breakfast    torsemide  20 mg Oral Daily    sodium chloride flush  5-40 mL IntraVENous 2 times per day    enoxaparin  40 mg SubCUTAneous Daily     Continuous Infusions:    lactated ringers 75 mL/hr at 02/14/22 0442    sodium chloride         Imaging:     CT abd/pelvis w/ contrast 2/12/22:  Impression   1. No appendicitis, diverticulitis, or small bowel obstruction. 2. Extensive atherosclerotic calcifications of the aorta and main branches   including apparent severe stenoses or short segment occlusions of the   superior mesenteric and celiac artery origins.  Correlate with presentation. 3. Other findings as described. BLE arterial duplex 11/4/2019:  Impressions   Right Impression   Right GISSELL: 1.0.  This is consistent with no significant PAD at rest.   Ultrasound images of the right lower extremity reveal no evidence of a   hemodynamically significant stenosis. Left Impression   Left GISSELL: 1.02. This is consistent with no significant PAD at rest.   Ultrasound images of the left lower extremity reveal no evidence of a   hemodynamically significant stenosis.       Conclusions        Summary        No significant arterial insufficiency noted at rest in the bilateral lower    extremities. Assessment:   Diarrhea - improved  Nausea/vomiting - improved  SMA and celiac artery stenosis - denies any post prandial pain, appears asymptomatic   PVD with h/o aortobifemoral bypass in 2001 for abdominal aortic aneurysm   Former tobacco use    Plan:  Continue ASA and statin therapy. No further vascular testing or intervention at this time. Will review further with Dr. Stella Novak and any outpatient testing or follow up will be arranged. Plan discussed with Dr. Stella Novak. Thank you for the consultation. Patient educated on plan of care and disease process. All questions answered. Electronically signed by ROSA MARIA Early CNP on 2/14/2022 at 9:29 AM     Vascular Staff    I independently performed an evaluation on Eddie Mina. I have reviewed the above documentation completed by Margie Ambrocio CNP. Please see my additional contributions to the HPI, physical exam, assessment, and medical decision making. 61-year-old male with history of hypertension, COPD and peripheral vascular disease who presented with several days of nausea vomiting and diarrhea. His history is also significant for aortobifemoral bypass by Dr. Carmelina Nixon in the past.  CT scan revealed atherosclerosis of the SMA and celiac artery and as result vascular surgery has been consulted. Patient states his nausea and vomiting has resolved. He states his diarrhea has resolved. He states that he has no postprandial abdominal pain.   He states he is able to eat any food he wants without any discomfort. He states diarrhea is not a general issue for him. He feels back to his baseline. PE:  AF  Abdomen soft nontender. CT scan personally reviewed and does show SMA and celiac artery stenosis with atherosclerosis. I: Nausea vomiting diarrhea resolved  Asymptomatic SMA celiac artery stenosis  PVD with history of aortobifemoral bypass  P: No need for further vascular work-up or intervention at this time. No signs of acute or chronic mesenteric ischemia. Findings were reviewed with the patient and his wife. All questions answered. He will follow-up in the office in 3 months for ongoing surveillance. Sincerely appreciate the consultation and please contact me with any questions      Omi Portillo M.D., FACS.   2/14/2022  3:55 PM

## 2022-02-14 NOTE — DISCHARGE SUMMARY
02/13/2022     02/13/2022     02/13/2022    K 4.7 02/13/2022     02/13/2022    CO2 24 02/13/2022    BUN 14 02/13/2022    CREATININE 1.0 02/13/2022    CALCIUM 9.0 02/13/2022    PHOS 3.0 05/24/2021    ALKPHOS 81 02/13/2022    ALT 32 02/13/2022    AST 20 02/13/2022    BILITOT 1.1 02/13/2022    BILIDIR <0.2 03/15/2018    LABALBU 3.8 02/13/2022    LDLCALC 111 11/20/2020    TRIG 97 09/05/2017     Lab Results   Component Value Date    INR 1.08 09/18/2018    INR 1.04 03/15/2018    INR 1.06 10/23/2017       Radiology:  CT ABDOMEN PELVIS W IV CONTRAST Additional Contrast? None    Result Date: 2/12/2022  EXAMINATION: CT OF THE ABDOMEN AND PELVIS WITH CONTRAST 2/12/2022 6:41 pm TECHNIQUE: CT of the abdomen and pelvis was performed with the administration of intravenous contrast. Multiplanar reformatted images are provided for review. Dose modulation, iterative reconstruction, and/or weight based adjustment of the mA/kV was utilized to reduce the radiation dose to as low as reasonably achievable. COMPARISON: None. HISTORY: ORDERING SYSTEM PROVIDED HISTORY: n/v/d TECHNOLOGIST PROVIDED HISTORY: Reason for exam:->n/v/d Additional Contrast?->None Decision Support Exception - unselect if not a suspected or confirmed emergency medical condition->Emergency Medical Condition (MA) FINDINGS: Lower Chest: Lung bases demonstrate no focal consolidation or pleural effusion. Prosthetic aortic valve. Coronary artery calcifications noted. Scattered granulomas. Organs: Liver: Unremarkable on this phase of enhancement. Granulomas. Spleen: Unremarkable on this phase of enhancement. Pancreas: Unremarkable on this phase of enhancement. Adrenal glands: Unremarkable on this phase of enhancement. Kidneys: Unremarkable on this phase of enhancement aside from parapelvic cysts on the left. No renal, ureteral, or bladder calculi. Gallbladder: Surgically absent.  GI/Bowel: Evaluation of the bowel and mesentery is limited due to lack of enteric contrast. Visualized esophagus/stomach: Unremarkable given lack of enteric contrast and degree of distension. Small bowel: No dilated small bowel. Large bowel: Scattered colonic diverticula without adjacent stranding. Appendix: Normal. Pelvis: Bladder is incompletely distended. Bladder diverticulum. Prostate and seminal vesicles appear unremarkable. Peritoneum/Retroperitoneum: Adenopathy: Suboptimal evaluation for adenopathy due to lack of enteric contrast. Abdominal aorta: No abdominal aortic aneurysm. Extensive atherosclerotic calcifications in the aorta and main branches. Apparent severe stenoses or short segment occlusions of superior mesenteric artery and celiac artery origins. Inferior mesenteric artery is not well visualized on this nondedicated exam.  Aortobifemoral bypass. Free fluid/air: No free fluid. No free air. Bones/Soft Tissues: Scattered degenerative changes noted in the visualized spine without spondylolisthesis. Fat filled left inguinal hernia without inflammatory changes. 1. No appendicitis, diverticulitis, or small bowel obstruction. 2. Extensive atherosclerotic calcifications of the aorta and main branches including apparent severe stenoses or short segment occlusions of the superior mesenteric and celiac artery origins. Correlate with presentation. 3. Other findings as described. XR CHEST PORTABLE    Result Date: 2/12/2022  EXAMINATION: ONE XRAY VIEW OF THE CHEST 2/12/2022 6:00 pm COMPARISON: Chest x-ray 01/07/2020. HISTORY: ORDERING SYSTEM PROVIDED HISTORY: SOB TECHNOLOGIST PROVIDED HISTORY: Reason for exam:->SOB Reason for Exam: Abdominal Pain FINDINGS: Cardiac silhouette is within normal limits in size. Mediastinal contours are otherwise suboptimally evaluated due to rotated projection. Probable prominent mediastinal fat at the left base. Lungs demonstrate no focal consolidation or pleural effusion. No pneumothorax appreciated on this projection.  Surgical clips over the left upper chest.     Probable prominent mediastinal fat at the left base. No airspace disease by radiograph.          Signed:    Laina Xiao MD   2/14/2022

## 2022-02-14 NOTE — CARE COORDINATION
Discharge Planning Note:      PT/OT recommends ARU. ARU denied, insurance. An approved list was provided to the patient and spouse for review. The following referrals were sent at the request of the patient and spouse:    - [facility] - waiting on response    - [facility] - waiting on response    Will continue to follow.     BELIA Crane RN      Phone: [phone]

## 2022-02-14 NOTE — PROGRESS NOTES
Occupational Therapy   Occupational Therapy Initial Assessment  Date: 2022   Patient Name: Alyssia Flower  MRN: [id number]     : 1945    Date of Service: 2022    Discharge Recommendations:  Alyssia Flower scored a 17/24 on the AM-PAC ADL Inpatient form. Current research shows that an AM-PAC score of 17 or less is typically not associated with a discharge to the patient's home setting. Based on the patient's AM-PAC score and their current ADL deficits, it is recommended that the patient have 5-7 sessions per week of Occupational Therapy at d/c to increase the patient's independence. At this time, this patient demonstrates the endurance, and/or tolerance for 3 hours of therapy each day, with a treatment frequency of 5-7x/wk. Please see assessment section for further patient specific details. If patient discharges prior to next session this note will serve as a discharge summary. Please see below for the latest assessment towards goals. OT Equipment Recommendations  Equipment Needed: Yes (Once pt returns home)  Mobility Devices: ADL Assistive Devices  ADL Assistive Devices: Shower Chair with back; Toileting - Heavy Duty Commode (depending on dimensions of toilet area to place over toilet)    Assessment   Performance deficits / Impairments: Decreased functional mobility ; Decreased ADL status; Decreased balance;Decreased cognition;Decreased safe awareness  Assessment: Pt is below his baseline level of occupational function, based on the above deficits associated with abdominal pain. Pt would benefit from continued skilled acute OT services to address these deficits.   Treatment Diagnosis: Decreased ADL status, functioinal mobility, balance, safety awareness, and cognition associated with abdominal pain  Prognosis: Good  Decision Making: Medium Complexity  History: Pt lives w/wife, gets assist as needed w/ADLs  Exam: As above  Assistance / Modification: Min A functional mobility w/RW w/verbal cues for safety; Mod A sit to stand, SBA don/doff socks  OT Education: OT Role;Plan of Care;Family Education;Equipment;Transfer Training  Patient Education: D/C recommendation. Pt needs reinforcement of learning. Barriers to Learning: Cognition  REQUIRES OT FOLLOW UP: Yes  Activity Tolerance  Activity Tolerance: Patient Tolerated treatment well  Safety Devices  Safety Devices in place: Yes  Type of devices: All fall risk precautions in place;Call light within reach; Patient at risk for falls;Gait belt;Nurse notified (Left seated on EOB as on arrival; wife present; RN aware.)           Patient Diagnosis(es): The primary encounter diagnosis was Nausea vomiting and diarrhea. A diagnosis of Mesenteric artery stenosis (HCC) was also pertinent to this visit. has a past medical history of Allergic rhinitis, Aortic valve disorders, COPD (chronic obstructive pulmonary disease) (HCC), Hydrocephalus ([facility] [address] Hyperlipidemia, Hypertension, Hypertrophy of prostate without urinary obstruction and other lower urinary tract symptoms (LUTS), Irritable bowel syndrome, Pancreatitis, Peptic ulcer, unspecified site, unspecified as acute or chronic, without mention of hemorrhage, perforation, or obstruction, and Peripheral vascular disease ([facility] [address] 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     Treatment Diagnosis: Decreased ADL status, functioinal mobility, balance, safety awareness, and cognition associated with abdominal body, showers only when wife is home)  Homemaking Responsibilities: No (spouse does all household chores)  Ambulation Assistance: Independent (with [address]; has been using walker x2 weeks since sciatica flared up, cues for use of RW from spouse)  Transfer Assistance: Needs assistance (cues for set up and occasional assist over past few weeks)  Active : No  Patient's  Info: Wife has been driving for past few months  Occupation: Retired  Type of occupation: Maintainence worker  Leisure & Hobbies: Watch golf  IADL Comments: Had lift chair but is has broken and they replaced it with an electric recliner. Sleeps in flat bed, with bedrail at [facility] (has motion sensor light). Additional Comments: 4 falls in last 6 months, mostly due to poor balance. Son has been able to assist with getting pt up. Objective   Vision: Impaired  Vision Exceptions: Wears glasses for reading  Hearing: Within functional limits    Orientation  Overall Orientation Status: Within Functional Limits (cues for Alcaraz's day; still did not guess Feb 14.)  Observation/Palpation  Edema: Mild (B) LE swelling  Balance  Sitting Balance: Stand by assistance  Standing Balance: Minimal assistance  Standing Balance  Time: ~3 min; ~1-2 min  Activity: clothing mgt & functional mobility to bathroom for clothing mgt & standing urination & toilet transfer; washing hands in stance at sink and functionial mobility to EOB  Comment: Posterior lean at sink  Functional Mobility  Functional - Mobility Device: Rolling Walker  Activity: To/from bathroom  Assist Level: Minimal assistance (max verbal cues for safety to stand closer to walker)  Toilet Transfers  Toilet - Technique: Stand step (turned from standing urination)  Equipment Used: Standard toilet (grab bar)  Toilet Transfer: Minimal assistance  ADL  Feeding: Supervision (able to open small packets, cut food with knife, use fork)  Grooming: Stand by assistance;Verbal cueing; Increased time to complete (washed hands with verbal cues; not aware soap still on hands, had soap on hands, needed cues to look for towels, dry hands)  LE Dressing: Minimal assistance;Verbal cueing;Setup; Increased time to complete (SBA don/doff socks, thread LEs into pants; Min A standing balance for clothing mgt; posterior lean)  Toileting: Maximum assistance (wife assisted; CGA standing balance; in stance at toilet)  Tone RUE  RUE Tone: Normotonic  Tone LUE  LUE Tone: Normotonic  Coordination  Movements Are Fluid And Coordinated: Yes     Bed mobility  Comment: Not observed d/t pt seated EOB at beginning and end of session. Transfers  Sit to stand: Moderate assistance (from EOB first trial; Min A second trial with rocking 3 X; Min A from low toilet using grab bar; Max cues for technique)  Stand to sit: Minimal assistance (to toilet w/grab bar; poor eccentric control; Min A to EOB, better eccentric control with cues)     Cognition  Overall Cognitive Status: Exceptions  Arousal/Alertness: Appropriate responses to stimuli  Following Commands: Follows one step commands with repetition; Follows one step commands with increased time  Attention Span: Attends with cues to redirect  Memory: Decreased short term memory  Safety Judgement: Decreased awareness of need for safety;Decreased awareness of need for assistance  Problem Solving: Assistance required to generate solutions;Assistance required to implement solutions;Assistance required to identify errors made;Assistance required to correct errors made  Insights: Not aware of deficits  Initiation: Requires cues for some  Sequencing: Requires cues for all        Sensation  Overall Sensation Status: Impaired (Decreased sensation in (B) LE due to neuropathy; UEs WFL)        LUE AROM (degrees)  LUE AROM : WFL  Left Hand AROM (degrees)  Left Hand AROM: WFL  RUE AROM (degrees)  RUE AROM : WFL  Right Hand AROM (degrees)  Right Hand AROM: WFL  LUE Strength  Gross LUE Strength: WNL (5/5 elbow, shoulder)  L Hand General: 5/5  RUE Strength  Gross RUE Strength: WNL (5/5 elbow, shoulder)  R Hand General: 5/5                   Plan   Plan  Times per week: 3-5  Times per day: Daily  Current Treatment Recommendations: Safety Education & Training,Self-Care / ADL,Functional Mobility Training,Balance Training,Equipment Evaluation, Education, & procurement,Patient/Caregiver Education & Training      AM-PAC Score        AM-PAC Inpatient Daily Activity Raw Score: 17 (02/14/22 1459)  AM-PAC Inpatient ADL T-Scale Score : 37.26 (02/14/22 1459)  ADL Inpatient CMS 0-100% Score: 50.11 (02/14/22 1459)  ADL Inpatient CMS G-Code Modifier : CK (02/14/22 1459)    Goals  Short term goals  Time Frame for Short term goals: Discharge  Short term goal 1: CGA for functional transfers to ADL surfaces w/RW and safe technique  Short term goal 2: CGA for functional mobility for ADL activity w/safe technique  Short term goal 3: Mod A for toileting  Short term goal 4: S/U for UB bathing/dressing  Short term goal 5: SBA for LB bathing/dressing       Therapy Time   Individual Concurrent Group Co-treatment   Time In 1136         Time Out 1215         Minutes 39               Timed Code Treatment Minutes:  24 min    Total Treatment Minutes: 39 min     C/ James [address], OT   Jazmin Owens, OTR/L, [id number]

## 2022-02-14 NOTE — PROGRESS NOTES
Shift assessment completed. VSS. Patient alert and oriented x 4. Denies having any pain. Reports not having any bowel movements at this time. The care plan and education have been reviewed and mutually agreed upon with the patient. Call light in reach. Will continue to monitor.

## 2022-02-14 NOTE — PROGRESS NOTES
His wife is concerned she is not able to provide the level of assistance he has been needing and is concerned for the risk of additional falls at home. Recommending continued skilled PT to safely progress independence with functional mobility in order to decrease risk of falls prior to return home with spouse. Treatment Diagnosis: Decreased balance, impaired functional mobility  Prognosis: Good  Decision Making: Medium Complexity  Exam: MMT, ROM, balance, functional mobility  Clinical Presentation: Evolving  PT Education: Goals;PT Role;Plan of Care;General Safety;Transfer Training;Family Education; Adaptive Device Training;Gait Training;Functional Mobility Training  Patient Education: D/c recommendations - Pt and spouse verbalize understanding. Pt will require repetition. Barriers to Learning: Cognition  REQUIRES PT FOLLOW UP: Yes  Activity Tolerance  Activity Tolerance: Patient Tolerated treatment well  Activity Tolerance: Pt reported fatigued following completing a lap in the room. Patient Diagnosis(es): The primary encounter diagnosis was Nausea vomiting and diarrhea. A diagnosis of Mesenteric artery stenosis (HCC) was also pertinent to this visit. has a past medical history of Allergic rhinitis, Aortic valve disorders, COPD (chronic obstructive pulmonary disease) (HCC), Hydrocephalus ([address] Hyperlipidemia, Hypertension, Hypertrophy of prostate without urinary obstruction and other lower urinary tract symptoms (LUTS), Irritable bowel syndrome, Pancreatitis, Peptic ulcer, unspecified site, unspecified as acute or chronic, without mention of hemorrhage, perforation, or obstruction, and Peripheral vascular disease ([address] 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); History  Social/Functional History  Lives With: Spouse (usually provides 24/7 assist)  Type of Home: House  Home Layout: One level,Laundry in basement (pt doesn't go to basement)  Home Access: Stairs to enter without rails  Entrance Stairs - Number of Steps: 1+1 (half rail onto porch but no easy rails) or 1+1 from garage into house with hand holds  Bathroom Shower/Tub: Walk-in shower,Doors  Bathroom Toilet: Standard  Bathroom Equipment: Grab bars around toilet,Grab bars in shower (spouse looking into toilet raiser)  Home Equipment: Rolling walker,Cane,Quad cane,Grab bars (RW is borrowed from a friend and is a bit wide to fit into the bathroom; bedrail)  ADL Assistance: Independent (Intermittent assist with sock/lower body, showers only when wife is home)  Homemaking Responsibilities: No (spouse does all household chores)  Ambulation Assistance: Independent (with SPC; has been using walker x2 weeks since sciatica flared up, cues for use of RW from spouse)  Transfer Assistance: Needs assistance (cues for set up and occasional assist over past few weeks)  Active : No  Patient's  Info: Wife has been driving for past few months  Occupation: Retired  Type of occupation: Maintainence worker  Leisure & Hobbies: Watch golf  IADL Comments: Had lift chair but is has broken and they replaced it with an electric recliner. Sleeps in flat bed, with bedrail at [facility] (has motion sensor light). Additional Comments: 4 falls in last 6 months, mostly due to poor balance. Son has been able to assist with getting pt up.      Cognition   See OT note    Objective     Observation/Palpation  Edema: Mild (B) LE swelling    AROM RLE (degrees)  RLE AROM: WFL  RLE General AROM: Lacking ~10 deg (full with PROM)  AROM LLE (degrees)  LLE AROM : WFL  Strength RLE  Strength RLE: WFL  Comment: 5/5 knee extension/flexion, ankle DF; 4/5 hip flexion  Strength LLE  Strength LLE: WFL  Comment: 5/5 knee extension, ankle DF, hip flexion; 4+/5 knee flexion        Sensation  Overall Sensation Status: Impaired (Decreased sensation in (B) LE due to neuropathy)     Bed mobility  Scooting: Supervision (sitting EOB)  Comment: Not observed this date, pt sitting EOB at beginning/end of session. Transfers  Sit to Stand: Contact guard assistance;Minimal Assistance; Moderate Assistance (CGA from EOB 1st trial; Mod A for 2nd trial; Min A for final 2 trials with Max A VCs for set up)  Stand to sit: Moderate Assistance;Contact guard assistance (CGA to control descent to EOB x3 trials with VCs for sequencing; Mod A to control descent to bed x2 trials due to LOB after attempting to pull up pants)  Comment: Pt completed multiple sit<>stand trials from EOB. The patient did well initially but then fatigued and required increased cues for set up throughout. Pt has 2 LOB posteriorly while standing at EOB attempting to pull up pants - pt with no righting reaction, therapist assisted with controlling descent to bed, poor trunk control noted as pt with increased posterior lean as he landed and needed assist to correct. Ambulation  Ambulation?: Yes  Ambulation 1  Surface: level tile  Device: Rolling Walker  Assistance: Contact guard assistance;Minimal assistance (CGA-Min A)  Quality of Gait: Slow faviola, narrow LINDA, decreased step length (B) with (R) more affected than (L), decreased step height (B) with (R) more affected than (L), frequently leaves (R) foot further behind when turning  Distance: 8 + 8 + 24 ft  Comments: Pt ambulated to/from bathroom and 1 lap in room with standing/seated rest break between. Pt with notable (R) LE weakness and gait deviations which present concern for increased risk of falls. Pt required cues for managing RW (especially with turns and backing up). Intermittent increased sway, mostly with turns.         Balance  Posture: Good  Sitting - Static: Good;-  Sitting - Dynamic: Fair;+  Standing - Static: Fair;+  Standing - Dynamic: Poor  Comments: Supervision for static sitting at EOB. SBA for dyanmic sitting with lower body dressing when sitting EOB, one episode of Min A due to posterior lean after return to sitting due to LOB, pt required assist to correct trunk control to upright. Pt stood 60 + 30 + 45 sec with (B) UE support on RW and CGA. Pt stood 5 + 5 sec with unilateral UE support on RW and had 2 posterior LOB requiring Mod A from therapist to control descend to EOB safely. Pt demonstrates no righting reaction and frequently leans to (L) more than (R). Other activity  Pt voided urine in urinal x2 due to difficulty with standing to urinate at commode. Pt donned socks at EOB with supervision. Pt donned pants at EOB with Mod A. Plan   Plan  Times per week: 3-5x  Current Treatment Recommendations: Strengthening,Transfer Training,Endurance Training,Patient/Caregiver Education & Training,Equipment Evaluation, Education, & procurement,Safety Education & Training,Gait Training,Stair training,Functional Mobility Training,Balance Training  Safety Devices  Type of devices:  All fall risk precautions in place,Gait belt,Patient at risk for falls,Nurse notified,Call light within reach,Left in bed (Pt sitting EOB without bed alarm on, however wife present and educated on need to call for staff assistance.)    AM-PAC Score  AM-PAC Inpatient Mobility Raw Score : 16 (02/14/22 1202)  AM-PAC Inpatient T-Scale Score : 40.78 (02/14/22 1202)  Mobility Inpatient CMS 0-100% Score: 54.16 (02/14/22 1202)  Mobility Inpatient CMS G-Code Modifier : CK (02/14/22 1202)          Goals  Short term goals  Time Frame for Short term goals: Before discharge  Short term goal 1: Pt will complete bed mobility Mod I from flat bed with use of rail  Short term goal 2: Pt will complete sit<>stand from multiple surfaces with SBA  Short term goal 3: Pt will ambulate 50 ft with LRAD and SBA  Short term goal 4: Pt will demonstrate 5 minutes of standing balance with (B) UE support on RW

## 2022-02-15 VITALS
HEART RATE: 65 BPM | BODY MASS INDEX: 32.51 KG/M2 | RESPIRATION RATE: 16 BRPM | OXYGEN SATURATION: 93 % | SYSTOLIC BLOOD PRESSURE: 160 MMHG | TEMPERATURE: 97.4 F | WEIGHT: 240 LBS | DIASTOLIC BLOOD PRESSURE: 77 MMHG | HEIGHT: 72 IN

## 2022-02-15 LAB
ANION GAP SERPL CALCULATED.3IONS-SCNC: 12 MMOL/L (ref 3–16)
BASOPHILS ABSOLUTE: 0.1 K/UL (ref 0–0.2)
BASOPHILS RELATIVE PERCENT: 1 %
BUN BLDV-MCNC: 13 MG/DL (ref 7–20)
CALCIUM SERPL-MCNC: 8.6 MG/DL (ref 8.3–10.6)
CHLORIDE BLD-SCNC: 104 MMOL/L (ref 99–110)
CO2: 24 MMOL/L (ref 21–32)
CREAT SERPL-MCNC: 1 MG/DL (ref 0.8–1.3)
EOSINOPHILS ABSOLUTE: 0.4 K/UL (ref 0–0.6)
EOSINOPHILS RELATIVE PERCENT: 5 %
GFR AFRICAN AMERICAN: >60
GFR NON-AFRICAN AMERICAN: >60
GLUCOSE BLD-MCNC: 93 MG/DL (ref 70–99)
HCT VFR BLD CALC: 41.3 % (ref 40.5–52.5)
HEMOGLOBIN: 13.9 G/DL (ref 13.5–17.5)
LYMPHOCYTES ABSOLUTE: 1.8 K/UL (ref 1–5.1)
LYMPHOCYTES RELATIVE PERCENT: 20.8 %
MCH RBC QN AUTO: 30.5 PG (ref 26–34)
MCHC RBC AUTO-ENTMCNC: 33.5 G/DL (ref 31–36)
MCV RBC AUTO: 91 FL (ref 80–100)
MONOCYTES ABSOLUTE: 0.8 K/UL (ref 0–1.3)
MONOCYTES RELATIVE PERCENT: 8.9 %
NEUTROPHILS ABSOLUTE: 5.5 K/UL (ref 1.7–7.7)
NEUTROPHILS RELATIVE PERCENT: 64.3 %
PDW BLD-RTO: 14.6 % (ref 12.4–15.4)
PLATELET # BLD: 177 K/UL (ref 135–450)
PMV BLD AUTO: 9.5 FL (ref 5–10.5)
POTASSIUM REFLEX MAGNESIUM: 3.9 MMOL/L (ref 3.5–5.1)
RBC # BLD: 4.55 M/UL (ref 4.2–5.9)
SODIUM BLD-SCNC: 140 MMOL/L (ref 136–145)
WBC # BLD: 8.6 K/UL (ref 4–11)

## 2022-02-15 PROCEDURE — 97110 THERAPEUTIC EXERCISES: CPT

## 2022-02-15 PROCEDURE — 6370000000 HC RX 637 (ALT 250 FOR IP): Performed by: HOSPITALIST

## 2022-02-15 PROCEDURE — G0378 HOSPITAL OBSERVATION PER HR: HCPCS

## 2022-02-15 PROCEDURE — 97116 GAIT TRAINING THERAPY: CPT

## 2022-02-15 PROCEDURE — 2580000003 HC RX 258: Performed by: HOSPITALIST

## 2022-02-15 PROCEDURE — 6360000002 HC RX W HCPCS: Performed by: HOSPITALIST

## 2022-02-15 PROCEDURE — 97530 THERAPEUTIC ACTIVITIES: CPT

## 2022-02-15 PROCEDURE — 96372 THER/PROPH/DIAG INJ SC/IM: CPT

## 2022-02-15 PROCEDURE — 85025 COMPLETE CBC W/AUTO DIFF WBC: CPT

## 2022-02-15 PROCEDURE — 80048 BASIC METABOLIC PNL TOTAL CA: CPT

## 2022-02-15 RX ADMIN — ATORVASTATIN CALCIUM 80 MG: 80 TABLET, FILM COATED ORAL at 09:09

## 2022-02-15 RX ADMIN — POTASSIUM CHLORIDE 20 MEQ: 20 TABLET, EXTENDED RELEASE ORAL at 09:09

## 2022-02-15 RX ADMIN — Medication 2 PUFF: at 10:01

## 2022-02-15 RX ADMIN — ENOXAPARIN SODIUM 40 MG: 40 INJECTION SUBCUTANEOUS at 09:09

## 2022-02-15 RX ADMIN — TORSEMIDE 20 MG: 20 TABLET ORAL at 09:09

## 2022-02-15 RX ADMIN — Medication 10 ML: at 09:11

## 2022-02-15 RX ADMIN — ASPIRIN 81 MG: 81 TABLET, COATED ORAL at 09:09

## 2022-02-15 RX ADMIN — TIOTROPIUM BROMIDE AND OLODATEROL 2 PUFF: 3.124; 2.736 SPRAY, METERED RESPIRATORY (INHALATION) at 10:01

## 2022-02-15 ASSESSMENT — PAIN SCALES - GENERAL: PAINLEVEL_OUTOF10: 0

## 2022-02-15 NOTE — DISCHARGE INSTR - COC
Continuity of Care Form    Patient Name: Glendy Awad   :    MRN:  [id number]    Admit date:  2022  Discharge date:  02/15/2022    Code Status Order: Full Code   Advance Directives:      Admitting Physician:  Awilda Maria MD  PCP: Freya Aguiar MD    Discharging Nurse: [facility] Unit/Room#: 8LR-9017/1460-14  Discharging Unit Phone Number: [phone]    Emergency Contact:   Extended Emergency Contact Information  Primary Emergency Contact: Chandni Dunlap  Address: [address], [facility] [facility] [address]  Phone: [phone]  Mobile Phone: [phone]  Relation: Spouse  Secondary Emergency Contact: [address] [facility] [address] Phone: [phone]  Relation: Child    Past Surgical History:  Past Surgical History:   Procedure Laterality Date    ABDOMINAL AORTIC ANEURYSM REPAIR N/A     ANGIOPLASTY      RT femoral artery    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REPAIR  2018    [place]    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REPAIR      Aortic valve replacement    AORTIC VALVE REPLACEMENT  2018    CHOLECYSTECTOMY      COLONOSCOPY N/A 2021    COLONOSCOPY POLYPECTOMY SNARE/COLD BIOPSY performed by Brian Mitchell MD at [facility]      bifemoral bypass    FOOT SURGERY Right 12/2/15    HERNIA REPAIR Left     OTHER SURGICAL HISTORY  10/23/2017    lumbar puncture    TX ESOPHAGOGASTRODUODENOSCOPY TRANSORAL DIAGNOSTIC N/A 2018    EGD performed by Brian Mitchell MD at [address] [place] [address] ENDOSCOPY  2018    WITH BIOPSY       Immunization History:   Immunization History   Administered Date(s) Administered    COVID-19, J&J, PF, 0.5 mL 2021, 2021    Influenza Whole 10/01/2015    Influenza, High Dose (Fluzone 65 yrs and older) 2014, 2017, 2018, 10/08/2019, 2020    Influenza, Intradermal, Preservative free 2012    Pneumococcal Conjugate 13-valent (Gfpayrq20) 05/04/2016    Pneumococcal Polysaccharide (Vaketzfhj33) 12/19/2002, 06/24/2013    Td, unspecified formulation 12/01/2006    Tdap (Boostrix, Adacel) 11/19/2020       Active Problems:  Patient Active Problem List   Diagnosis Code    Hypercholesteremia E78.00    Essential hypertension I10    Nonrheumatic aortic valve disorder I35.9    Hypertrophy of prostate without urinary obstruction and other lower urinary tract symptoms (LUTS) N40.0    Peptic ulcer K27.9    PVD (peripheral vascular disease) ([facility]) I73.9    Allergic rhinitis J30.9    COPD (chronic obstructive pulmonary disease) ([facility]) J44.9    Edema R60.9    Hallux valgus with bunions M20.10, M21.619    Carotid stenosis I65.29    Rosacea L71.9    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 I65.23    Normal pressure hydrocephalus ([facility]) G91.2    Ataxia R27.0    Mild cognitive impairment G31.84    Chronic idiopathic constipation K59.04    Encounter for follow-up for aortic valve replacement Z09, Z95.2    GI hemorrhage K92.2    Nonrheumatic aortic valve stenosis I35.0    Anticoagulated Z79.01    S/P TAVR (transcatheter aortic valve replacement) Z95.2    Hyperglycemia R73.9    Abdominal pain R10.9       Isolation/Infection:   Isolation            C Diff Contact          Patient Infection Status       Infection Onset Added Last Indicated Last Indicated By Review Planned Expiration Resolved Resolved By    C-diff Rule Out 02/12/22 02/12/22 02/12/22 Clostridium difficile toxin/antigen (Ordered)        Resolved    COVID-19 (Rule Out) 02/12/22 02/12/22 02/12/22 COVID-19 (Ordered)   02/14/22 Rule-Out Test Resulted            Nurse Assessment:  Last Vital Signs: BP (!) 160/77   Pulse 65   Temp 97.4 °F (36.3 °C) (Oral)   Resp 16   Ht 6' (1.829 m)   Wt 240 lb (108.9 kg)   SpO2 93%   BMI 32.55 kg/m²     Last documented pain score (0-10 scale): Pain Level: 0  Last Weight:   Wt Readings from Last 1 Encounters:   02/12/22 240 lb (108.9 kg) Mental Status:  oriented    IV Access:  - None    Nursing Mobility/ADLs:  Walking   Assisted  Transfer  Assisted  Bathing  Assisted  Dressing  Assisted  Toileting  Assisted  Feeding  Independent  Med Admin  Independent  Med Delivery   whole    Wound Care Documentation and Therapy:  Incision 12/02/15 Foot Right (Active)   Number of days: 2266       Incision 10/23/17 Back Lower (Active)   Number of days: 8819        Elimination:  Continence: Bowel: {YES / WJ:65756}  Bladder: {YES / KW:01985}  Urinary Catheter: None   Colostomy/Ileostomy/Ileal Conduit: {YES / ND:18415}       Date of Last BM: ***    Intake/Output Summary (Last 24 hours) at 2/15/2022 0916  Last data filed at 2/15/2022 0911  Gross per 24 hour   Intake 20 ml   Output --   Net 20 ml     I/O last 3 completed shifts: In: 10 [I.V.:10]  Out: 350 [Urine:350]    Safety Concerns: At Risk for Falls    Impairments/Disabilities:      None    Nutrition Therapy:  Current Nutrition Therapy:   - Oral Diet:  General    Routes of Feeding: Oral  Liquids: {Slp liquid thickness:97339}  Daily Fluid Restriction: no  Last Modified Barium Swallow with Video (Video Swallowing Test): not done    Treatments at the Time of Hospital Discharge:   Respiratory Treatments: ***  Oxygen Therapy:  is not on home oxygen therapy.   Ventilator:    - No ventilator support    Rehab Therapies: {THERAPEUTIC INTERVENTION:9007091016}  Weight Bearing Status/Restrictions: No weight bearing restirctions  Other Medical Equipment (for information only, NOT a DME order):  {EQUIPMENT:118432918}  Other Treatments: ***    Patient's personal belongings (please select all that are sent with patient):  Jimi PARKER SIGNATURE:  Electronically signed by Suyapa Pulliam RN on 2/15/22 at 9:21 AM EST    CASE MANAGEMENT/SOCIAL WORK SECTION    Inpatient Status Date: 02/12/2022    Readmission Risk Assessment Score:  Readmission Risk              Risk of Unplanned Readmission:  0           Discharging to Facility/ Agency   Name: [facility]  Address: [address] [place] [address]  Phone: [phone]  Fax: [phone]        / signature: Electronically signed by Fahad Urbano RN on 2/15/22 at 9:39 AM EST    PHYSICIAN SECTION    Prognosis: Fair    Condition at Discharge: Stable    Rehab Potential (if transferring to Rehab): Fair    Recommended Labs or Other Treatments After Discharge:     Physician Certification: I certify the above information and transfer of Jennifer Cisneros  is necessary for the continuing treatment of the diagnosis listed and that he requires [facility] for greater 30 days.      Update Admission H&P: No change in H&P    PHYSICIAN SIGNATURE:  Electronically signed by Glendy Cole MD on 2/15/22 at 12:33 PM EST

## 2022-02-15 NOTE — PROGRESS NOTES
Pt picked up at 1310 by transport. Wife in room collecting personal belogings to take home with her.

## 2022-02-15 NOTE — CARE COORDINATION
Discharge Plan:     Patient discharged to:    [address] [facility] 78    SW/DC Planner faxed, [address] Micah Das and LIZZETTE to: [phone]    Narcotic Prescriptions faxed were: N/A    RN: will call report to: [phone]     Medical Transport with: [facility] [phone]     time: 1300    Family advised of discharge?: Yes    HENS Submitted?:  Yes    All discharge needs met per case management.       ESTELLE DowningN RN      Phone: [phone]

## 2022-02-15 NOTE — PLAN OF CARE
Problem: Skin Integrity:  Goal: Will show no infection signs and symptoms  Description: Will show no infection signs and symptoms  2/15/2022 1121 by Tata Camacho RN  Outcome: Completed  2/14/2022 2200 by Sha Fisher RN  Outcome: Ongoing  Goal: Absence of new skin breakdown  Description: Absence of new skin breakdown  2/15/2022 1121 by Tata Camacho RN  Outcome: Completed  2/14/2022 2200 by Sha Fisher RN  Outcome: Ongoing     Problem: Falls - Risk of:  Goal: Will remain free from falls  Description: Will remain free from falls  2/15/2022 1121 by Tata Camacho RN  Outcome: Completed  2/14/2022 2200 by Sha Fisher RN  Outcome: Ongoing  Goal: Absence of physical injury  Description: Absence of physical injury  2/15/2022 1121 by Tata Camacho RN  Outcome: Completed  2/14/2022 2200 by Sha Fisher RN  Outcome: Ongoing     Problem:  Activity:  Goal: Ability to tolerate increased activity will improve  Description: Ability to tolerate increased activity will improve  2/15/2022 1121 by Tata Camacho RN  Outcome: Completed  2/14/2022 2200 by Sha Fisher RN  Outcome: Ongoing     Problem: Cardiac:  Goal: Hemodynamic stability will improve  Description: Hemodynamic stability will improve  2/15/2022 1121 by Tata Camacho RN  Outcome: Completed  2/14/2022 2200 by Sha Fisher RN  Outcome: Ongoing  Goal: Complications related to the disease process, condition or treatment will be avoided or minimized  Description: Complications related to the disease process, condition or treatment will be avoided or minimized  2/15/2022 1121 by Tata Camacho RN  Outcome: Completed  2/14/2022 2200 by Sha Fisher RN  Outcome: Ongoing  Goal: Cerebral tissue perfusion will improve  Description: Cerebral tissue perfusion will improve  2/15/2022 1121 by Tata Camacho RN  Outcome: Completed  2/14/2022 2200 by Sha Fisher RN  Outcome: Ongoing     Problem: Coping:  Goal: Ability to identify and develop effective coping behavior will improve  Description: Ability to identify and develop effective coping behavior will improve  2/15/2022 1121 by Estefany Mendoza RN  Outcome: Completed  2/14/2022 2200 by Rikki Fontana RN  Outcome: Ongoing     Problem: Health Behavior:  Goal: Identification of resources available to assist in meeting health care needs will improve  Description: Identification of resources available to assist in meeting health care needs will improve  2/15/2022 1121 by Estefany Mendoza RN  Outcome: Completed  2/14/2022 2200 by Rikki Fontana RN  Outcome: Ongoing     Problem: Nutritional:  Goal: Ability to identify appropriate dietary choices will improve  Description: Ability to identify appropriate dietary choices will improve  2/15/2022 1121 by Estefany Mendoza RN  Outcome: Completed  2/14/2022 2200 by Rikki Fontana RN  Outcome: Ongoing     Problem: Bowel/Gastric:  Goal: Control of bowel function will improve  Description: Control of bowel function will improve  2/15/2022 1121 by Estefany Mendzoa RN  Outcome: Completed  2/14/2022 2200 by Rikki Fontana RN  Outcome: Ongoing  Goal: Ability to achieve a regular elimination pattern will improve  Description: Ability to achieve a regular elimination pattern will improve  2/15/2022 1121 by Estefany Mendoza RN  Outcome: Completed  2/14/2022 2200 by Rikki Fontana RN  Outcome: Ongoing   The care plan and education has been reviewed and mutually agreed upon with the patient.

## 2022-02-15 NOTE — PLAN OF CARE
Problem: Skin Integrity:  Goal: Will show no infection signs and symptoms  Description: Will show no infection signs and symptoms  2/14/2022 2200 by Malaika Reyes RN  Outcome: Ongoing     Problem: Skin Integrity:  Goal: Absence of new skin breakdown  Description: Absence of new skin breakdown  2/14/2022 2200 by Malaika Reyes RN  Outcome: Ongoing     Problem: Falls - Risk of:  Goal: Will remain free from falls  Description: Will remain free from falls  2/14/2022 2200 by Malaika Reyes RN  Outcome: Ongoing     Problem: Falls - Risk of:  Goal: Absence of physical injury  Description: Absence of physical injury  2/14/2022 2200 by Malaika Reyes RN  Outcome: Ongoing     Problem:  Activity:  Goal: Ability to tolerate increased activity will improve  Description: Ability to tolerate increased activity will improve  2/14/2022 2200 by Malaika Reyes RN  Outcome: Ongoing     Problem: Cardiac:  Goal: Hemodynamic stability will improve  Description: Hemodynamic stability will improve  2/14/2022 2200 by Malaika Reyes RN  Outcome: Ongoing     Problem: Cardiac:  Goal: Complications related to the disease process, condition or treatment will be avoided or minimized  Description: Complications related to the disease process, condition or treatment will be avoided or minimized  2/14/2022 2200 by Malaika Reyes RN  Outcome: Ongoing     Problem: Cardiac:  Goal: Cerebral tissue perfusion will improve  Description: Cerebral tissue perfusion will improve  2/14/2022 2200 by Malaika Reyes RN  Outcome: Ongoing     Problem: Coping:  Goal: Ability to identify and develop effective coping behavior will improve  Description: Ability to identify and develop effective coping behavior will improve  2/14/2022 2200 by Malaika Reyes RN  Outcome: Ongoing     Problem: Health Behavior:  Goal: Identification of resources available to assist in meeting health care needs will improve  Description: Identification of resources available to assist in meeting health care needs will improve  2/14/2022 2200 by Molina Del Rio RN  Outcome: Ongoing     Problem: Nutritional:  Goal: Ability to identify appropriate dietary choices will improve  Description: Ability to identify appropriate dietary choices will improve  2/14/2022 2200 by Molina Del Rio RN  Outcome: Ongoing     Problem: Bowel/Gastric:  Goal: Control of bowel function will improve  Description: Control of bowel function will improve  2/14/2022 2200 by Molina Del Rio RN  Outcome: Ongoing     Problem: Bowel/Gastric:  Goal: Ability to achieve a regular elimination pattern will improve  Description: Ability to achieve a regular elimination pattern will improve  2/14/2022 2200 by Molina Del Rio RN  Outcome: Ongoing     Continue with plan of care.

## 2022-02-15 NOTE — PROGRESS NOTES
Physical Therapy  Facility/Department: [address] ORTHO/NEURO NURSING  Daily Treatment Note  NAME: Veronica Delgado  : 1945  MRN: [id number]    Date of Service: 2/15/2022    Discharge Recommendations:      PT Equipment Recommendations  Equipment Needed: Yes  Other: If pt discharges home recommend  and [facility]     Veronica Delgado scored a 16/24 on the AM-PAC short mobility form. Current research shows that an AM-PAC score of 17 or less is typically not associated with a discharge to the patient's home setting. Based on the patient's AM-PAC score and their current functional mobility deficits, it is recommended that the patient have 3-5 sessions per week of Physical Therapy at d/ to increase the patient's independence. Please see assessment section for further patient specific details. If patient discharges prior to next session this note will serve as a discharge summary. Please see below for the latest assessment towards goals. Assessment   Body structures, Functions, Activity limitations: Decreased functional mobility ; Decreased safe awareness;Decreased balance;Decreased endurance;Decreased strength  Assessment: Pt demonstrated improved sit<>stand within session but continues to require frequent cues for set up and sequencing. The patient demonstrated improved posture and (R) step length following balance exercises however he continues to fatigue or have trouble maintaining this correction once distracted. Spouse present throughout session and happy to see pt make improvements within session. Encouraged spouse to take notes so she an pt can review therapy cues and exercises outside of the sessions. Continue to recommend skilled PT to safely progress independence with functional mobility prior to return home.   Treatment Diagnosis: Decreased balance, impaired functional mobility  Prognosis: Good  Decision Making: Medium Complexity  Clinical Presentation: Evolving  PT Education: Goals;PT Role;Plan of Care;General Safety;Transfer Training;Family Education; Adaptive Device Training;Gait Training;Functional Mobility Training  Patient Education: D/c recommendations - Pt and spouse verbalize understanding. Pt will require repetition. Barriers to Learning: Cognition  REQUIRES PT FOLLOW UP: Yes  Activity Tolerance  Activity Tolerance: Patient Tolerated treatment well  Activity Tolerance: Sitting EOB /76     Patient Diagnosis(es): The primary encounter diagnosis was Nausea vomiting and diarrhea. A diagnosis of Mesenteric artery stenosis (HCC) was also pertinent to this visit. has a past medical history of Allergic rhinitis, Aortic valve disorders, COPD (chronic obstructive pulmonary disease) (HCC), Hydrocephalus ([facility] [address] Hyperlipidemia, Hypertension, Hypertrophy of prostate without urinary obstruction and other lower urinary tract symptoms (LUTS), Irritable bowel syndrome, Pancreatitis, Peptic ulcer, unspecified site, unspecified as acute or chronic, without mention of hemorrhage, perforation, or obstruction, and Peripheral vascular disease ([facility] [address] 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 Restrictions  Restrictions/Precautions  Restrictions/Precautions: Fall Risk,Isolation,Contact Precautions (High fall risk; Droplet Plus isolation (COVID r/o);  Contact plus (c-diff r/o))  Position Activity Restriction  Other position/activity restrictions: Per H&P on 2/12 \"76 y.o. male who presented with: Reports acute onset of N/V at home last night and he had recurrent episodes at home and started having bilious vomiting earlier today. Also having multiple loose stools at home. He denies any F/C or Abd pain. Has been unable to take his medications or eat/drink much at home. Wife called his PCP and was recommended to bring him to ED. He denies having any abd pain w/ food intake in past. He recently completed a Prednisone course for sciatica. Denies any GERD s/s. Denies taking any NSAIDS @ Home recently for back pain, has been taking tylenol prn \"     Subjective   General  Chart Reviewed: Yes  Additional Pertinent Hx: Goes by Anum Villela / Caregiver Present: Yes (spouse)  Subjective  Subjective: Pt agreeable PT tx. Denies pain. General Comment  Comments: Pt coming back from bathroom with assist from spouse upon therapist arrival.  Pain Screening  Patient Currently in Pain: Denies  Vital Signs  Patient Currently in Pain: Denies          Orientation  Orientation  Overall Orientation Status: Impaired  Orientation Level: Oriented to person;Oriented to place;Oriented to situation;Disoriented to time (knows month but not day or year despite cues)     Cognition   WFL    Objective   Bed mobility  Scooting: Supervision (cues to scoot to EOB for set up for sit<>stand transfers on 70% of trials)  Comment: Not observed d/t pt seated EOB at beginning and end of session. Transfers  Sit to Stand: Moderate Assistance;Minimal Assistance;Contact guard assistance (~12 trials completed total with several additional unsuccessful attempts to reach standing; assist level varied from Mod A-CGA (mostly Min-CGA))  Stand to sit: Moderate Assistance;Contact guard assistance (~12 trials completed to EOB with varying assist from Mod-CGA)  Comment: Pt educated on correct set up and sequencing for sit<>stand transfers from EOB.  Pt with intermittent recall on these instructions throughout session but demonstrates some improvement in retention overall by end of session (until distracted by having to go to the Inpatient CMS 0-100% Score: 54.16 (02/15/22 1604)  Mobility Inpatient CMS G-Code Modifier : CK (02/15/22 1604)          Goals  Short term goals  Time Frame for Short term goals: Before discharge  Short term goal 1: Pt will complete bed mobility Mod I from flat bed with use of rail  Short term goal 2: Pt will complete sit<>stand from multiple surfaces with SBA  Short term goal 3: Pt will ambulate 50 ft with LRAD and SBA  Short term goal 4: Pt will demonstrate 5 minutes of standing balance with (B) UE support on RW and supervision in order to complete ADLs  Short term goal 5: Pt will ascend/descend 1 + 1 step with LRAD and SBA  Patient Goals   Patient goals : None stated. Spouse's goal is for him to move better   None met this date    Plan    Plan  Times per week: 3-5x  Current Treatment Recommendations: Strengthening,Transfer Training,Endurance Training,Patient/Caregiver Education & Training,Equipment Evaluation, Education, & procurement,Safety Education & Training,Gait Training,Stair training,Functional Mobility Training,Balance Training  Safety Devices  Type of devices:  All fall risk precautions in place,Gait belt,Patient at risk for falls,Nurse notified,Call light within reach,Left in bed (Pt sitting EOB without bed alarm on, however wife present and educated on need to call for staff assistance.)     Therapy Time   Individual Concurrent Group Co-treatment   Time In [phone]         Time Out 1142         Minutes 61         Timed Code Treatment Minutes: [address] [place], [place], DPT #[id number]

## 2022-02-15 NOTE — FLOWSHEET NOTE
02/14/22 1945   Vitals   Temp 97.6 °F (36.4 °C)   Temp Source Oral   Pulse 66   Heart Rate Source Monitor   Resp 16   BP (!) 156/70   MAP (mmHg) 99   BP Location Left upper arm   BP Upper/Lower Upper   BP Method Automatic   Patient Position Supine; Turns self   Level of Consciousness Alert (0)   MEWS Score 1   Patient Currently in Pain Denies   Cardiac Rhythm Sinus rhythm   Pain Assessment   Pain Assessment 0-10   Pain Level 0   Patient's Stated Pain Goal No pain   Oxygen Therapy   SpO2 96 %   Pulse Oximeter Device Mode Intermittent   Pulse Oximeter Device Location Finger   O2 Device None (Room air)     Slight confusion noted upon assessment, patient disoriented to time and current situation. Denies any nausea, vomiting, or diarrhea. Plan of care reviewed and agreed upon by patient. Call light and personal belongings within reach. Fall and safety precautions in place. Continue to monitor.

## 2022-02-15 NOTE — PROGRESS NOTES
Department of Internal Medicine  General Internal Medicine  Progress Note    CC:  Abdominal pain     SUBJECTIVE:      Bed no chest pain shortness breath or chills no further diarrhea or abdominal    OBJECTIVE      Medications    Current Facility-Administered Medications: tiotropium-olodaterol (STIOLTO) 2.5-2.5 MCG/ACT inhaler 2 puff, 2 puff, Inhalation, Daily  albuterol sulfate  (90 Base) MCG/ACT inhaler 2 puff, 2 puff, Inhalation, BID  gabapentin (NEURONTIN) capsule 200 mg, 200 mg, Oral, TID PRN  albuterol sulfate  (90 Base) MCG/ACT inhaler 2 puff, 2 puff, Inhalation, Q4H PRN  aspirin EC tablet 81 mg, 81 mg, Oral, Daily  atorvastatin (LIPITOR) tablet 80 mg, 80 mg, Oral, Daily  potassium chloride (KLOR-CON M) extended release tablet 20 mEq, 20 mEq, Oral, Daily with breakfast  torsemide (DEMADEX) tablet 20 mg, 20 mg, Oral, Daily  sodium chloride flush 0.9 % injection 5-40 mL, 5-40 mL, IntraVENous, 2 times per day  sodium chloride flush 0.9 % injection 5-40 mL, 5-40 mL, IntraVENous, PRN  0.9 % sodium chloride infusion, 25 mL, IntraVENous, PRN  enoxaparin (LOVENOX) injection 40 mg, 40 mg, SubCUTAneous, Daily  polyethylene glycol (GLYCOLAX) packet 17 g, 17 g, Oral, Daily PRN  acetaminophen (TYLENOL) tablet 650 mg, 650 mg, Oral, Q6H PRN **OR** acetaminophen (TYLENOL) suppository 650 mg, 650 mg, Rectal, Q6H PRN  ondansetron (ZOFRAN-ODT) disintegrating tablet 4 mg, 4 mg, Oral, Q8H PRN **OR** ondansetron (ZOFRAN) injection 4 mg, 4 mg, IntraVENous, Q6H PRN  potassium chloride (KLOR-CON M) extended release tablet 40 mEq, 40 mEq, Oral, PRN **OR** potassium bicarb-citric acid (EFFER-K) effervescent tablet 40 mEq, 40 mEq, Oral, PRN **OR** potassium chloride 10 mEq/100 mL IVPB (Peripheral Line), 10 mEq, IntraVENous, PRN  magnesium sulfate 2000 mg in 50 mL IVPB premix, 2,000 mg, IntraVENous, PRN  Physical    VITALS:  BP (!) 160/77   Pulse 65   Temp 97.4 °F (36.3 °C) (Oral)   Resp 16   Ht 6' (1.829 m)   Wt 240 lb (108.9 kg)   SpO2 93%   BMI 32.55 kg/m²     General appearance: aaox3  Eyes:  Sclera clear, pupils equal  ENT:  moist  mucus membranes, Trachea midline. Cardiovascular: Regular rhythm, normal S1, S2.    Respiratory:  ctab  Gastrointestinal:  Abdomen soft,  non-tender,  not distended,    Neurology:  Cranial nerves grossly intact. Alert and oriented in time, place and person. No speech or motor deficits   Psychiatry:  Appropriate affect. Not agitated  Skin:  Warm, dry, normal turgor, no rash      Data       CT ABDOMEN PELVIS W IV CONTRAST Additional Contrast? None   Final Result   1. No appendicitis, diverticulitis, or small bowel obstruction. 2. Extensive atherosclerotic calcifications of the aorta and main branches   including apparent severe stenoses or short segment occlusions of the   superior mesenteric and celiac artery origins. Correlate with presentation. 3. Other findings as described.           XR CHEST PORTABLE   Final Result   Probable prominent mediastinal fat at the left base. No airspace disease by   radiograph.                 ASSESSMENT AND PLAN      Acute diarrhea  Resolved     SMA stenosis  - S/p Femoral Bypass Hx   - S/p AAA repair Hx in 2001   - Cont daily asa and statin  - Vascular surgery consulted  pending    Code status: full code  DVT ppx:  Sc lovenox

## 2022-03-07 ENCOUNTER — CARE COORDINATION (OUTPATIENT)
Dept: CASE MANAGEMENT | Age: 77
End: 2022-03-07

## 2022-03-07 DIAGNOSIS — R10.9 ABDOMINAL PAIN, UNSPECIFIED ABDOMINAL LOCATION: Primary | ICD-10-CM

## 2022-03-07 LAB
ANION GAP SERPL CALCULATED.3IONS-SCNC: 8 MMOL/L (ref 6–18)
BASOPHILS ABSOLUTE: 0.1 THOU/MCL (ref 0–0.2)
BASOPHILS ABSOLUTE: 1 %
BUN BLDV-MCNC: 14 MG/DL (ref 8–26)
CALCIUM SERPL-MCNC: 9.6 MG/DL (ref 8.5–10.4)
CHLORIDE BLD-SCNC: 101 MEQ/L (ref 98–111)
CO2: 30 MMOL/L (ref 21–31)
CREAT SERPL-MCNC: 1.14 MG/DL (ref 0.7–1.3)
EGFR (CKD-EPI): 67 ML/MIN/1.73 M2
EOSINOPHILS ABSOLUTE: 0.4 THOU/MCL (ref 0.03–0.45)
EOSINOPHILS RELATIVE PERCENT: 5 %
GLUCOSE BLD-MCNC: 89 MG/DL (ref 70–99)
HCT VFR BLD CALC: 46.2 % (ref 40–50)
HEMOGLOBIN: 15.5 G/DL (ref 13.5–16.5)
LYMPHOCYTES ABSOLUTE: 1.6 THOU/MCL (ref 1–4)
LYMPHOCYTES RELATIVE PERCENT: 17 %
MCH RBC QN AUTO: 30.1 PG (ref 27–33)
MCHC RBC AUTO-ENTMCNC: 33.5 G/DL (ref 32–36)
MCV RBC AUTO: 90 FL (ref 82–97)
MONOCYTES # BLD: 7 %
MONOCYTES ABSOLUTE: 0.6 THOU/MCL (ref 0.2–0.9)
NEUTROPHILS ABSOLUTE: 6.4 THOU/MCL (ref 1.8–7.7)
PDW BLD-RTO: 14.4 % (ref 12.3–17)
PLATELET # BLD: 263 THOU/MCL (ref 140–375)
PMV BLD AUTO: 9.5 FL (ref 7.4–11.5)
POTASSIUM SERPL-SCNC: 4.2 MEQ/L (ref 3.6–5.1)
RBC # BLD: 5.13 MIL/MCL (ref 4.4–5.8)
SEG NEUTROPHILS: 70 %
SODIUM BLD-SCNC: 139 MEQ/L (ref 135–145)
WBC # BLD: 9.1 THOU/MCL (ref 3.6–10.5)

## 2022-03-07 PROCEDURE — 1111F DSCHRG MED/CURRENT MED MERGE: CPT | Performed by: FAMILY MEDICINE

## 2022-03-07 NOTE — CARE COORDINATION
[facility] Trans[facility] Initial Follow Up Call    Call within 2 business days of discharge: Yes    Patient: lEi Rust Patient : 1945   MRN: [id number]  Reason for Admission: n/v  Discharge Date: 2/15/22 RARS: No data recorded    Last Discharge [facility]       Complaint Diagnosis Description Type Department Provider    22 Abdominal Pain; Emesis Nausea vomiting and diarrhea . .. ED to Hosp-Admission (Discharged) (ADMITTED) Carmelita Woods MD; Jelena Hollis. .. Acute Care Course:   Pt to [facility] with abd pain and n/v from  to 2/15. Pt with Celiac artery stenosis and will need a f/u with vascular and on 3/24 will have doppler and see cardio for f/u. Pt went from hospital to [facility] [place] for 17 days and on 3/4 had a d/c home with [facility]. 3/10 has PCP f/u    Sig Hx:   HLD, aortic valve with TAVR, HTN, PVD, COPD, carotid stenosis    DME: walker, grab bars, BSC    Conversation:   Spoke with Nevaeh after 2 IDs. Pt is doing well and a nurse from [facility] was there yesterday and she is expecting PT/OT to call today. Reviewed appts. He is sleeping well and his nausea is much better and he is able to eat. He is weak but his mobility is improving. A new walker was obtained and it is sturdier. Also a BSC was obtained. They do not have a shower chair but are using grab bars in place. Advised to have OT evaluate. Also will have OT look at toilet. He is using grab bars to get up but may need a raised toilet seat. Nevaeh prepares meals and obtains supplies. She manages his meds. Reviewed meds. Swelling is much better and advised to call PCP if worsens. Advised for pt to take deep breaths to exercise lungs. Agreeable to calls. Follow up plan: Will hand off to [facility] Initial Call    Was this an external facility discharge?  No Discharge Facility: [facility]      Challenges to be reviewed by the provider   Additional needs identified to be addressed with provider: No  medications-1111F needs reviewed             Method of communication with provider : none      Advance Care Planning:   Does patient have an Advance Directive: reviewed and current, reviewed and needs to be updated, not on file; education provided, not on file, patient declined education, decision maker updated and referral to internal ACP facilitator. Was this a readmission? No  Patient stated reason for admission: n/v  Patients top risk factors for readmission: medical condition-vascular disease    Care Transition Nurse (CTN) contacted the family by telephone to perform post hospital discharge assessment. Verified name and  with family as identifiers. Provided introduction to self, and explanation of the CTN role. CTN reviewed discharge instructions, medical action plan and red flags with family who verbalized understanding. Family given an opportunity to ask questions and does not have any further questions or concerns at this time. Were discharge instructions available to patient? Yes. Reviewed appropriate site of care based on symptoms and resources available to patient including: PCP. The family agrees to contact the PCP office for questions related to their healthcare. Medication reconciliation was performed with family, who verbalizes understanding of administration of home medications. Advised obtaining a 90-day supply of all daily and as-needed medications. Covid Risk Education  vaccinated    Reviewed and educated family on any new and changed medications related to discharge diagnosis. Was patient discharged with a pulse oximeter? No Discussed and confirmed pulse oximeter discharge instructions and when to notify provider or seek emergency care. CTN provided contact information. Plan for follow-up call in 5-7 days based on severity of symptoms and risk factors.   Plan for next call: referral to ambulatory care [facility]ovany [facility] 24 [facility] Call    Do you have any ongoing symptoms?: No  Do you have all of your prescriptions and are they filled?: Yes  Have you scheduled your follow up appointment?: Yes  How are you going to get to your appointment?: Car - family or friend to transport  Were you discharged with any Home Care or Post Acute Services: Yes  Post Acute Services:  (Comment: [facility])  Do you feel like you have everything you need to keep you well at home?: Yes  Care Transitions Interventions         Follow Up  Future Appointments   Date Time Provider Pawan Renner   3/10/2022 10:15 AM MD Ghassan Angel [id number] - DYD   3/24/2022  8:30 AM ECHO ROOM 1 [facility]   3/24/2022  9:30 AM Zahra Pressley MD FF Cardio MMA   5/10/2022  9:40 AM Fahad Ragsdale MD FF NEURO MMA   5/17/2022 10:15 AM Karen Núñez MD FF VASC/ENDO MMA     ESTELLE SalazarN, RN   [address] [facility] Transition Nurse  [phone]

## 2022-03-08 ENCOUNTER — CARE COORDINATION (OUTPATIENT)
Dept: CARE COORDINATION | Age: 77
End: 2022-03-08

## 2022-03-08 DIAGNOSIS — E78.00 HYPERCHOLESTEREMIA: Chronic | ICD-10-CM

## 2022-03-08 RX ORDER — ATORVASTATIN CALCIUM 80 MG/1
TABLET, FILM COATED ORAL
Qty: 90 TABLET | Refills: 3 | Status: SHIPPED | OUTPATIENT
Start: 2022-03-08

## 2022-03-08 SDOH — HEALTH STABILITY: PHYSICAL HEALTH: ON AVERAGE, HOW MANY DAYS PER WEEK DO YOU ENGAGE IN MODERATE TO STRENUOUS EXERCISE (LIKE A BRISK WALK)?: 0 DAYS

## 2022-03-08 SDOH — ECONOMIC STABILITY: FOOD INSECURITY: WITHIN THE PAST 12 MONTHS, YOU WORRIED THAT YOUR FOOD WOULD RUN OUT BEFORE YOU GOT MONEY TO BUY MORE.: NEVER TRUE

## 2022-03-08 SDOH — ECONOMIC STABILITY: INCOME INSECURITY: IN THE LAST 12 MONTHS, WAS THERE A TIME WHEN YOU WERE NOT ABLE TO PAY THE MORTGAGE OR RENT ON TIME?: NO

## 2022-03-08 SDOH — ECONOMIC STABILITY: TRANSPORTATION INSECURITY
IN THE PAST 12 MONTHS, HAS LACK OF TRANSPORTATION KEPT YOU FROM MEETINGS, WORK, OR FROM GETTING THINGS NEEDED FOR DAILY LIVING?: NO

## 2022-03-08 SDOH — ECONOMIC STABILITY: TRANSPORTATION INSECURITY
IN THE PAST 12 MONTHS, HAS THE LACK OF TRANSPORTATION KEPT YOU FROM MEDICAL APPOINTMENTS OR FROM GETTING MEDICATIONS?: NO

## 2022-03-08 SDOH — ECONOMIC STABILITY: HOUSING INSECURITY
IN THE LAST 12 MONTHS, WAS THERE A TIME WHEN YOU DID NOT HAVE A STEADY PLACE TO SLEEP OR SLEPT IN A SHELTER (INCLUDING NOW)?: NO

## 2022-03-08 SDOH — HEALTH STABILITY: PHYSICAL HEALTH: ON AVERAGE, HOW MANY MINUTES DO YOU ENGAGE IN EXERCISE AT THIS LEVEL?: 0 MIN

## 2022-03-08 SDOH — ECONOMIC STABILITY: HOUSING INSECURITY: IN THE LAST 12 MONTHS, HOW MANY PLACES HAVE YOU LIVED?: 1

## 2022-03-08 SDOH — ECONOMIC STABILITY: FOOD INSECURITY: WITHIN THE PAST 12 MONTHS, THE FOOD YOU BOUGHT JUST DIDN'T LAST AND YOU DIDN'T HAVE MONEY TO GET MORE.: NEVER TRUE

## 2022-03-08 ASSESSMENT — LIFESTYLE VARIABLES
HOW MANY STANDARD DRINKS CONTAINING ALCOHOL DO YOU HAVE ON A TYPICAL DAY: 1 OR 2
HOW OFTEN DO YOU HAVE A DRINK CONTAINING ALCOHOL: MONTHLY OR LESS

## 2022-03-08 ASSESSMENT — SOCIAL DETERMINANTS OF HEALTH (SDOH)
HOW OFTEN DO YOU ATTEND CHURCH OR RELIGIOUS SERVICES?: NEVER
HOW OFTEN DO YOU ATTENT MEETINGS OF THE CLUB OR ORGANIZATION YOU BELONG TO?: NEVER
HOW OFTEN DO YOU GET TOGETHER WITH FRIENDS OR RELATIVES?: ONCE A WEEK
DO YOU BELONG TO ANY CLUBS OR ORGANIZATIONS SUCH AS CHURCH GROUPS UNIONS, FRATERNAL OR ATHLETIC GROUPS, OR SCHOOL GROUPS?: YES
IN A TYPICAL WEEK, HOW MANY TIMES DO YOU TALK ON THE PHONE WITH FAMILY, FRIENDS, OR NEIGHBORS?: MORE THAN THREE TIMES A WEEK
HOW HARD IS IT FOR YOU TO PAY FOR THE VERY BASICS LIKE FOOD, HOUSING, MEDICAL CARE, AND HEATING?: NOT HARD AT ALL

## 2022-03-08 ASSESSMENT — ENCOUNTER SYMPTOMS: DYSPNEA ASSOCIATED WITH: EXERTION

## 2022-03-08 NOTE — CARE COORDINATION
per his wife she has thought about buying a monitor to keep at home to use for monitoring. ACM advised on how to use and normal readings vs when to seek medical attention. She verbalized understanding with no questions. ACM discussed physical activity with patient. Per patient and his wife he is limited to activity at this time. He will walk around the around but typically for no longer than 5 minutes at a time. Once PT has started he hopes to improve his activity level. ACM discussed social connections with patient. He is actively social with friends and family and speaks to them on the phone daily and gets together with them at least once a week. He does belong to clubs/organizations but due to [place] they are not currently meeting in person at this time. ACM discussed SDOH and financial insecurities with patient and no current needs were identified at this time. Patient has the needed financial resources for food, medicine, housing and basic necessities. ACM made arrangements to follow up with patient and his wife. ACM provided her contact information and encouraged them to call with any non emergent questions or concerns. Plan  F/U COPD - [place]? F/U PT/OT, physical activity  Establish Goal  F/U Care Gaps    Ambulatory Care Coordination Assessment    Care Coordination Protocol  Program Enrollment: Rising Risk  Referral from Primary Care Provider: No  Week 1 - Initial Assessment     Do you have all of your prescriptions and are they filled?: Yes  Barriers to medication adherence: None  Are you able to afford your medications?: Yes  How often do you have trouble taking your medications the way you have been told to take them?: I always take them as prescribed. Do you have Home O2 Therapy?: No      Ability to seek help/take action for Emergent Urgent situations i.e. fire, crime, inclement weather or health crisis. : Independent  Ability to ambulate to restroom: Independent  Ability handle personal hygeine needs (bathing/dressing/grooming): Independent  Ability to manage Medications: Needs Assistance  Ability to prepare Food Preparation: Needs Assistance  Ability to maintain home (clean home, laundry): Dependent  Ability to drive and/or has transportation: Independent  Ability to do shopping: Independent  Ability to manage finances: Independent  Is patient able to live independently?: Yes     Current Housing: Private Residence        Per the Fall Risk Screening, did the patient have 2 or more falls or 1 fall with injury in the past year?: Yes  How often do you think you are about to fall and you do NOT fall? For example, you grab something to stabilize yourself or hold onto a wall/furniture?: Frequently  Use of a Mobility Aid: Yes  Difficulty walking/impaired gait: Yes  Issues with feet or shoes like numbness, edema, shoes not fitting: No  Changes in vision, poor vision or poor lighting in environment: No  Dizziness: No  Other Fall Risk: No     Frequent urination at night?: Yes  Do you use rails/bars?: Yes  Do you have a non-slip tub mat?: Yes     Are you experiencing loss of meaning?: No  Are you experiencing loss of hope and peace?: No     Are the patients physical health problems impacting on their mental well-being?: No identified areas of concern   Are there any problems with your patients lifestyle behaviors (alcohol, drugs, diet, exercise) that are impacting on physical or mental well-being?: Some mild concern of potential negative impact on well-being   Do you have any other concerns about your patients mental well-being?  How would you rate their severity and impact on the patient?: No identified areas of concern   How would you rate their home environment in terms of safety and stability (including domestic violence, insecure housing, neighbor harassment)?: Consistently safe, supportive, stable, no identified problems   How do daily activities impact on the patient's well-being? (include current or anticipated unemployment, work, caregiving, access to transportation or other): No identified problems or perceived positive benefits   How would you rate their social network (family, work, friends)?: Adequate participation with social networks   How would you rate their financial resources (including ability to afford all required medical care)?: Financially secure, resources adequate, no identified problems   How wells does the patient now understand their health and well-being (symptoms, signs or risk factors) and what they need to do to manage their health?: Reasonable to good understanding and already engages in managing health or is willing to undertake better management   How well do you think your patient can engage in healthcare discussions? (Barriers include language, deafness, aphasia, alcohol or drug problems, learning difficulties, concentration): Clear and open communication, no identified barriers   Do other services need to be involved to help this patient?: Other care/services not required at this time   Are current services involved with this patient well-coordinated? (Include coordination with other services you are now recommendation): All required care/services in place and well-coordinated   Suggested Interventions and [place]: In Process   Occupational Therapy: In Process   Physical Therapy: In Process   Zone Management Tools: In Process                  Prior to Admission medications    Medication Sig Start Date End Date Taking? Authorizing Provider   atorvastatin (LIPITOR) 80 MG tablet TAKE ONE TABLET BY MOUTH DAILY 3/8/22   Heidi Vargas MD   predniSONE (DELTASONE) 10 MG tablet Take 4 tablets daily for 3 days, then 3 tablets daily for 3 days, then 2 tablets daily for 3 days then 1 tablet daily until finished.   Patient not taking: Reported on 2/12/2022 2/1/22   Heidi Vargas MD   torsemide (DEMADEX) 20 MG tablet Take 1 tablet by mouth daily 1/18/22   Houston Ha MD   gabapentin (NEURONTIN) 100 MG capsule Take 2 capsules by mouth nightly for 30 days.  Intended supply: 30 days 10/26/21 3/7/22  Tevin Peralta MD   levocetirizine Erikalicia Esparza) 5 MG tablet Take 1 tablet by mouth nightly  Patient not taking: Reported on 2/12/2022 10/15/21   Tevin Peralta MD   albuterol sulfate  (90 Base) MCG/ACT inhaler Inhale 2 puffs into the lungs every 4 hours as needed for Wheezing 7/28/21   MD DARYA Burnett-CON M20 20 MEQ extended release tablet TAKE ONE TABLET BY MOUTH DAILY 5/17/21   Houston Ha MD   umeclidinium-vilanterol [facility]) 62.5-25 MCG/INH AEPB inhaler Inhale 1 puff into the lungs daily 6/29/20   Tete Vásquez MD   aspirin 81 MG EC tablet Take 1 tablet by mouth daily 3/21/18   Houston Ha MD       Future Appointments   Date Time Provider Pawan Renner   3/10/2022 10:15 AM T[facility]eralta [facility]   3/24/2022  8:30 AM ECHO ROOM 1 [facility]   3/24/2022  9:30 AM Houston Ha MD FF Cardio MMA   5/10/2022  9:40 AM Reagan Reis MD FF NEURO MMA   5/17/2022 10:15 AM Cayla Landaverde MD  VASC/ENDO MMA     ,   COPD Assessment    Does the patient understand envrionmental exposure?: Yes  Is the patient able to verbalize Rescue vs. Long Acting medications?: Yes  Does the patient have a nebulizer?: Yes  Does the patient use a space with inhaled medications?: No            Symptoms:  None: Yes      Symptom course: stable  Breathlessness: exertion  Increase use of rapid acting/rescue inhaled medications?: No  Change in chronic cough?: No/At Baseline  Change in sputum?: No/At Baseline  Self Monitoring - SaO2: No     ,   General Assessment    Do you have any symptoms that are causing concern?: No     , Care Coordination Episodes    Type: Amb Care Coordination  Episode: Rising Risk  Noted: 3/8/2022  Comments: COPD, HTN, HLD, PVD

## 2022-03-09 ENCOUNTER — TELEPHONE (OUTPATIENT)
Dept: FAMILY MEDICINE CLINIC | Age: 77
End: 2022-03-09

## 2022-03-09 NOTE — TELEPHONE ENCOUNTER
Antolin Maguire with [facility] [phone] is calling to let  know the next steps for Plan of Care with the patient. They are going to be doing 8 more home visits for PT with the patient.

## 2022-03-10 ENCOUNTER — OFFICE VISIT (OUTPATIENT)
Dept: FAMILY MEDICINE CLINIC | Age: 77
End: 2022-03-10
Payer: COMMERCIAL

## 2022-03-10 VITALS
SYSTOLIC BLOOD PRESSURE: 118 MMHG | TEMPERATURE: 97.9 F | OXYGEN SATURATION: 98 % | BODY MASS INDEX: 30.52 KG/M2 | HEART RATE: 66 BPM | WEIGHT: 225 LBS | DIASTOLIC BLOOD PRESSURE: 80 MMHG

## 2022-03-10 DIAGNOSIS — I10 ESSENTIAL HYPERTENSION: Primary | ICD-10-CM

## 2022-03-10 DIAGNOSIS — R73.9 HYPERGLYCEMIA: ICD-10-CM

## 2022-03-10 DIAGNOSIS — J44.9 CHRONIC OBSTRUCTIVE PULMONARY DISEASE, UNSPECIFIED COPD TYPE (HCC): ICD-10-CM

## 2022-03-10 DIAGNOSIS — E78.00 HYPERCHOLESTEREMIA: Chronic | ICD-10-CM

## 2022-03-10 DIAGNOSIS — M54.31 SCIATICA OF RIGHT SIDE: ICD-10-CM

## 2022-03-10 DIAGNOSIS — G91.2 NORMAL PRESSURE HYDROCEPHALUS (HCC): ICD-10-CM

## 2022-03-10 DIAGNOSIS — Z12.5 SCREENING FOR MALIGNANT NEOPLASM OF PROSTATE: ICD-10-CM

## 2022-03-10 PROCEDURE — 99214 OFFICE O/P EST MOD 30 MIN: CPT | Performed by: FAMILY MEDICINE

## 2022-03-10 RX ORDER — GABAPENTIN 100 MG/1
100 CAPSULE ORAL 4 TIMES DAILY
Qty: 360 CAPSULE | Refills: 3 | Status: ON HOLD | OUTPATIENT
Start: 2022-03-10 | End: 2022-04-12 | Stop reason: SDUPTHER

## 2022-03-10 RX ORDER — LISINOPRIL 5 MG/1
5 TABLET ORAL DAILY
COMMUNITY
End: 2022-03-10 | Stop reason: SDUPTHER

## 2022-03-10 RX ORDER — LISINOPRIL 5 MG/1
5 TABLET ORAL DAILY
Qty: 90 TABLET | Refills: 3 | Status: ON HOLD
Start: 2022-03-10 | End: 2022-04-12 | Stop reason: HOSPADM

## 2022-03-10 RX ORDER — GABAPENTIN 100 MG/1
200 CAPSULE ORAL NIGHTLY
Qty: 180 CAPSULE | Refills: 3 | Status: SHIPPED | OUTPATIENT
Start: 2022-03-10 | End: 2022-03-10

## 2022-03-10 ASSESSMENT — PATIENT HEALTH QUESTIONNAIRE - PHQ9
SUM OF ALL RESPONSES TO PHQ QUESTIONS 1-9: 1
SUM OF ALL RESPONSES TO PHQ9 QUESTIONS 1 & 2: 1
SUM OF ALL RESPONSES TO PHQ QUESTIONS 1-9: 1
2. FEELING DOWN, DEPRESSED OR HOPELESS: 1
SUM OF ALL RESPONSES TO PHQ QUESTIONS 1-9: 1
1. LITTLE INTEREST OR PLEASURE IN DOING THINGS: 0
SUM OF ALL RESPONSES TO PHQ QUESTIONS 1-9: 1

## 2022-03-10 ASSESSMENT — ENCOUNTER SYMPTOMS
DIFFICULTY BREATHING: 1
SINUS PAIN: 0
CHEST TIGHTNESS: 0
RHINORRHEA: 0
SHORTNESS OF BREATH: 1
CONSTIPATION: 1
DIARRHEA: 0

## 2022-03-10 ASSESSMENT — COPD QUESTIONNAIRES: COPD: 1

## 2022-03-10 NOTE — PROGRESS NOTES
SUBJECTIVE:    Arturo Craven is a 78 y.o. male who presents for a follow up visit. Chief Complaint   Patient presents with    Follow-up     Patient is here for a follow up. Had recent lab. Pain in right lower back, down right leg. Issues with walking/weakness, has home PT that will be starting. Hyperlipidemia  This is a chronic problem. The current episode started more than 1 year ago. The problem is controlled. Associated symptoms include leg pain, myalgias and shortness of breath. Pertinent negatives include no chest pain. Current antihyperlipidemic treatment includes statins. The current treatment provides significant improvement of lipids. Compliance problems include adherence to exercise and adherence to diet. Risk factors for coronary artery disease include hypertension, a sedentary lifestyle, male sex, obesity and dyslipidemia. Hypertension  This is a chronic problem. The current episode started more than 1 year ago. The problem is controlled. Associated symptoms include shortness of breath. Pertinent negatives include no chest pain. Agents associated with hypertension include steroids. Risk factors for coronary artery disease include male gender, obesity, sedentary lifestyle and dyslipidemia. Past treatments include diuretics. Compliance problems include exercise and diet. Leg Pain   Incident onset: 6 weeks. There was no injury mechanism. The pain is present in the right hip and right thigh. The quality of the pain is described as aching. The pain is moderate. The pain has been fluctuating since onset. Associated symptoms include a loss of motion. The symptoms are aggravated by movement and weight bearing. Treatments tried: prednisone, PT. The treatment provided mild relief. COPD  He complains of difficulty breathing and shortness of breath. This is a recurrent problem. The current episode started more than 1 year ago. The problem occurs rarely.  The problem has been waxing and waning. Associated symptoms include dyspnea on exertion and myalgias. Pertinent negatives include no chest pain or rhinorrhea. His symptoms are aggravated by exercise, strenuous activity, URI and change in weather. His symptoms are alleviated by beta-agonist and steroid inhaler. He reports significant improvement on treatment. His past medical history is significant for COPD. Patient's medications, allergies, past medical,surgical, social and family histories were reviewed and updated as appropriate.      Past Medical History:   Diagnosis Date    Allergic rhinitis     Aortic valve disorders     COPD (chronic obstructive pulmonary disease) (HCC)     Hydrocephalus (HCC)     normal pressure,    Hyperlipidemia     Hypertension     Hypertrophy of prostate without urinary obstruction and other lower urinary tract symptoms (LUTS)     Irritable bowel syndrome     Pancreatitis     Peptic ulcer, unspecified site, unspecified as acute or chronic, without mention of hemorrhage, perforation, or obstruction     Peripheral vascular disease ([facility] [address]      Past Surgical History:   Procedure Laterality Date    ABDOMINAL AORTIC ANEURYSM REPAIR N/A 2001    ANGIOPLASTY      RT femoral artery    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    [place]    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REPAIR      Aortic valve replacement    AORTIC VALVE REPLACEMENT  2018    CHOLECYSTECTOMY      COLONOSCOPY N/A 5/4/2021    COLONOSCOPY POLYPECTOMY SNARE/COLD BIOPSY performed by Tima Salcido MD at [address] [facility]      bifemoral bypass    FOOT SURGERY Right 12/2/15    HERNIA REPAIR Left     OTHER SURGICAL HISTORY  10/23/2017    lumbar puncture    CO ESOPHAGOGASTRODUODENOSCOPY TRANSORAL DIAGNOSTIC N/A 12/4/2018    EGD performed by Tima Salcido MD at [address] ENDOSCOPY  09/19/2018    WITH BIOPSY     Family History   Problem Relation Age of Onset    Heart Disease Father    Francis Gorman Diabetes Father     Alcohol Abuse Father     Other Mother     Cancer Brother         liver     Social History     Tobacco Use    Smoking status: Former Smoker     Packs/day: 1.50     Years: 30.00     Pack years: 45.00     Types: Cigarettes     Quit date: 2001     Years since quittin.2    Smokeless tobacco: Never Used    Tobacco comment: H.O.smoking at age 25 / smoked up to 1.5p.p.d /quit     Substance Use Topics    Alcohol use: Yes     Alcohol/week: 2.0 standard drinks     Types: 2 Cans of beer per week     Comment: occasional beer      No Known Allergies  Current Outpatient Medications on File Prior to Visit   Medication Sig Dispense Refill    atorvastatin (LIPITOR) 80 MG tablet TAKE ONE TABLET BY MOUTH DAILY 90 tablet 3    torsemide (DEMADEX) 20 MG tablet Take 1 tablet by mouth daily 30 tablet 6    albuterol sulfate  (90 Base) MCG/ACT inhaler Inhale 2 puffs into the lungs every 4 hours as needed for Wheezing 1 Inhaler 1    KLOR-CON M20 20 MEQ extended release tablet TAKE ONE TABLET BY MOUTH DAILY 30 tablet 6    aspirin 81 MG EC tablet Take 1 tablet by mouth daily 30 tablet 3     No current facility-administered medications on file prior to visit. Review of Systems   HENT: Negative for congestion, rhinorrhea and sinus pain. Respiratory: Positive for shortness of breath. Negative for chest tightness. Cardiovascular: Positive for dyspnea on exertion. Negative for chest pain. Gastrointestinal: Positive for constipation. Negative for diarrhea. Genitourinary: Negative for difficulty urinating. Musculoskeletal: Positive for gait problem and myalgias. Neurological: Positive for dizziness and light-headedness. Psychiatric/Behavioral: Positive for dysphoric mood. Negative for sleep disturbance. The patient is not nervous/anxious.         OBJECTIVE:    /80   Pulse 66   Temp 97.9 °F (36.6 °C)   Wt 225 lb (102.1 kg)   SpO2 98%   BMI 30.52 kg/m²    Physical Exam  Constitutional:       Appearance: He is well-developed. He is obese. HENT:      Head: Normocephalic and atraumatic. Right Ear: Tympanic membrane and external ear normal.      Left Ear: Tympanic membrane and external ear normal.      Nose: Nose normal.      Mouth/Throat:      Mouth: Mucous membranes are moist.      Pharynx: No posterior oropharyngeal erythema. Eyes:      General:         Right eye: No discharge. Conjunctiva/sclera: Conjunctivae normal.   Neck:      Thyroid: No thyromegaly. Vascular: No JVD. Trachea: No tracheal deviation. Cardiovascular:      Rate and Rhythm: Normal rate and regular rhythm. Heart sounds: Normal heart sounds. Pulmonary:      Effort: Pulmonary effort is normal. No respiratory distress. Breath sounds: Normal breath sounds. No rales. Musculoskeletal:      Cervical back: Normal range of motion and neck supple. Right lower leg: No edema. Left lower leg: No edema. Lymphadenopathy:      Cervical: No cervical adenopathy. Skin:     General: Skin is warm and dry. Neurological:      Mental Status: He is alert and oriented to person, place, and time. Gait: Gait abnormal (antalgic). ASSESSMENT/PLAN:    Narda Dooley was seen today for follow-up. Diagnoses and all orders for this visit:    Essential hypertension  -     lisinopril (PRINIVIL;ZESTRIL) 5 MG tablet; Take 1 tablet by mouth daily    Hypercholesteremia  -     Comprehensive Metabolic Panel, Fasting; Future  -     CBC with Auto Differential; Future  -     Lipid, Fasting; Future  -     TSH with Reflex; Future    Chronic obstructive pulmonary disease, unspecified COPD type ([address]  Symptoms are stable. He is to continue use of his inhalers. Sciatica of right side  Of asked him to try and increase his Neurontin from 2 capsules at night to a total of 4 capsules daily  -     Discontinue: gabapentin (NEURONTIN) 100 MG capsule; Take 2 capsules by mouth nightly for 90 days. Intended supply: 30 days  -     gabapentin (NEURONTIN) 100 MG capsule; Take 1 capsule by mouth 4 times daily for 90 days. Hyperglycemia  -     Hemoglobin A1C; Future    Screening for malignant neoplasm of prostate  -     PSA Screening; Future    Normal pressure hydrocephalus  Followed by neurology      Return in about 4 months (around 7/10/2022). Please note portions of this note were completed with a voicerecognition program.  Efforts were made to edit the dictations but occasionally words are mis-transcribed.

## 2022-03-16 ENCOUNTER — CARE COORDINATION (OUTPATIENT)
Dept: CARE COORDINATION | Age: 77
End: 2022-03-16

## 2022-03-17 ENCOUNTER — TELEPHONE (OUTPATIENT)
Dept: FAMILY MEDICINE CLINIC | Age: 77
End: 2022-03-17

## 2022-03-17 ENCOUNTER — CARE COORDINATION (OUTPATIENT)
Dept: CARE COORDINATION | Age: 77
End: 2022-03-17

## 2022-03-17 DIAGNOSIS — G91.2 NORMAL PRESSURE HYDROCEPHALUS (HCC): ICD-10-CM

## 2022-03-17 DIAGNOSIS — G31.84 MILD COGNITIVE IMPAIRMENT: ICD-10-CM

## 2022-03-17 DIAGNOSIS — M54.41 LOW BACK PAIN WITH RIGHT-SIDED SCIATICA, UNSPECIFIED BACK PAIN LATERALITY, UNSPECIFIED CHRONICITY: Primary | ICD-10-CM

## 2022-03-17 NOTE — CARE COORDINATION
This Visit's Progress     Wellness Goal        Patient Self-Management Goal for Health Maintenance  Goal: I will schedule a yearly preventative care visit. Barriers: overwhelmed by complexity of regimen and time constraints  Plan for overcoming my barriers: work with ACM and PCP  Confidence: 10/10  Anticipated Goal Completion Date: 6/17/2022            Prior to Admission medications    Medication Sig Start Date End Date Taking? Authorizing Provider   lisinopril (PRINIVIL;ZESTRIL) 5 MG tablet Take 1 tablet by mouth daily 3/10/22   Markel Cortés MD   gabapentin (NEURONTIN) 100 MG capsule Take 1 capsule by mouth 4 times daily for 90 days.  3/10/22 6/8/22  Markel Cortés MD   atorvastatin (LIPITOR) 80 MG tablet TAKE ONE TABLET BY MOUTH DAILY 3/8/22   Rome Walker MD   torsemide [facility]) 20 MG tablet Take 1 tablet by mouth daily 1/18/22   Louise Castellon MD   albuterol sulfate  (90 Base) MCG/ACT inhaler Inhale 2 puffs into the lungs every 4 hours as needed for Wheezing 7/28/21   Markel Cortés MD   KLOR-CON M20 20 MEQ extended release tablet TAKE ONE TABLET BY MOUTH DAILY 5/17/21   Louise Castellon MD   aspirin 81 MG EC tablet Take 1 tablet by mouth daily 3/21/18   Louise Castellon MD       Future Appointments   Date Time Provider Pawan Renner   3/24/2022  8:30 AM ECHO ROOM [address] [place] [address]   3/24/2022  9:30 AM Louise Castellon MD FF Cardio MMA   5/10/2022  9:40 AM Crystal Abraham MD FF NEURO MMA   5/17/2022 10:15 AM Whitley Mahmood MD FF VASC/ENDO MMA   7/14/2022 10:15 AM Markel Cortés MD [address]     ,   COPD Assessment    Does the patient understand envrionmental exposure?: Yes  Is the patient able to verbalize Rescue vs. Long Acting medications?: Yes  Does the patient have a nebulizer?: Yes  Does the patient use a space with inhaled medications?: No            Symptoms:        ,   General Assessment    Do

## 2022-03-17 NOTE — TELEPHONE ENCOUNTER
[facility] Provider signed orders and has been faxed to home care agency.

## 2022-03-17 NOTE — TELEPHONE ENCOUNTER
Okay to order MRI of LS spine and would like to refer him to [facility] neurology for his evaluation of possible dementia

## 2022-03-18 NOTE — TELEPHONE ENCOUNTER
Spoke with pts wife, order placed for MRI. She is unable to go to  due to insurance restrictions. She would like a referral sent to Dr. Bethany Coates- faxed to [phone] for a second opinion, currently seeing Dr. Karlo Hurst.

## 2022-03-20 ENCOUNTER — HOSPITAL ENCOUNTER (EMERGENCY)
Age: 77
Discharge: HOME | End: 2022-03-20
Attending: EMERGENCY MEDICINE
Payer: COMMERCIAL

## 2022-03-20 VITALS
HEART RATE: 71 BPM | SYSTOLIC BLOOD PRESSURE: 147 MMHG | DIASTOLIC BLOOD PRESSURE: 68 MMHG | OXYGEN SATURATION: 98 % | TEMPERATURE: 97.4 F | WEIGHT: 225 LBS | BODY MASS INDEX: 30.52 KG/M2 | RESPIRATION RATE: 18 BRPM

## 2022-03-20 DIAGNOSIS — M54.50 ACUTE EXACERBATION OF CHRONIC LOW BACK PAIN: Primary | ICD-10-CM

## 2022-03-20 DIAGNOSIS — G89.29 ACUTE EXACERBATION OF CHRONIC LOW BACK PAIN: Primary | ICD-10-CM

## 2022-03-20 DIAGNOSIS — M54.31 SCIATICA OF RIGHT SIDE: ICD-10-CM

## 2022-03-20 LAB
BILIRUBIN URINE: NEGATIVE
BLOOD, URINE: ABNORMAL
CLARITY: CLEAR
COLOR: YELLOW
EPITHELIAL CELLS, UA: 0 /HPF (ref 0–5)
GLUCOSE URINE: NEGATIVE MG/DL
HYALINE CASTS: 2 /LPF (ref 0–8)
KETONES, URINE: NEGATIVE MG/DL
LEUKOCYTE ESTERASE, URINE: NEGATIVE
MICROSCOPIC EXAMINATION: YES
NITRITE, URINE: NEGATIVE
PH UA: 5.5 (ref 5–8)
PROTEIN UA: NEGATIVE MG/DL
RBC UA: 9 /HPF (ref 0–4)
SPECIFIC GRAVITY UA: 1.02 (ref 1–1.03)
URINE REFLEX TO CULTURE: ABNORMAL
URINE TYPE: ABNORMAL
UROBILINOGEN, URINE: 1 E.U./DL
WBC UA: 1 /HPF (ref 0–5)

## 2022-03-20 PROCEDURE — 93005 ELECTROCARDIOGRAM TRACING: CPT | Performed by: EMERGENCY MEDICINE

## 2022-03-20 PROCEDURE — 96372 THER/PROPH/DIAG INJ SC/IM: CPT

## 2022-03-20 PROCEDURE — 2500000003 HC RX 250 WO HCPCS: Performed by: EMERGENCY MEDICINE

## 2022-03-20 PROCEDURE — 99284 EMERGENCY DEPT VISIT MOD MDM: CPT

## 2022-03-20 PROCEDURE — 96374 THER/PROPH/DIAG INJ IV PUSH: CPT

## 2022-03-20 PROCEDURE — 6360000002 HC RX W HCPCS: Performed by: EMERGENCY MEDICINE

## 2022-03-20 PROCEDURE — 6370000000 HC RX 637 (ALT 250 FOR IP): Performed by: EMERGENCY MEDICINE

## 2022-03-20 PROCEDURE — 81001 URINALYSIS AUTO W/SCOPE: CPT

## 2022-03-20 RX ORDER — HYDROCODONE BITARTRATE AND ACETAMINOPHEN 5; 325 MG/1; MG/1
1 TABLET ORAL EVERY 6 HOURS PRN
Qty: 12 TABLET | Refills: 0 | Status: SHIPPED | OUTPATIENT
Start: 2022-03-20 | End: 2022-03-23

## 2022-03-20 RX ORDER — KETOROLAC TROMETHAMINE 30 MG/ML
30 INJECTION, SOLUTION INTRAMUSCULAR; INTRAVENOUS ONCE
Status: COMPLETED | OUTPATIENT
Start: 2022-03-20 | End: 2022-03-20

## 2022-03-20 RX ORDER — HYDROCODONE BITARTRATE AND ACETAMINOPHEN 7.5; 325 MG/1; MG/1
1 TABLET ORAL ONCE
Status: COMPLETED | OUTPATIENT
Start: 2022-03-20 | End: 2022-03-20

## 2022-03-20 RX ORDER — CYCLOBENZAPRINE HCL 10 MG
10 TABLET ORAL ONCE
Status: COMPLETED | OUTPATIENT
Start: 2022-03-20 | End: 2022-03-20

## 2022-03-20 RX ORDER — KETAMINE HYDROCHLORIDE 100 MG/ML
0.1 INJECTION, SOLUTION INTRAMUSCULAR; INTRAVENOUS ONCE
Status: COMPLETED | OUTPATIENT
Start: 2022-03-20 | End: 2022-03-20

## 2022-03-20 RX ADMIN — KETOROLAC TROMETHAMINE 30 MG: 30 INJECTION, SOLUTION INTRAMUSCULAR; INTRAVENOUS at 15:23

## 2022-03-20 RX ADMIN — HYDROCODONE BITARTRATE AND ACETAMINOPHEN 1 TABLET: 7.5; 325 TABLET ORAL at 15:24

## 2022-03-20 RX ADMIN — KETAMINE HYDROCHLORIDE 10 MG: 100 INJECTION INTRAMUSCULAR; INTRAVENOUS at 18:48

## 2022-03-20 RX ADMIN — CYCLOBENZAPRINE 10 MG: 10 TABLET, FILM COATED ORAL at 15:23

## 2022-03-20 ASSESSMENT — PAIN SCALES - GENERAL
PAINLEVEL_OUTOF10: 8
PAINLEVEL_OUTOF10: 9

## 2022-03-20 ASSESSMENT — PAIN DESCRIPTION - PAIN TYPE: TYPE: ACUTE PAIN

## 2022-03-20 ASSESSMENT — PAIN - FUNCTIONAL ASSESSMENT: PAIN_FUNCTIONAL_ASSESSMENT: 0-10

## 2022-03-20 ASSESSMENT — PAIN DESCRIPTION - ORIENTATION: ORIENTATION: LOWER

## 2022-03-20 ASSESSMENT — PAIN DESCRIPTION - LOCATION: LOCATION: BACK

## 2022-03-20 NOTE — ED PROVIDER NOTES
[address] PROVIDER NOTE    Patient Identification  Pt Name: Robin Brooks  MRN: [id number]  Stanleygfdianne 1945  Date of evaluation: 3/20/2022  Provider: Aleja Thomas MD  PCP: Monico Lew MD    Chief Complaint  Back Pain (lower back pain since January - seen by PCP and ER. Been going to therapy. MRI is ordered by PCP, but has to get it cleared by cardio. )    HPI  (History provided by patient and spouse/SO)  This is a 78 y.o. male who was brought in by self for acute worsening of back pain. Pain was intermittent and rated 6/10 since January. This morning, it became more severe and is now rated 10/10. It has been constant since. It is localized to his right buttock and down his right leg. Pain is described as throbbing in character. He denies any modifying factors. Patient denies saddle anesthesia, urinary retention, fecal incontinence, focal leg numbness, focal leg weakness, fever, and chills. He has not had a recent injury or fall. ROS  10 systems reviewed, pertinent positives/negatives per HPI otherwise noted to be negative. I have reviewed the following nursing documentation:  Allergies: Patient has no known allergies.     Past medical history:   Past Medical History:   Diagnosis Date    Allergic rhinitis     Aortic valve disorders     COPD (chronic obstructive pulmonary disease) (HCC)     Hydrocephalus (HCC)     normal pressure,    Hyperlipidemia     Hypertension     Hypertrophy of prostate without urinary obstruction and other lower urinary tract symptoms (LUTS)     Irritable bowel syndrome     Pancreatitis     Peptic ulcer, unspecified site, unspecified as acute or chronic, without mention of hemorrhage, perforation, or obstruction     Peripheral vascular disease ([facility] [address]      Past surgical history:   Past Surgical History:   Procedure Laterality Date    ABDOMINAL AORTIC ANEURYSM REPAIR N/A 2001    ANGIOPLASTY      RT femoral artery    and vitals reviewed. Constitutional: Well developed, well nourished. Non-toxic in appearance. HENT:      Head: Normocephalic and atraumatic. Ears: External ears normal.      Nose: Nose normal.     Mouth: Membrane mucosa moist and pink. Eyes: Anicteric sclera. No discharge. Neck: Supple. Trachea midline. Cardiovascular: Brisk capillary refill  Pulmonary/Chest: Effort normal. No respiratory distress. Abdominal: Soft. No distension. Musculoskeletal: Moves all extremities. No gross deformity. Neurological: Normal toe movement, foot plantar/dorsiflexion, knee flexion/extension. Normal patellar reflexes bilaterally. Normal sensation to light touch throughout the bilateral feet, medial/lateral calves, and medial/lateral thighs. Skin: Warm and dry. No rash. Psychiatric: Normal mood and affect. Behavior is normal.    EKG  The Ekg interpreted by me in the absence of a cardiologist shows. normal sinus rhythm with a rate of 71; PACs present  Axis is   Normal  QTc is  normal  Intervals and Durations are unremarkable. No specific ST-T wave changes appreciated. No evidence of acute ischemia. No significant change from prior EKG dated 2/12/2022        [facility] and ED Course  Since the patient does not have saddle anesthesia, urinary retention, fecal incontinence, focal leg numbness, focal leg weakness or red flag risk factors, an MRI is not indicated to evaluate for epidural abscess or cauda equina syndrome. His presentation, history, exam are most suggestive of acute exacerbation of sciatica. Initial treatment with Toradol, Flexeril, and hydrocodone was ineffective. Low-dose ketamine, however, resulted in complete resolution of his symptoms. I am referring him to [facility] [place] [facility] for further evaluation and management given the acute episode he experienced today.     I estimate there is LOW risk for ABDOMINAL AORTIC ANEURYSM, CAUDA EQUINA SYNDROME, EPIDURAL MASS LESION, SPINAL STENOSIS, OR HERNIATED DISK CAUSING SEVERE STENOSIS, thus I consider the discharge disposition reasonable. Oralia Harris and I have discussed the diagnosis and risks, and we agree with discharging home to follow-up with their primary doctor. We also discussed returning to the Emergency Department immediately if new or worsening symptoms occur. We have discussed the symptoms which are most concerning (e.g., saddle anesthesia, urinary or bowel incontinence or retention, changing or worsening pain, weakness) that necessitate immediate return. Final Impression  1. Acute exacerbation of chronic low back pain    2. Sciatica of right side        Blood pressure (!) 151/71, pulse 74, temperature 97.4 °F (36.3 °C), temperature source Oral, resp. rate 15, weight 225 lb (102.1 kg), SpO2 97 %. Disposition:  DISPOSITION        Patient Referrals:  [facility] Emergency Department  [address]  [phone]    As needed, If symptoms worsen or new symptoms develop    [address] [place] [address] [address]          Discharge Medications:  New Prescriptions    HYDROCODONE-ACETAMINOPHEN (LORCET) 5-325 MG PER TABLET    Take 1 tablet by mouth every 6 hours as needed for Pain for up to 3 days. Intended supply: 3 days. Take lowest dose possible to manage pain       This chart was generated using the BYOM! dictation system. I created this record but it may contain dictation errors given the limitations of this technology.        Monique Cuellar MD  03/20/22 [id number]

## 2022-03-21 ENCOUNTER — CARE COORDINATION (OUTPATIENT)
Dept: CARE COORDINATION | Age: 77
End: 2022-03-21

## 2022-03-21 ENCOUNTER — TELEPHONE (OUTPATIENT)
Dept: CARDIOLOGY CLINIC | Age: 77
End: 2022-03-21

## 2022-03-21 ENCOUNTER — OFFICE VISIT (OUTPATIENT)
Dept: ORTHOPEDIC SURGERY | Age: 77
End: 2022-03-21
Payer: COMMERCIAL

## 2022-03-21 VITALS — WEIGHT: 225.09 LBS | HEIGHT: 72 IN | BODY MASS INDEX: 30.49 KG/M2

## 2022-03-21 DIAGNOSIS — M51.36 DDD (DEGENERATIVE DISC DISEASE), LUMBAR: ICD-10-CM

## 2022-03-21 DIAGNOSIS — M54.41 CHRONIC BILATERAL LOW BACK PAIN WITH RIGHT-SIDED SCIATICA: ICD-10-CM

## 2022-03-21 DIAGNOSIS — M54.16 RIGHT LUMBAR RADICULOPATHY: ICD-10-CM

## 2022-03-21 DIAGNOSIS — G91.2 NORMAL PRESSURE HYDROCEPHALUS (HCC): ICD-10-CM

## 2022-03-21 DIAGNOSIS — G89.29 CHRONIC BILATERAL LOW BACK PAIN WITH RIGHT-SIDED SCIATICA: ICD-10-CM

## 2022-03-21 DIAGNOSIS — M47.816 LUMBAR SPONDYLOSIS: ICD-10-CM

## 2022-03-21 LAB
EKG ATRIAL RATE: 71 BPM
EKG DIAGNOSIS: NORMAL
EKG P AXIS: 105 DEGREES
EKG P-R INTERVAL: 190 MS
EKG Q-T INTERVAL: 384 MS
EKG QRS DURATION: 102 MS
EKG QTC CALCULATION (BAZETT): 417 MS
EKG R AXIS: 73 DEGREES
EKG T AXIS: 84 DEGREES
EKG VENTRICULAR RATE: 71 BPM

## 2022-03-21 PROCEDURE — 99204 OFFICE O/P NEW MOD 45 MIN: CPT | Performed by: INTERNAL MEDICINE

## 2022-03-21 PROCEDURE — 93010 ELECTROCARDIOGRAM REPORT: CPT | Performed by: INTERNAL MEDICINE

## 2022-03-21 RX ORDER — POLYETHYLENE GLYCOL 3350 17 G/17G
17 POWDER, FOR SOLUTION ORAL DAILY PRN
Qty: 510 G | Refills: 0 | Status: SHIPPED | OUTPATIENT
Start: 2022-03-21 | End: 2022-04-20

## 2022-03-21 NOTE — CARE COORDINATION
ACM received outreach from patient's wife Lara Camier regarding patients urine results from ED yesterday. Per patient's wife results were \"flagged\" and she wanted to make sure that Dr. Melissa Durand was aware of the results. Lara Escobar states that patient had appointment with Ortho today. Patient is schedule for MRI of lumber spine tomorrow. Patient is in a lot of pain still. He has started to take pain meds Lorcet  prescribed by ED. He is refusing to eat or drink due to the amount of pain that he is in. ACM discussed importance of fluids. Patient's wife was encouraged to have patient take small frequent sips of water and push fluids as much as possible. Lara Escobar is continuing to offer food and encouraging patient to eat foods such as chicken broth due to concerns of stomach upset while taking medications.

## 2022-03-21 NOTE — TELEPHONE ENCOUNTER
Pt needs MRI done on his back, pt had valve placement done 2 years ago PCP told him to call the office to see if it is ok to get the MRI.     Pls advise thank you

## 2022-03-21 NOTE — CARE COORDINATION
Ambulatory Care Coordination  ED Follow up Call    Reason for ED visit:  Back pain   Status:     Pain is tolerable but not improving    Did you call your PCP prior to going to the ED? No, called nurse triage line with medical insurance      Did you receive a discharge instructions from the Emergency Room? Yes  Review of Instructions:     Understands what to report/when to return?:  Yes   Understands discharge instructions?:  Yes   Following discharge instructions?:  Yes   If not why? N/A    Are there any new complaints of pain? No  New Pain Meds? Yes    Constipation prophylaxis needed? No    If you have a wound is the dressing clean, dry, and intact? N/A  Understands wound care regimen? N/A    Are there any other complaints/concerns that you wish to tell your provider? no    FU appts/Provider:    Future Appointments   Date Time Provider Pawan Renner   3/21/2022  2:00 PM [facility]   3/24/2022  8:30 AM ECHO ROOM 1 [facility]   3/24/2022  9:30 AM Gilmar Jeffers MD  Cardio [facility]   5/10/2022  9:40 AM Marleny Philippe MD  NEURO [facility]   5/17/2022 10:15 AM Ruben Braxton MD  VASC/ENDO [facility]   7/14/2022 10:15 AM Taiwo Lopez MD [facility]           New Medications?:   Yes      Medication Reconciliation by phone - No  Understands Medications? Yes  Taking Medications? No, has not filled new rx. Can you swallow your pills? Yes    Any further needs in the home i.e. Equipment? Not Applicable    Link to services in community?:  N/A   Which services:  N/A    JANINA made outreach to patient to follow up from recent ED visit. JANINA spoke with patient's wife. She states that patient's pain is tolerable today due to treatment that he received during ED. Appointment was scheduled with Florentino Owens MD to evaluate his continued back pain. Patient's wife has a call out to cardiologist for approval for back MRI.  JANINA discussed discharge instructions with patient's wife and they have full understanding of discharge instructions. They are aware of when to call PCP and when to return to ED with RED flags. Patient's wife had no questions or concerns at the time of call. [facility] has made arrangements to follow up at a later date/time.

## 2022-03-21 NOTE — PROGRESS NOTES
Chief Complaint:   Chief Complaint   Patient presents with    Lower Back Pain     For 3 month has been hurting in his lower back w/ shooting pain down his R leg, has been doing PT for what they diagnosed at sciatica, in the past 3 months he has fallen at home several times. The past week his pain has increased dramatically and he also fell yesterday. History of Present Illness:       Patient is a 78 y.o. male presents with the above complaint. The symptoms began 3 monthsago and started without an injury. The patient describes a sharp, aching pain that does radiate. The symptoms are constant  and are show no change since the onset. The symptoms of RT leg pain   do not show a neurogenic claudication pattern and is constant worsened by all and improved with none. There is not new onset weakness or progressive weakness of the lower extremities that has developed. The patient denies new onset bowel or bladder dysfunction. There is no history of previous spinal trauma. This past history is significant for normal pressure hydrocephalus followed by neurology. The patient does not have history or orthopaedic lumbar spine surgery. Back pain to leg pain ratio 40:60    Pain levels: 9     There is  lower limb pain and this follows an S1 dermatomal pattern in the right lower extremity. Work-up to date has included:none  Prior treatment has included physical therapy and inpatient rehabilitation this patient reports no improvement with this treatment. The patient has no history or autoimmune disease, inflammatory arthropathy or crystal arthropathy.      Past Medical History:        Past Medical History:   Diagnosis Date    Allergic rhinitis     Aortic valve disorders     COPD (chronic obstructive pulmonary disease) (HCC)     Hydrocephalus (HCC)     normal pressure,    Hyperlipidemia     Hypertension     Hypertrophy of prostate without urinary obstruction and other lower urinary tract symptoms (LUTS)     Irritable bowel syndrome     Pancreatitis     Peptic ulcer, unspecified site, unspecified as acute or chronic, without mention of hemorrhage, perforation, or obstruction     Peripheral vascular disease (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    [place]    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REPAIR      Aortic valve replacement    AORTIC VALVE REPLACEMENT  2018    CHOLECYSTECTOMY      COLONOSCOPY N/A 5/4/2021    COLONOSCOPY POLYPECTOMY SNARE/COLD BIOPSY performed by Sommer Carpenter MD at [facility]      bifemoral bypass    FOOT SURGERY Right 12/2/15    HERNIA REPAIR Left     OTHER SURGICAL HISTORY  10/23/2017    lumbar puncture    [place] ESOPHAGOGASTRODUODENOSCOPY TRANSORAL DIAGNOSTIC N/A 12/4/2018    EGD performed by Sommer Carpenter MD at [address] [facility]  09/19/2018    WITH BIOPSY         Present Medications:         Current Outpatient Medications   Medication Sig Dispense Refill    HYDROcodone-acetaminophen (LORCET) 5-325 MG per tablet Take 1 tablet by mouth every 6 hours as needed for Pain for up to 3 days. Intended supply: 3 days. Take lowest dose possible to manage pain 12 tablet 0    lisinopril (PRINIVIL;ZESTRIL) 5 MG tablet Take 1 tablet by mouth daily 90 tablet 3    gabapentin (NEURONTIN) 100 MG capsule Take 1 capsule by mouth 4 times daily for 90 days.  360 capsule 3    atorvastatin (LIPITOR) 80 MG tablet TAKE ONE TABLET BY MOUTH DAILY 90 tablet 3    torsemide (DEMADEX) 20 MG tablet Take 1 tablet by mouth daily 30 tablet 6    albuterol sulfate  (90 Base) MCG/ACT inhaler Inhale 2 puffs into the lungs every 4 hours as needed for Wheezing 1 Inhaler 1    KLOR-CON M20 20 MEQ extended release tablet TAKE ONE TABLET BY MOUTH DAILY 30 tablet 6    aspirin 81 MG EC tablet Take 1 tablet by mouth daily 30 tablet 3     No current facility-administered medications for this visit. Allergies:      No Known Allergies     Social History:         Social History     Socioeconomic History    Marital status:      Spouse name: dalia    Number of children: 2    Years of education: Not on file    Highest education level: Not on file   Occupational History    Not on file   Tobacco Use    Smoking status: Former Smoker     Packs/day: 1.50     Years: 30.00     Pack years: 45.00     Types: Cigarettes     Quit date: 2001     Years since quittin.2    Smokeless tobacco: Never Used    Tobacco comment: H.O.smoking at age 25 / smoked up to 1.5p.p.d /quit     Vaping Use    Vaping Use: Never used   Substance and Sexual Activity    Alcohol use: Yes     Alcohol/week: 2.0 standard drinks     Types: 2 Cans of beer per week     Comment: occasional beer    Drug use: Never    Sexual activity: Yes     Partners: Female     Birth control/protection: Post-menopausal   Other Topics Concern    Not on file   Social History Narrative    Not on file     Social Determinants of Health     Financial Resource Strain: Low Risk     Difficulty of Paying Living Expenses: Not hard at all   Food Insecurity: No Food Insecurity    Worried About [facility] in the Last Year: Never true    [facility] in the Last Year: Never true   Transportation Needs: No Transportation Needs    Lack of Transportation (Medical): No    Lack of Transportation (Non-Medical): No   Physical Activity: Inactive    Days of Exercise per Week: 0 days    Minutes of Exercise per Session: 0 min   Stress:     Feeling of Stress : Not on file   Social Connections:  Moderately Integrated    Frequency of Communication with Friends and Family: More than three times a week    Frequency of Social Gatherings with Friends and Family: Once a week    Attends Caodaism Services: Never    Active Member of Clubs or Organizations: Yes    Attends Club or Organization Meetings: Never    Marital Status:    Intimate Partner Violence:     Fear of Current or Ex-Partner: Not on file    Emotionally Abused: Not on file    Physically Abused: Not on file    Sexually Abused: Not on file   Housing Stability: [address] Unable to Pay for Housing in the Last Year: No    Number of [place] in the Last Year: 1    Unstable Housing in the Last Year: No        Review of Symptoms:    Pertinent items are noted in HPI    Review of systems reviewed from Patient History Form dated on today's date and   available in the patient's chart under the Media tab. Vital Signs: There were no vitals filed for this visit. General Exam:     Constitutional: Patient is adequately groomed with no evidence of malnutrition  Mental Status: The patient is oriented to time, place and person. The patient's mood and affect are appropriate. Vascular: Examination reveals no swelling or calf tenderness. Peripheral pulses are palpable and 2+. Lymphatics: no lymphadenopathy of the inguinal region or lower extremity      Physical Exam: lower back   Modified examination as the patient is unable to stand without assistance     Primary Exam:    Inspection: No deformity atrophy appreciable curvature      Palpation: No focal trigger point tenderness      Range of Motion: Unable to adequately assess as patient cannot assume standing posture      Strength: Normal lower extremity      Special Tests: Negative SLR      Skin: There are no rashes, ulcerations or lesions. Gait: Wide-based gait      Reflex intact lower     Additional Comments:        Additional Examinations:         Neurolgic -Light touch sensation and manual muscle testing normal L2-S1. No fasiculations. Pattella tendon and Achilles tendon reflexes +2 bilaterally.   Seated SLR negative           Office Imaging Results/Procedures PerformedToday:      Radiology:      X-rays obtained and reviewed in office:   Views 3 views lumbar spine lumbar spine   Location lumbar spine   Impression there is hypertrophic endplate spurring Z7-J9 on the left normal alignment on the AP projection lateral projection demonstrates multilevel spondylosis anterior endplate spurring and multilevel facet arthropathy L3-L5. Stigmata of aortic stent surgical clips right upper quadrant vertebral body heights are well-maintained       Office Procedures:     Orders Placed This Encounter   Procedures    XR LUMBAR SPINE (2-3 VIEWS)     Standing Status:   Future     Number of Occurrences:   1     Standing Expiration Date:   3/21/2023     Order Specific Question:   Reason for exam:     Answer:   pain    MRI LUMBAR SPINE WO CONTRAST     Standing Status:   Future     Standing Expiration Date:   3/21/2023     Scheduling Instructions:      Pratik Mckeon, please call pt to schedule, Etienne Galan will obtain auth and fwd to your facility. Pt advised to f/u in clinic 2-3 days after MRI for results. Order Specific Question:   Reason for exam:     Answer:   R/O HNP/STENOSIS - R RADIC     Order Specific Question:   What is the sedation requirement? Answer:   None           Other Outside Imaging and Testing Personally Reviewed:    XR LUMBAR SPINE (2-3 VIEWS)    Result Date: 3/21/2022  Radiology exam is complete. No Radiologist dictation. Please follow up with ordering provider.       EXAMINATION:   MRI OF THE BRAIN WITHOUT CONTRAST  10/11/2021 8:20 am       TECHNIQUE:   Multiplanar multisequence MRI of the brain was performed without the   administration of intravenous contrast.       COMPARISON:   08/05/2019.       HISTORY:   ORDERING SYSTEM PROVIDED HISTORY: Normal pressure hydrocephalus ([address]   TECHNOLOGIST PROVIDED HISTORY:   Reason for exam:->Patient with NPH and progressive ataxia, please compare   with previous MRI brain from 2019 to compare the ventricular size   Reason for Exam: PT BEING EVALUATED FOR HX OF NPH AND PREVIOUS MRI 2019 AND   Acuity: Acute   Type of Exam: Initial   Additional signs and symptoms: PT BEING EVALUATED FOR HX OF NPH AND PREVIOUS   MRI 2019 AND   Relevant Medical/Surgical History: PT BEING EVALUATED FOR HX OF NPH AND   PREVIOUS MRI 2019 AND       FINDINGS:   INTRACRANIAL STRUCTURES/VENTRICLES: There is no acute infarct.  Small chronic   right cerebellar infarct appears unchanged.  No mass effect or midline shift. No evidence of an acute intracranial hemorrhage.  Areas of T2 FLAIR   hyperintensity are seen in the periventricular and subcortical white matter,   which are nonspecific, but may represent chronic microvascular ischemic   change. Kreg Bull is prominence of the ventricles and sulci due to global   parenchymal volume loss.  The ventricular prominence is out of proportion to   the cortical sulci.  This is similar to the prior exam.  The   sellar/suprasellar regions appear unremarkable.  The normal signal voids   within the major intracranial vessels appear maintained.       ORBITS: The visualized portion of the orbits demonstrate no acute abnormality.       SINUSES: Scattered mucosal thickening of the paranasal sinuses, right more   than left.  The mastoid air cells demonstrate no acute abnormality.       BONES/SOFT TISSUES: The bone marrow signal intensity appears normal. The soft   tissues demonstrate no acute abnormality.           Impression   1. No acute intracranial abnormality.  No acute infarct. 2. Global parenchymal volume loss with mild chronic microvascular ischemic   changes. 3.  The ventricular dilatation is out of proportion to the cortical sulci.  A   component of normal pressure hydrocephalus cannot be excluded. 4. Small chronic right cerebellar infarct. Assessment   Impression: . Encounter Diagnoses   Name Primary?     Right lumbar radiculopathy     DDD (degenerative disc disease), lumbar     Lumbar spondylosis     Chronic bilateral low back pain with right-sided sciatica     Normal pressure hydrocephalus (HCC)         S1 sensory radiculitis-right      Plan:     MRI evaluation lumbar spine evaluate severity of compressive discopathy/stenosis suspected clinically  Continue medical pain management as per previous minimize use of narcotics caution relative to polypharmacy and constipation  Consider  for lumbar spine intervention injection pending results of MRI  Continue Neurontin at current dosage    Approximately 45 minutes was spent related to previewing pertinent medical documentation prior to the patient's visit along with counseling during the patient's visit with respect to treatment options inclusive of alternatives to treatment and the complications and risks related to those treatment options along with expectations of outcome related to those treatments and inclusive of time in the documentation and ordering of testing and treatment after the visit. The nature of the finding, probable diagnosis and likely treatment was thoroughly discussed with the patient and family. The options, risks, complications, alternative treatment as well as some of the differential diagnosis was discussed. The patient was thoroughly informed and all questions were answered. the patient indicated understanding and satisfaction with the discussion. Orders:        Orders Placed This Encounter   Procedures    XR LUMBAR SPINE (2-3 VIEWS)     Standing Status:   Future     Number of Occurrences:   1     Standing Expiration Date:   3/21/2023     Order Specific Question:   Reason for exam:     Answer:   pain    MRI LUMBAR SPINE WO CONTRAST     Standing Status:   Future     Standing Expiration Date:   3/21/2023     Scheduling Instructions:      Dequan Ocampo, please call pt to schedule, Etienne Galan will obtain auth and fwd to your facility. Pt advised to f/u in clinic 2-3 days after MRI for results.      Order Specific Question:   Reason for exam:     Answer:   R/O HNP/STENOSIS - R RADIC     Order Specific Question:   What is the sedation requirement? Answer:   None           Disclaimer: \"This note was dictated with voice recognition software. Though review and correction are routine, we apologize for any errors. \"

## 2022-03-21 NOTE — TELEPHONE ENCOUNTER
Patient is ok to have MRI for his back. He needs to have a MRI Conditional and they will need to follow the protocol for a TAVR valve. MRI department should know what this means.

## 2022-03-22 ENCOUNTER — TELEPHONE (OUTPATIENT)
Dept: CARDIOLOGY CLINIC | Age: 77
End: 2022-03-22

## 2022-03-22 ENCOUNTER — TELEPHONE (OUTPATIENT)
Dept: FAMILY MEDICINE CLINIC | Age: 77
End: 2022-03-22

## 2022-03-22 NOTE — TELEPHONE ENCOUNTER
Patient's wife is calling about this. She states that the nurse can't come out until tomorrow because it's so late now. She wants to know if she can get a cup and drop a sample off herself at the lab.      Please advise

## 2022-03-22 NOTE — TELEPHONE ENCOUNTER
JAKOB    Patients wife Rachel Tubbs) is calling to let us know they will need to reschedule the Echo and appointment with   Dr. Mariano Martines that was set up for 03/24    Patient has hurt his back and is having an MRI tomorrow    To hopefully get some answers on when he could come in for his Echo and appointment with dr Mariano Martines    Patients wife will call back later this week to inform us and schedule something

## 2022-03-23 LAB
BILIRUBIN, URINE: NEGATIVE
CLARITY: CLEAR
COLOR: YELLOW
GLUCOSE URINE: NEGATIVE
HB: SOURCE: NORMAL
KETONES, URINE: NEGATIVE
LEUKOCYTE ESTERASE, URINE: NEGATIVE
NITRITE, URINE: NEGATIVE
PH, URINE: 5.5 (ref 5–8)
PROTEIN, URINE: NEGATIVE
RBC URINE: NEGATIVE
SPECIFIC GRAVITY UA: 1.02 (ref 1–1.03)
URINE TYPE: NORMAL
UROBILINOGEN, URINE: NORMAL MG/DL

## 2022-03-24 ENCOUNTER — TELEPHONE (OUTPATIENT)
Dept: NEUROLOGY | Age: 77
End: 2022-03-24

## 2022-03-24 ENCOUNTER — OFFICE VISIT (OUTPATIENT)
Dept: ORTHOPEDIC SURGERY | Age: 77
End: 2022-03-24
Payer: COMMERCIAL

## 2022-03-24 DIAGNOSIS — M48.061 DEGENERATIVE LUMBAR SPINAL STENOSIS: ICD-10-CM

## 2022-03-24 DIAGNOSIS — M51.27 HERNIATION OF INTERVERTEBRAL DISC BETWEEN L5 AND S1: ICD-10-CM

## 2022-03-24 DIAGNOSIS — M51.36 DDD (DEGENERATIVE DISC DISEASE), LUMBAR: ICD-10-CM

## 2022-03-24 DIAGNOSIS — G91.2 NORMAL PRESSURE HYDROCEPHALUS (HCC): ICD-10-CM

## 2022-03-24 DIAGNOSIS — M47.816 LUMBAR SPONDYLOSIS: ICD-10-CM

## 2022-03-24 DIAGNOSIS — M51.26 HERNIATION OF INTERVERTEBRAL DISC BETWEEN L4 AND L5: ICD-10-CM

## 2022-03-24 DIAGNOSIS — M54.16 RIGHT LUMBAR RADICULITIS: ICD-10-CM

## 2022-03-24 PROCEDURE — 99214 OFFICE O/P EST MOD 30 MIN: CPT | Performed by: INTERNAL MEDICINE

## 2022-03-24 NOTE — TELEPHONE ENCOUNTER
Wife phoned Dr Light had recommended pt to get a Spinal tap. However the pt did not want to do it. Per wife Dr Light said if they changed their mind they could go ahead and make the appt . Please call pt wife back. He is schedule din May for his follow up, but they would like to get th LP sooner.

## 2022-03-24 NOTE — PROGRESS NOTES
Chief Complaint: No chief complaint on file. History of Present Illness:       Patient is a 78 y.o. male returns follow up for the above complaint. The patient was last seen approximately 3 daysago. The symptoms show no change since the last visit. The patient has had further testing for the problem. In the interim MRI scan completed which is outlined below in detail    Symptoms show no appreciable change. Back pain to leg pain ratio 0:100 pain is sharp and aching. Symptoms do not follow a typical neurogenic claudication pattern. The lower limb symptoms follow an L5/S1 dermatomal distribution involving the right lower extremity    Continues on Neurontin and hydrocodone    Consecutive trials of steroids have offered no benefit. Past Medical History:        Past Medical History:   Diagnosis Date    Allergic rhinitis     Aortic valve disorders     COPD (chronic obstructive pulmonary disease) (HCC)     Hydrocephalus (HCC)     normal pressure,    Hyperlipidemia     Hypertension     Hypertrophy of prostate without urinary obstruction and other lower urinary tract symptoms (LUTS)     Irritable bowel syndrome     Pancreatitis     Peptic ulcer, unspecified site, unspecified as acute or chronic, without mention of hemorrhage, perforation, or obstruction     Peripheral vascular disease (HCC)         Present Medications:         Current Outpatient Medications   Medication Sig Dispense Refill    polyethylene glycol (GLYCOLAX) 17 GM/SCOOP powder Take 17 g by mouth daily as needed (constipation) 510 g 0    lisinopril (PRINIVIL;ZESTRIL) 5 MG tablet Take 1 tablet by mouth daily 90 tablet 3    gabapentin (NEURONTIN) 100 MG capsule Take 1 capsule by mouth 4 times daily for 90 days.  360 capsule 3    atorvastatin (LIPITOR) 80 MG tablet TAKE ONE TABLET BY MOUTH DAILY 90 tablet 3    torsemide (DEMADEX) 20 MG tablet Take 1 tablet by mouth daily 30 tablet 6    albuterol sulfate  (90 Base) MCG/ACT inhaler Inhale 2 puffs into the lungs every 4 hours as needed for Wheezing 1 Inhaler 1    KLOR-CON M20 20 MEQ extended release tablet TAKE ONE TABLET BY MOUTH DAILY 30 tablet 6    aspirin 81 MG EC tablet Take 1 tablet by mouth daily 30 tablet 3     No current facility-administered medications for this visit. Allergies:      No Known Allergies        Review of Systems:    Pertinent items are noted in HPI      Vital Signs: There were no vitals filed for this visit. General Exam:     Constitutional: Patient is adequately groomed with no evidence of malnutrition    Physical Exam: lower back      Primary Exam:    Inspection: No deformity atrophy appreciable curvature      Palpation: No focal trigger point tenderness      Range of Motion: Purposely not assessed      Strength: Normal lower extremity      Special Tests: SLR suggestive right      Skin: There are no rashes, ulcerations or lesions. Gait: Wide-based and walker assisted      Neurovascular - non focal and intact       Additional Comments:        Additional Examinations:                   Office Imaging Results/Procedures PerformedToday:             Office Procedures:   No orders of the defined types were placed in this encounter. Other Outside Imaging and Testing Personally Reviewed:    XR LUMBAR SPINE (2-3 VIEWS)    Result Date: 3/21/2022  Radiology exam is complete. No Radiologist dictation. Please follow up with ordering provider. MRI LUMBAR SPINE WO CONTRAST    Result Date: 3/24/2022  [facility]: [facility] [place] [facility] #: [id number] #: [id number] Procedure: MR Lumbar Spine w/o Contrast ; Reason for Exam: Radiculopathy, lumbar region This document is confidential medical information. Unauthorized disclosure or use of this information is prohibited by law. If you are not the intended recipient of this document, please advise us by calling immediately [phone].  [facility] [id number] Patient Name: Titus Schuster Case ID: [id number] Patient : 1945 Referring Physician: Ele Rooney MD Exam Date: 2022 Exam Description: MR Lumbar Spine w/o Contrast HISTORY:  Right leg pain and weakness since 01/15/2022 TECHNICAL FACTORS:  Long- and short-axis fat- and water-weighted images were performed. COMPARISON:  None. FINDINGS:  No evidence of lumbar spine fracture and vertebral body heights are normal. Thoracolumbar junction is intact and lumbar spine alignment is anatomic. Anterior and middle columns are intact as well as the anterior and posterior longitudinal ligaments. No focal ligamentous disruption or epidural fluid collection is appreciated. A relatively large Schmorl's node is present in the inferior endplate of L3 with extensive adjacent edema. T12 vertebral body hemangioma. Conus medullaris is normal and visualized spinal cord and cauda equina nerve roots appear normal.  No evidence of an intradural or extradural mass is present. L1-L2: Disc desiccation. Bulging annulus and bilateral facet hypertrophy and facet joint effusions with mild central canal and mild bilateral foraminal stenosis. L2-L3: Disc desiccation. Bulging annulus and bilateral facet hypertrophy with mild central canal and mild bilateral foraminal stenosis. L3-L4: Disc desiccation. Bulging annulus and bilateral facet and ligamentum flavum hypertrophy  with severe central canal stenosis and mild bilateral foraminal stenosis. L4-L5: Disc desiccation. Bulging annulus and bilateral facet and ligamentum flavum hypertrophy  with superimposed focal central disc protrusion. Moderate central canal stenosis and moderate  right and mild left foraminal stenosis. L5-S1: Disc desiccation. Bulging annulus and bilateral facet hypertrophy with focal central disc protrusion. Moderate left and mild right foraminal stenosis. No significant paraspinal soft tissue abnormalities are seen.  CONCLUSION: 1. Multilevel degenerative changes. The following compressive sequelae are noted: 2. Severe central canal stenosis at L3-4. 3. Moderate central canal stenosis at L4-5. Moderate foraminal stenoses on the right at L4-5 and on the left at L5-S1. 4. Mild central canal stenosis at L1-2 and L2-3. Mild foraminal stenoses are present bilaterally at L1-2, bilaterally at L2-3, bilaterally at L3-4, on the left at L4-5, and on the right at L5-S1. 5. A relatively large Schmorl's node is present in the inferior endplate of L3 with extensive adjacent edema. Thank you for the opportunity to provide your interpretation. Kevin Todd MD A: WD 03/24/2022 9:20 AM      EXAMINATION:   MRI OF THE BRAIN WITHOUT CONTRAST  10/11/2021 8:20 am       TECHNIQUE:   Multiplanar multisequence MRI of the brain was performed without the   administration of intravenous contrast.       COMPARISON:   08/05/2019.       HISTORY:   ORDERING SYSTEM PROVIDED HISTORY: Normal pressure hydrocephalus ([address]   TECHNOLOGIST PROVIDED HISTORY:   Reason for exam:->Patient with NPH and progressive ataxia, please compare   with previous MRI brain from 2019 to compare the ventricular size   Reason for Exam: PT BEING EVALUATED FOR HX OF NPH AND PREVIOUS MRI 2019 AND   Acuity: Acute   Type of Exam: Initial   Additional signs and symptoms: PT BEING EVALUATED FOR HX OF NPH AND PREVIOUS   MRI 2019 AND   Relevant Medical/Surgical History: PT BEING EVALUATED FOR HX OF NPH AND   PREVIOUS MRI 2019 AND       FINDINGS:   INTRACRANIAL STRUCTURES/VENTRICLES: There is no acute infarct.  Small chronic   right cerebellar infarct appears unchanged.  No mass effect or midline shift.    No evidence of an acute intracranial hemorrhage.  Areas of T2 FLAIR   hyperintensity are seen in the periventricular and subcortical white matter,   which are nonspecific, but may represent chronic microvascular ischemic   change. Abdulaziz Spatz is prominence of the ventricles and sulci due to global   parenchymal volume loss.  The ventricular prominence is out of proportion to   the cortical sulci.  This is similar to the prior exam.  The   sellar/suprasellar regions appear unremarkable.  The normal signal voids   within the major intracranial vessels appear maintained.       ORBITS: The visualized portion of the orbits demonstrate no acute abnormality.       SINUSES: Scattered mucosal thickening of the paranasal sinuses, right more   than left.  The mastoid air cells demonstrate no acute abnormality.       BONES/SOFT TISSUES: The bone marrow signal intensity appears normal. The soft   tissues demonstrate no acute abnormality.           Impression   1. No acute intracranial abnormality.  No acute infarct. 2. Global parenchymal volume loss with mild chronic microvascular ischemic   changes. 3.  The ventricular dilatation is out of proportion to the cortical sulci.  A   component of normal pressure hydrocephalus cannot be excluded. 4. Small chronic right cerebellar infarct.                 Assessment   Impression: . Encounter Diagnoses   Name Primary?  Herniation of intervertebral disc between L5 and S1     Herniation of intervertebral disc between L4 and L5     Right lumbar radiculitis     Degenerative lumbar spinal stenosis     DDD (degenerative disc disease), lumbar     Lumbar spondylosis     Normal pressure hydrocephalus (HCC)       L4/L5 and L5/S1 central disc herniations        Plan:     Continue Neurontin at current dosage as needed use of Norco caution relative to constipation  Proceed with L TODD right L4/L5 translaminar  Activy modification lumbar disc protocol   Recommend clinical follow-up with Dr. Ronald Jean regarding follow up evaluation of NPH    If no therapeutic benefit from the L TODD we will EMG evaluation right lower extremity and lower MRI thoracic spine. Proceed as outlined above     Orders:      No orders of the defined types were placed in this encounter.         Stuart Smith, MD.      Clem Austin: \"This note was dictated with voice recognition software. Though review and correction are routine, we apologize for any errors. \"

## 2022-03-24 NOTE — LETTER
[facility] [address] [facility]  Phone: [phone]  Fax: [phone]    Wilfredo Estevez MD    March 24, 2022     Joann lCark MD  [address] [place] [address]    Patient: Mk Pastrana   MR Number: [id number]   YOB: 1945   Date of Visit: 3/24/2022       Dear Joann Clark:    Thank you for referring Jos Griffin to me for evaluation/treatment. Below are the relevant portions of my assessment and plan of care. Impression: . Encounter Diagnoses   Name Primary?  Herniation of intervertebral disc between L5 and S1     Herniation of intervertebral disc between L4 and L5     Right lumbar radiculitis     Degenerative lumbar spinal stenosis     DDD (degenerative disc disease), lumbar     Lumbar spondylosis     Normal pressure hydrocephalus (HCC)       L4/L5 and L5/S1 central disc herniations        Plan:     Continue Neurontin at current dosage as needed use of Norco caution relative to constipation  Proceed with L TODD right L4/L5 translaminar  Activy modification lumbar disc protocol   Recommend clinical follow-up with Dr. Joselyn Chicas regarding follow up evaluation of NPH    If no therapeutic benefit from the L TODD we will EMG evaluation right lower extremity and lower MRI thoracic spine. Proceed as outlined above     Orders:      No orders of the defined types were placed in this encounter. Marti Montgomery MD.      Disclaimer: \"This note was dictated with voice recognition software. Though review and correction are routine, we apologize for any errors. \"       If you have questions, please do not hesitate to call me. I look forward to following Lucita Goncalves along with you.     Sincerely,      Wilfredo Estevez MD

## 2022-03-25 ENCOUNTER — TELEPHONE (OUTPATIENT)
Dept: VASCULAR SURGERY | Age: 77
End: 2022-03-25

## 2022-03-25 LAB
CULTURE RESULT: NORMAL
Lab: NORMAL
REPORT STATUS: NORMAL
SPECIMEN DESCRIPTION: NORMAL

## 2022-03-25 NOTE — TELEPHONE ENCOUNTER
Patient spouse dalia calling to discuss some concerns she has. She states the Patient has some health issues and she wants to know if it is connected with his vascular issues. Patient was last seen in 2019.      Please call   Mario Sutherland

## 2022-03-25 NOTE — TELEPHONE ENCOUNTER
\"Patient spouse dalia calling to discuss some concerns she has. She states the Patient has some health issues and she wants to know if it is connected with his vascular issues. Patient was last seen in 2019. \"    Called wife and she is concerned with his lack of appetite and his eating habits. Told her that I looked over testing we did and notes from Dr Sarthak Holt and that I couldn't find any reason that it was related at all to his eating habits. According to his notes there were no noted vascular issues. Advised wife that he might want to reach out to his PCP to further guide her.

## 2022-03-27 ENCOUNTER — HOSPITAL ENCOUNTER (EMERGENCY)
Age: 77
Discharge: HOME | End: 2022-03-28
Attending: EMERGENCY MEDICINE
Payer: COMMERCIAL

## 2022-03-27 ENCOUNTER — APPOINTMENT (OUTPATIENT)
Dept: CT IMAGING | Age: 77
End: 2022-03-27
Payer: COMMERCIAL

## 2022-03-27 DIAGNOSIS — R63.0 ANOREXIA: ICD-10-CM

## 2022-03-27 DIAGNOSIS — R53.1 GENERAL WEAKNESS: Primary | ICD-10-CM

## 2022-03-27 LAB
BASOPHILS ABSOLUTE: 0.1 K/UL (ref 0–0.2)
BASOPHILS RELATIVE PERCENT: 1.2 %
EOSINOPHILS ABSOLUTE: 0.5 K/UL (ref 0–0.6)
EOSINOPHILS RELATIVE PERCENT: 4.6 %
HCT VFR BLD CALC: 47.2 % (ref 40.5–52.5)
HEMOGLOBIN: 15.7 G/DL (ref 13.5–17.5)
LYMPHOCYTES ABSOLUTE: 1.6 K/UL (ref 1–5.1)
LYMPHOCYTES RELATIVE PERCENT: 14.1 %
MCH RBC QN AUTO: 29.8 PG (ref 26–34)
MCHC RBC AUTO-ENTMCNC: 33.3 G/DL (ref 31–36)
MCV RBC AUTO: 89.6 FL (ref 80–100)
MONOCYTES ABSOLUTE: 1 K/UL (ref 0–1.3)
MONOCYTES RELATIVE PERCENT: 9.3 %
NEUTROPHILS ABSOLUTE: 7.8 K/UL (ref 1.7–7.7)
NEUTROPHILS RELATIVE PERCENT: 70.8 %
PDW BLD-RTO: 14.2 % (ref 12.4–15.4)
PLATELET # BLD: 382 K/UL (ref 135–450)
PMV BLD AUTO: 9.5 FL (ref 5–10.5)
RBC # BLD: 5.27 M/UL (ref 4.2–5.9)
WBC # BLD: 11 K/UL (ref 4–11)

## 2022-03-27 PROCEDURE — 36415 COLL VENOUS BLD VENIPUNCTURE: CPT

## 2022-03-27 PROCEDURE — 80076 HEPATIC FUNCTION PANEL: CPT

## 2022-03-27 PROCEDURE — 85025 COMPLETE CBC W/AUTO DIFF WBC: CPT

## 2022-03-27 PROCEDURE — 84484 ASSAY OF TROPONIN QUANT: CPT

## 2022-03-27 PROCEDURE — 93005 ELECTROCARDIOGRAM TRACING: CPT | Performed by: EMERGENCY MEDICINE

## 2022-03-27 PROCEDURE — 99284 EMERGENCY DEPT VISIT MOD MDM: CPT

## 2022-03-27 PROCEDURE — 6360000002 HC RX W HCPCS: Performed by: EMERGENCY MEDICINE

## 2022-03-27 PROCEDURE — 80048 BASIC METABOLIC PNL TOTAL CA: CPT

## 2022-03-27 PROCEDURE — 83690 ASSAY OF LIPASE: CPT

## 2022-03-27 PROCEDURE — 2580000003 HC RX 258: Performed by: EMERGENCY MEDICINE

## 2022-03-27 PROCEDURE — 96374 THER/PROPH/DIAG INJ IV PUSH: CPT

## 2022-03-27 RX ORDER — 0.9 % SODIUM CHLORIDE 0.9 %
1000 INTRAVENOUS SOLUTION INTRAVENOUS ONCE
Status: COMPLETED | OUTPATIENT
Start: 2022-03-27 | End: 2022-03-28

## 2022-03-27 RX ORDER — ONDANSETRON 2 MG/ML
4 INJECTION INTRAMUSCULAR; INTRAVENOUS EVERY 6 HOURS PRN
Status: DISCONTINUED | OUTPATIENT
Start: 2022-03-27 | End: 2022-03-28 | Stop reason: HOSPADM

## 2022-03-27 RX ADMIN — ONDANSETRON 4 MG: 2 INJECTION INTRAMUSCULAR; INTRAVENOUS at 23:21

## 2022-03-27 RX ADMIN — SODIUM CHLORIDE 1000 ML: 9 INJECTION, SOLUTION INTRAVENOUS at 23:22

## 2022-03-28 ENCOUNTER — APPOINTMENT (OUTPATIENT)
Dept: CT IMAGING | Age: 77
End: 2022-03-28
Payer: COMMERCIAL

## 2022-03-28 ENCOUNTER — CARE COORDINATION (OUTPATIENT)
Dept: CARE COORDINATION | Age: 77
End: 2022-03-28

## 2022-03-28 VITALS
OXYGEN SATURATION: 94 % | SYSTOLIC BLOOD PRESSURE: 125 MMHG | DIASTOLIC BLOOD PRESSURE: 101 MMHG | RESPIRATION RATE: 21 BRPM | HEART RATE: 72 BPM

## 2022-03-28 LAB
ALBUMIN SERPL-MCNC: 4.1 G/DL (ref 3.4–5)
ALP BLD-CCNC: 143 U/L (ref 40–129)
ALT SERPL-CCNC: 25 U/L (ref 10–40)
ANION GAP SERPL CALCULATED.3IONS-SCNC: 16 MMOL/L (ref 3–16)
AST SERPL-CCNC: 27 U/L (ref 15–37)
BILIRUB SERPL-MCNC: 0.9 MG/DL (ref 0–1)
BILIRUBIN DIRECT: <0.2 MG/DL (ref 0–0.3)
BILIRUBIN, INDIRECT: ABNORMAL MG/DL (ref 0–1)
BUN BLDV-MCNC: 18 MG/DL (ref 7–20)
CALCIUM SERPL-MCNC: 10.3 MG/DL (ref 8.3–10.6)
CHLORIDE BLD-SCNC: 94 MMOL/L (ref 99–110)
CO2: 24 MMOL/L (ref 21–32)
CREAT SERPL-MCNC: 0.9 MG/DL (ref 0.8–1.3)
EKG ATRIAL RATE: 75 BPM
EKG DIAGNOSIS: NORMAL
EKG P AXIS: 72 DEGREES
EKG P-R INTERVAL: 180 MS
EKG Q-T INTERVAL: 360 MS
EKG QRS DURATION: 96 MS
EKG QTC CALCULATION (BAZETT): 402 MS
EKG R AXIS: 80 DEGREES
EKG T AXIS: 117 DEGREES
EKG VENTRICULAR RATE: 75 BPM
GFR AFRICAN AMERICAN: >60
GFR NON-AFRICAN AMERICAN: >60
GLUCOSE BLD-MCNC: 116 MG/DL (ref 70–99)
LIPASE: 30 U/L (ref 13–60)
POTASSIUM REFLEX MAGNESIUM: 4 MMOL/L (ref 3.5–5.1)
SODIUM BLD-SCNC: 134 MMOL/L (ref 136–145)
TOTAL PROTEIN: 7.1 G/DL (ref 6.4–8.2)
TROPONIN: <0.01 NG/ML

## 2022-03-28 PROCEDURE — 93010 ELECTROCARDIOGRAM REPORT: CPT | Performed by: INTERNAL MEDICINE

## 2022-03-28 PROCEDURE — 74176 CT ABD & PELVIS W/O CONTRAST: CPT

## 2022-03-28 RX ORDER — ONDANSETRON 4 MG/1
4 TABLET, FILM COATED ORAL DAILY PRN
Qty: 30 TABLET | Refills: 0 | Status: SHIPPED | OUTPATIENT
Start: 2022-03-28 | End: 2022-05-19 | Stop reason: ALTCHOICE

## 2022-03-28 NOTE — ED NOTES
Patient discharged to home in stable condition alert and oriented x4 at this time. Patient wheeled to exit by family.       Gonsalo Jade RN  03/28/22 [id number]

## 2022-03-28 NOTE — ED PROVIDER NOTES
Emergency Department Encounter    Patient: Roberto Galloway  MRN: [id number]  : 1945  Date of Evaluation: 2022  ED Provider:  Julia Goldstein MD    Triage Chief Complaint:   Constipation (Patient in by [facility] from home. Per EMS, patient has not had a bm in over a week, denies any abd pain. Patient stated he has not eaten or drank anyhting for about 3 days. States has no appetite. Patient AxOx4, slow to respond at time of triage. )    [place]:  Roberto Galloway is a 78 y.o. male that presents to the ER for evaluation of 1 week history of decrease in p.o. intake generalized malaise and fatigue, no headache no falls, positive constipation. No appetite. Positive flatus. 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 History of chronic back pain. Treated peptic ulcer disease and pancreatitis. History of irritable bowel syndrome. Mode of arrival is EMS. History is obtained from patient as well as chart review.     ROS - see HPI, below listed is current ROS at time of my eval:  General:  No fevers, no chills, + weakness  Eyes:  no discharge  ENT:  No sore throat, no nasal congestion  Cardiovascular:  No chest pain, no palpitations  Respiratory:  No shortness of breath, no cough, no wheezing  Gastrointestinal:  + pain, + nausea, no vomiting, no diarrhea  Musculoskeletal:  No muscle pain, no joint pain  Skin:  No rash, no pruritis  Neurologic:  no headache  Genitourinary:  No dysuria, no hematuria  Endocrine:  No unexpected weight gain, no unexpected weight loss  Extremities:  no edema, no pain    Past Medical History:   Diagnosis Date    Allergic rhinitis     Aortic valve disorders     COPD (chronic obstructive pulmonary disease) (HCC)     Hydrocephalus (HCC)     normal pressure,    Hyperlipidemia     Hypertension     Hypertrophy of prostate without urinary obstruction and other lower urinary tract symptoms (LUTS)     Irritable bowel syndrome     Pancreatitis  Peptic ulcer, unspecified site, unspecified as acute or chronic, without mention of hemorrhage, perforation, or obstruction     Peripheral vascular disease ([facility] [address]      Past Surgical History:   Procedure Laterality Date    ABDOMINAL AORTIC ANEURYSM REPAIR N/A     ANGIOPLASTY      RT femoral artery    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REPAIR  2018    [place]    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REPAIR      Aortic valve replacement    AORTIC VALVE REPLACEMENT  2018    CHOLECYSTECTOMY      COLONOSCOPY N/A 2021    COLONOSCOPY POLYPECTOMY SNARE/COLD BIOPSY performed by Anya Salinas MD at [address]      bifemoral bypass    FOOT SURGERY Right 12/2/15    HERNIA REPAIR Left     OTHER SURGICAL HISTORY  10/23/2017    lumbar puncture    [place] ESOPHAGOGASTRODUODENOSCOPY TRANSORAL DIAGNOSTIC N/A 2018    EGD performed by Anya Salinas MD at [address] ENDOSCOPY  2018    WITH BIOPSY     Family History   Problem Relation Age of Onset    Heart Disease Father     Diabetes Father     Alcohol Abuse Father     Other Mother     Cancer Brother         liver     Social History     Socioeconomic History    Marital status:      Spouse name: dalia    Number of children: 2    Years of education: Not on file    Highest education level: Not on file   Occupational History    Not on file   Tobacco Use    Smoking status: Former Smoker     Packs/day: 1.50     Years: 30.00     Pack years: 45.00     Types: Cigarettes     Quit date: 2001     Years since quittin.3    Smokeless tobacco: Never Used    Tobacco comment: H.O.smoking at age 25 / smoked up to 1.5p.p.d /quit     Vaping Use    Vaping Use: Never used   Substance and Sexual Activity    Alcohol use:  Yes     Alcohol/week: 2.0 standard drinks     Types: 2 Cans of beer per week     Comment: occasional beer    Drug use: Never    Sexual activity: Yes     Partners: Female Birth control/protection: Post-menopausal   Other Topics Concern    Not on file   Social History Narrative    Not on file     Social Determinants of Health     Financial Resource Strain: Low Risk     Difficulty of Paying Living Expenses: Not hard at all   Food Insecurity: No Food Insecurity    Worried About Running Out of Food in the Last Year: Never true    [address] in the Last Year: Never true   Transportation Needs: No Transportation Needs    Lack of Transportation (Medical): No    Lack of Transportation (Non-Medical): No   Physical Activity: Inactive    Days of Exercise per Week: 0 days    Minutes of Exercise per Session: 0 min   Stress:     Feeling of Stress : Not on file   Social Connections: Moderately Integrated    Frequency of Communication with Friends and Family: More than three times a week    Frequency of Social Gatherings with Friends and Family: Once a week    Attends Ten[employer] Services: Never    Active Member of Clubs or Organizations: Yes    Attends Club or Organization Meetings: Never    Marital Status:    Intimate Partner Violence:     Fear of Current or Ex-Partner: Not on file    Emotionally Abused: Not on file    Physically Abused: Not on file    Sexually Abused: Not on file   Housing Stability: [address] Unable to Pay for Housing in the Last Year: No    Number of [place] in the Last Year: 1    Unstable Housing in the Last Year: No     No current facility-administered medications for this encounter. Current Outpatient Medications   Medication Sig Dispense Refill    ondansetron (ZOFRAN) 4 MG tablet Take 1 tablet by mouth daily as needed for Nausea or Vomiting 30 tablet 0    HYDROcodone-acetaminophen (NORCO) 5-325 MG per tablet Take 1 tablet by mouth every 6 hours as needed for Pain.       polyethylene glycol (GLYCOLAX) 17 GM/SCOOP powder Take 17 g by mouth daily as needed (constipation) 510 g 0    lisinopril (PRINIVIL;ZESTRIL) 5 MG tablet Take 1 tablet by mouth daily 90 tablet 3    gabapentin (NEURONTIN) 100 MG capsule Take 1 capsule by mouth 4 times daily for 90 days. 360 capsule 3    atorvastatin (LIPITOR) 80 MG tablet TAKE ONE TABLET BY MOUTH DAILY 90 tablet 3    torsemide (DEMADEX) 20 MG tablet Take 1 tablet by mouth daily 30 tablet 6    albuterol sulfate  (90 Base) MCG/ACT inhaler Inhale 2 puffs into the lungs every 4 hours as needed for Wheezing 1 Inhaler 1    KLOR-CON M20 20 MEQ extended release tablet TAKE ONE TABLET BY MOUTH DAILY 30 tablet 6    aspirin 81 MG EC tablet Take 1 tablet by mouth daily 30 tablet 3     No Known Allergies    Nursing Notes Reviewed    Physical Exam:  Triage VS:    ED Triage Vitals   Enc Vitals Group      BP       Pulse       Resp       Temp       Temp src       SpO2       Weight       Height       Head Circumference       Peak Flow       Pain Score       Pain Loc       Pain Edu? Excl. in [address]? My pulse ox interpretation is - normal    General appearance:  No acute distress. Skin:  Warm. Dry. No rash. Neck:  Trachea midline. Heart:  Regular rate and rhythm, normal S1 & S2.    Perfusion:  intact  Respiratory:  Lungs clear to auscultation bilaterally. Respirations nonlabored. Abdominal:  No  tenderness to palpation, no rebound guarding or rigidity, neg Gilbert's sign, neg McBurney's point tenderness to palpation, normal bowel sounds, no masses, no organomegaly, no pulsatile masses  Back:  No CVA TTP  Extremity:    normal ROM   Neurological:  Alert and oriented times 3.       I have reviewed and interpreted all of the currently available lab results from this visit (if applicable):  Results for orders placed or performed during the hospital encounter of 43/61/97   Basic Metabolic Panel w/ Reflex to MG   Result Value Ref Range    Sodium 134 (L) 136 - 145 mmol/L    Potassium reflex Magnesium 4.0 3.5 - 5.1 mmol/L    Chloride 94 (L) 99 - 110 mmol/L    CO2 24 21 - 32 mmol/L    Anion Gap 16 3 - 16 Glucose 116 (H) 70 - 99 mg/dL    BUN 18 7 - 20 mg/dL    CREATININE 0.9 0.8 - 1.3 mg/dL    GFR Non-African American >60 >60    GFR African American >60 >60    Calcium 10.3 8.3 - 10.6 mg/dL   CBC with Auto Differential   Result Value Ref Range    WBC 11.0 4.0 - 11.0 K/uL    RBC 5.27 4.20 - 5.90 M/uL    Hemoglobin 15.7 13.5 - 17.5 g/dL    Hematocrit 47.2 40.5 - 52.5 %    MCV 89.6 80.0 - 100.0 fL    MCH 29.8 26.0 - 34.0 pg    MCHC 33.3 31.0 - 36.0 g/dL    RDW 14.2 12.4 - 15.4 %    Platelets 458 318 - 388 K/uL    MPV 9.5 5.0 - 10.5 fL    Neutrophils % 70.8 %    Lymphocytes % 14.1 %    Monocytes % 9.3 %    Eosinophils % 4.6 %    Basophils % 1.2 %    Neutrophils Absolute 7.8 (H) 1.7 - 7.7 K/uL    Lymphocytes Absolute 1.6 1.0 - 5.1 K/uL    Monocytes Absolute 1.0 0.0 - 1.3 K/uL    Eosinophils Absolute 0.5 0.0 - 0.6 K/uL    Basophils Absolute 0.1 0.0 - 0.2 K/uL   Hepatic Function Panel   Result Value Ref Range    Total Protein 7.1 6.4 - 8.2 g/dL    Albumin 4.1 3.4 - 5.0 g/dL    Alkaline Phosphatase 143 (H) 40 - 129 U/L    ALT 25 10 - 40 U/L    AST 27 15 - 37 U/L    Total Bilirubin 0.9 0.0 - 1.0 mg/dL    Bilirubin, Direct <0.2 0.0 - 0.3 mg/dL    Bilirubin, Indirect see below 0.0 - 1.0 mg/dL   Lipase   Result Value Ref Range    Lipase 30.0 13.0 - 60.0 U/L   Troponin   Result Value Ref Range    Troponin <0.01 <0.01 ng/mL   EKG 12 Lead   Result Value Ref Range    Ventricular Rate 75 BPM    Atrial Rate 75 BPM    P-R Interval 180 ms    QRS Duration 96 ms    Q-T Interval 360 ms    QTc Calculation (Bazett) 402 ms    P Axis 72 degrees    R Axis 80 degrees    T Axis 117 degrees    Diagnosis       Normal sinus rhythmNonspecific ST and T wave abnormalityConfirmed by EHSAN GOMEZ MD ([id number]) on 3/28/2022 11:50:45 AM      Radiographs (if obtained):  Radiologist's Report Reviewed:  No results found. EKG (if obtained): (All EKG's are interpreted by myself in the absence of a cardiologist)      MDM:  Patient here with abdominal pain.  I estimate there is LOW risk for an acute emergent intraabdominal process including, but not limited to, acute appendicitis, bowel obstruction, acute cholecystitis, ruptured diverticulitis, incarcerated/strangling hernia,  perforated bowel/ulcer. Workup reveals mild anorexia without evidence of bowel obstruction free air no evidence of sepsis, no evidence of diverticulitis, possible viral etiology, no clinical signs of hepatitis pancreatitis, no evidence for ACS. No acute pneumonia, mild peribronchial thickening which may be viral in etiology. Patient's abdominal exam in the ED is nonsurgical.  I do feel the Patient can be discharged home. I have discussed the results, plan for treatment and possible risks, and patient agrees with discharging home with close follow-up. Patient understands and agrees with the plan, return warnings given. We have discussed the symptoms which are most concerning that necessitate immediate return. Clinical Impression:  1. General weakness    2. Anorexia      Disposition referral (if applicable):  Mark Henderson MD  [address] [address]  [phone]          Disposition medications (if applicable):  Discharge Medication List as of 3/28/2022  1:43 AM      START taking these medications    Details   ondansetron (ZOFRAN) 4 MG tablet Take 1 tablet by mouth daily as needed for Nausea or Vomiting, Disp-30 tablet, R-0Print             Comment: Please note this report has been produced using speech recognition software and may contain errors related to that system including errors in grammar, punctuation, and spelling, as well as words and phrases that may be inappropriate. Efforts were made to edit the dictations.       Steffi Root MD  51/48/80 [id number]

## 2022-03-28 NOTE — CARE COORDINATION
JANINA received call from patient's wife stating that her grand daughter is a nurse and wants patient seen today or tomorrow for follow up and discuss blood work. JANINA advised that I will send message to Dr. Aneta Simmons office. She was encouraged to call office if she does not receive call back by 3 today. Patient's wife would prefer patient seen by Dr. Aneta Simmons over another provider.

## 2022-03-28 NOTE — CARE COORDINATION
Ambulatory Care Coordination  ED Follow up Call    Reason for ED visit:  Anorexia, Constipation, weakness  Status:     improved    Did you call your PCP prior to going to the ED? No      Did you receive a discharge instructions from the Emergency Room? Yes  Review of Instructions:     Understands what to report/when to return?:  Yes   Understands discharge instructions?:  Yes   Following discharge instructions?:  Yes   If not why? N/A    Are there any new complaints of pain? No  New Pain Meds? No    Constipation prophylaxis needed? Yes    If you have a wound is the dressing clean, dry, and intact? N/A  Understands wound care regimen? N/A    Are there any other complaints/concerns that you wish to tell your provider? No    FU appts/Provider:    Future Appointments   Date Time Provider Pawan Renner   5/10/2022  9:40 AM Adamaris Ham MD [facility] NEURO [facility]   5/17/2022 10:15 AM iMke Sutherland MD  VASC/ENDO MMA   7/14/2022 10:15 AM Tima Hinton MD [facility]           New Medications?:   Yes      Medication Reconciliation by phone - No  Understands Medications? Yes  Taking Medications? No  Can you swallow your pills? Yes    Any further needs in the home i.e. Equipment? Yes    Link to services in community?:  N/A   Which services:  N/A    ACM spoke with patient's wife. She states that he is doing a little better today. He was able to get out of bed and was currently eating breakfast. ACM discussed constipation with patient's wife. She is buying miralax for him today as directed by MD in ER and will increase fluids and fiber in diet. ACM discussed s/sx to report and RED flag symptoms that warrant ED visit. ACM discussed discharge instructions, when to call MD and when to return to ED. Patient's wife verbalized understanding with no questions. Patient's wife advised ACM that patient's insurance will reach out to PCP to order a hospital bed.  Per patient's wife a hospital bed is needed to allow patient to get in and out of bed more easily and improve his mobility. Patient's wife had no questions or concerns for ACM at the end of the call.

## 2022-03-29 DIAGNOSIS — G91.2 NORMAL PRESSURE HYDROCEPHALUS (HCC): Primary | ICD-10-CM

## 2022-03-30 ENCOUNTER — OFFICE VISIT (OUTPATIENT)
Dept: FAMILY MEDICINE CLINIC | Age: 77
End: 2022-03-30
Payer: COMMERCIAL

## 2022-03-30 ENCOUNTER — TELEPHONE (OUTPATIENT)
Dept: FAMILY MEDICINE CLINIC | Age: 77
End: 2022-03-30

## 2022-03-30 VITALS
BODY MASS INDEX: 29.29 KG/M2 | WEIGHT: 216 LBS | DIASTOLIC BLOOD PRESSURE: 90 MMHG | OXYGEN SATURATION: 97 % | TEMPERATURE: 98.4 F | HEART RATE: 101 BPM | SYSTOLIC BLOOD PRESSURE: 150 MMHG

## 2022-03-30 DIAGNOSIS — M79.604 PAIN OF RIGHT LOWER EXTREMITY: Primary | ICD-10-CM

## 2022-03-30 DIAGNOSIS — R29.898 BILATERAL LEG WEAKNESS: ICD-10-CM

## 2022-03-30 DIAGNOSIS — K59.00 CONSTIPATION, UNSPECIFIED CONSTIPATION TYPE: ICD-10-CM

## 2022-03-30 DIAGNOSIS — R41.82 ALTERED MENTAL STATUS, UNSPECIFIED ALTERED MENTAL STATUS TYPE: ICD-10-CM

## 2022-03-30 PROCEDURE — 96372 THER/PROPH/DIAG INJ SC/IM: CPT | Performed by: FAMILY MEDICINE

## 2022-03-30 PROCEDURE — 99214 OFFICE O/P EST MOD 30 MIN: CPT | Performed by: FAMILY MEDICINE

## 2022-03-30 RX ORDER — KETOROLAC TROMETHAMINE 30 MG/ML
60 INJECTION, SOLUTION INTRAMUSCULAR; INTRAVENOUS ONCE
Status: COMPLETED | OUTPATIENT
Start: 2022-03-30 | End: 2022-03-30

## 2022-03-30 RX ADMIN — KETOROLAC TROMETHAMINE 60 MG: 30 INJECTION, SOLUTION INTRAMUSCULAR; INTRAVENOUS at 16:57

## 2022-03-30 ASSESSMENT — ENCOUNTER SYMPTOMS
CONSTIPATION: 1
SHORTNESS OF BREATH: 0

## 2022-03-30 NOTE — TELEPHONE ENCOUNTER
----- Message from Christian Guerrero sent at 3/28/2022  5:13 PM EDT -----  Subject: Message to Provider    QUESTIONS  Information for Provider? Pt's wife called to speak with Dr. Lesia Canavan nurse   to ask a medical question after the office had closed. Pt's wife would   like the nurse to call her back. ---------------------------------------------------------------------------  --------------  Rosio Arrow INFO  What is the best way for the office to contact you? OK to leave message on   voicemail  Preferred Call Back Phone Number? [phone]  ---------------------------------------------------------------------------  --------------  SCRIPT ANSWERS  Relationship to Patient? Other  Representative Name? Stuart Boone  Is the [facility] on the appropriate HIPAA document in Epic?  Yes

## 2022-03-30 NOTE — PROGRESS NOTES
consulted and will be working with him at home. He has had a rather extensive work-up with no recent scan of the brain. Patient's medications, allergies, past medical,surgical, social and family histories were reviewed and updated as appropriate.      Past Medical History:   Diagnosis Date    Allergic rhinitis     Aortic valve disorders     COPD (chronic obstructive pulmonary disease) (HCC)     Hydrocephalus (HCC)     normal pressure,    Hyperlipidemia     Hypertension     Hypertrophy of prostate without urinary obstruction and other lower urinary tract symptoms (LUTS)     Irritable bowel syndrome     Pancreatitis     Peptic ulcer, unspecified site, unspecified as acute or chronic, without mention of hemorrhage, perforation, or obstruction     Peripheral vascular disease ([facility] [address]      Past Surgical History:   Procedure Laterality Date    ABDOMINAL AORTIC ANEURYSM REPAIR N/A 2001    ANGIOPLASTY      RT femoral artery    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    [place]    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REPAIR      Aortic valve replacement    AORTIC VALVE REPLACEMENT  2018    CHOLECYSTECTOMY      COLONOSCOPY N/A 5/4/2021    COLONOSCOPY POLYPECTOMY SNARE/COLD BIOPSY performed by Susanne Mcgrath MD at [address]      bifemoral bypass    FOOT SURGERY Right 12/2/15    HERNIA REPAIR Left     OTHER SURGICAL HISTORY  10/23/2017    lumbar puncture    RI ESOPHAGOGASTRODUODENOSCOPY TRANSORAL DIAGNOSTIC N/A 12/4/2018    EGD performed by Susanne Mcgrath MD at [address] [facility] ENDOSCOPY  09/19/2018    WITH BIOPSY     Family History   Problem Relation Age of Onset    Heart Disease Father     Diabetes Father     Alcohol Abuse Father     Other Mother     Cancer Brother         liver     Social History     Tobacco Use    Smoking status: Former Smoker     Packs/day: 1.50     Years: 30.00     Pack years: 45.00     Types: Cigarettes     Quit date: 2001     Years since quittin.2    Smokeless tobacco: Never Used    Tobacco comment: H.O.smoking at age 25 / smoked up to 1.5p.p.d /quit     Substance Use Topics    Alcohol use: Yes     Alcohol/week: 2.0 standard drinks     Types: 2 Cans of beer per week     Comment: occasional beer      No Known Allergies  Current Outpatient Medications on File Prior to Visit   Medication Sig Dispense Refill    ondansetron (ZOFRAN) 4 MG tablet Take 1 tablet by mouth daily as needed for Nausea or Vomiting 30 tablet 0    polyethylene glycol (GLYCOLAX) 17 GM/SCOOP powder Take 17 g by mouth daily as needed (constipation) 510 g 0    lisinopril (PRINIVIL;ZESTRIL) 5 MG tablet Take 1 tablet by mouth daily 90 tablet 3    gabapentin (NEURONTIN) 100 MG capsule Take 1 capsule by mouth 4 times daily for 90 days. 360 capsule 3    atorvastatin (LIPITOR) 80 MG tablet TAKE ONE TABLET BY MOUTH DAILY 90 tablet 3    torsemide (DEMADEX) 20 MG tablet Take 1 tablet by mouth daily 30 tablet 6    albuterol sulfate  (90 Base) MCG/ACT inhaler Inhale 2 puffs into the lungs every 4 hours as needed for Wheezing 1 Inhaler 1    KLOR-CON M20 20 MEQ extended release tablet TAKE ONE TABLET BY MOUTH DAILY 30 tablet 6    aspirin 81 MG EC tablet Take 1 tablet by mouth daily 30 tablet 3     No current facility-administered medications on file prior to visit. Review of Systems   Constitutional: Positive for fatigue. HENT: Negative for congestion. Respiratory: Negative for shortness of breath. Cardiovascular: Negative for chest pain and palpitations. Gastrointestinal: Positive for constipation. OBJECTIVE:    BP (!) 150/90 (Site: Right Upper Arm, Position: Sitting, Cuff Size: Medium Adult)   Pulse 101   Temp 98.4 °F (36.9 °C)   Wt 216 lb (98 kg)   SpO2 97%   BMI 29.29 kg/m²    Physical Exam  Constitutional:       Appearance: Normal appearance. HENT:      Head: Normocephalic and atraumatic.       Right Ear: Tympanic membrane normal.      Left Ear: Tympanic membrane normal.      Nose: Nose normal. No rhinorrhea. Mouth/Throat:      Mouth: Mucous membranes are moist.      Pharynx: No posterior oropharyngeal erythema. Cardiovascular:      Rate and Rhythm: Normal rate and regular rhythm. Pulses: Normal pulses. Heart sounds: Normal heart sounds. Pulmonary:      Effort: Pulmonary effort is normal.      Breath sounds: Normal breath sounds. Abdominal:      Palpations: Abdomen is soft. Tenderness: There is no abdominal tenderness. There is no guarding or rebound. Musculoskeletal:      Right lower leg: No edema. Left lower leg: No edema. Neurological:      General: No focal deficit present. Mental Status: He is alert. Motor: Weakness ( Both lower extremities) present. Psychiatric:         Mood and Affect: Mood normal.         Behavior: Behavior normal.         ASSESSMENT/PLAN:    Mishel Alva was seen today for follow-up. Diagnoses and all orders for this visit:    Pain of right lower extremity  -     ketorolac (TORADOL) injection 60 mg    Altered mental status, unspecified altered mental status type  -     MRI BRAIN WO CONTRAST; Future    Bilateral leg weakness  -     MRI BRAIN WO CONTRAST; Future    Constipation, unspecified constipation type    I did encourage the wife to give him more MiraLAX and could even give him twice daily until he is having a bowel movement. He is scheduled for an epidural steroid injection in the near future and he is to have a lumbar puncture done by his neurologist in the near future. Return for regularly scheduled follow-up. Please note portions of this note were completed with a voicerecognition program.  Efforts were made to edit the dictations but occasionally words are mis-transcribed.

## 2022-03-31 ENCOUNTER — TELEPHONE (OUTPATIENT)
Dept: FAMILY MEDICINE CLINIC | Age: 77
End: 2022-03-31

## 2022-03-31 NOTE — TELEPHONE ENCOUNTER
Patient's wife called to check on the order for a hospital bed. The company has not received the fax yet.   Fax: [phone] (She was given an incomplete fax number and did not realize it.)    Please advise    Provider does not have a MA

## 2022-03-31 NOTE — TELEPHONE ENCOUNTER
Faxed and scanned. Wife was going to call and get the other fax number to send to since a number was missing. Fax number is different on form than what wife was giving us. Await fax number to send to both.

## 2022-04-01 ENCOUNTER — HOSPITAL ENCOUNTER (OUTPATIENT)
Dept: INTERVENTIONAL RADIOLOGY/VASCULAR | Age: 77
Discharge: HOME | End: 2022-04-01

## 2022-04-04 ENCOUNTER — TELEPHONE (OUTPATIENT)
Dept: ORTHOPEDIC SURGERY | Age: 77
End: 2022-04-04

## 2022-04-04 ENCOUNTER — HOSPITAL ENCOUNTER (OUTPATIENT)
Dept: GENERAL RADIOLOGY | Age: 77
Discharge: HOME | End: 2022-04-04
Payer: COMMERCIAL

## 2022-04-04 VITALS
DIASTOLIC BLOOD PRESSURE: 97 MMHG | HEART RATE: 97 BPM | RESPIRATION RATE: 20 BRPM | SYSTOLIC BLOOD PRESSURE: 185 MMHG | TEMPERATURE: 97.1 F | OXYGEN SATURATION: 95 %

## 2022-04-04 DIAGNOSIS — G91.2 NORMAL PRESSURE HYDROCEPHALUS (HCC): ICD-10-CM

## 2022-04-04 LAB
APPEARANCE CSF: CLEAR
APTT: 41.3 SEC (ref 26.2–38.6)
CLOT EVALUATION CSF: NORMAL
COLOR CSF: COLORLESS
GLUCOSE, CSF: 71 MG/DL (ref 40–80)
INR BLD: 1.14 (ref 0.88–1.12)
NO DIFFERENTIAL CSF: NORMAL
PATH CONSULT CSF: NO
PROTEIN CSF: 59 MG/DL (ref 15–45)
PROTHROMBIN TIME: 13 SEC (ref 9.9–12.7)
RBC CSF: 0 /CUMM
TUBE NUMBER CSF: NORMAL
VOLUME CSF: 2 ML
WBC CSF: 1 /CUMM (ref 0–5)

## 2022-04-04 PROCEDURE — 36415 COLL VENOUS BLD VENIPUNCTURE: CPT

## 2022-04-04 PROCEDURE — 7100000010 HC PHASE II RECOVERY - FIRST 15 MIN

## 2022-04-04 PROCEDURE — 7100000011 HC PHASE II RECOVERY - ADDTL 15 MIN

## 2022-04-04 PROCEDURE — 85610 PROTHROMBIN TIME: CPT

## 2022-04-04 PROCEDURE — 62328 DX LMBR SPI PNXR W/FLUOR/CT: CPT

## 2022-04-04 PROCEDURE — 82945 GLUCOSE OTHER FLUID: CPT

## 2022-04-04 PROCEDURE — 85730 THROMBOPLASTIN TIME PARTIAL: CPT

## 2022-04-04 PROCEDURE — 84157 ASSAY OF PROTEIN OTHER: CPT

## 2022-04-04 PROCEDURE — 89050 BODY FLUID CELL COUNT: CPT

## 2022-04-04 RX ORDER — HYDROCODONE BITARTRATE AND ACETAMINOPHEN 5; 325 MG/1; MG/1
1 TABLET ORAL EVERY 6 HOURS PRN
COMMUNITY
End: 2022-05-19 | Stop reason: ALTCHOICE

## 2022-04-04 ASSESSMENT — PAIN DESCRIPTION - LOCATION: LOCATION: BACK

## 2022-04-04 ASSESSMENT — PAIN - FUNCTIONAL ASSESSMENT: PAIN_FUNCTIONAL_ASSESSMENT: 0-10

## 2022-04-04 ASSESSMENT — PAIN SCALES - GENERAL: PAINLEVEL_OUTOF10: 4

## 2022-04-04 NOTE — PROGRESS NOTES
PT DISCHARGED HOME WITH FAMILY, PT IN STABLE CONDITION, TRANSPORTED TO [address] [place] [address]

## 2022-04-04 NOTE — TELEPHONE ENCOUNTER
Patient has epidural scheduled at [facility] on 4/6/22 and would like to schedule at [facility]. Please call patient wife dalia to discuss.

## 2022-04-05 ENCOUNTER — TELEPHONE (OUTPATIENT)
Dept: FAMILY MEDICINE CLINIC | Age: 77
End: 2022-04-05

## 2022-04-05 ENCOUNTER — TELEPHONE (OUTPATIENT)
Dept: INTERVENTIONAL RADIOLOGY/VASCULAR | Age: 77
End: 2022-04-05

## 2022-04-05 ENCOUNTER — CARE COORDINATION (OUTPATIENT)
Dept: CARE COORDINATION | Age: 77
End: 2022-04-05

## 2022-04-05 NOTE — TELEPHONE ENCOUNTER
S/W pt's wife, we are unable to switch to [facility], as SHAN does not go there. Pt's wife stated they will keep appt at [facility] tomorrow. Advised to schedule F/U appt for 7-10 days after with Dr. Chela Massey.   SHe v/u

## 2022-04-05 NOTE — CARE COORDINATION
Ambulatory Care Coordination Note  4/5/2022  CM Risk Score: 6  Charlson 10 Year Mortality Risk Score: 100%     ACC: Tao Bain RN    Summary Note: ACM made outreach to patient to follow up with Care Management. ACM discussed patient with his wife Trudy Alberts. She states that 4/4/2022 he had a lumbar puncture. He had at home PT today and per patient's wife it makes him very tired. He is still in a lot of pain especially if he needs to sit up for an extended period. He is scheduled for lumbar injection tomorrow. Per patients wife she is working on getting MRI and injection done in same day to limit transporting patient. She has a call placed to have appointment moved from [facility] to [facility]. ACM advised Trudy Alberts if she needed any assistance with scheduling to reach out. Trudy Alberts states that hospital bed is scheduled for delivery tomorrow. She feels that care for patient is moving in the right direction and hopes that he will get relief with the new bed, therapy and injection. Trudy Alberts will reach out to [facility] if any assistance is needed. ACM will follow up with patient/wife at a later date/time. Plan  F/U MRI, Lumbar injection  F/U Hospital Bed  F/U COPD  Assist with scheduling AWV        Care Coordination Interventions    Program Enrollment: Rising Risk  Referral from Primary Care Provider: No  Suggested Interventions and [address] [place] [address] Hwy: In Process  Occupational Therapy: In Process  Physical Therapy: In Process  Zone Management Tools: In Process         Goals Addressed    None         Prior to Admission medications    Medication Sig Start Date End Date Taking? Authorizing Provider   HYDROcodone-acetaminophen (NORCO) 5-325 MG per tablet Take 1 tablet by mouth every 6 hours as needed for Pain.     Historical Provider, MD   ondansetron (ZOFRAN) 4 MG tablet Take 1 tablet by mouth daily as needed for Nausea or Vomiting 0/92/30   Sukhdeep Monroe MD   polyethylene glycol [facility]) 17 GM/SCOOP powder Take 17 g by mouth daily as needed (constipation) 3/21/22 4/20/22  Cora Robles MD   lisinopril (PRINIVIL;ZESTRIL) 5 MG tablet Take 1 tablet by mouth daily 3/10/22   Clem Guillermo MD   gabapentin (NEURONTIN) 100 MG capsule Take 1 capsule by mouth 4 times daily for 90 days.  3/10/22 6/8/22  Clem Guillermo MD   atorvastatin (LIPITOR) 80 MG tablet TAKE ONE TABLET BY MOUTH DAILY 3/8/22   Don Helena Bretta Sicard., MD torsemide [facility]) 20 MG tablet Take 1 tablet by mouth daily 1/18/22   Tasha Rosario MD   albuterol sulfate  (90 Base) MCG/ACT inhaler Inhale 2 puffs into the lungs every 4 hours as needed for Wheezing 7/28/21   Clem Guillermo MD   KLOR-CON M20 20 MEQ extended release tablet TAKE ONE TABLET BY MOUTH DAILY 5/17/21   Tasha Rosario MD   aspirin 81 MG EC tablet Take 1 tablet by mouth daily 3/21/18   Tasha Rosario MD       Future Appointments   Date Time Provider Pawan Renner   4/6/2022  1:00 PM [facility] SPECIAL PROCEDURES RM 1 [facility] SP PROC [employer]   5/10/2022  9:40 AM Jaye Nowak MD FF NEURO MMA   5/17/2022 10:15 AM Leanne Ha MD FF VASC/ENDO MMA   7/14/2022 10:15 AM Clem Guillermo MD [facility]     ,   General Assessment    Do you have any symptoms that are causing concern?: Yes  Progression since Onset: Intermittent - Waxing/Waning  Reported Symptoms: Pain, Weakness     , Care Coordination Episodes    Type: Amb Care Coordination  Episode: Rising Risk  Noted: 3/8/2022  Comments: COPD, HTN, HLD, PVD and 6

## 2022-04-06 ENCOUNTER — HOSPITAL ENCOUNTER (INPATIENT)
Age: 77
LOS: 6 days | Discharge: NURSING, REHAB OR LONG-TERM CARE | DRG: 377 | End: 2022-04-12
Attending: EMERGENCY MEDICINE
Payer: COMMERCIAL

## 2022-04-06 ENCOUNTER — TELEPHONE (OUTPATIENT)
Dept: FAMILY MEDICINE CLINIC | Age: 77
End: 2022-04-06

## 2022-04-06 ENCOUNTER — HOSPITAL ENCOUNTER (OUTPATIENT)
Dept: INTERVENTIONAL RADIOLOGY/VASCULAR | Age: 77
Discharge: HOME | DRG: 377 | End: 2022-04-06
Payer: COMMERCIAL

## 2022-04-06 ENCOUNTER — APPOINTMENT (OUTPATIENT)
Dept: GENERAL RADIOLOGY | Age: 77
DRG: 377 | End: 2022-04-06
Payer: COMMERCIAL

## 2022-04-06 DIAGNOSIS — N17.9 AKI (ACUTE KIDNEY INJURY) (HCC): Primary | ICD-10-CM

## 2022-04-06 DIAGNOSIS — R41.82 ALTERED MENTAL STATUS, UNSPECIFIED ALTERED MENTAL STATUS TYPE: ICD-10-CM

## 2022-04-06 DIAGNOSIS — M54.31 SCIATICA OF RIGHT SIDE: ICD-10-CM

## 2022-04-06 DIAGNOSIS — M54.16 LUMBAR RADICULOPATHY: ICD-10-CM

## 2022-04-06 LAB
ALBUMIN SERPL-MCNC: 4.6 G/DL (ref 3.4–5)
ALP BLD-CCNC: 172 U/L (ref 40–129)
ALT SERPL-CCNC: 35 U/L (ref 10–40)
AMMONIA: 19 UMOL/L (ref 16–60)
ANION GAP SERPL CALCULATED.3IONS-SCNC: 17 MMOL/L (ref 3–16)
AST SERPL-CCNC: 31 U/L (ref 15–37)
BASE EXCESS VENOUS: 5.1 MMOL/L (ref -2–3)
BASOPHILS ABSOLUTE: 0.1 K/UL (ref 0–0.2)
BASOPHILS RELATIVE PERCENT: 0.8 %
BILIRUB SERPL-MCNC: 1.7 MG/DL (ref 0–1)
BILIRUBIN DIRECT: 0.5 MG/DL (ref 0–0.3)
BILIRUBIN URINE: NEGATIVE
BILIRUBIN, INDIRECT: 1.2 MG/DL (ref 0–1)
BLOOD, URINE: NEGATIVE
BUN BLDV-MCNC: 31 MG/DL (ref 7–20)
CALCIUM SERPL-MCNC: 10.4 MG/DL (ref 8.3–10.6)
CARBOXYHEMOGLOBIN: 0.9 % (ref 0–1.5)
CHLORIDE BLD-SCNC: 93 MMOL/L (ref 99–110)
CHP ED QC CHECK: YES
CLARITY: CLEAR
CO2: 27 MMOL/L (ref 21–32)
COLOR: YELLOW
CREAT SERPL-MCNC: 1.7 MG/DL (ref 0.8–1.3)
EKG ATRIAL RATE: 94 BPM
EKG DIAGNOSIS: NORMAL
EKG P AXIS: 44 DEGREES
EKG P-R INTERVAL: 178 MS
EKG Q-T INTERVAL: 356 MS
EKG QRS DURATION: 106 MS
EKG QTC CALCULATION (BAZETT): 445 MS
EKG R AXIS: 78 DEGREES
EKG T AXIS: 115 DEGREES
EKG VENTRICULAR RATE: 94 BPM
EOSINOPHILS ABSOLUTE: 0.1 K/UL (ref 0–0.6)
EOSINOPHILS RELATIVE PERCENT: 0.9 %
GFR AFRICAN AMERICAN: 48
GFR NON-AFRICAN AMERICAN: 40
GLUCOSE BLD-MCNC: 158 MG/DL (ref 70–99)
GLUCOSE URINE: NEGATIVE MG/DL
HCO3 VENOUS: 32.4 MMOL/L (ref 24–28)
HCT VFR BLD CALC: 48.3 % (ref 40.5–52.5)
HEMOCCULT STL QL: POSITIVE
HEMOGLOBIN, VEN, REDUCED: 75.2 %
HEMOGLOBIN: 15.9 G/DL (ref 13.5–17.5)
KETONES, URINE: NEGATIVE MG/DL
LACTIC ACID: 2.5 MMOL/L (ref 0.4–2)
LEUKOCYTE ESTERASE, URINE: NEGATIVE
LIPASE: 56 U/L (ref 13–60)
LYMPHOCYTES ABSOLUTE: 1.1 K/UL (ref 1–5.1)
LYMPHOCYTES RELATIVE PERCENT: 7.9 %
MCH RBC QN AUTO: 29.2 PG (ref 26–34)
MCHC RBC AUTO-ENTMCNC: 32.8 G/DL (ref 31–36)
MCV RBC AUTO: 88.9 FL (ref 80–100)
METHEMOGLOBIN VENOUS: 0.5 % (ref 0–1.5)
MICROSCOPIC EXAMINATION: ABNORMAL
MONOCYTES ABSOLUTE: 0.9 K/UL (ref 0–1.3)
MONOCYTES RELATIVE PERCENT: 6.5 %
NEUTROPHILS ABSOLUTE: 11.9 K/UL (ref 1.7–7.7)
NEUTROPHILS RELATIVE PERCENT: 83.9 %
NITRITE, URINE: NEGATIVE
O2 SAT, VEN: 24 %
PCO2, VEN: 53.3 MMHG (ref 41–51)
PDW BLD-RTO: 14 % (ref 12.4–15.4)
PH UA: 6 (ref 5–8)
PH VENOUS: 7.39 (ref 7.35–7.45)
PLATELET # BLD: 399 K/UL (ref 135–450)
PMV BLD AUTO: 10 FL (ref 5–10.5)
PO2, VEN: <30 MMHG (ref 25–40)
POTASSIUM REFLEX MAGNESIUM: 4.9 MMOL/L (ref 3.5–5.1)
PRO-BNP: 214 PG/ML (ref 0–449)
PROTEIN UA: NEGATIVE MG/DL
RBC # BLD: 5.43 M/UL (ref 4.2–5.9)
SODIUM BLD-SCNC: 137 MMOL/L (ref 136–145)
SPECIFIC GRAVITY UA: 1.02 (ref 1–1.03)
TCO2 CALC VENOUS: 34 MMOL/L
TOTAL PROTEIN: 7.9 G/DL (ref 6.4–8.2)
TROPONIN: 0.02 NG/ML
TROPONIN: <0.01 NG/ML
URINE REFLEX TO CULTURE: ABNORMAL
URINE TYPE: ABNORMAL
UROBILINOGEN, URINE: 2 E.U./DL
WBC # BLD: 14.2 K/UL (ref 4–11)

## 2022-04-06 PROCEDURE — 6360000004 HC RX CONTRAST MEDICATION: Performed by: RADIOLOGY

## 2022-04-06 PROCEDURE — 3E0R33Z INTRODUCTION OF ANTI-INFLAMMATORY INTO SPINAL CANAL, PERCUTANEOUS APPROACH: ICD-10-PCS | Performed by: RADIOLOGY

## 2022-04-06 PROCEDURE — 96361 HYDRATE IV INFUSION ADD-ON: CPT

## 2022-04-06 PROCEDURE — 82803 BLOOD GASES ANY COMBINATION: CPT

## 2022-04-06 PROCEDURE — 84443 ASSAY THYROID STIM HORMONE: CPT

## 2022-04-06 PROCEDURE — 82140 ASSAY OF AMMONIA: CPT

## 2022-04-06 PROCEDURE — B01B1ZZ FLUOROSCOPY OF SPINAL CORD USING LOW OSMOLAR CONTRAST: ICD-10-PCS | Performed by: RADIOLOGY

## 2022-04-06 PROCEDURE — 62323 NJX INTERLAMINAR LMBR/SAC: CPT

## 2022-04-06 PROCEDURE — 80076 HEPATIC FUNCTION PANEL: CPT

## 2022-04-06 PROCEDURE — 83880 ASSAY OF NATRIURETIC PEPTIDE: CPT

## 2022-04-06 PROCEDURE — 83690 ASSAY OF LIPASE: CPT

## 2022-04-06 PROCEDURE — 82607 VITAMIN B-12: CPT

## 2022-04-06 PROCEDURE — 93005 ELECTROCARDIOGRAM TRACING: CPT

## 2022-04-06 PROCEDURE — 81003 URINALYSIS AUTO W/O SCOPE: CPT

## 2022-04-06 PROCEDURE — 71046 X-RAY EXAM CHEST 2 VIEWS: CPT

## 2022-04-06 PROCEDURE — 96360 HYDRATION IV INFUSION INIT: CPT

## 2022-04-06 PROCEDURE — 36415 COLL VENOUS BLD VENIPUNCTURE: CPT

## 2022-04-06 PROCEDURE — 2580000003 HC RX 258

## 2022-04-06 PROCEDURE — 6360000002 HC RX W HCPCS

## 2022-04-06 PROCEDURE — 2580000003 HC RX 258: Performed by: INTERNAL MEDICINE

## 2022-04-06 PROCEDURE — 80048 BASIC METABOLIC PNL TOTAL CA: CPT

## 2022-04-06 PROCEDURE — 1200000000 HC SEMI PRIVATE

## 2022-04-06 PROCEDURE — 85025 COMPLETE CBC W/AUTO DIFF WBC: CPT

## 2022-04-06 PROCEDURE — 2500000003 HC RX 250 WO HCPCS

## 2022-04-06 PROCEDURE — 99283 EMERGENCY DEPT VISIT LOW MDM: CPT

## 2022-04-06 PROCEDURE — 83605 ASSAY OF LACTIC ACID: CPT

## 2022-04-06 PROCEDURE — 84484 ASSAY OF TROPONIN QUANT: CPT

## 2022-04-06 RX ORDER — ACETAMINOPHEN 325 MG/1
650 TABLET ORAL EVERY 6 HOURS PRN
Status: DISCONTINUED | OUTPATIENT
Start: 2022-04-06 | End: 2022-04-12 | Stop reason: HOSPADM

## 2022-04-06 RX ORDER — SODIUM CHLORIDE, SODIUM LACTATE, POTASSIUM CHLORIDE, AND CALCIUM CHLORIDE .6; .31; .03; .02 G/100ML; G/100ML; G/100ML; G/100ML
1000 INJECTION, SOLUTION INTRAVENOUS ONCE
Status: COMPLETED | OUTPATIENT
Start: 2022-04-06 | End: 2022-04-06

## 2022-04-06 RX ORDER — POLYETHYLENE GLYCOL 3350 17 G/17G
17 POWDER, FOR SOLUTION ORAL DAILY PRN
Status: DISCONTINUED | OUTPATIENT
Start: 2022-04-06 | End: 2022-04-12 | Stop reason: HOSPADM

## 2022-04-06 RX ORDER — PANTOPRAZOLE SODIUM 40 MG/10ML
40 INJECTION, POWDER, LYOPHILIZED, FOR SOLUTION INTRAVENOUS 2 TIMES DAILY
Status: DISCONTINUED | OUTPATIENT
Start: 2022-04-06 | End: 2022-04-08

## 2022-04-06 RX ORDER — ATORVASTATIN CALCIUM 80 MG/1
80 TABLET, FILM COATED ORAL DAILY
Status: DISCONTINUED | OUTPATIENT
Start: 2022-04-07 | End: 2022-04-12 | Stop reason: HOSPADM

## 2022-04-06 RX ORDER — ACETAMINOPHEN 650 MG/1
650 SUPPOSITORY RECTAL EVERY 6 HOURS PRN
Status: DISCONTINUED | OUTPATIENT
Start: 2022-04-06 | End: 2022-04-12 | Stop reason: HOSPADM

## 2022-04-06 RX ORDER — SENNA AND DOCUSATE SODIUM 50; 8.6 MG/1; MG/1
1 TABLET, FILM COATED ORAL 2 TIMES DAILY
Status: DISCONTINUED | OUTPATIENT
Start: 2022-04-06 | End: 2022-04-12 | Stop reason: HOSPADM

## 2022-04-06 RX ORDER — GABAPENTIN 100 MG/1
100 CAPSULE ORAL 4 TIMES DAILY
Status: DISCONTINUED | OUTPATIENT
Start: 2022-04-06 | End: 2022-04-07

## 2022-04-06 RX ORDER — SODIUM CHLORIDE 9 MG/ML
INJECTION, SOLUTION INTRAVENOUS PRN
Status: DISCONTINUED | OUTPATIENT
Start: 2022-04-06 | End: 2022-04-12 | Stop reason: HOSPADM

## 2022-04-06 RX ORDER — PROMETHAZINE HYDROCHLORIDE 25 MG/1
12.5 TABLET ORAL EVERY 6 HOURS PRN
Status: DISCONTINUED | OUTPATIENT
Start: 2022-04-06 | End: 2022-04-12 | Stop reason: HOSPADM

## 2022-04-06 RX ORDER — HYDROCODONE BITARTRATE AND ACETAMINOPHEN 5; 325 MG/1; MG/1
1 TABLET ORAL EVERY 6 HOURS PRN
Status: DISCONTINUED | OUTPATIENT
Start: 2022-04-06 | End: 2022-04-06

## 2022-04-06 RX ORDER — SODIUM CHLORIDE 0.9 % (FLUSH) 0.9 %
10 SYRINGE (ML) INJECTION EVERY 12 HOURS SCHEDULED
Status: DISCONTINUED | OUTPATIENT
Start: 2022-04-06 | End: 2022-04-12 | Stop reason: HOSPADM

## 2022-04-06 RX ORDER — ASPIRIN 81 MG/1
81 TABLET ORAL DAILY
Status: DISCONTINUED | OUTPATIENT
Start: 2022-04-07 | End: 2022-04-06

## 2022-04-06 RX ORDER — ONDANSETRON 2 MG/ML
4 INJECTION INTRAMUSCULAR; INTRAVENOUS EVERY 6 HOURS PRN
Status: DISCONTINUED | OUTPATIENT
Start: 2022-04-06 | End: 2022-04-12 | Stop reason: HOSPADM

## 2022-04-06 RX ORDER — SODIUM CHLORIDE 0.9 % (FLUSH) 0.9 %
10 SYRINGE (ML) INJECTION PRN
Status: DISCONTINUED | OUTPATIENT
Start: 2022-04-06 | End: 2022-04-12 | Stop reason: HOSPADM

## 2022-04-06 RX ADMIN — SODIUM CHLORIDE, POTASSIUM CHLORIDE, SODIUM LACTATE AND CALCIUM CHLORIDE 1000 ML: 600; 310; 30; 20 INJECTION, SOLUTION INTRAVENOUS at 15:28

## 2022-04-06 RX ADMIN — IOHEXOL 10 ML: 180 INJECTION INTRAVENOUS at 13:12

## 2022-04-06 RX ADMIN — SODIUM CHLORIDE, PRESERVATIVE FREE 10 ML: 5 INJECTION INTRAVENOUS at 22:12

## 2022-04-06 ASSESSMENT — ENCOUNTER SYMPTOMS
WHEEZING: 0
EYE PAIN: 0
VOMITING: 1
CONSTIPATION: 1
SORE THROAT: 0
SHORTNESS OF BREATH: 0
EYE DISCHARGE: 0
ABDOMINAL DISTENTION: 0
RHINORRHEA: 0
CHEST TIGHTNESS: 0
COUGH: 0
BLOOD IN STOOL: 0
NAUSEA: 1
ABDOMINAL PAIN: 0
EYE ITCHING: 0

## 2022-04-06 ASSESSMENT — PAIN SCALES - GENERAL
PAINLEVEL_OUTOF10: 0
PAINLEVEL_OUTOF10: 0

## 2022-04-06 NOTE — TELEPHONE ENCOUNTER
Patient's wife called concerned about her . He has black stools and black liquid coming out. Dry heaves, and still not eating.   She would like him admitted to [facility].  [phone]    Please advise

## 2022-04-06 NOTE — H&P
Hospital Medicine History & Physical      PCP: Jeaneth Pineda MD    Date of Admission: 4/6/2022    Date of Service: 4/6/2022    Pt seen/examined on 4/6/2022    Admitted to Inpatient with expected LOS greater than two midnights due to medical therapy. Chief Complaint:     Chief Complaint   Patient presents with    Other     not eating or drinking been going on since Feb       History Of Present Illness:      Mr. José is a 44-year-old male who presents today with chief complaint of altered mental status. His wife who was at bedside in the ED and provided most of the history. Since January the patient has had progressively worsening confusion as well as anorexia. The have been evaluated in the emergency department times and have seen neurology as an outpatient with no etiology established. He was scheduled for an MRI tomorrow however he presents today because he had a dark black bowel movement today as well as on Friday. Of note he does carry a diagnosis of NPH. An LP was recently done with removal of 7 cc of fluid. Admitted for further management.     Past Medical History:      Reviewed and non-contributory except as noted below      Diagnosis Date    Allergic rhinitis     Aortic valve disorders     COPD (chronic obstructive pulmonary disease) (HCC)     Hydrocephalus (HCC)     normal pressure,    Hyperlipidemia     Hypertension     Hypertrophy of prostate without urinary obstruction and other lower urinary tract symptoms (LUTS)     Irritable bowel syndrome     Pancreatitis     Peptic ulcer, unspecified site, unspecified as acute or chronic, without mention of hemorrhage, perforation, or obstruction     Peripheral vascular disease ([address]        Past Surgical History:      Reviewed and non-contributory except as noted below      Procedure Laterality Date    ABDOMINAL AORTIC ANEURYSM REPAIR N/A 2001    ANGIOPLASTY      RT femoral artery    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/2018    [place]    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REPAIR      Aortic valve replacement    AORTIC VALVE REPLACEMENT  2018    CHOLECYSTECTOMY      COLONOSCOPY N/A 5/4/2021    COLONOSCOPY POLYPECTOMY SNARE/COLD BIOPSY performed by Umm Rehman MD at [address]      bifemoral bypass    FOOT SURGERY Right 12/2/15    HERNIA REPAIR Left     OTHER SURGICAL HISTORY  10/23/2017    lumbar puncture    SD ESOPHAGOGASTRODUODENOSCOPY TRANSORAL DIAGNOSTIC N/A 12/4/2018    EGD performed by Umm Rehman MD at [address] [facility] ENDOSCOPY  09/19/2018    WITH BIOPSY       Medications Prior to Admission:      Reviewed and non-contributory except as noted below  Prior to Admission medications    Medication Sig Start Date End Date Taking? Authorizing Provider   HYDROcodone-acetaminophen (NORCO) 5-325 MG per tablet Take 1 tablet by mouth every 6 hours as needed for Pain. Historical Provider, MD   ondansetron (ZOFRAN) 4 MG tablet Take 1 tablet by mouth daily as needed for Nausea or Vomiting 5/57/63   Ella Donovan MD   polyethylene glycol [facility]) 17 GM/SCOOP powder Take 17 g by mouth daily as needed (constipation) 3/21/22 4/20/22  Luan Peterson MD   lisinopril (PRINIVIL;ZESTRIL) 5 MG tablet Take 1 tablet by mouth daily 3/10/22   Durga Elaine MD   gabapentin (NEURONTIN) 100 MG capsule Take 1 capsule by mouth 4 times daily for 90 days.  3/10/22 6/8/22  Durga Elaine MD   atorvastatin (LIPITOR) 80 MG tablet TAKE ONE TABLET BY MOUTH DAILY 3/8/22   Cezar Martinez SkMD da   torsemide [facility]) 20 MG tablet Take 1 tablet by mouth daily 1/18/22   Lin Savage MD   albuterol sulfate  (90 Base) MCG/ACT inhaler Inhale 2 puffs into the lungs every 4 hours as needed for Wheezing 7/28/21   Durga Elaine MD   KLOR-CON M20 20 MEQ extended release tablet TAKE ONE TABLET BY MOUTH DAILY 5/17/21   Priyanka Morales Danyel Marshall MD   aspirin 81 MG EC tablet Take 1 tablet by mouth daily 3/21/18   Thony Hyman MD       Allergies:     Reviewed and non-contributory except as noted below   Patient has no known allergies. Social History:      Reviewed and non-contributory except as noted below    TOBACCO:   reports that he quit smoking about 20 years ago. His smoking use included cigarettes. He has a 45.00 pack-year smoking history. He has never used smokeless tobacco.  ETOH:   reports current alcohol use of about 2.0 standard drinks of alcohol per week. Family History:      Reviewed and non-contributory except as noted below        Problem Relation Age of Onset    Heart Disease Father     Diabetes Father     Alcohol Abuse Father     Other Mother     Cancer Brother         liver       REVIEW OF SYSTEMS:   Pertinent positives and negatives as noted in the HPI. All other systems reviewed and negative.     PHYSICAL EXAM PERFORMED:    BP (!) 152/79   Pulse 77   Temp 96.9 °F (36.1 °C) (Oral)   Resp 18   Ht 6' (1.829 m)   Wt 215 lb (97.5 kg)   SpO2 94%   BMI 29.16 kg/m²     General appearance:  No acute distress, appears stated age  Eyes: Pupils equal, round, reactive to light, conjunctiva/corneas clear  Ears/Nose/Mouth/Throat: No external lesions or scars, hearing intact to voice  Neck: Trachea midline, no masses noted, no thyromegaly  Respiratory:  Non-labored breathing, clear to auscultation bilaterally  Cardiovascular: Regular rate and rhythm, no murmurs, gallops, or rubs  Abdomen: soft, non-tender, non-distended  Musculoskeletal: Warm, well perfused, no cyanosis or edema  Skin: normal color, no wounds noted  Psychiatric: Awake alert and oriented, limited insight    Labs:     Recent Labs     04/06/22  1511   WBC 14.2*   HGB 15.9   HCT 48.3        Recent Labs     04/06/22  1511      K 4.9   CL 93*   CO2 27   BUN 31*   CREATININE 1.7*   CALCIUM 10.4     Recent Labs     04/06/22  1511   AST 31   ALT 35 BILIDIR 0.5*   BILITOT 1.7*   ALKPHOS 172*     Recent Labs     04/04/22  1025   INR 1.14*     Recent Labs     04/06/22  1511 04/06/22  1742   TROPONINI 0.02* <0.01       Urinalysis:      Lab Results   Component Value Date    NITRU Negative 04/06/2022    WBCUA 1 03/20/2022    RBCUA NEGATIVE 03/23/2022    BLOODU Negative 04/06/2022    SPECGRAV 1.025 04/06/2022    GLUCOSEU Negative 04/06/2022       Radiology:     XR CHEST (2 VW)   Final Result      Mild linear atelectasis/scarring left lung base. Lungs are otherwise clear. Cardiomediastinal silhouette. Cardiac valve replacement noted. Surgical clips overlie the left upper thorax. ASSESSMENT:    Active Hospital Problems    Diagnosis Date Noted    RADHA (acute kidney injury) ([facility] [address] [N17.9] 04/06/2022       PLAN:    #RADHA  Trend lactate  Hold lisinopril and torsemide  Potassium supplementation at home, hold for now  Gentle IV fluids    #GI bleed  FOBT positive  IV PPI  Trend H&H  GI consulted  N.p.o.  Hold AP/AC, SCDs    #Subacute on chronic encephalopathy  #Normal pressure hydrocephalus  Unsuccessful LP as outpatient  Check TSH, ammonia, B12  Troponin negative  Chest x-ray negative  Neurology consulted  Leukocytosis to 14.2, nothing on chest x-ray, UA pending  Watch off antibiotics for now    #Hypertension  #COPD  #Hyperlipidemia  #Peripheral vascular disease  -home management except as above    DVT Prophylaxis: SCDs  Diet: Diet NPO  Code Status: Full Code    PT/OT Eval Status: Ongoing    Dispo: Nanette Bae pending clinical improvement    Lul Pedraza MD    Thank you Luciano Card MD for the opportunity to be involved in this patient's care. If you have any questions or concerns please feel free to contact me at [phone].

## 2022-04-06 NOTE — ED PROVIDER NOTES
ED Attending Attestation Note     Date of evaluation: 4/6/2022    This patient was seen by the advance practice provider. I have seen and examined the patient, agree with the workup, evaluation, management and diagnosis. The care plan has been discussed. I have reviewed the ECG and concur with the EMILY's interpretation. My assessment reveals patient here with progressive mental status changes who is already in the midst of a work-up for NPH (s/p LP yesterday with subtherapeutic volume taken, and due to get an MRI tomorrow) who now has hematemesis and dark stool. Patient is getting worse more quickly and now cannot even get himself out of bed. Labs demonstrate RADHA and stable Hb but guaiac positive and will need to admit for further workup and management of suspected UGIB and decline in MS.      Danay Varela MD  04/06/22 [id number]

## 2022-04-06 NOTE — ED PROVIDER NOTES
[address] [facility]          PHYSICIAN ASSISTANT NOTE       Date of evaluation: 4/6/2022    Chief Complaint     Other (not eating or drinking been going on since Feb)      History of Present Illness     Roxann Moss is a 78 y.o. male with a history of HTN, HLD, COPD, normal pressure hydrocephalus, who presents with concerns for altered mental status as well as hematemesis and black, tarry stools. Most of the history is obtained by the patient's wife, who is at bedside. She states that ever since January the patient has had somewhat sudden onset of confusion, generalized weakness, anorexia. They have been seen several times in the emergency department for this with a largely unremarkable work-up. They are also following with neurology and her did have an MRI tomorrow. However, they are presenting today given that the patient did have a black bowel movement on Friday, and then again today. He does have constipation at baseline, therefore is not out of the norm for the patient to go several days in between bowel movements. Additionally, he did have an episode of hematemesis this morning. She also states that he has been progressively more confused over the past 2 weeks. Additionally, he has been increasingly weak and has not been eating and drinking. They estimate that he only has approximately 400 ashli of intake per day and he has lost approximately 30 pounds over since all this started. The patient does not complain of any abdominal pain, chest pain, nausea at this moment. However, he did have some nausea prior to arrival prior to his episode of hematemesis. He is not on blood thinners, iron or Pepto-Bismol. He does have a history of mesenteric artery stenosis for which he has been followed by vascular surgery. They also note that he has been exhibiting some chills recently but no fever.   His wife states that they are capable of caring for him at home with the help of her son, therefore their main concern is not placement at this time, her main concern is in regards to the black stools and hematemesis today. Of note, he did recently have an LP done, however reportedly only 7 cc was removed. Review of Systems     Review of Systems   Constitutional: Positive for appetite change, chills and fatigue. Negative for fever. HENT: Negative for congestion, ear pain, rhinorrhea and sore throat. Eyes: Negative for pain, discharge and itching. Respiratory: Negative for cough, chest tightness, shortness of breath and wheezing. Cardiovascular: Negative for chest pain, palpitations and leg swelling. Gastrointestinal: Positive for constipation, nausea and vomiting. Negative for abdominal distention, abdominal pain and blood in stool. Melena, hematemesis   Genitourinary: Negative for dysuria, flank pain, hematuria, penile pain and urgency. Neurological: Positive for weakness (Generalized). Psychiatric/Behavioral: Positive for confusion. Past Medical, Surgical, Family, and Social History     He has a past medical history of Allergic rhinitis, Aortic valve disorders, COPD (chronic obstructive pulmonary disease) ([address] Hydrocephalus ([address] Hyperlipidemia, Hypertension, Hypertrophy of prostate without urinary obstruction and other lower urinary tract symptoms (LUTS), Irritable bowel syndrome, Pancreatitis, Peptic ulcer, unspecified site, unspecified as acute or chronic, without mention of hemorrhage, perforation, or obstruction, and Peripheral vascular disease ([address] 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      Nose: Nose normal. No congestion or rhinorrhea. Mouth/Throat:      Mouth: Mucous membranes are dry. Pharynx: No oropharyngeal exudate or posterior oropharyngeal erythema. Eyes:      Extraocular Movements: Extraocular movements intact. Pupils: Pupils are equal, round, and reactive to light. Cardiovascular:      Rate and Rhythm: Normal rate and regular rhythm. Pulses: Normal pulses. Heart sounds: Normal heart sounds. No murmur heard. No gallop. Pulmonary:      Effort: Pulmonary effort is normal. No respiratory distress. Breath sounds: Normal breath sounds. No wheezing, rhonchi or rales. Abdominal:      General: Abdomen is flat. Bowel sounds are normal. There is no distension. Palpations: Abdomen is soft. Tenderness: There is abdominal tenderness (Right upper and lower quadrant). There is no guarding or rebound. Musculoskeletal:         General: Normal range of motion. Cervical back: Normal range of motion and neck supple. No tenderness. Right lower leg: No edema. Left lower leg: No edema. Skin:     General: Skin is warm and dry. Capillary Refill: Capillary refill takes less than 2 seconds. Neurological:      Mental Status: He is alert. Comments: Alert, oriented to place, self, situation. Able to tell me that Mike Mayer is the president. Unable to tell me the date. Cranial nerves II through XII are gross intact bilaterally. Pupils are equal and reactive to light, extraocular movements are intact. Speech is fluent without dysarthria, answers questions appropriately, cooperative. Finger-to-nose testing is normal.  Bilateral upper and lower extremity strength is 4/5. Psychiatric:         Mood and Affect: Mood normal.         Behavior: Behavior normal.         Diagnostic Results     EKG   Interpreted in conjunction with emergency department physician No att. providers found  Rhythm: normal sinus   Rate: normal  Axis: normal  Ectopy: none  Conduction: normal  ST Segments: no acute change  T Waves: no acute change  Q Waves:none  Clinical Impression: no acute changes  Comparison: Normal sinus rhythm with nonspecific T wave changes, stable as compared to previous    RADIOLOGY:  XR CHEST (2 VW)   Final Result      Mild linear atelectasis/scarring left lung base. Lungs are otherwise clear. Cardiomediastinal silhouette. Cardiac valve replacement noted. Surgical clips overlie the left upper thorax.           LABS:   Results for orders placed or performed during the hospital encounter of 04/06/22   CBC with Auto Differential   Result Value Ref Range    WBC 14.2 (H) 4.0 - 11.0 K/uL    RBC 5.43 4.20 - 5.90 M/uL    Hemoglobin 15.9 13.5 - 17.5 g/dL    Hematocrit 48.3 40.5 - 52.5 %    MCV 88.9 80.0 - 100.0 fL    MCH 29.2 26.0 - 34.0 pg    MCHC 32.8 31.0 - 36.0 g/dL    RDW 14.0 12.4 - 15.4 %    Platelets 497 003 - 318 K/uL    MPV 10.0 5.0 - 10.5 fL    Neutrophils % 83.9 %    Lymphocytes % 7.9 %    Monocytes % 6.5 %    Eosinophils % 0.9 %    Basophils % 0.8 %    Neutrophils Absolute 11.9 (H) 1.7 - 7.7 K/uL    Lymphocytes Absolute 1.1 1.0 - 5.1 K/uL    Monocytes Absolute 0.9 0.0 - 1.3 K/uL    Eosinophils Absolute 0.1 0.0 - 0.6 K/uL    Basophils Absolute 0.1 0.0 - 0.2 K/uL   Hepatic Function Panel   Result Value Ref Range    Total Protein 7.9 6.4 - 8.2 g/dL    Albumin 4.6 3.4 - 5.0 g/dL    Alkaline Phosphatase 172 (H) 40 - 129 U/L    ALT 35 10 - 40 U/L    AST 31 15 - 37 U/L    Total Bilirubin 1.7 (H) 0.0 - 1.0 mg/dL    Bilirubin, Direct 0.5 (H) 0.0 - 0.3 mg/dL    Bilirubin, Indirect 1.2 (H) 0.0 - 1.0 mg/dL   Lipase   Result Value Ref Range    Lipase 56.0 13.0 - 60.0 U/L   Lactic Acid   Result Value Ref Range    Lactic Acid 2.5 (H) 0.4 - 2.0 mmol/L   Urinalysis with Reflex to Culture    Specimen: Urine   Result Value Ref Range    Color, UA Yellow Straw/Yellow    Clarity, UA Clear Clear    Glucose, Ur Negative Negative mg/dL    Bilirubin Urine Negative Negative    Ketones, Urine Negative Negative mg/dL    Specific Gravity, UA 1.025 1.005 - 1.030    Blood, Urine Negative Negative    pH, UA 6.0 5.0 - 8.0    Protein, UA Negative Negative mg/dL    Urobilinogen, Urine 2.0 (A) <2.0 E.U./dL    Nitrite, Urine Negative Negative    Leukocyte Esterase, Urine Negative Negative    Microscopic Examination Not Indicated     Urine Type Voided     Urine Reflex to Culture Not Indicated    Blood Gas, Venous   Result Value Ref Range    pH, Myron 7.393 7.350 - 7.450    pCO2, Myron 53.3 (H) 41.0 - 51.0 mmHg    pO2, Myron <30.0 25.0 - 40.0 mmHg    HCO3, Venous 32.4 (H) 24.0 - 28.0 mmol/L    Base Excess, Myron 5.1 (H) -2.0 - 3.0 mmol/L    O2 Sat, Myron 24 Not established %    Carboxyhemoglobin 0.9 0.0 - 1.5 %    MetHgb, Myron 0.5 0.0 - 1.5 %    TC02 (Calc), Myron 34 mmol/L    Hemoglobin, Myron, Reduced 75.20 %   Troponin   Result Value Ref Range    Troponin 0.02 (H) <0.01 ng/mL   Brain Natriuretic Peptide   Result Value Ref Range    Pro- 0 - 449 pg/mL   Basic Metabolic Panel w/ Reflex to MG   Result Value Ref Range    Sodium 137 136 - 145 mmol/L    Potassium reflex Magnesium 4.9 3.5 - 5.1 mmol/L    Chloride 93 (L) 99 - 110 mmol/L    CO2 27 21 - 32 mmol/L    Anion Gap 17 (H) 3 - 16    Glucose 158 (H) 70 - 99 mg/dL    BUN 31 (H) 7 - 20 mg/dL    CREATININE 1.7 (H) 0.8 - 1.3 mg/dL    GFR Non- 40 (A) >60    GFR  48 (A) >60    Calcium 10.4 8.3 - 10.6 mg/dL   Troponin   Result Value Ref Range    Troponin <0.01 <0.01 ng/mL   POCT BLOOD OCCULT   Result Value Ref Range    OCCULT BLOOD FECAL positive     QC OK?  yes    EKG 12 Lead   Result Value Ref Range    Ventricular Rate 94 BPM    Atrial Rate 94 BPM    P-R Interval 178 ms    QRS Duration 106 ms    Q-T Interval 356 ms    QTc Calculation (Bazett) 445 ms    P Axis 44 degrees    R Axis 78 degrees    T Axis 115 degrees    Diagnosis       EKG performed in ER and to be interpreted by ER physician. Confirmed by MD, ER ([id number]),  Silvia Guadalupe ([phone]) on 4/6/2022 6:55:53 PM       ED BEDSIDE ULTRASOUND:      RECENT VITALS:  BP: (!) 162/80, Temp: 97.8 °F (36.6 °C), Pulse: 74, Resp: 20, SpO2: 96 %     Procedures         ED Course     Nursing Notes, Past Medical Hx,Past Surgical Hx, Social Hx, Allergies, and Family Hx were reviewed. The patient was given the following medications:  Orders Placed This Encounter   Medications    lactated ringers bolus    aspirin EC tablet 81 mg    atorvastatin (LIPITOR) tablet 80 mg    gabapentin (NEURONTIN) capsule 100 mg    HYDROcodone-acetaminophen (NORCO) 5-325 MG per tablet 1 tablet    pantoprazole (PROTONIX) injection 40 mg    sodium chloride flush 0.9 % injection 10 mL    sodium chloride flush 0.9 % injection 10 mL    0.9 % sodium chloride infusion    OR Linked Order Group     promethazine (PHENERGAN) tablet 12.5 mg     ondansetron (ZOFRAN) injection 4 mg    sennosides-docusate sodium (SENOKOT-S) 8.6-50 MG tablet 1 tablet    polyethylene glycol (GLYCOLAX) packet 17 g    OR Linked Order Group     acetaminophen (TYLENOL) tablet 650 mg     acetaminophen (TYLENOL) suppository 650 mg       CONSULTS:  IP CONSULT TO HOSPITALIST  IP CONSULT TO NEUROLOGY  IP CONSULT TO GI  IP CONSULT TO CASE MANAGEMENT    MEDICAL DECISION MAKING / ASSESSMENT / Aleksandar Gabby is a 78 y.o. male that presents with progressive altered mental status/confusion ever since January of this year, approximately 3 months ago. More acutely, he presented today for concerns for dark stool and hematemesis. He is undergone significant work-up in regards to his altered mental status. He has been following with neurology and has been diagnosed with hydrocephalus for which she recently underwent lumbar puncture, however only 6cc was drawn off.   Over the past 2 weeks, he has had increasing change in his mental status as well as increasing generalized weakness. On physical examination, the patient is resting comfortably no acute distress. His vital signs are unremarkable. Neuro exam is nonfocal and the patient does respond to commands. He is alert and oriented with the exception of time. His abdomen is soft, however it is tender diffusely in the right side without any rebound or guarding. He does have generalized mild weakness noted. To further evaluate the patient's symptoms I obtain CBC with differential, BMP, troponin, BNP, chest x-ray, EKG, back function panel, urinalysis. Labs show leukocytosis to 14.2. Additionally, lactate was elevated to 2.5. However, his urinalysis shows no evidence of infection and his chest x-ray shows no evidence of pneumonia. Therefore, it is unclear if he does have an infection contributing to his symptoms at this time. In regards to the patient's abdominal pain and abdominal tenderness on physical examination, I do not feel that CT of his abdomen pelvis is warranted today given that he did just have a CT of his abdomen and pelvis on 3/26. However, I do feel that a CTA of his abdomen and pelvis would be helpful given that he is having an elevated lactate, however with his acute kidney injury, I believe it would be in the best interest of the patient to hydrate him, get his kidney function improved, and then obtain CTAs of his abdomen and pelvis to further evaluate his history of mesenteric artery stenosis and possible mesenteric ischemia given the elevated lactate. The remainder of his labs were significant for a RADHA. His creatinine is 1.7, almost double his baseline. Electrolytes are within normal limits. His troponin is 0.02 and upon repeat is less than 0.01, his BNP is very mildly elevated at 214. EKG is without acute ischemia or infarct and is stable from previous. Therefore, with these findings, I have low suspicion for ACS to be contributing to his symptoms.      Given the patient's work-up, as stated above, it is possible that the patient is suffering from possible mesenteric ischemia that may be contributing to his symptoms, however he has an RADHA that would preclude CTA at this time, given that he is stable. The plan will be to rehydrate the patient and possibly obtain a CTPA while he is inpatient. Additionally, I do think the patient would benefit from a neurology consult and probably repeat lumbar puncture given that his previous lumbar puncture did not reveal any significant effects. Additionally, the patient was to have an MRI of his head outpatient tomorrow, which she would benefit from having while he is inpatient. The patient and his family were in agreement to the plan. The patient will remain in the emergency department until bed can be obtained upstairs. This patient was also evaluated by the attending physician. All care plans were discussed and agreed upon. Clinical Impression     1. RADHA (acute kidney injury) ([facility] [address]    2. Altered mental status, unspecified altered mental status type        Disposition     PATIENT REFERRED TO:  No follow-up provider specified.     DISCHARGE MEDICATIONS:  New Prescriptions    No medications on file       DISPOSITION Admitted 04/06/2022 07:15:15 PM       YUE Arias  04/06/22 2001

## 2022-04-07 ENCOUNTER — APPOINTMENT (OUTPATIENT)
Dept: MRI IMAGING | Age: 77
DRG: 377 | End: 2022-04-07
Payer: COMMERCIAL

## 2022-04-07 ENCOUNTER — ANESTHESIA EVENT (OUTPATIENT)
Dept: ENDOSCOPY | Age: 77
DRG: 377 | End: 2022-04-07
Payer: COMMERCIAL

## 2022-04-07 ENCOUNTER — ANESTHESIA (OUTPATIENT)
Dept: ENDOSCOPY | Age: 77
DRG: 377 | End: 2022-04-07
Payer: COMMERCIAL

## 2022-04-07 VITALS — SYSTOLIC BLOOD PRESSURE: 140 MMHG | DIASTOLIC BLOOD PRESSURE: 76 MMHG | OXYGEN SATURATION: 98 %

## 2022-04-07 PROBLEM — E43 SEVERE MALNUTRITION (HCC): Chronic | Status: ACTIVE | Noted: 2022-04-07

## 2022-04-07 PROBLEM — M48.00 CENTRAL STENOSIS OF SPINAL CANAL: Status: ACTIVE | Noted: 2022-04-07

## 2022-04-07 PROBLEM — R29.898 BILATERAL LEG WEAKNESS: Status: ACTIVE | Noted: 2022-04-07

## 2022-04-07 PROBLEM — G60.8 POLYNEUROPATHY, PERIPHERAL SENSORIMOTOR AXONAL: Status: ACTIVE | Noted: 2022-04-07

## 2022-04-07 LAB
ANION GAP SERPL CALCULATED.3IONS-SCNC: 9 MMOL/L (ref 3–16)
BASOPHILS ABSOLUTE: 0 K/UL (ref 0–0.2)
BASOPHILS RELATIVE PERCENT: 0.2 %
BUN BLDV-MCNC: 33 MG/DL (ref 7–20)
CALCIUM SERPL-MCNC: 10.1 MG/DL (ref 8.3–10.6)
CHLORIDE BLD-SCNC: 97 MMOL/L (ref 99–110)
CO2: 30 MMOL/L (ref 21–32)
CREAT SERPL-MCNC: 1.3 MG/DL (ref 0.8–1.3)
EOSINOPHILS ABSOLUTE: 0 K/UL (ref 0–0.6)
EOSINOPHILS RELATIVE PERCENT: 0 %
GFR AFRICAN AMERICAN: >60
GFR NON-AFRICAN AMERICAN: 54
GLUCOSE BLD-MCNC: 135 MG/DL (ref 70–99)
HCT VFR BLD CALC: 37.4 % (ref 40.5–52.5)
HCT VFR BLD CALC: 41 % (ref 40.5–52.5)
HEMOGLOBIN: 13 G/DL (ref 13.5–17.5)
HEMOGLOBIN: 13.7 G/DL (ref 13.5–17.5)
LACTIC ACID: 1.1 MMOL/L (ref 0.4–2)
LACTIC ACID: 1.6 MMOL/L (ref 0.4–2)
LACTIC ACID: 2 MMOL/L (ref 0.4–2)
LACTIC ACID: 2.7 MMOL/L (ref 0.4–2)
LYMPHOCYTES ABSOLUTE: 0.9 K/UL (ref 1–5.1)
LYMPHOCYTES RELATIVE PERCENT: 7.3 %
MCH RBC QN AUTO: 29.4 PG (ref 26–34)
MCHC RBC AUTO-ENTMCNC: 33.4 G/DL (ref 31–36)
MCV RBC AUTO: 88.1 FL (ref 80–100)
MONOCYTES ABSOLUTE: 0.5 K/UL (ref 0–1.3)
MONOCYTES RELATIVE PERCENT: 4.1 %
NEUTROPHILS ABSOLUTE: 11.2 K/UL (ref 1.7–7.7)
NEUTROPHILS RELATIVE PERCENT: 88.4 %
PDW BLD-RTO: 14.2 % (ref 12.4–15.4)
PLATELET # BLD: 334 K/UL (ref 135–450)
PMV BLD AUTO: 9.7 FL (ref 5–10.5)
POTASSIUM REFLEX MAGNESIUM: 4.9 MMOL/L (ref 3.5–5.1)
RBC # BLD: 4.66 M/UL (ref 4.2–5.9)
SODIUM BLD-SCNC: 136 MMOL/L (ref 136–145)
TSH REFLEX: 0.63 UIU/ML (ref 0.27–4.2)
VITAMIN B-12: 983 PG/ML (ref 211–911)
WBC # BLD: 12.6 K/UL (ref 4–11)

## 2022-04-07 PROCEDURE — 0DB68ZX EXCISION OF STOMACH, VIA NATURAL OR ARTIFICIAL OPENING ENDOSCOPIC, DIAGNOSTIC: ICD-10-PCS | Performed by: INTERNAL MEDICINE

## 2022-04-07 PROCEDURE — 3700000001 HC ADD 15 MINUTES (ANESTHESIA): Performed by: INTERNAL MEDICINE

## 2022-04-07 PROCEDURE — 99223 1ST HOSP IP/OBS HIGH 75: CPT | Performed by: INTERNAL MEDICINE

## 2022-04-07 PROCEDURE — 6360000002 HC RX W HCPCS: Performed by: INTERNAL MEDICINE

## 2022-04-07 PROCEDURE — 7100000010 HC PHASE II RECOVERY - FIRST 15 MIN: Performed by: INTERNAL MEDICINE

## 2022-04-07 PROCEDURE — 2500000003 HC RX 250 WO HCPCS: Performed by: NURSE ANESTHETIST, CERTIFIED REGISTERED

## 2022-04-07 PROCEDURE — APPNB60 APP NON BILLABLE TIME 46-60 MINS

## 2022-04-07 PROCEDURE — 36415 COLL VENOUS BLD VENIPUNCTURE: CPT

## 2022-04-07 PROCEDURE — 70551 MRI BRAIN STEM W/O DYE: CPT

## 2022-04-07 PROCEDURE — 2580000003 HC RX 258: Performed by: INTERNAL MEDICINE

## 2022-04-07 PROCEDURE — 2580000003 HC RX 258: Performed by: NURSE ANESTHETIST, CERTIFIED REGISTERED

## 2022-04-07 PROCEDURE — 1200000000 HC SEMI PRIVATE

## 2022-04-07 PROCEDURE — 3700000000 HC ANESTHESIA ATTENDED CARE: Performed by: INTERNAL MEDICINE

## 2022-04-07 PROCEDURE — 94150 VITAL CAPACITY TEST: CPT

## 2022-04-07 PROCEDURE — 80048 BASIC METABOLIC PNL TOTAL CA: CPT

## 2022-04-07 PROCEDURE — 83605 ASSAY OF LACTIC ACID: CPT

## 2022-04-07 PROCEDURE — C9113 INJ PANTOPRAZOLE SODIUM, VIA: HCPCS | Performed by: INTERNAL MEDICINE

## 2022-04-07 PROCEDURE — 85018 HEMOGLOBIN: CPT

## 2022-04-07 PROCEDURE — 3609012400 HC EGD TRANSORAL BIOPSY SINGLE/MULTIPLE: Performed by: INTERNAL MEDICINE

## 2022-04-07 PROCEDURE — 6360000002 HC RX W HCPCS: Performed by: NURSE ANESTHETIST, CERTIFIED REGISTERED

## 2022-04-07 PROCEDURE — 85025 COMPLETE CBC W/AUTO DIFF WBC: CPT

## 2022-04-07 PROCEDURE — 99223 1ST HOSP IP/OBS HIGH 75: CPT | Performed by: PSYCHIATRY & NEUROLOGY

## 2022-04-07 PROCEDURE — 0DB58ZX EXCISION OF ESOPHAGUS, VIA NATURAL OR ARTIFICIAL OPENING ENDOSCOPIC, DIAGNOSTIC: ICD-10-PCS | Performed by: INTERNAL MEDICINE

## 2022-04-07 PROCEDURE — 6370000000 HC RX 637 (ALT 250 FOR IP): Performed by: INTERNAL MEDICINE

## 2022-04-07 PROCEDURE — 85014 HEMATOCRIT: CPT

## 2022-04-07 PROCEDURE — 88305 TISSUE EXAM BY PATHOLOGIST: CPT

## 2022-04-07 PROCEDURE — 2709999900 HC NON-CHARGEABLE SUPPLY: Performed by: INTERNAL MEDICINE

## 2022-04-07 PROCEDURE — 7100000011 HC PHASE II RECOVERY - ADDTL 15 MIN: Performed by: INTERNAL MEDICINE

## 2022-04-07 RX ORDER — TORSEMIDE 20 MG/1
20 TABLET ORAL DAILY
Status: DISCONTINUED | OUTPATIENT
Start: 2022-04-07 | End: 2022-04-07

## 2022-04-07 RX ORDER — GABAPENTIN 100 MG/1
100 CAPSULE ORAL NIGHTLY
Status: DISCONTINUED | OUTPATIENT
Start: 2022-04-08 | End: 2022-04-12 | Stop reason: HOSPADM

## 2022-04-07 RX ORDER — SODIUM CHLORIDE 9 MG/ML
INJECTION, SOLUTION INTRAVENOUS CONTINUOUS
Status: DISCONTINUED | OUTPATIENT
Start: 2022-04-07 | End: 2022-04-07

## 2022-04-07 RX ORDER — TORSEMIDE 20 MG/1
20 TABLET ORAL DAILY
Status: DISCONTINUED | OUTPATIENT
Start: 2022-04-08 | End: 2022-04-12 | Stop reason: HOSPADM

## 2022-04-07 RX ORDER — LISINOPRIL 5 MG/1
5 TABLET ORAL DAILY
Status: DISCONTINUED | OUTPATIENT
Start: 2022-04-07 | End: 2022-04-07

## 2022-04-07 RX ORDER — SODIUM CHLORIDE, SODIUM LACTATE, POTASSIUM CHLORIDE, CALCIUM CHLORIDE 600; 310; 30; 20 MG/100ML; MG/100ML; MG/100ML; MG/100ML
INJECTION, SOLUTION INTRAVENOUS CONTINUOUS PRN
Status: DISCONTINUED | OUTPATIENT
Start: 2022-04-07 | End: 2022-04-07 | Stop reason: SDUPTHER

## 2022-04-07 RX ORDER — PROPOFOL 10 MG/ML
INJECTION, EMULSION INTRAVENOUS PRN
Status: DISCONTINUED | OUTPATIENT
Start: 2022-04-07 | End: 2022-04-07 | Stop reason: SDUPTHER

## 2022-04-07 RX ORDER — PROPOFOL 10 MG/ML
INJECTION, EMULSION INTRAVENOUS CONTINUOUS PRN
Status: DISCONTINUED | OUTPATIENT
Start: 2022-04-07 | End: 2022-04-07 | Stop reason: SDUPTHER

## 2022-04-07 RX ORDER — IPRATROPIUM BROMIDE AND ALBUTEROL SULFATE 2.5; .5 MG/3ML; MG/3ML
1 SOLUTION RESPIRATORY (INHALATION) EVERY 4 HOURS PRN
Status: DISCONTINUED | OUTPATIENT
Start: 2022-04-07 | End: 2022-04-12 | Stop reason: HOSPADM

## 2022-04-07 RX ORDER — LIDOCAINE HYDROCHLORIDE 20 MG/ML
INJECTION, SOLUTION EPIDURAL; INFILTRATION; INTRACAUDAL; PERINEURAL PRN
Status: DISCONTINUED | OUTPATIENT
Start: 2022-04-07 | End: 2022-04-07 | Stop reason: SDUPTHER

## 2022-04-07 RX ORDER — LANOLIN ALCOHOL/MO/W.PET/CERES
6 CREAM (GRAM) TOPICAL NIGHTLY
Status: DISCONTINUED | OUTPATIENT
Start: 2022-04-07 | End: 2022-04-12 | Stop reason: HOSPADM

## 2022-04-07 RX ADMIN — PANTOPRAZOLE SODIUM 40 MG: 40 INJECTION, POWDER, LYOPHILIZED, FOR SOLUTION INTRAVENOUS at 11:30

## 2022-04-07 RX ADMIN — SODIUM CHLORIDE: 9 INJECTION, SOLUTION INTRAVENOUS at 12:32

## 2022-04-07 RX ADMIN — ATORVASTATIN CALCIUM 80 MG: 80 TABLET, FILM COATED ORAL at 12:29

## 2022-04-07 RX ADMIN — SODIUM CHLORIDE: 9 INJECTION, SOLUTION INTRAVENOUS at 03:12

## 2022-04-07 RX ADMIN — SODIUM CHLORIDE, SODIUM LACTATE, POTASSIUM CHLORIDE, AND CALCIUM CHLORIDE: .6; .31; .03; .02 INJECTION, SOLUTION INTRAVENOUS at 09:08

## 2022-04-07 RX ADMIN — SODIUM CHLORIDE, PRESERVATIVE FREE 10 ML: 5 INJECTION INTRAVENOUS at 20:48

## 2022-04-07 RX ADMIN — PROPOFOL 125 MCG/KG/MIN: 10 INJECTION, EMULSION INTRAVENOUS at 09:18

## 2022-04-07 RX ADMIN — SODIUM CHLORIDE, PRESERVATIVE FREE 10 ML: 5 INJECTION INTRAVENOUS at 11:31

## 2022-04-07 RX ADMIN — LIDOCAINE HYDROCHLORIDE 60 MG: 20 INJECTION, SOLUTION EPIDURAL; INFILTRATION; INTRACAUDAL; PERINEURAL at 09:16

## 2022-04-07 RX ADMIN — PANTOPRAZOLE SODIUM 40 MG: 40 INJECTION, POWDER, LYOPHILIZED, FOR SOLUTION INTRAVENOUS at 20:48

## 2022-04-07 RX ADMIN — SENNOSIDES AND DOCUSATE SODIUM 1 TABLET: 50; 8.6 TABLET ORAL at 12:30

## 2022-04-07 RX ADMIN — PROPOFOL 30 MG: 10 INJECTION, EMULSION INTRAVENOUS at 09:18

## 2022-04-07 RX ADMIN — SENNOSIDES AND DOCUSATE SODIUM 1 TABLET: 50; 8.6 TABLET ORAL at 20:46

## 2022-04-07 RX ADMIN — PROPOFOL 50 MG: 10 INJECTION, EMULSION INTRAVENOUS at 09:16

## 2022-04-07 RX ADMIN — Medication 6 MG: at 20:46

## 2022-04-07 RX ADMIN — GABAPENTIN 100 MG: 100 CAPSULE ORAL at 12:29

## 2022-04-07 ASSESSMENT — PULMONARY FUNCTION TESTS
PIF_VALUE: 1
PIF_VALUE: 0
PIF_VALUE: 1
PIF_VALUE: 0
PIF_VALUE: 1
PIF_VALUE: 0

## 2022-04-07 ASSESSMENT — PAIN SCALES - GENERAL
PAINLEVEL_OUTOF10: 0

## 2022-04-07 ASSESSMENT — PAIN - FUNCTIONAL ASSESSMENT: PAIN_FUNCTIONAL_ASSESSMENT: 0-10

## 2022-04-07 NOTE — PROGRESS NOTES
Comprehensive Nutrition Assessment    RECOMMENDATIONS:  1. PO Diet: Continue current- regular   2. ONS: Start- Ensure Clears BID     NUTRITION ASSESSMENT:   Nutritional summary & status: Pt triggered for positive nutrition screen. NFPE findings indicate pt with severe malnutrition. Pt complains of decreased appetite since february. Pt wife states he takes a few bites at each meal and that is all he can manage. Daughter added that this is not normal for him as he usually has a big appetite. Pt with self reported wt loss of 15 lbs, however EMR shows wt loss of 20lbs in 2 months. Pt with early satiety. Pt reports test with findings of ulcers. Pt reports he is not drinking much either. RD encouraged pt to stay consistent with small, frequent intakes throghout the day as his appetite is returning. pt advanced to clear liquid diet. Pt dislikes ons as he has tried them in the past. Pt agreed to try Ensure Clears. RD will continue to monitor pt through admission. Admission/PMH: Pt admitted with altered mental status, RADHA. PMHx: COPD, HTN, HLD, Irritable bowel syndrome, Pancreatitis. MALNUTRITION ASSESSMENT  Context of Malnutrition: Acute Illness   Malnutrition Status: Severe malnutrition  Findings of the 6 clinical characteristics of malnutrition (Minimum of 2 out of 6 clinical characteristics is required to make the diagnosis of moderate or severe Protein Calorie Malnutrition based on AND/ASPEN Guidelines):  Energy Intake: Less than/equal to 50% of estimated energy requirements    Energy Intake Time: Greater than or equal to 1 month    Weight Loss %: 10% loss or greater    Weight loss Time: Greater than or equal to 1 month    Body Fat Loss: Moderate Loss    Body Fat Location: Triceps   Body Muscle Loss:  Moderate Loss    Body Muscle Loss Location: Hand and Temples      NUTRITION DIAGNOSIS   Inadequate oral intake related to altered GI function as evidenced by NPO or clear liquid status due to medical condition,intake 0-25%,poor intake prior to [address] and/or Nutrient Delivery:  Continue Current Diet,Start Oral Nutrition Supplement  Nutrition Education/Counseling:  No recommendation at this time   Goals:  Pt will tolerate diet advancement and consume 50% of meals and ONS through admission        Nutrition Monitoring and Evaluation:   Food/Nutrient Intake Outcomes:  Food and Nutrient Intake,Supplement Intake  Physical Signs/Symptoms Outcomes:  Biochemical Data,Nutrition Focused Physical Findings,Weight     OBJECTIVE DATA: Significant to nutrition assessment  · Nutrition-Focused Physical Findings: lbm: unknown   · Labs: Reviewed;   · Meds: Reviewed  · Wounds: None       CURRENT NUTRITION THERAPIES  ADULT DIET; Regular     PO Intake: NPO   PO Supplement Intake:None Ordered  Additional Sources of Calories/IVF:NS @ 100 ml/hr     ANTHROPOMETRICS  Current Height: 6' (182.9 cm)  Current Weight: 215 lb (97.5 kg)    Admission weight: 215 lb (97.5 kg)  Ideal Body Weight (IBW): 178 lbs  (81 kg)    Usual Bodyweight 230 lb (104.3 kg) (per pt )   Weight Changes: Wt loss of 25 lbs or 10% in 2 months      BMI: 29.2    Wt Readings from Last 50 Encounters:   04/06/22 215 lb (97.5 kg)   03/30/22 216 lb (98 kg)   03/21/22 225 lb 1.4 oz (102.1 kg)   03/20/22 225 lb (102.1 kg)   03/10/22 225 lb (102.1 kg)   02/12/22 240 lb (108.9 kg)   09/29/21 236 lb (107 kg)   09/23/21 236 lb (107 kg)   05/04/21 240 lb (108.9 kg)   03/25/21 240 lb 9.6 oz (109.1 kg)   11/19/20 244 lb (110.7 kg)   10/01/20 242 lb (109.8 kg)   06/29/20 234 lb (106.1 kg)   05/21/20 234 lb 3.2 oz (106.2 kg)   04/29/20 238 lb (108 kg)       COMPARATIVE STANDARDS  Energy (kcal):  6259-2660 (20-25)     Protein (g):   (1.2-1.4)       Fluid (ml/day):  0187-0067    The patient will still be monitored per nutrition standards of care.   Consult dietitian if nutrition interventions essential to patient care is [facility]  Office:  [phone]

## 2022-04-07 NOTE — PROGRESS NOTES
Physical/ Occupational Therapy  Haile Neumann     PT/OT  orders received. Chart reviewed. PT/OT  attempted to see patient for initial evaluation although patient off unit for a procedure. Plan to continue to follow and re-attempt as schedule allows and as medically appropriate.     Diego Joel PT, DPT [id number]  Bee Dupont, MOT, OTR/L

## 2022-04-07 NOTE — PROGRESS NOTES
4 Eyes Admission Assessment     I agree as the admission nurse that 2 RN's have performed a thorough Head to Toe Skin Assessment on the patient. ALL assessment sites listed below have been assessed on admission. Areas assessed by both nurses:   [x]   Head, Face, and Ears   [x]   Shoulders, Back, and Chest  [x]   Arms, Elbows, and Hands   []   Coccyx, Sacrum, and Ischium  [x]   Legs, Feet, and Heels        Does the Patient have Skin Breakdown?   Yes a wound was noted on the Admission Assessment and an LDA was Initiated documentation include the Delfina-wound, Wound Assessment, Measurements, Dressing Treatment, Drainage, and Color\",        Abrasion on R elbow, scattered bruises  Onel Prevention initiated:  No   Wound Care Orders initiated:  No      WOC nurse consulted for Pressure Injury (Stage 3,4, Unstageable, DTI, NWPT, and Complex wounds) or Onel score 18 or lower:  No      Nurse 1 eSignature: Electronically signed by Valentino Mcwilliams RN on 4/7/22 at 12:06 AM EDT    **SHARE this note so that the co-signing nurse is able to place an eSignature**    Nurse 2 eSignature: Electronically signed by Madelin Alexander RN on 4/7/22 at 12:33 AM EDT

## 2022-04-07 NOTE — PLAN OF CARE
Problem: Falls - Risk of:  Goal: Will remain free from falls  Description: Will remain free from falls  Outcome: Ongoing  Note: Bed in lowest setting. Call light within reach. Bed alarm on. Nurse rounding on patient frequently for visual checks and bathroom needs. Gripper socks on. In room by nurse's station.

## 2022-04-07 NOTE — PROGRESS NOTES
Patient A&Ox4. Patient knew president was Rick Martin but unsure of year. VSS. Admission was completed with wife over the phone and patient stated Missy Almonte knows all that\". Nurse verified that pt did did not need to be straight cathed as soon as he arrived on the floor with dr and to do so if not voiding. Patient NPO. Patient said he would \"start taking pills in the morning\". Patient stated that he is unable to walk at this time but is able to move all extremities in bed. Bed is in lowest setting and bed alarm on. Call light is within reach. Nurse bladder scanned pt and showed 0ml. Patient stated he did not feel the urge to void at all. Dr notified and order for fluids was placed.

## 2022-04-07 NOTE — PROGRESS NOTES
Transport here for patient     BP elevated, other VSS Rm air  Answered all orientation questions correctly except for he thought he was at [facility]  Neuro check good- BLE weak

## 2022-04-07 NOTE — ANESTHESIA PRE PROCEDURE
Department of Anesthesiology  Preprocedure Note       Name:  Talia Michel   Age:  78 y.o.  :  1945                                          MRN:  [id number]         Date:  2022      Surgeon: Deepa Rothman):  Robin Quijano MD    Procedure: Procedure(s):  EGD ESOPHAGOGASTRODUODENOSCOPY    Medications prior to admission:   Prior to Admission medications    Medication Sig Start Date End Date Taking? Authorizing Provider   HYDROcodone-acetaminophen (NORCO) 5-325 MG per tablet Take 1 tablet by mouth every 6 hours as needed for Pain. Historical Provider, MD   ondansetron (ZOFRAN) 4 MG tablet Take 1 tablet by mouth daily as needed for Nausea or Vomiting    Lyubov Mccurdy MD   polyethylene glycol [facility]) 17 GM/SCOOP powder Take 17 g by mouth daily as needed (constipation) 3/21/22 4/20/22  Lilian Martínez MD   lisinopril (PRINIVIL;ZESTRIL) 5 MG tablet Take 1 tablet by mouth daily 3/10/22   Mariano Frey MD   gabapentin (NEURONTIN) 100 MG capsule Take 1 capsule by mouth 4 times daily for 90 days.  3/10/22 6/8/22  Mariano Frey MD   atorvastatin (LIPITOR) 80 MG tablet TAKE ONE TABLET BY MOUTH DAILY 3/8/22   Mikaela Moya MD   torsemide [facility]) 20 MG tablet Take 1 tablet by mouth daily 22   Eddie Gomez MD   albuterol sulfate  (90 Base) MCG/ACT inhaler Inhale 2 puffs into the lungs every 4 hours as needed for Wheezing 21   Mariano Frey MD   KLOR-CON M20 20 MEQ extended release tablet TAKE ONE TABLET BY MOUTH DAILY 21   Eddie Gomez MD   aspirin 81 MG EC tablet Take 1 tablet by mouth daily 3/21/18   Eddie Gomez MD       Current medications:    Current Facility-Administered Medications   Medication Dose Route Frequency Provider Last Rate Last Admin    0.9 % sodium chloride infusion   IntraVENous Continuous Denis Martinez  mL/hr at 22 New Bag at 22    atorvastatin (LIPITOR) tablet 80 mg  80 mg Oral Daily Naomie Hanks MD        gabapentin (NEURONTIN) capsule 100 mg  100 mg Oral 4x Daily Naomie Hanks MD        pantoprazole (PROTONIX) injection 40 mg  40 mg IntraVENous BID Naomie Hanks MD        sodium chloride flush 0.9 % injection 10 mL  10 mL IntraVENous 2 times per day Naomie Hanks MD   10 mL at 04/06/22 2212    sodium chloride flush 0.9 % injection 10 mL  10 mL IntraVENous PRN Naomie Hanks MD        0.9 % sodium chloride infusion   IntraVENous PRN Naomie Hanks MD        promethazine (PHENERGAN) tablet 12.5 mg  12.5 mg Oral Q6H PRN Naomie Hanks MD        Or    ondansetron [facility]) injection 4 mg  4 mg IntraVENous Q6H PRN Naomie Hanks MD        sennosides-docusate sodium (SENOKOT-S) 8.6-50 MG tablet 1 tablet  1 tablet Oral BID Naomie Hanks MD        polyethylene glycol (GLYCOLAX) packet 17 g  17 g Oral Daily PRN Naomie Hanks MD        acetaminophen (TYLENOL) tablet 650 mg  650 mg Oral Q6H PRN Naomie Hanks MD        Or   Milagro Pablo acetaminophen (TYLENOL) suppository 650 mg  650 mg Rectal Q6H PRN Naomie Hanks MD           Allergies:  No Known Allergies    Problem List:    Patient Active Problem List   Diagnosis Code    Hypercholesteremia E78.00    Essential hypertension I10    Nonrheumatic aortic valve disorder I35.9    Hypertrophy of prostate without urinary obstruction and other lower urinary tract symptoms (LUTS) N40.0    Peptic ulcer K27.9    PVD (peripheral vascular disease) ([facility]) I73.9    Allergic rhinitis J30.9    COPD (chronic obstructive pulmonary disease) ([facility] [address] J44.9    Edema R60.9    Hallux valgus with bunions M20.10, M21.619    Carotid stenosis I65.29    Rosacea L71.9    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 I65.23    Normal pressure hydrocephalus (HCC) G91.2    Ataxia R27.0    Mild cognitive impairment G31.84    Chronic idiopathic constipation K59.04    Encounter for follow-up for aortic valve replacement Z09, Z95.2  GI hemorrhage K92.2    Nonrheumatic aortic valve stenosis I35.0    Anticoagulated Z79.01    S/P TAVR (transcatheter aortic valve replacement) Z95.2    Hyperglycemia R73.9    Abdominal pain R10.9    RADHA (acute kidney injury) (HCC) N17.9       Past Medical History:        Diagnosis Date    Allergic rhinitis     Aortic valve disorders     COPD (chronic obstructive pulmonary disease) (HCC)     Hydrocephalus (HCC)     normal pressure,    Hyperlipidemia     Hypertension     Hypertrophy of prostate without urinary obstruction and other lower urinary tract symptoms (LUTS)     Irritable bowel syndrome     Pancreatitis     Peptic ulcer, unspecified site, unspecified as acute or chronic, without mention of hemorrhage, perforation, or obstruction     Peripheral vascular disease ([facility] [address]        Past Surgical History:        Procedure Laterality Date    ABDOMINAL AORTIC ANEURYSM REPAIR N/A     ANGIOPLASTY      RT femoral artery    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REPAIR  2018    [place]    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REPAIR      Aortic valve replacement    AORTIC VALVE REPLACEMENT  2018    CHOLECYSTECTOMY      COLONOSCOPY N/A 2021    COLONOSCOPY POLYPECTOMY SNARE/COLD BIOPSY performed by Aron Bray MD at [address]      bifemoral bypass    FOOT SURGERY Right 12/2/15    HERNIA REPAIR Left     OTHER SURGICAL HISTORY  10/23/2017    lumbar puncture    [place] ESOPHAGOGASTRODUODENOSCOPY TRANSORAL DIAGNOSTIC N/A 2018    EGD performed by Aron Bray MD at [id number] [facility] ENDOSCOPY  2018    WITH BIOPSY       Social History:    Social History     Tobacco Use    Smoking status: Former Smoker     Packs/day: 1.50     Years: 30.00     Pack years: 45.00     Types: Cigarettes     Quit date: 2001     Years since quittin.3    Smokeless tobacco: Never Used    Tobacco comment: H.O.smoking at age 25 / smoked up to 1.5p.p.d /quit Substance Use Topics    Alcohol use: Yes     Alcohol/week: 2.0 standard drinks     Types: 2 Cans of beer per week     Comment: occasional beer                                Counseling given: Not Answered  Comment: H.O.smoking at age 25 / smoked up to 1.5p.p.d /quit        Vital Signs (Current):   Vitals:    04/07/22 0626 04/07/22 0808 04/07/22 0816 04/07/22 0839   BP:  (!) 172/73 (!) 169/76    Pulse:  73  73   Resp: 16 16  18   Temp:  97.8 °F (36.6 °C)  97.8 °F (36.6 °C)   TempSrc:  Oral  Tympanic   SpO2: 94% 96%  96%   Weight:       Height:                                                  BP Readings from Last 3 Encounters:   04/07/22 (!) 169/76   04/04/22 (!) 185/97   03/30/22 (!) 150/90       NPO Status: Time of last liquid consumption: 0000                        Time of last solid consumption: 1800                        Date of last liquid consumption: 04/07/22                        Date of last solid food consumption: 04/05/22    BMI:   Wt Readings from Last 3 Encounters:   04/06/22 215 lb (97.5 kg)   03/30/22 216 lb (98 kg)   03/21/22 225 lb 1.4 oz (102.1 kg)     Body mass index is 29.16 kg/m².     CBC:   Lab Results   Component Value Date    WBC 12.6 04/07/2022    RBC 4.66 04/07/2022    HGB 13.7 04/07/2022    HCT 41.0 04/07/2022    MCV 88.1 04/07/2022    RDW 14.2 04/07/2022     04/07/2022       CMP:   Lab Results   Component Value Date     04/07/2022    K 4.9 04/07/2022    CL 97 04/07/2022    CO2 30 04/07/2022    BUN 33 04/07/2022    CREATININE 1.3 04/07/2022    GFRAA >60 04/07/2022    GFRAA >60 10/05/2011    AGRATIO 1.7 02/13/2022    LABGLOM 54 04/07/2022    LABGLOM 78 12/20/2013    GLUCOSE 135 04/07/2022    PROT 7.9 04/06/2022    PROT 7.4 11/27/2012    CALCIUM 10.1 04/07/2022    BILITOT 1.7 04/06/2022    ALKPHOS 172 04/06/2022    AST 31 04/06/2022    ALT 35 04/06/2022       POC Tests: No results for input(s): POCGLU, POCNA, POCK, POCCL, POCBUN, POCHEMO, POCHCT in the last 72 hours.    Coags:   Lab Results   Component Value Date    PROTIME 13.0 04/04/2022    INR 1.14 04/04/2022    APTT 41.3 04/04/2022       HCG (If Applicable): No results found for: PREGTESTUR, PREGSERUM, HCG, HCGQUANT     ABGs:   Lab Results   Component Value Date    PHART 7.399 03/20/2018    PO2ART 398.7 03/20/2018    BXX5NAH 37.6 03/20/2018    EYN4LPJ 23.2 03/20/2018    BEART -2 03/20/2018    E8HUYNJM 100 03/20/2018        Type & Screen (If Applicable):  No results found for: LABABO, LABRH    Drug/Infectious Status (If Applicable):  No results found for: HIV, HEPCAB    COVID-19 Screening (If Applicable):   Lab Results   Component Value Date    COVID19 Not Detected 02/12/2022    COVID19 NOT DETECTED 08/18/2020           Anesthesia Evaluation  Patient summary reviewed and Nursing notes reviewed no history of anesthetic complications:   Airway: Mallampati: III  TM distance: >3 FB   Neck ROM: full  Mouth opening: > = 3 FB Dental: normal exam         Pulmonary:normal exam    (+) COPD:                             Cardiovascular:  Exercise tolerance: good (>4 METS),   (+) hypertension:, valvular problems/murmurs (TAVR):, pulmonary hypertension:,     (-)  angina, orthopnea and PND    ECG reviewed  Rhythm: regular  Rate: normal           Beta Blocker:  Not on Beta Blocker      ROS comment: Left ventricle - normal size, thickness and function with EF of 60%   *Aortic valve - TAVR valve well seated with PV of 2.18m/s and MG 9mmHg, TSERING   1.97cm2, no regurgitation     Neuro/Psych:   Negative Neuro/Psych ROS              GI/Hepatic/Renal:   (+) PUD,           Endo/Other: Negative Endo/Other ROS                    Abdominal:   (+) obese,     Abdomen: soft. Vascular: negative vascular ROS.  + PVD, aortic or cerebral, . Other Findings:             Anesthesia Plan      MAC     ASA 3 - emergent       Induction: intravenous. MIPS: Prophylactic antiemetics administered.   Anesthetic plan and risks discussed with patient. Plan discussed with CRNA and attending.                   Krista Carpenter DO   4/7/2022

## 2022-04-07 NOTE — RT PROTOCOL NOTE
RT Inhaler-Nebulizer Bronchodilator Protocol Note    There is a bronchodilator order in the chart from a provider indicating to follow the RT Bronchodilator Protocol and there is an Initiate RT Inhaler-Nebulizer Bronchodilator Protocol order as well (see protocol at bottom of note). CXR Findings:  No results found. The findings from the last RT Protocol Assessment were as follows:   History Pulmonary Disease: Chronic pulmonary disease  Respiratory Pattern: Regular pattern and RR 12-20 bpm  Breath Sounds: Clear breath sounds  Cough: Strong, spontaneous, non-productive  Indication for Bronchodilator Therapy:    Bronchodilator Assessment Score: 2    Aerosolized bronchodilator medication orders have been revised according to the RT Inhaler-Nebulizer Bronchodilator Protocol below. Respiratory Therapist to perform RT Therapy Protocol Assessment initially then follow the protocol. Repeat RT Therapy Protocol Assessment PRN for score 0-3 or on second treatment, BID, and PRN for scores above 3. No Indications - adjust the frequency to every 6 hours PRN wheezing or bronchospasm, if no treatments needed after 48 hours then discontinue using Per Protocol order mode. If indication present, adjust the RT bronchodilator orders based on the Bronchodilator Assessment Score as indicated below. Use Inhaler orders unless patient has one or more of the following: on home nebulizer, not able to hold breath for 10 seconds, is not alert and oriented, cannot activate and use MDI correctly, or respiratory rate 25 breaths per minute or more, then use the equivalent nebulizer order(s) with same Frequency and PRN reasons based on the score. If a patient is on this medication at home then do not decrease Frequency below that used at home.     0-3 - enter or revise RT bronchodilator order(s) to equivalent RT Bronchodilator order with Frequency of every 4 hours PRN for wheezing or increased work of breathing using Per Protocol order mode. 4-6 - enter or revise RT Bronchodilator order(s) to two equivalent RT bronchodilator orders with one order with BID Frequency and one order with Frequency of every 4 hours PRN wheezing or increased work of breathing using Per Protocol order mode. 7-10 - enter or revise RT Bronchodilator order(s) to two equivalent RT bronchodilator orders with one order with TID Frequency and one order with Frequency of every 4 hours PRN wheezing or increased work of breathing using Per Protocol order mode. 11-13 - enter or revise RT Bronchodilator order(s) to one equivalent RT bronchodilator order with QID Frequency and an Albuterol order with Frequency of every 4 hours PRN wheezing or increased work of breathing using Per Protocol order mode. Greater than 13 - enter or revise RT Bronchodilator order(s) to one equivalent RT bronchodilator order with every 4 hours Frequency and an Albuterol order with Frequency of every 2 hours PRN wheezing or increased work of breathing using Per Protocol order mode. RT to enter RT Home Evaluation for COPD & MDI Assessment order using Per Protocol order mode.     Electronically signed by Nohemy Romero RCP on 4/7/2022 at 4:45 PM

## 2022-04-07 NOTE — CARE COORDINATION
SW met w/Pt's daughter in law and wife at bedside. Pt was getting an EGD. Pt is from home w/his wife. They have DME needed for him to return home and has [address] [facility]. Pt's family thinks he will need SNF, he was recently at [facility] [place], they are okay w/returning or going to [place]. They plan to base the DCP off of PT/OT recs. SW sent referrals to [facility] and [facility]. SW following for DCP.   Electronically signed by CLARENCE Jennings, RAVINDERW on 4/7/2022 at 5:22 PM  [phone]

## 2022-04-07 NOTE — PROGRESS NOTES
Hospitalist Progress Note      PCP: Chrissy Mejia MD    Date of Admission: 4/6/2022    Chief Complaint: AMS, weakness, [facility] Hospital Course: 82-year-old male who presents to [place] [address] with chief complaint of altered mental status, weakness and dark stool. since January the patient has had progressively worsening confusion as well as anorexia. The have been evaluated in the emergency department times and have seen neurology as an outpatient with no etiology established. He was scheduled for an MRI however he presents because he had a dark black bowel movement. Subjective: pleasant but has some level of confusion. No pain       Medications:  Reviewed    Infusion Medications    sodium chloride 100 mL/hr at 04/07/22 1232    sodium chloride       Scheduled Medications    atorvastatin  80 mg Oral Daily    gabapentin  100 mg Oral 4x Daily    pantoprazole  40 mg IntraVENous BID    sodium chloride flush  10 mL IntraVENous 2 times per day    sennosides-docusate sodium  1 tablet Oral BID     PRN Meds: sodium chloride flush, sodium chloride, promethazine **OR** ondansetron, polyethylene glycol, acetaminophen **OR** acetaminophen      Intake/Output Summary (Last 24 hours) at 4/7/2022 1238  Last data filed at 4/7/2022 1131  Gross per 24 hour   Intake 310 ml   Output --   Net 310 ml       Physical Exam Performed:    BP (!) 150/70   Pulse 66   Temp 97.3 °F (36.3 °C) (Oral)   Resp 20   Ht 6' (1.829 m)   Wt 215 lb (97.5 kg)   SpO2 97%   BMI 29.16 kg/m²     General appearance: chronically ill appearing, appears stated age and cooperative. HEENT: Pupils equal, round, and reactive to light. Conjunctivae/corneas clear. Neck: Supple, with full range of motion. No jugular venous distention. Trachea midline. Respiratory:  Normal respiratory effort. Clear to auscultation, bilaterally without Rales/Wheezes/Rhonchi.   Cardiovascular: Regular rate and rhythm with normal S1/S2 without murmurs, rubs or gallops. Abdomen: Soft, non-tender, non-distended with normal bowel sounds. Musculoskeletal: No clubbing, cyanosis or edema bilaterally. Full range of motion without deformity. Skin: Skin color, texture, turgor normal.  No rashes or lesions. Neurologic:  Neurovascularly intact without any focal sensory/motor deficits. Cranial nerves: II-XII intact, grossly non-focal. Generalized weakness  Psychiatric: has some level of confusion  Capillary Refill: Brisk,3 seconds, normal   Peripheral Pulses: +2 palpable, equal bilaterally       Labs:   Recent Labs     04/06/22  1511 04/07/22  0708   WBC 14.2* 12.6*   HGB 15.9 13.7   HCT 48.3 41.0    334     Recent Labs     04/06/22  1511 04/07/22  0708    136   K 4.9 4.9   CL 93* 97*   CO2 27 30   BUN 31* 33*   CREATININE 1.7* 1.3   CALCIUM 10.4 10.1     Recent Labs     04/06/22  1511   AST 31   ALT 35   BILIDIR 0.5*   BILITOT 1.7*   ALKPHOS 172*     No results for input(s): INR in the last 72 hours. Recent Labs     04/06/22  1511 04/06/22  1742   TROPONINI 0.02* <0.01       Urinalysis:      Lab Results   Component Value Date    NITRU Negative 04/06/2022    WBCUA 1 03/20/2022    RBCUA NEGATIVE 03/23/2022    BLOODU Negative 04/06/2022    SPECGRAV 1.025 04/06/2022    GLUCOSEU Negative 04/06/2022       Radiology:  XR CHEST (2 VW)   Final Result      Mild linear atelectasis/scarring left lung base. Lungs are otherwise clear. Cardiomediastinal silhouette. Cardiac valve replacement noted. Surgical clips overlie the left upper thorax.       MRI BRAIN WO CONTRAST    (Results Pending)           Assessment/Plan:    Active Hospital Problems    Diagnosis     RADHA (acute kidney injury) ([facility]) [N17.9]      RADHA  - improving  - can stop IVF  - Hold lisinopril   - can restart torsemide 4/8/22     Melana due to duodenal ulcers and gastritis  FOBT positive  IV PPI BID  Trend H&H  GI consulted s/p EGD which showed multiple duodenal ulcers and antral gastritis  - will hold ASA for now  - can probably change asa to plavix start 1 week later     Subacute on chronic encephalopathy  Normal pressure hydrocephalus  S/p recent LP 2022 and had LP 2017  Check TSH, ammonia, B12  UA neg  Troponin negative  Chest x-ray negative  Neurology consulted  - CSF showed mildly elevated protein, no WBC or RBC  - LP did not remove enough CSF fluid due to not enough for them to remove  - openning pressure was normal  - I had long discussion with family, will do MRI  - I think this is multifactorial, will change gabapentin to bed time only  - melatonin at bedtime  - PT/OT     Hypertension  - holding lisinopril due to RADHA  - restart torsemide in AM    COPD not in exacerbation  - duoneb PRN    Hyperlipidemia  - statin    Peripheral vascular disease  Hx of TAVR  - statin  - holding asa due to melana  - discussed with GI  - will hold antiplelet for 1 week  - can switch to plavix  - consulted card       DVT Prophylaxis: SCDs  Diet: ADULT DIET;  Regular  Code Status: Full Code    PT/OT Eval Status: pending    Dispo - floor    Becca Parham DO

## 2022-04-07 NOTE — PROCEDURES
EGD REPORT    Patient:  Lazarus Stabile                  1945    Endoscopist: JEF Toscano     Indication:  UGI bleed     Medications:  MAC    Pre-Anesthesia Assessment:  I have reviewed and am in agreement with patient history and medication, including previous response to sedation. Prior to the procedure, a History and Physical was performed, and patient medications and allergies were reviewed. The patient is competent. The risks and benefits of the procedure and the sedation options and risks were discussed with the patient and patients wife. Risks discussed included but were not limited to infection, bleeding, perforation, death, and missed lesions. All questions were answered and informed consent was obtained. Patient identification and proposed procedure were verified by the physician and the nurse in the pre-procedure area in the procedure room. Mallampatti: 3  ASA Grade Assessment: 3     After reviewing the risks and benefits, the patient was deemed in satisfactory condition to undergo the procedure. The anesthesia plan was to use MAC anesthesia. Immediately prior to administration of medications, the patient was re-assessed for adequacy to receive sedatives and a time out was performed. Patient and healthcare providers were in agreement it was the correct patient and procedure. The heart rate, respiratory rate, oxygen saturations, blood pressure, adequacy of pulmonary ventilation, and response to care were monitored throughout the procedure. The physical status of the patient was re-assessed after the procedure. After obtaining informed consent, the endoscope was passed under direct vision. The endoscope was introduced through the mouth, and advanced to the second part of duodenum. The EGD was accomplished without difficulty. The patient tolerated the procedure well.        Duodenum: Multiple 6-10 mm ulcers , non bleeding, all with black hematin in base, no visible vessels seen, bile noted  Stomach: Moderate erosive antritis, biopsied for H pylori  Retroflexion did not show a hiatal hernia. Esophagus: Small tongue of possible ectopic mucosa biopsied to evaluate for De Jesus's, no esophagitis or nodules.     Estimated blood loss trace    Plan:  Avoid NSAIDS  PPI  Evaluate need for ASA therapy  Call office in 1 week for results  Will signoff recall GI if needed this admission    Tried to call wife no answer

## 2022-04-07 NOTE — CONSULTS
Reason for Consultation/Chief Complaint: Not eating/hematemesis    History of Present Illness:  Jacob Matthew is a 78 y.o. patient whom we were asked to comment on ASA/Plavix for TAVR. Admitted with anorexia and decline in mental status. Worsening confusion and no appetite and not eating well for several months. Has been evaluated. Presents to ER because of dark black BM day of admit. Also had episode of emesis of dark red blood. Noted heme + in ER. EGD shows gastritis and multiple duodenal ulcers. Had TAVR and wife gives hx of bleed while in first 6 months on plavix. Past Medical History:   has a past medical history of Allergic rhinitis, Aortic valve disorders, COPD (chronic obstructive pulmonary disease) ([address] Hydrocephalus ([address] Hyperlipidemia, Hypertension, Hypertrophy of prostate without urinary obstruction and other lower urinary tract symptoms (LUTS), Irritable bowel syndrome, Pancreatitis, Peptic ulcer, unspecified site, unspecified as acute or chronic, without mention of hemorrhage, perforation, or obstruction, and Peripheral vascular disease ([address] 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 Social History:   reports that he quit smoking about 20 years ago. His smoking use included cigarettes. He has a 45.00 pack-year smoking history. He has never used smokeless tobacco. He reports current alcohol use of about 2.0 standard drinks of alcohol per week.  He reports that he does not use drugs. Family History:  No evidence for sudden cardiac death or premature CAD    Home Medications:  Were reviewed and are listed in nursing record. and/or listed below  Prior to Admission medications    Medication Sig Start Date End Date Taking? Authorizing Provider   HYDROcodone-acetaminophen (NORCO) 5-325 MG per tablet Take 1 tablet by mouth every 6 hours as needed for Pain. Historical Provider, MD   ondansetron (ZOFRAN) 4 MG tablet Take 1 tablet by mouth daily as needed for Nausea or Vomiting 2/06/43   Les Muñoz MD   polyethylene glycol [facility]) 17 GM/SCOOP powder Take 17 g by mouth daily as needed (constipation) 3/21/22 4/20/22  Chandan Matthews MD   lisinopril (PRINIVIL;ZESTRIL) 5 MG tablet Take 1 tablet by mouth daily 3/10/22   Karen Szymanski MD   gabapentin (NEURONTIN) 100 MG capsule Take 1 capsule by mouth 4 times daily for 90 days. 3/10/22 6/8/22  Karen Szymanski MD   atorvastatin (LIPITOR) 80 MG tablet TAKE ONE TABLET BY MOUTH DAILY 3/8/22   Araseli Charmaine Torres., MD   torsemide [facility]) 20 MG tablet Take 1 tablet by mouth daily 1/18/22   Haylee Davis MD   albuterol sulfate  (90 Base) MCG/ACT inhaler Inhale 2 puffs into the lungs every 4 hours as needed for Wheezing 7/28/21   Karen Szymanski MD   KLOR-CON M20 20 MEQ extended release tablet TAKE ONE TABLET BY MOUTH DAILY 5/17/21   Haylee Davis MD   aspirin 81 MG EC tablet Take 1 tablet by mouth daily 3/21/18   Haylee Davis MD        Allergies:  Patient has no known allergies. Review of Systems:       · Constitutional: there has been no unanticipated weight loss. There's been no change in energy level, sleep pattern, or activity level. · Eyes: No visual changes or diplopia. No scleral icterus. · ENT: No Headaches, hearing loss or vertigo. No mouth sores or sore throat. · Cardiovascular: No loss of consciousness.  No hemoptysis, pleuritic pain, or phlebitis. · Respiratory: No cough or wheezing, no sputum production. No hematemesis. · Gastrointestinal: AS above  · Genitourinary: No dysuria, trouble voiding, or hematuria. · Musculoskeletal:  No gait disturbance, weakness or joint complaints. · Integumentary: No rash or pruritis. · All other systems reviewed negative as done.      Physical Examination:    Vitals:    04/07/22 1038   BP: (!) 150/70   Pulse: 66   Resp:    Temp: 97.3 °F (36.3 °C)   SpO2: 97%    Weight: 215 lb (97.5 kg)         General Appearance:  Alert, cooperative, no distress, appears stated age   Head:  Normocephalic, without obvious abnormality, atraumatic   Eyes:  PERRL, conjunctiva/corneas clear       Nose: Nares normal, no drainage or sinus tenderness   Throat: Lips, mucosa, and tongue normal   Neck: Supple, symmetrical, trachea midline       Lungs:   Clear to auscultation bilaterally, respirations unlabored   Chest Wall:  No tenderness or deformity   Heart:  Regular rate and rhythm, S1, S2 normal, no rub or gallop, 1/6 syst M   Abdomen:   Soft, non-tender, bowel sounds active all four quadrants           Extremities: Extremities normal, atraumatic, no cyanosis or edema   Pulses: 2+ and symmetric   Skin: Skin color, texture, turgor normal, no rashes or lesions   Pysch: Normal mood and affect   Neurologic: Normal gross motor and sensory exam.         Labs  CBC:   Lab Results   Component Value Date    WBC 12.6 04/07/2022    RBC 4.66 04/07/2022    HGB 13.7 04/07/2022    HCT 41.0 04/07/2022    MCV 88.1 04/07/2022    RDW 14.2 04/07/2022     04/07/2022     CMP:    Lab Results   Component Value Date     04/07/2022    K 4.9 04/07/2022    CL 97 04/07/2022    CO2 30 04/07/2022    BUN 33 04/07/2022    CREATININE 1.3 04/07/2022    GFRAA >60 04/07/2022    GFRAA >60 10/05/2011    AGRATIO 1.7 02/13/2022    LABGLOM 54 04/07/2022    LABGLOM 78 12/20/2013    GLUCOSE 135 04/07/2022    PROT 7.9 04/06/2022    PROT 7.4 11/27/2012    CALCIUM 10.1 04/07/2022    BILITOT 1.7 04/06/2022    ALKPHOS 172 04/06/2022    AST 31 04/06/2022    ALT 35 04/06/2022     PT/INR:  No results found for: PTINR  Lab Results   Component Value Date    TROPONINI <0.01 04/06/2022       EKG:  I have reviewed EKG with the following interpretation:  Impression:  Personally reviewed, NSR, NSSTTW changes. 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 Bleed takes priority and now off asa. IV PPI. Likely chronic PPI. Hold asa for week. Could probably use either ASA or plavix after week. If ASA would use 81 mg qd. Family reports bleed also with plavix when TAVR first done.

## 2022-04-07 NOTE — CONSULTS
Neurology / Neurocritical Care Consult Note    Pantera Salguero DO is requesting this consult. Reason for Consult: encephalopathy, dx of NPH  Admission Chief Complaint: AMS    History of Present Illness     Calista Halsted is a 78 y.o. y/o male with PMH significant for NPH, HLD, HTN, IBS, pancreatitis, peptic ulcers, peripheral vascular disease. Patient is not the best historian, majority of history obtained from family at bedside and per chart review. Per the patient's wife, the patient was diagnosed around 2018 with NPH. He is well established with his outpatient Neurologist Dr. Mile Pop. Originally presented with gait abnormalities; an EMG in 2017 revealed mild to moderate generalized sensorimotor mixed polyneuropathy. He had his initial lumbar puncture in 2017 that removed 16 mls, he did not report much relief from this. He had a cisternogram that was suggestive of normal pressure hydrocephalus. He eventually developed some cognitive deficits and occasional urinary incontinence as well. Family noticed that his gait abnormalities worsened between the time period of 2019 - 2021, noting that he seemed to be dragging on leg over the other. Last note from Dr. Mile Pop from 11/9/21 noted that the patient was using a cane to ambulate and had had several falls. A repeat LP was recommended at that time but the patient wanted to think about it, reported the last one was painful. He had an MRI of his lumbar spine on 3/24 multilevel degenerative changes including severe canal stenosis at L3-L4, moderate canal stenosis at L4-5, and mild central canal stenosis at L1-2 and L2-3. Mild foraminal stenosis present bilaterally. Last MRI brain completed 10/2021 showed no acute abnormality and ventricular dilation out of proportion to the cortical sulci. His wife reports that about a month ago, the patient stopped walking.  He had signiificant problems with balance prior to this as well as some cognitive deficits (trouble with memory). She states that it seemed like his L leg was initially worse than his R, but then the R leg \"caught up\" and says both have been incredibly weak. She reports that he hasn't been following commands very well and when trying to get him to ambulate/exercise they have to move his legs to the side of the bed for him and tap his legs to cue him to move. He has not been eating well recently and reports he has been more confused over the past few weeks. Noted in the chart that he has had some low back pain recently, but he states this has been significantly better recently and complains of no pain, numbness, or tingling. This was all being worked up as an outpatient, and he was scheduled to have an MRI of his brain as an outpatient but he presented to the hospital with hematemesis and dark tarry stools. He had a LP on 4/4/22 where only 6 mLs were removed. Opening pressure was 18, cell count revealed clear fluid with 1 WBC,  Glucose 71, protein mildly elevated at 59. His wife's primary concern is the fact that he has stopped walking. REVIEW OF SYSTEMS:   Constitutional- + weight loss, No fevers   Eyes- No diplopia. No photophobia. Ears/nose/throat- No dysphagia. No Dysarthria   Cardiovascular- No palpitations. No chest pain   Respiratory- No dyspnea. No Cough   Gastrointestinal- No Abdominal pain. No Vomiting. Genitourinary- + incontinence. No urinary retention   Musculoskeletal- No myalgia. No arthralgia   Skin- No rash. No easy bruising. Psychiatric- No depression. No anxiety   Endocrine- No diabetes. No thyroid issues. Hematologic- No bleeding difficulty. No fatigue   Neurologic- +weakness.  +confusion    Past Medical, Surgical, Family, and Social History   PAST MEDICAL HISTORY:  Past Medical History:   Diagnosis Date    Allergic rhinitis     Aortic valve disorders     COPD (chronic obstructive pulmonary disease) (HCC)     Hydrocephalus (HCC)     normal pressure,    Hyperlipidemia     Hypertension     Hypertrophy of prostate without urinary obstruction and other lower urinary tract symptoms (LUTS)     Irritable bowel syndrome     Pancreatitis     Peptic ulcer, unspecified site, unspecified as acute or chronic, without mention of hemorrhage, perforation, or obstruction     Peripheral vascular disease ([facility] [address] [address]      SURGICAL HISTORY:  Past Surgical History:   Procedure Laterality Date    ABDOMINAL AORTIC ANEURYSM REPAIR N/A 2001    ANGIOPLASTY      RT femoral artery    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    Asa Brake    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REPAIR      Aortic valve replacement    AORTIC VALVE REPLACEMENT  2018    CHOLECYSTECTOMY      COLONOSCOPY N/A 5/4/2021    COLONOSCOPY POLYPECTOMY SNARE/COLD BIOPSY performed by Storm Krishnan MD at [address]      bifemoral bypass    FOOT SURGERY Right 12/2/15    HERNIA REPAIR Left     OTHER SURGICAL HISTORY  10/23/2017    lumbar puncture    [place] ESOPHAGOGASTRODUODENOSCOPY TRANSORAL DIAGNOSTIC N/A 12/4/2018    EGD performed by Storm Krishnan MD at [address] [facility] ENDOSCOPY  09/19/2018    WITH BIOPSY     FAMILY HISTORY & SOCIAL HISTORY:  Family history non-contributory  Family History   Problem Relation Age of Onset    Heart Disease Father     Diabetes Father     Alcohol Abuse Father     Other Mother     Cancer Brother         liver     Social History     Tobacco History     Smoking Status  Former Smoker Quit date  12/17/2001 Smoking Frequency  1.5 packs/day for 30 years (39 pk yrs) Smoking Tobacco Type  Cigarettes    Smokeless Tobacco Use  Never Used    Tobacco Comment  H.O.smoking at age 25 / smoked up to 1.5p.p.d /quit            Alcohol History     Alcohol Use Status  Yes Drinks/Week  2 Cans of beer, 0 Standard drinks or equivalent per week Amount  2.0 standard drinks of alcohol/wk Comment  occasional beer          Drug Use     Drug Use Status  Never          Sexual Activity     Sexually Active  Yes Partners  Female Birth Control/Protection  Post-menopausal                Allergies & Outpatient Medications   ALLERGIES:  No Known Allergies  HOME MEDICATIONS:  Current Discharge Medication List      CONTINUE these medications which have NOT CHANGED    Details   HYDROcodone-acetaminophen (NORCO) 5-325 MG per tablet Take 1 tablet by mouth every 6 hours as needed for Pain. ondansetron (ZOFRAN) 4 MG tablet Take 1 tablet by mouth daily as needed for Nausea or Vomiting  Qty: 30 tablet, Refills: 0      polyethylene glycol (GLYCOLAX) 17 GM/SCOOP powder Take 17 g by mouth daily as needed (constipation)  Qty: 510 g, Refills: 0      lisinopril (PRINIVIL;ZESTRIL) 5 MG tablet Take 1 tablet by mouth daily  Qty: 90 tablet, Refills: 3    Associated Diagnoses: Essential hypertension      gabapentin (NEURONTIN) 100 MG capsule Take 1 capsule by mouth 4 times daily for 90 days.   Qty: 360 capsule, Refills: 3    Associated Diagnoses: Sciatica of right side      atorvastatin (LIPITOR) 80 MG tablet TAKE ONE TABLET BY MOUTH DAILY  Qty: 90 tablet, Refills: 3    Associated Diagnoses: Hypercholesteremia      torsemide (DEMADEX) 20 MG tablet Take 1 tablet by mouth daily  Qty: 30 tablet, Refills: 6      albuterol sulfate  (90 Base) MCG/ACT inhaler Inhale 2 puffs into the lungs every 4 hours as needed for Wheezing  Qty: 1 Inhaler, Refills: 1      KLOR-CON M20 20 MEQ extended release tablet TAKE ONE TABLET BY MOUTH DAILY  Qty: 30 tablet, Refills: 6      aspirin 81 MG EC tablet Take 1 tablet by mouth daily  Qty: 30 tablet, Refills: 3               Physical Exam   PHYSICAL EXAM:  Vitals:    04/07/22 0945 04/07/22 0959 04/07/22 1011 04/07/22 1038   BP: (!) 140/75 (!) 148/75 (!) 166/76 (!) 150/70   Pulse: 64 62 67 66   Resp: 18 20 20    Temp:    97.3 °F (36.3 °C)   TempSrc:    Oral   SpO2: 97% 96% 97% 97%   Weight:       Height:             General: Alert, no distress, well-nourished  Neurologic  Mental status:   orientation to person, knows he is In the hospital and the season is spring but does not know the year or which hospital he is at   Attention intact as able to attend well to the exam     Language fluent in conversation    Comprehension  follows most commands, had difficulty straightening L leg to command but he was able to follow commands crossing midline    Cranial nerves:   CN2: Visual fields full w/o extinction on confrontational testing   CN 3,4,6: Pupils equal and reactive to light, extraocular muscles intact  CN5: Facial sensation symmetric   CN7: Face symmetric at rest and activation  CN8: Hearing symmetric to spoken voice  CN9: Palate elevated symmetrically  CN11: Traps full strength on shoulder shrug  CN12: Tongue midline with protrusion    Motor Exam:  Excellent strength with no pronator drift in all extremities. Had difficulty straightening L leg to command   R  L    Deltoid 5  5   Biceps 5 5   Triceps 5 5   Wrist extension  5 5   Interossei 5 5      R  L    Hip flexion  5  5   Hip extension  5 5   Knee flexion  5 5   Knee extension  5 HARSHA   Ankle dorsiflexion  5 5   Ankle plantar flexion  5 5     Deep tendon reflexes:    R  L    Biceps  2  2   Brachioradialis  2 2   Patellar  2 2     Sensory: light touch intact and symmetric in all 4 extremities. No sensory extinction on bilateral simultaneous stimulation  Cerebellar/coordination: finger nose finger normal without ataxia  Tone: normal in all 4 extremities  Gait: held for patient safety      OTHER SYSTEMS:  Cardiovascular: Warm, appears well perfused   Respiratory: Easy, non-labored respiratory pattern   Abdominal: Abdomen is without distention   Extremities: Upper and lower extremities are atraumatic in appearance without deformity. No swelling or erythema. Diagnostic Testing Results   IMAGES:  Images personally reviewed and agree w/ radiology interpretation. MRI Brain WO Contrast 10/11/21:  Impression   1.  No acute intracranial abnormality.  No acute infarct. 2. Global parenchymal volume loss with mild chronic microvascular ischemic   changes. 3.  The ventricular dilatation is out of proportion to the cortical sulci.  A   component of normal pressure hydrocephalus cannot be excluded. 4. Small chronic right cerebellar infarct. NM Cisternogram:  FINDINGS:   4 hour image demonstrates activity within the thecal sac and heart shaped   ventricular system.  By 24 and 48 hours, there is persistent ventricular   activity and incomplete transit of activity over the cerebral convexity.           Impression   In the appropriate clinical setting, imaging findings are suggestive of   normal pressure hydrocephalus. MRI Lumbar Spine 3/24/22:     CONCLUSION:   1. Multilevel degenerative changes.  The following compressive sequelae are noted:   2. Severe central canal stenosis at L3-4.   3. Moderate central canal stenosis at L4-5.  Moderate foraminal stenoses on the right at L4-5    and on the left at L5-S1.   4. Mild central canal stenosis at L1-2 and L2-3.  Mild foraminal stenoses are present    bilaterally at L1-2, bilaterally at L2-3, bilaterally at L3-4, on the left at L4-5, and on the    right at L5-S1.   5. A relatively large Schmorl's node is present in the inferior endplate of L3 with extensive    adjacent edema. LABS:  All results below personally reviewed. Pertinent positives & negatives are addressed in Impression & Recommendations below.      LABS   Metabolic Panel Recent Labs     04/06/22  1511 04/06/22  2140 04/07/22  0708     --  136   K 4.9  --  4.9   CL 93*  --  97*   CO2 27  --  30   BUN 31*  --  33*   CREATININE 1.7*  --  1.3   GLUCOSE 158*  --  135*   CALCIUM 10.4  --  10.1   LABALBU 4.6  --   --    ALKPHOS 172*  --   --    ALT 35  --   --    AST 31  --   --    AMMONIA  --  19  --       CBC / Coags Recent Labs     04/06/22  1511 04/07/22  0708   WBC 14.2* 12.6*   RBC 5.43 4.66   HGB 15.9 13.7   HCT 48.3 41.0  334      Other No results for input(s): LABA1C, LDLCALC, TRIG, TSH, [id number], FOLATE, LABSALI, COVID19 in the last 72 hours. Recent Labs     04/07/22  0708   LACTA 2.0          CURRENT SCHEDULED MEDICATIONS   Inpatient Medications   atorvastatin, 80 mg, Oral, Daily    gabapentin, 100 mg, Oral, 4x Daily    pantoprazole, 40 mg, IntraVENous, BID    sodium chloride flush, 10 mL, IntraVENous, 2 times per day    sennosides-docusate sodium, 1 tablet, Oral, BID   Infusions    sodium chloride 100 mL/hr at 04/07/22 0312    sodium chloride        Antibiotics   Recent Abx Admin      No antibiotic orders with administrations found. IMPRESSION & RECOMMENDATIONS     IMPRESSION:  Barrera Martinez is a 78 y.o. y/o male with PMH significant for NPH, HLD, HTN, IBS, pancreatitis, peptic ulcers, peripheral vascular disease who presented with dark tarry stools, hematemesis, and AMS. His wife reports that for the past month he has been unable to walk. He has a diagnosis of NPH, recently had LP but only a small volume was removed. He is well established with Dr. Luke De La Torre for this diagnosis of NPH and he should continue to follow with him. No focal deficits identified on exam.    RECOMMENDATIONS:  - Reasonable to obtain MRI while he is here as this was scheduled as an outpatient for him today  - CSF labs from 4/4 demonstrated slightly elevated protein but otherwise unremarkable  - Would recommend PT/OT  - He needs to follow up with his established Neurologist ROSA MARIA Webb - CNP   Neurology & Neurocritical Care   Neurology Line: [phone]  [employer]: [facility] NPs  4/7/2022 10:55 AM    I spent 55 minutes in the care of this patient. Over 50% of that time was in face-to-face counseling regarding disease process, diagnostic testing, preventative measures, and answering patient and family questions.

## 2022-04-07 NOTE — CONSULTS
[address] [place] [address]  GI Consultation                                                                 Patient: Jovany Goncalves  : 1945       Date:  2022    Subjective:       History of Present Illness  Patient is a 78 y.o.  male admitted with RADHA (acute kidney injury) ([facility] [address] [N17.9]  Altered mental status, unspecified altered mental status type [R41.82] who is seen in consult for UGI bleed   2 days ago had emesis of dark red blood once and melena, no recurrence, heme positive in ED. Denies heartburn dysphagia abd pain, BRBPR, syncope. Being worked up for NPH.   Colonoscopy  small polyp, EGD  /DU HP negative    Past Medical History:   Diagnosis Date    Allergic rhinitis     Aortic valve disorders     COPD (chronic obstructive pulmonary disease) (HCC)     Hydrocephalus (HCC)     normal pressure,    Hyperlipidemia     Hypertension     Hypertrophy of prostate without urinary obstruction and other lower urinary tract symptoms (LUTS)     Irritable bowel syndrome     Pancreatitis     Peptic ulcer, unspecified site, unspecified as acute or chronic, without mention of hemorrhage, perforation, or obstruction     Peripheral vascular disease ([facility] [address]       Past Surgical History:   Procedure Laterality Date    ABDOMINAL AORTIC ANEURYSM REPAIR N/A     ANGIOPLASTY      RT femoral artery    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REPAIR  2018    [place]    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REPAIR      Aortic valve replacement    AORTIC VALVE REPLACEMENT  2018    CHOLECYSTECTOMY      COLONOSCOPY N/A 2021    COLONOSCOPY POLYPECTOMY SNARE/COLD BIOPSY performed by Nyra Saint, MD at [address] [place] [address]      bifemoral bypass    FOOT SURGERY Right 12/2/15    HERNIA REPAIR Left     OTHER SURGICAL HISTORY  10/23/2017    lumbar puncture    ID ESOPHAGOGASTRODUODENOSCOPY TRANSORAL DIAGNOSTIC N/A 2018    EGD performed by Nyra Saint, MD at [place] [address]. UPPER GASTROINTESTINAL ENDOSCOPY  2018    WITH BIOPSY        Admission Meds  No current facility-administered medications on file prior to encounter. Current Outpatient Medications on File Prior to Encounter   Medication Sig Dispense Refill    HYDROcodone-acetaminophen (NORCO) 5-325 MG per tablet Take 1 tablet by mouth every 6 hours as needed for Pain.  ondansetron (ZOFRAN) 4 MG tablet Take 1 tablet by mouth daily as needed for Nausea or Vomiting 30 tablet 0    polyethylene glycol (GLYCOLAX) 17 GM/SCOOP powder Take 17 g by mouth daily as needed (constipation) 510 g 0    lisinopril (PRINIVIL;ZESTRIL) 5 MG tablet Take 1 tablet by mouth daily 90 tablet 3    gabapentin (NEURONTIN) 100 MG capsule Take 1 capsule by mouth 4 times daily for 90 days. 360 capsule 3    atorvastatin (LIPITOR) 80 MG tablet TAKE ONE TABLET BY MOUTH DAILY 90 tablet 3    torsemide (DEMADEX) 20 MG tablet Take 1 tablet by mouth daily 30 tablet 6    albuterol sulfate  (90 Base) MCG/ACT inhaler Inhale 2 puffs into the lungs every 4 hours as needed for Wheezing 1 Inhaler 1    KLOR-CON M20 20 MEQ extended release tablet TAKE ONE TABLET BY MOUTH DAILY 30 tablet 6    aspirin 81 MG EC tablet Take 1 tablet by mouth daily 30 tablet 3       Patient denies NSAID use. Allergies  No Known Allergies   Social   Social History     Tobacco Use    Smoking status: Former Smoker     Packs/day: 1.50     Years: 30.00     Pack years: 45.00     Types: Cigarettes     Quit date: 2001     Years since quittin.3    Smokeless tobacco: Never Used    Tobacco comment: H.O.smoking at age 25 / smoked up to 1.5p.p.d /quit     Substance Use Topics    Alcohol use:  Yes     Alcohol/week: 2.0 standard drinks     Types: 2 Cans of beer per week     Comment: occasional beer        Family History   Problem Relation Age of Onset    Heart Disease Father     Diabetes Father     Alcohol Abuse Father     Other Mother     Cancer Brother liver      Review of Systems  A comprehensive review of systems was negative except for: Constitutional: positive for fatigue       Physical Exam    BP (!) 154/79   Pulse 71   Temp 97.5 °F (36.4 °C) (Oral)   Resp 16   Ht 6' (1.829 m)   Wt 215 lb (97.5 kg)   SpO2 94%   BMI 29.16 kg/m²   General appearance: alert, cooperative, no distress, appears stated age  Anicteric, No Jaundice  Head: Normocephalic, without obvious abnormality  Lungs: clear to auscultation bilaterally, Normal Effort  Heart: regular rate and rhythm, normal S1 and S2, no murmurs or rubs  Abdomen: soft, non-tender; bowel sounds normal; no masses,  no organomegaly  Extremities: atraumatic, no cyanosis or edema  Skin: warm and dry  Neuro: intact  AAOX3      Data Review:    Recent Labs     04/06/22  1511 04/07/22  0708   WBC 14.2* 12.6*   HGB 15.9 13.7   HCT 48.3 41.0   MCV 88.9 88.1    334     Recent Labs     04/06/22  1511      K 4.9   CL 93*   CO2 27   BUN 31*   CREATININE 1.7*     Recent Labs     04/06/22  1511   AST 31   ALT 35   BILIDIR 0.5*   BILITOT 1.7*   ALKPHOS 172*     Recent Labs     04/06/22  1511   LIPASE 56.0     Recent Labs     04/04/22  1025   PROTIME 13.0*   INR 1.14*           Assessment:     Principal Problem:    RADHA (acute kidney injury) (HCC)  Resolved Problems:    * No resolved hospital problems. *    Consider ulcer bleed is currently stable H/H OK, slightly decreased since admission  History of bleeding /DU 2018    Recommendations:     EGD today  PPI    Thank you for the opportunity to participate in [address] care.     Erica Barrios MD

## 2022-04-07 NOTE — ANESTHESIA POSTPROCEDURE EVALUATION
Department of Anesthesiology  Postprocedure Note    Patient: Inés Parada  MRN: [id number]  [place]: 1945  Date of evaluation: 4/7/2022  Time:  12:32 PM     Procedure Summary     Date: 04/07/22 Room / Location: [address] [place] [address]    Anesthesia Start: 0908 Anesthesia Stop: 7022    Procedure: EGD BIOPSY (N/A ) Diagnosis: (GI BLEED)    Surgeons: Giovanni Briceno MD Responsible Provider: Constantine Gilbert DO    Anesthesia Type: MAC ASA Status: 3 - Emergent          Anesthesia Type: MAC    Jelani Phase I: Jelani Score: 10    Jelani Phase II: Jelani Score: 7    Last vitals: Reviewed and per EMR flowsheets. Anesthesia Post Evaluation    Patient location during evaluation: PACU  Patient participation: complete - patient participated  Level of consciousness: awake  Pain score: 0  Airway patency: patent  Nausea & Vomiting: no nausea and no vomiting  Complications: no  Cardiovascular status: blood pressure returned to baseline  Respiratory status: acceptable  Hydration status: euvolemic  There was medical reason for not using a multimodal analgesia pain management approach.

## 2022-04-07 NOTE — PLAN OF CARE
Problem: Nutrition  Goal: Optimal nutrition therapy  Outcome: Ongoing  Note: Nutrition Problem #1: Inadequate oral intake  Intervention: Food and/or Nutrient Delivery: Continue Current Diet,Start Oral Nutrition Supplement  Nutritional Goals: Pt will tolerate diet advancement and consume 50% of meals and ONS through admission

## 2022-04-07 NOTE — PROGRESS NOTES
Had black tarry stool on bedpan. Pt able to pull himself up in bed. Pt  content and has been A+ox4 with family to assist his needs at bedside. VSS rm air, bp slightly elevated. IV saline locked.  MRI to be done ~9pm.

## 2022-04-08 LAB
HCT VFR BLD CALC: 37.7 % (ref 40.5–52.5)
HCT VFR BLD CALC: 38 % (ref 40.5–52.5)
HEMOGLOBIN: 12.8 G/DL (ref 13.5–17.5)
HEMOGLOBIN: 12.8 G/DL (ref 13.5–17.5)
LACTIC ACID: 1.6 MMOL/L (ref 0.4–2)
LACTIC ACID: 1.8 MMOL/L (ref 0.4–2)
LACTIC ACID: 2.4 MMOL/L (ref 0.4–2)
LACTIC ACID: 2.5 MMOL/L (ref 0.4–2)

## 2022-04-08 PROCEDURE — 97530 THERAPEUTIC ACTIVITIES: CPT

## 2022-04-08 PROCEDURE — C9113 INJ PANTOPRAZOLE SODIUM, VIA: HCPCS | Performed by: INTERNAL MEDICINE

## 2022-04-08 PROCEDURE — 1200000000 HC SEMI PRIVATE

## 2022-04-08 PROCEDURE — 97166 OT EVAL MOD COMPLEX 45 MIN: CPT

## 2022-04-08 PROCEDURE — 83605 ASSAY OF LACTIC ACID: CPT

## 2022-04-08 PROCEDURE — 6360000002 HC RX W HCPCS: Performed by: INTERNAL MEDICINE

## 2022-04-08 PROCEDURE — 97162 PT EVAL MOD COMPLEX 30 MIN: CPT

## 2022-04-08 PROCEDURE — 85018 HEMOGLOBIN: CPT

## 2022-04-08 PROCEDURE — 85014 HEMATOCRIT: CPT

## 2022-04-08 PROCEDURE — 6370000000 HC RX 637 (ALT 250 FOR IP): Performed by: INTERNAL MEDICINE

## 2022-04-08 PROCEDURE — 36415 COLL VENOUS BLD VENIPUNCTURE: CPT

## 2022-04-08 PROCEDURE — 2580000003 HC RX 258: Performed by: INTERNAL MEDICINE

## 2022-04-08 RX ORDER — PANTOPRAZOLE SODIUM 40 MG/1
40 TABLET, DELAYED RELEASE ORAL
Status: DISCONTINUED | OUTPATIENT
Start: 2022-04-08 | End: 2022-04-12 | Stop reason: HOSPADM

## 2022-04-08 RX ADMIN — PANTOPRAZOLE SODIUM 40 MG: 40 TABLET, DELAYED RELEASE ORAL at 17:25

## 2022-04-08 RX ADMIN — ATORVASTATIN CALCIUM 80 MG: 80 TABLET, FILM COATED ORAL at 10:50

## 2022-04-08 RX ADMIN — PANTOPRAZOLE SODIUM 40 MG: 40 INJECTION, POWDER, LYOPHILIZED, FOR SOLUTION INTRAVENOUS at 10:50

## 2022-04-08 RX ADMIN — Medication 6 MG: at 21:30

## 2022-04-08 RX ADMIN — GABAPENTIN 100 MG: 100 CAPSULE ORAL at 21:30

## 2022-04-08 RX ADMIN — SODIUM CHLORIDE, PRESERVATIVE FREE 10 ML: 5 INJECTION INTRAVENOUS at 10:51

## 2022-04-08 RX ADMIN — TORSEMIDE 20 MG: 20 TABLET ORAL at 10:50

## 2022-04-08 RX ADMIN — SENNOSIDES AND DOCUSATE SODIUM 1 TABLET: 50; 8.6 TABLET ORAL at 21:30

## 2022-04-08 RX ADMIN — SENNOSIDES AND DOCUSATE SODIUM 1 TABLET: 50; 8.6 TABLET ORAL at 10:50

## 2022-04-08 ASSESSMENT — PAIN SCALES - GENERAL: PAINLEVEL_OUTOF10: 0

## 2022-04-08 NOTE — PROGRESS NOTES
Physician Progress Note      PATIENT:               Carol Ann Joseph  CSN #:                  [id number]  :                       1945  ADMIT DATE:       2022 1:35 PM  [address] [place] DATE:  RESPONDING  PROVIDER #:        Yumiko Del Rio DO          QUERY TEXT:    Pt admitted with GI bleed. Noted documentation of severe malnutrition on    progress note by ordered registered dietician. If possible, please   document in progress notes and discharge summary:    The medical record reflects the following:  Risk Factors: 78year old male with AMS  Clinical Indicators: Per RD consult note- Pt triggered for positive nutrition   screen. NFPE findings indicate pt with severe malnutrition. Pt complains of   decreased appetite since February. Pt wife states he takes a few bites at each   meal and that is all he can manage. Daughter added that this is not normal   for him as he usually has a big appetite. Pt with self reported wt loss of 15   lbs, however EMR shows wt loss of 20lbs in 2 months. Pt with early satiety. Malnutrition Status: Severe malnutrition. Inadequate oral intake related to   altered GI function as evidenced by NPO or  Treatment: RD consult, continue regular diet, start ensure clears BID  Options provided:  -- Severe malnutrition confirmed present on admission  -- Severe malnutrition ruled out  -- Other - I will add my own diagnosis  -- Disagree - Not applicable / Not valid  -- Disagree - Clinically unable to determine / Unknown  -- Refer to Clinical Documentation Reviewer    PROVIDER RESPONSE TEXT:    The diagnosis of severe malnutrition was confirmed as present on admission. Query created by:  Martina Keys on 2022 8:34 AM      Electronically signed by:  Yumiko Del Rio DO 2022 11:28 AM

## 2022-04-08 NOTE — CARE COORDINATION
UPDATE: ROD spoke to admissions and faxed referral to [address] [place] [address] due to bed availability at [place] [address]. fax [phone] phone [phone]  Electronically signed by CLARENCE Walker, RAVINDERW on 4/8/2022 at 5:16 PM      ROD spoke to MD, he believes Pt is appropriated for ARU, he will cons ARU. ROD notified [place] in the ARU. ROD received a call from [facility], Pt used 17 SNF days and is now in co-pay days. ROD informed her MD thinks ARU may be more appropriate for Pt, she will continue to follow in case SNF is needed. Pt and Pt's family is okay w/ARU or SNF. Pt is improved but still a little confused, restarting diuretic margot, stop IVF,    SW following for DCP.      Electronically signed by CLARENCE Walker, KEARA on 4/8/2022 at 3:12 PM  [phone]

## 2022-04-08 NOTE — PROGRESS NOTES
Physical Therapy    Facility/Department: [facility]  Initial Assessment    NAME: Dhara Lopez  : 1945  MRN: [id number]    Date of Service: 2022    Discharge Recommendations: Dhara Daily scored a  on the AM-PAC short mobility form. Current research shows that an AM-PAC score of 17 or less is typically not associated with a discharge to the patient's home setting. Based on the patient's AM-PAC score and their current functional mobility deficits, it is recommended that the patient have 3-5 sessions per week of Physical Therapy at d/c to increase the patient's independence. Please see assessment section for further patient specific details. If patient discharges prior to next session this note will serve as a discharge summary. Please see below for the latest assessment towards goals. PT Equipment Recommendations  Equipment Needed:  (defer)    Assessment   Body structures, Functions, Activity limitations: Decreased functional mobility ; Decreased endurance;Decreased strength;Decreased balance;Decreased safe awareness;Decreased cognition  Assessment: pt is a 79 yo male typically independent with mobility usingn SPC/RW however family reports a significant decline in last 2 weeks to a month with increased difficulty ambulating and poor balance. pt is currently performing bed mobility with CGA and increased time to complete, sit to stand transfers with min A of 2, and required mod A of 2 at [facility] for taking a few steps to the chair via stand pivot. pt demonstrating a significant posterior lean with all standing activities and is requiring constant verbal cues for sequencing and initiation of tasks. pt presenting with increased confusion and difficulty performing everyday tasks. pt is a high fall risk and unsafe to return home in current state. Recommend further IP PT upon dc to maximize independence with mobility.  will follow throughout stay  Treatment Diagnosis: dec functional mobility  Prognosis: Good  Decision Making: Medium Complexity  PT Education: PT Role;Plan of Care;General Safety;Goals; Functional Mobility Training  Patient Education: pt will benefit from reinforcement  REQUIRES PT FOLLOW UP: Yes  Activity Tolerance  Activity Tolerance: Patient limited by endurance; Patient limited by fatigue;Patient limited by cognitive status       Patient Diagnosis(es): The primary encounter diagnosis was RADHA (acute kidney injury) ([facility] [address] A diagnosis of Altered mental status, unspecified altered mental status type was also pertinent to this visit. has a past medical history of Allergic rhinitis, Aortic valve disorders, COPD (chronic obstructive pulmonary disease) (HCC), Hydrocephalus ([facility] [address] Hyperlipidemia, Hypertension, Hypertrophy of prostate without urinary obstruction and other lower urinary tract symptoms (LUTS), Irritable bowel syndrome, Pancreatitis, Peptic ulcer, unspecified site, unspecified as acute or chronic, without mention of hemorrhage, perforation, or obstruction, and Peripheral vascular disease ([facility] [address] 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 Restrictions  Position Activity Restriction  Other position/activity restrictions: up as tolerated  Vision/Hearing  Vision: Within Functional Limits  Hearing: Within functional limits     Subjective  General  Chart Reviewed:  Yes  Additional Pertinent Hx: pt is a 79 yo male presenting with black stools and progressive weakness over the last few weeks experiencing difficulty with ambulation and balance, EGD on 4/7 showed multiple ulcers. Neuro following for diagnosis of NPH in 2018  Referring Practitioner: Nahum Hooks MD  Diagnosis: RADHA  Follows Commands: Within Functional Limits  Subjective  Subjective: pt supine in bed and agreeable to PT, pt with slight confusion throughout session and difficulty with word finding  Pain Screening  Patient Currently in Pain: Denies          Orientation  Orientation  Overall Orientation Status: Impaired  Orientation Level: Oriented to person;Disoriented to time;Disoriented to situation;Disoriented to place  Social/Functional History  Social/Functional History  Lives With: Spouse  Type of Home: House  Home Layout: One level,Laundry in basement  Home Access: Stairs to enter without rails (porch)  Entrance Stairs - Number of Steps: 1  Bathroom Shower/Tub: Walk-in shower  Home Equipment: Rolling walker  Receives Help From: Family  ADL Assistance: Needs assistance (pt reports wife assists)  Homemaking Assistance: Needs assistance (wife performs house duties)  Ambulation Assistance: Independent (with RW)  Transfer Assistance: Independent  Active : No  Patient's  Info: wife  Occupation: Retired  Type of occupation: maintainence  Additional Comments: pt reports no falls recently. pt questionable historian  Cognition        Objective             Strength RLE  Comment: generalized weakness noted, grossly 3/5  Strength LLE  Comment: generalized weakness noted, grossly 3/5        Bed mobility  Supine to Sit: Contact guard assistance  Scooting: Contact guard assistance  Comment: max VC for sequencing and initation, ++ time and effort to complete  Transfers  Sit to Stand: 2 Person Assistance;Minimal Assistance (min A of 2 at RW)  Stand to sit: 2 Person Assistance;Minimal Assistance  Bed to Chair: 2 Person Assistance; Moderate assistance (mod A of 2 with RW)  Stand Pivot Transfers: 2 Person Assistance; Moderate Assistance (mod A of 2 with RW)  Comment: significant posterior lean with stance at RW, max VC for sequencing and initiation of tasks  Ambulation  Ambulation?: Yes  More Ambulation?: No  Ambulation 1  Surface: level tile  Device: [address]: 2 Person assistance; Moderate assistance (mod A of 2)  Quality of Gait: unsteady gait, significant posterior lean, shuffling steps  Gait Deviations: Shuffles;Decreased step length; Slow Kaylee  Distance: few steps to chair  Comments: pt requiring constant VC for sequencing of steps     Balance  Posture: Fair  Sitting - Static: Fair  Sitting - Dynamic: Fair  Standing - Static: Poor;+  Standing - Dynamic: Poor  Comments: sitting balance EOB with CGA, static standing requiring mod A at RW due to posteior lean, mod A of 2 required for mobility with RW        Plan   Plan  Times per week: 2-5  Current Treatment Recommendations: [place] Coma Re-education,Patient/Caregiver Education & Training,Balance Training,Gait Training,Functional Mobility Training,Stair training,Safety Education & Training,Home Exercise Program  Safety Devices  Type of devices: Call light within reach,Left in chair,Chair alarm in place,Gait belt,Nurse notified    G-Code       OutComes Score                                                  AM-PAC Score  AM-PAC Inpatient Mobility Raw Score : 11 (04/08/22 1038)  AM-PAC Inpatient T-Scale Score : 33.86 (04/08/22 1038)  Mobility Inpatient CMS 0-100% Score: 72.57 (04/08/22 1038)  Mobility Inpatient CMS G-Code Modifier : CL (04/08/22 1038)          Goals  Short term goals  Time Frame for Short term goals: by dc  Short term goal 1: pt will perform bed mobility with supervision  Short term goal 2: pt will perform functional transfers with min A at [address] term goal 3: pt will ambulate 22' with LRAD and mod A  Patient Goals   Patient goals : to get stronger       Therapy Time   Individual Concurrent Group Co-treatment   Time In 0848         Time Out 0914         Minutes 26              Timed Code Treatment Minutes:  11 min     Total Treatment Minutes:  26 min       Imani Fletcher, PT

## 2022-04-08 NOTE — PROGRESS NOTES
Occupational Therapy   Occupational Therapy Initial Assessment and Tx  Date: 2022   Patient Name: Dedra Tang  MRN: [id number]     : 1945    Date of Service: 2022    Discharge Recommendations: Dedra Tang scored a 15/24 on the AM-PAC ADL Inpatient form. Current research shows that an AM-PAC score of 17 or less is typically not associated with a discharge to the patient's home setting. Based on the patient's AM-PAC score and their current ADL deficits, it is recommended that the patient have 3-5 sessions per week of Occupational Therapy at d/c to increase the patient's independence. Please see assessment section for further patient specific details. If patient discharges prior to next session this note will serve as a discharge summary. Please see below for the latest assessment towards goals. OT Equipment Recommendations  Equipment Needed: No  Other: defer    Assessment   Performance deficits / Impairments: Decreased functional mobility ; Decreased ADL status; Decreased endurance;Decreased cognition;Decreased posture;Decreased balance  Assessment: From home with wife admit RADHA, AMS, UGI bleed. Pt currently requires 1-2 person assist with fx mobility and transfers. Heavy posterior lean in standing. Pt completing bed to chair transfer with ModAx2. Decreased cognition, max cues for tasks, increased time and effort for initiation and completion of tasks. Completing ADLs seated in recliner chair. Would benefit from cont skilled OT while in acute care and inpt at [facility].   Treatment Diagnosis: impaired mobility, transfers, ADL  Decision Making: Medium Complexity  OT Education: OT Role;Plan of Care;ADL Adaptive Strategies;Transfer Training  Patient Education: cont to reinforce  Barriers to Learning: cognition  REQUIRES OT FOLLOW UP: Yes  Activity Tolerance  Activity Tolerance: Patient Tolerated treatment well;Patient limited by fatigue;Treatment limited secondary to decreased cognition  Safety Devices  Safety Devices in place: Yes  Type of devices: All fall risk precautions in place;Call light within reach; Chair alarm in place;Gait belt;Patient at risk for falls; Left in chair;Nurse notified           Patient Diagnosis(es): The primary encounter diagnosis was RADHA (acute kidney injury) ([address] A diagnosis of Altered mental status, unspecified altered mental status type was also pertinent to this visit. has a past medical history of Allergic rhinitis, Aortic valve disorders, COPD (chronic obstructive pulmonary disease) (HCC), Hydrocephalus ([address] Hyperlipidemia, Hypertension, Hypertrophy of prostate without urinary obstruction and other lower urinary tract symptoms (LUTS), Irritable bowel syndrome, Pancreatitis, Peptic ulcer, unspecified site, unspecified as acute or chronic, without mention of hemorrhage, perforation, or obstruction, and Peripheral vascular disease ([address] 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 Treatment Diagnosis: impaired mobility, transfers, ADL      Restrictions  Position Activity Restriction  Other position/activity restrictions: up as tolerated    Subjective   General  Chart Reviewed: Yes  Additional Pertinent Hx: 78 y.o. male with a history of HTN, HLD, COPD, normal pressure hydrocephalus, who presents with concerns for altered mental status as well as hematemesis and black, tarry stools. Most of the history is obtained by the patient's wife, who is at bedside.   She states that ever since January the patient has had somewhat sudden onset of confusion, generalized weakness, anorexia. UGI bleed, OR 4/7 ENDO. Referring Practitioner: Chanelle Castellanos MD  Diagnosis: RADHA  Subjective  Subjective: In bed, agreeable to session, reporting no pain    Social/Functional History  Social/Functional History  Lives With: Spouse  Type of Home: House  Home Layout: One level,Laundry in basement  Home Access: Stairs to enter without rails (porch)  Entrance Stairs - Number of Steps: 1  Bathroom Shower/Tub: Walk-in shower  Home Equipment: Rolling walker  Receives Help From: Family  ADL Assistance: Needs assistance (pt reports wife assists)  Homemaking Assistance: Needs assistance (wife performs house duties)  Ambulation Assistance: Independent (with RW)  Transfer Assistance: Independent  Active : No  Patient's  Info: wife  Occupation: Retired  Type of occupation: maintainence  Additional Comments: pt reports no falls recently. pt questionable historian       Objective        Orientation  Overall Orientation Status: Impaired  Orientation Level: Oriented to person (place as hospital, stating month as march)     Balance  Sitting Balance: Stand by assistance  Standing Balance: Moderate assistance  Standing Balance  Activity: sit<>stand, bed to chair  Functional Mobility  Functional - Mobility Device: Rolling Walker  Activity: Other  ADL  Feeding: Setup;Supervision  Grooming: Setup;Stand by assistance  UE Dressing: Minimal assistance  LE Dressing: Maximum assistance  Toileting:  (brief donned)        Bed mobility  Supine to Sit: Contact guard assistance  Scooting: Contact guard assistance  Comment: increased time and effort, max cues  Transfers  Sit to stand: Minimal assistance;2 Person assistance  Stand to sit: Minimal assistance;2 Person assistance  Transfer Comments: Min to [facility] x1-2     Cognition  Overall Cognitive Status: Exceptions  Arousal/Alertness: Delayed responses to stimuli; Appropriate responses to stimuli  Following Commands: Follows one step commands with increased time; Follows one step commands with repetition; Inconsistently follows commands  Attention Span: Attends with cues to redirect; Difficulty attending to directions; Difficulty dividing attention  Memory: Decreased recall of biographical Information;Decreased recall of recent events  Safety Judgement: Decreased awareness of need for assistance;Decreased awareness of need for safety  Problem Solving: Decreased awareness of errors  Insights: Decreased awareness of deficits  Initiation: Requires cues for some  Sequencing: Requires cues for some                 LUE AROM (degrees)  LUE General AROM: not formally tested, BUE appears WFL with fx activity/ADLs  LUE Strength  Gross LUE Strength: WFL  RUE Strength  Gross RUE Strength: WFL                   Plan   Plan  Times per week: 2-5  Times per day: Daily      AM-PAC Score        AM-[facility] Inpatient Daily Activity Raw Score: 15 (04/08/22 1043)  AM-PAC Inpatient ADL T-Scale Score : 34.69 (04/08/22 1043)  ADL Inpatient CMS 0-100% Score: 56.46 (04/08/22 1043)  ADL Inpatient CMS G-Code Modifier : CK (04/08/22 1043)    Goals  Short term goals  Time Frame for Short term goals: dc  Short term goal 1: supine to sit SBA  Short term goal 2: sit<>stand CGA  Short term goal 3: bed to chair/BSC fx transfers Kalen  Short term goal 4: toileting Kalen  Short term goal 5: UB/LB dressing Kalen       Therapy Time   Individual Concurrent Group Co-treatment   Time In 0848         Time Out 0915         Minutes 27           Timed Code Treatment Minutes:   12    Total Treatment Minutes:  [address], OT

## 2022-04-08 NOTE — PROGRESS NOTES
Hospitalist Progress Note      PCP: Gideon Lu MD    Date of Admission: 4/6/2022    Chief Complaint: AMS, weakness, [place]: 60-year-old male who presents to [facility] with chief complaint of altered mental status, weakness and dark stool. since January the patient has had progressively worsening confusion as well as anorexia. The have been evaluated in the emergency department times and have seen neurology as an outpatient with no etiology established. He was scheduled for an MRI however he presents because he had a dark black bowel movement. Subjective: mental statues seem to improve a little bit, more awake, able to get up from bed with help      Medications:  Reviewed    Infusion Medications    sodium chloride       Scheduled Medications    melatonin  6 mg Oral Nightly    gabapentin  100 mg Oral Nightly    torsemide  20 mg Oral Daily    atorvastatin  80 mg Oral Daily    pantoprazole  40 mg IntraVENous BID    sodium chloride flush  10 mL IntraVENous 2 times per day    sennosides-docusate sodium  1 tablet Oral BID     PRN Meds: ipratropium-albuterol, sodium chloride flush, sodium chloride, promethazine **OR** ondansetron, polyethylene glycol, acetaminophen **OR** acetaminophen      Intake/Output Summary (Last 24 hours) at 4/8/2022 1048  Last data filed at 4/7/2022 2105  Gross per 24 hour   Intake 1801 ml   Output 100 ml   Net 1701 ml       Physical Exam Performed:    BP (!) 148/78   Pulse 71   Temp 97.6 °F (36.4 °C) (Oral)   Resp 16   Ht 6' (1.829 m)   Wt 215 lb (97.5 kg)   SpO2 99%   BMI 29.16 kg/m²     General appearance: chronically ill appearing, appears stated age and cooperative. HEENT: Pupils equal, round, and reactive to light. Conjunctivae/corneas clear. Neck: Supple, with full range of motion. No jugular venous distention. Trachea midline. Respiratory:  Normal respiratory effort.  Clear to auscultation, bilaterally without Rales/Wheezes/Rhonchi. Cardiovascular: Regular rate and rhythm with normal S1/S2 without murmurs, rubs or gallops. Abdomen: Soft, non-tender, non-distended with normal bowel sounds. Musculoskeletal: No clubbing, cyanosis or edema bilaterally. Full range of motion without deformity. Skin: Skin color, texture, turgor normal.  No rashes or lesions. Neurologic:  Neurovascularly intact without any focal sensory/motor deficits. Cranial nerves: II-XII intact, grossly non-focal. Generalized weakness  Psychiatric: has some level of confusion  Capillary Refill: Brisk,3 seconds, normal   Peripheral Pulses: +2 palpable, equal bilaterally       Labs:   Recent Labs     04/06/22  1511 04/06/22  1511 04/07/22  0708 04/07/22 1952 04/08/22  0802   WBC 14.2*  --  12.6*  --   --    HGB 15.9   < > 13.7 13.0* 12.8*   HCT 48.3   < > 41.0 37.4* 38.0*     --  334  --   --     < > = values in this interval not displayed. Recent Labs     04/06/22  1511 04/07/22  0708    136   K 4.9 4.9   CL 93* 97*   CO2 27 30   BUN 31* 33*   CREATININE 1.7* 1.3   CALCIUM 10.4 10.1     Recent Labs     04/06/22  1511   AST 31   ALT 35   BILIDIR 0.5*   BILITOT 1.7*   ALKPHOS 172*     No results for input(s): INR in the last 72 hours. Recent Labs     04/06/22  1511 04/06/22  1742   TROPONINI 0.02* <0.01       Urinalysis:      Lab Results   Component Value Date    NITRU Negative 04/06/2022    WBCUA 1 03/20/2022    RBCUA NEGATIVE 03/23/2022    BLOODU Negative 04/06/2022    SPECGRAV 1.025 04/06/2022    GLUCOSEU Negative 04/06/2022       Radiology:  MRI BRAIN WO CONTRAST   Final Result      1. Moderate cortical atrophy. 2. Mild to moderate white matter disease. XR CHEST (2 VW)   Final Result      Mild linear atelectasis/scarring left lung base. Lungs are otherwise clear. Cardiomediastinal silhouette. Cardiac valve replacement noted. Surgical clips overlie the left upper thorax. 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]     Central stenosis of spinal canal [M48.00]     RADHA (acute kidney injury) ([facility] [N17.9]     S/P TAVR (transcatheter aortic valve replacement) [Z95.2]     GI hemorrhage [K92.2]      RADHA  - improving  - can stop IVF  - Hold lisinopril   - can restart torsemide 4/8/22     Melana due to duodenal ulcers and gastritis  FOBT positive  IV PPI BID  Trend H&H  GI consulted s/p EGD which showed multiple duodenal ulcers and antral gastritis  - will hold ASA for now  - can probably change asa to plavix start 1 week later     Subacute on chronic encephalopathy  Normal pressure hydrocephalus  S/p recent LP 2022 and had LP 2017  Check TSH, ammonia, B12  UA neg  Troponin negative  Chest x-ray negative  Neurology consulted  - CSF showed mildly elevated protein, no WBC or RBC  - LP did not remove enough CSF fluid due to not enough for them to remove  - openning pressure was normal  - MRI neg for stroke  - I think this is multifactorial, will change gabapentin to bed time only  - melatonin at bedtime  - PT/OT     Hypertension  - holding lisinopril due to RADHA  - restart torsemide in AM    COPD not in exacerbation  - duoneb PRN    Hyperlipidemia  - statin    Peripheral vascular disease  Hx of TAVR  - statin  - holding asa due to melana  - discussed with GI  - will hold antiplelet for 1 week  - can switch to plavix  - consulted card       DVT Prophylaxis: SCDs  Diet: ADULT DIET;  Regular  ADULT ORAL NUTRITION SUPPLEMENT; Breakfast, Dinner; Clear Liquid Oral Supplement  Code Status: Full Code    PT/OT Eval Status: pending    Dispo - floor    Adithya Garcia DO

## 2022-04-08 NOTE — PROGRESS NOTES
This AM Pt attempting to get out bed to \"get up for breakfast\" and Pt asking \"is Joselyn here? \" and Kylah Chanelon is Thelbert Lightning? \", Pt reminded and reoriented that he is at the [facility], [facility]. in [facility]. Pt repositioned in bed, Pt satisfied and reminded that breakfast will be delivered to his room in a few hours. Bed alarm on, wheels locked, bed in lowest position, and non-skid socks on.

## 2022-04-08 NOTE — PROGRESS NOTES
Patient alert to self and situation. Vitals stable, denies pain. Hgb stable at 12.8. No bm yet this shift. Patient up to chair, eating well. Lost IV access, spoke to Dr Ilene Chance, ok to leave out, changed protonix to oral.  Call light in reach, alarm on. Will continue to monitor.

## 2022-04-09 LAB
A/G RATIO: 1.1 (ref 1.1–2.2)
ALBUMIN SERPL-MCNC: 3.5 G/DL (ref 3.4–5)
ALP BLD-CCNC: 105 U/L (ref 40–129)
ALT SERPL-CCNC: 27 U/L (ref 10–40)
ANION GAP SERPL CALCULATED.3IONS-SCNC: 10 MMOL/L (ref 3–16)
AST SERPL-CCNC: 26 U/L (ref 15–37)
BASOPHILS ABSOLUTE: 0.1 K/UL (ref 0–0.2)
BASOPHILS RELATIVE PERCENT: 1.1 %
BILIRUB SERPL-MCNC: 1 MG/DL (ref 0–1)
BUN BLDV-MCNC: 26 MG/DL (ref 7–20)
CALCIUM SERPL-MCNC: 9.5 MG/DL (ref 8.3–10.6)
CHLORIDE BLD-SCNC: 100 MMOL/L (ref 99–110)
CO2: 27 MMOL/L (ref 21–32)
CREAT SERPL-MCNC: 1.2 MG/DL (ref 0.8–1.3)
EOSINOPHILS ABSOLUTE: 0 K/UL (ref 0–0.6)
EOSINOPHILS RELATIVE PERCENT: 0.1 %
GFR AFRICAN AMERICAN: >60
GFR NON-AFRICAN AMERICAN: 59
GLUCOSE BLD-MCNC: 117 MG/DL (ref 70–99)
HCT VFR BLD CALC: 38 % (ref 40.5–52.5)
HEMOGLOBIN: 13 G/DL (ref 13.5–17.5)
LACTIC ACID: 1 MMOL/L (ref 0.4–2)
LACTIC ACID: 1.2 MMOL/L (ref 0.4–2)
LACTIC ACID: 1.6 MMOL/L (ref 0.4–2)
LACTIC ACID: 2 MMOL/L (ref 0.4–2)
LYMPHOCYTES ABSOLUTE: 1.3 K/UL (ref 1–5.1)
LYMPHOCYTES RELATIVE PERCENT: 12.3 %
MCH RBC QN AUTO: 29.9 PG (ref 26–34)
MCHC RBC AUTO-ENTMCNC: 34.3 G/DL (ref 31–36)
MCV RBC AUTO: 87.1 FL (ref 80–100)
MONOCYTES ABSOLUTE: 0.8 K/UL (ref 0–1.3)
MONOCYTES RELATIVE PERCENT: 7.6 %
NEUTROPHILS ABSOLUTE: 8.4 K/UL (ref 1.7–7.7)
NEUTROPHILS RELATIVE PERCENT: 78.9 %
PDW BLD-RTO: 14 % (ref 12.4–15.4)
PLATELET # BLD: 257 K/UL (ref 135–450)
PMV BLD AUTO: 9.5 FL (ref 5–10.5)
POTASSIUM REFLEX MAGNESIUM: 4.1 MMOL/L (ref 3.5–5.1)
RBC # BLD: 4.36 M/UL (ref 4.2–5.9)
SODIUM BLD-SCNC: 137 MMOL/L (ref 136–145)
TOTAL PROTEIN: 6.8 G/DL (ref 6.4–8.2)
WBC # BLD: 10.7 K/UL (ref 4–11)

## 2022-04-09 PROCEDURE — 80053 COMPREHEN METABOLIC PANEL: CPT

## 2022-04-09 PROCEDURE — 6370000000 HC RX 637 (ALT 250 FOR IP): Performed by: INTERNAL MEDICINE

## 2022-04-09 PROCEDURE — 85025 COMPLETE CBC W/AUTO DIFF WBC: CPT

## 2022-04-09 PROCEDURE — 2580000003 HC RX 258: Performed by: INTERNAL MEDICINE

## 2022-04-09 PROCEDURE — 1200000000 HC SEMI PRIVATE

## 2022-04-09 PROCEDURE — 36415 COLL VENOUS BLD VENIPUNCTURE: CPT

## 2022-04-09 PROCEDURE — 83605 ASSAY OF LACTIC ACID: CPT

## 2022-04-09 RX ADMIN — TORSEMIDE 20 MG: 20 TABLET ORAL at 09:17

## 2022-04-09 RX ADMIN — GABAPENTIN 100 MG: 100 CAPSULE ORAL at 20:23

## 2022-04-09 RX ADMIN — SENNOSIDES AND DOCUSATE SODIUM 1 TABLET: 50; 8.6 TABLET ORAL at 09:17

## 2022-04-09 RX ADMIN — PANTOPRAZOLE SODIUM 40 MG: 40 TABLET, DELAYED RELEASE ORAL at 18:25

## 2022-04-09 RX ADMIN — Medication 6 MG: at 20:23

## 2022-04-09 RX ADMIN — PANTOPRAZOLE SODIUM 40 MG: 40 TABLET, DELAYED RELEASE ORAL at 06:31

## 2022-04-09 RX ADMIN — ATORVASTATIN CALCIUM 80 MG: 80 TABLET, FILM COATED ORAL at 09:18

## 2022-04-09 RX ADMIN — SODIUM CHLORIDE, PRESERVATIVE FREE 10 ML: 5 INJECTION INTRAVENOUS at 09:18

## 2022-04-09 ASSESSMENT — PAIN SCALES - GENERAL
PAINLEVEL_OUTOF10: 0

## 2022-04-09 ASSESSMENT — PAIN DESCRIPTION - PAIN TYPE
TYPE: ACUTE PAIN

## 2022-04-09 NOTE — PROGRESS NOTES
Pt was alert to person, time and place. Quiet and rested well. No BM this shift. Used urinal to void few times and incontinent overnight. BP elevated slightly, other VSS. Several attempts made to offer incontinence care, pt refused to be cleaned up this morning.

## 2022-04-09 NOTE — PROGRESS NOTES
Patient alert and oriented x4, can be forgetful at times but easily reoriented. VSS. Patient has denied any pain throughout shift. Patient with one incontinent large dark bowel movement. Patient remains continent of bladder. Patient resting comfortably throughout shift awaiting placement. Patient denies any other needs at this time. Bed is in the lowest position, call light and bedside table within reach. Patient bed alarm is on. Will continue to monitor for changes in patient status.      Electronically signed by Isaak Alfred RN on 4/9/2022 at 6:36 PM

## 2022-04-09 NOTE — PROGRESS NOTES
murmurs, rubs or gallops. Abdomen: Soft, non-tender, non-distended with normal bowel sounds. Musculoskeletal: No clubbing, cyanosis or edema bilaterally. Full range of motion without deformity. Skin: Skin color, texture, turgor normal.  No rashes or lesions. Neurologic:  Neurovascularly intact without any focal sensory/motor deficits. Cranial nerves: II-XII intact, grossly non-focal. Generalized weakness  Psychiatric: has some level of confusion  Capillary Refill: Brisk,3 seconds, normal   Peripheral Pulses: +2 palpable, equal bilaterally       Labs:   Recent Labs     04/06/22  1511 04/06/22  1511 04/07/22  0708 04/07/22  0708 04/07/22  1952 04/08/22  0802 04/08/22 2023   WBC 14.2*  --  12.6*  --   --   --   --    HGB 15.9   < > 13.7   < > 13.0* 12.8* 12.8*   HCT 48.3   < > 41.0   < > 37.4* 38.0* 37.7*     --  334  --   --   --   --     < > = values in this interval not displayed. Recent Labs     04/06/22  1511 04/07/22  0708    136   K 4.9 4.9   CL 93* 97*   CO2 27 30   BUN 31* 33*   CREATININE 1.7* 1.3   CALCIUM 10.4 10.1     Recent Labs     04/06/22  1511   AST 31   ALT 35   BILIDIR 0.5*   BILITOT 1.7*   ALKPHOS 172*     No results for input(s): INR in the last 72 hours. Recent Labs     04/06/22  1511 04/06/22  1742   TROPONINI 0.02* <0.01       Urinalysis:      Lab Results   Component Value Date    NITRU Negative 04/06/2022    WBCUA 1 03/20/2022    RBCUA NEGATIVE 03/23/2022    BLOODU Negative 04/06/2022    SPECGRAV 1.025 04/06/2022    GLUCOSEU Negative 04/06/2022       Radiology:  MRI BRAIN WO CONTRAST   Final Result      1. Moderate cortical atrophy. 2. Mild to moderate white matter disease. XR CHEST (2 VW)   Final Result      Mild linear atelectasis/scarring left lung base. Lungs are otherwise clear. Cardiomediastinal silhouette. Cardiac valve replacement noted. Surgical clips overlie the left upper thorax.               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]     Central stenosis of spinal canal [M48.00]     RADHA (acute kidney injury) ([facility] [address] [N17.9]     S/P TAVR (transcatheter aortic valve replacement) [Z95.2]     GI hemorrhage [K92.2]      RADHA  - improving  - can stop IVF  - Hold lisinopril   - can restart torsemide 4/8/22  - awaiting for repeat lab     Melana due to duodenal ulcers and gastritis  FOBT positive  IV PPI BID  Trend H&H  GI consulted s/p EGD which showed multiple duodenal ulcers and antral gastritis  - will hold ASA for now  - can probably change asa to plavix start 1 week later     Subacute on chronic encephalopathy  Normal pressure hydrocephalus  S/p recent LP 2022 and had LP 2017  Check TSH, ammonia, B12  UA neg  Troponin negative  Chest x-ray negative  Neurology consulted  - CSF showed mildly elevated protein, no WBC or RBC  - LP did not remove enough CSF fluid due to not enough for them to remove  - openning pressure was normal  - MRI neg for stroke  - I think this is multifactorial, will change gabapentin to bed time only  - melatonin at bedtime  - PT/OT  - pending rehab placement     Hypertension  - holding lisinopril due to RADHA  - restart torsemide in AM    COPD not in exacerbation  - duoneb PRN    Hyperlipidemia  - statin    Peripheral vascular disease  Hx of TAVR  - statin  - holding asa due to melana  - discussed with GI  - will hold antiplelet for 1 week  - can switch to plavix  - consulted card       DVT Prophylaxis: SCDs  Diet: ADULT DIET;  Regular  ADULT ORAL NUTRITION SUPPLEMENT; Breakfast, Dinner; Clear Liquid Oral Supplement  Code Status: Full Code    PT/OT Eval Status: pending    Dispo - floor    Yuliana Jacobo DO

## 2022-04-10 LAB
A/G RATIO: 1.1 (ref 1.1–2.2)
ALBUMIN SERPL-MCNC: 3.5 G/DL (ref 3.4–5)
ALP BLD-CCNC: 105 U/L (ref 40–129)
ALT SERPL-CCNC: 28 U/L (ref 10–40)
ANION GAP SERPL CALCULATED.3IONS-SCNC: 10 MMOL/L (ref 3–16)
AST SERPL-CCNC: 23 U/L (ref 15–37)
BASOPHILS ABSOLUTE: 0 K/UL (ref 0–0.2)
BASOPHILS RELATIVE PERCENT: 0.3 %
BILIRUB SERPL-MCNC: 0.8 MG/DL (ref 0–1)
BUN BLDV-MCNC: 27 MG/DL (ref 7–20)
CALCIUM SERPL-MCNC: 9.2 MG/DL (ref 8.3–10.6)
CHLORIDE BLD-SCNC: 99 MMOL/L (ref 99–110)
CO2: 28 MMOL/L (ref 21–32)
CREAT SERPL-MCNC: 1.1 MG/DL (ref 0.8–1.3)
EOSINOPHILS ABSOLUTE: 0.1 K/UL (ref 0–0.6)
EOSINOPHILS RELATIVE PERCENT: 0.8 %
GFR AFRICAN AMERICAN: >60
GFR NON-AFRICAN AMERICAN: >60
GLUCOSE BLD-MCNC: 122 MG/DL (ref 70–99)
HCT VFR BLD CALC: 38.2 % (ref 40.5–52.5)
HCT VFR BLD CALC: 39.6 % (ref 40.5–52.5)
HCT VFR BLD CALC: 40 % (ref 40.5–52.5)
HEMOGLOBIN: 12.8 G/DL (ref 13.5–17.5)
HEMOGLOBIN: 13.1 G/DL (ref 13.5–17.5)
HEMOGLOBIN: 13.5 G/DL (ref 13.5–17.5)
LACTIC ACID: 1.4 MMOL/L (ref 0.4–2)
LACTIC ACID: 1.4 MMOL/L (ref 0.4–2)
LACTIC ACID: 1.5 MMOL/L (ref 0.4–2)
LACTIC ACID: 1.6 MMOL/L (ref 0.4–2)
LYMPHOCYTES ABSOLUTE: 1.7 K/UL (ref 1–5.1)
LYMPHOCYTES RELATIVE PERCENT: 14.8 %
MCH RBC QN AUTO: 29.4 PG (ref 26–34)
MCHC RBC AUTO-ENTMCNC: 33.1 G/DL (ref 31–36)
MCV RBC AUTO: 88.8 FL (ref 80–100)
MONOCYTES ABSOLUTE: 0.7 K/UL (ref 0–1.3)
MONOCYTES RELATIVE PERCENT: 6.3 %
NEUTROPHILS ABSOLUTE: 9.1 K/UL (ref 1.7–7.7)
NEUTROPHILS RELATIVE PERCENT: 77.8 %
PDW BLD-RTO: 14.4 % (ref 12.4–15.4)
PLATELET # BLD: 283 K/UL (ref 135–450)
PMV BLD AUTO: 10 FL (ref 5–10.5)
POTASSIUM REFLEX MAGNESIUM: 3.7 MMOL/L (ref 3.5–5.1)
RBC # BLD: 4.46 M/UL (ref 4.2–5.9)
SODIUM BLD-SCNC: 137 MMOL/L (ref 136–145)
TOTAL PROTEIN: 6.6 G/DL (ref 6.4–8.2)
WBC # BLD: 11.6 K/UL (ref 4–11)

## 2022-04-10 PROCEDURE — 6370000000 HC RX 637 (ALT 250 FOR IP): Performed by: INTERNAL MEDICINE

## 2022-04-10 PROCEDURE — 85025 COMPLETE CBC W/AUTO DIFF WBC: CPT

## 2022-04-10 PROCEDURE — 85018 HEMOGLOBIN: CPT

## 2022-04-10 PROCEDURE — 85014 HEMATOCRIT: CPT

## 2022-04-10 PROCEDURE — 1200000000 HC SEMI PRIVATE

## 2022-04-10 PROCEDURE — 80053 COMPREHEN METABOLIC PANEL: CPT

## 2022-04-10 PROCEDURE — 36415 COLL VENOUS BLD VENIPUNCTURE: CPT

## 2022-04-10 PROCEDURE — 83605 ASSAY OF LACTIC ACID: CPT

## 2022-04-10 RX ORDER — AMLODIPINE BESYLATE 5 MG/1
5 TABLET ORAL DAILY
Status: DISCONTINUED | OUTPATIENT
Start: 2022-04-10 | End: 2022-04-12 | Stop reason: HOSPADM

## 2022-04-10 RX ADMIN — AMLODIPINE BESYLATE 5 MG: 5 TABLET ORAL at 05:13

## 2022-04-10 RX ADMIN — TORSEMIDE 20 MG: 20 TABLET ORAL at 08:51

## 2022-04-10 RX ADMIN — ATORVASTATIN CALCIUM 80 MG: 80 TABLET, FILM COATED ORAL at 08:51

## 2022-04-10 RX ADMIN — SENNOSIDES AND DOCUSATE SODIUM 1 TABLET: 50; 8.6 TABLET ORAL at 08:51

## 2022-04-10 RX ADMIN — GABAPENTIN 100 MG: 100 CAPSULE ORAL at 21:23

## 2022-04-10 RX ADMIN — PANTOPRAZOLE SODIUM 40 MG: 40 TABLET, DELAYED RELEASE ORAL at 05:13

## 2022-04-10 RX ADMIN — PANTOPRAZOLE SODIUM 40 MG: 40 TABLET, DELAYED RELEASE ORAL at 17:06

## 2022-04-10 RX ADMIN — Medication 6 MG: at 21:23

## 2022-04-10 RX ADMIN — SENNOSIDES AND DOCUSATE SODIUM 1 TABLET: 50; 8.6 TABLET ORAL at 21:23

## 2022-04-10 ASSESSMENT — PAIN SCALES - GENERAL
PAINLEVEL_OUTOF10: 0

## 2022-04-10 NOTE — PROGRESS NOTES
Patient alert and oriented x4. VSS with the exception of elevated bp, MD aware and medication added. Patient has denied any pain or nausea throughout shift. Patient using urinal with RN assistance, remains incontinent of bowels. Patient denies any other needs at this time. Bed is in the lowest position, call light and bedside table within reach. Patient bed alarm is on. Will continue to monitor for changes in patient status.      Electronically signed by Carlee Hooker RN on 4/10/2022 at 4:08 PM

## 2022-04-10 NOTE — PROGRESS NOTES
Hospitalist Progress Note      PCP: Maegan Lopez MD    Date of Admission: 4/6/2022    Chief Complaint: AMS, weakness, [facility] Course: 79-year-old male who presents to [facility] with chief complaint of altered mental status, weakness and dark stool. since January the patient has had progressively worsening confusion as well as anorexia. The have been evaluated in the emergency department times and have seen neurology as an outpatient with no etiology established. He was scheduled for an MRI however he presents because he had a dark black bowel movement. Subjective: over all improved, waiting for rehab  No new complaints    Medications:  Reviewed    Infusion Medications    sodium chloride       Scheduled Medications    amLODIPine  5 mg Oral Daily    pantoprazole  40 mg Oral BID AC    melatonin  6 mg Oral Nightly    gabapentin  100 mg Oral Nightly    torsemide  20 mg Oral Daily    atorvastatin  80 mg Oral Daily    sodium chloride flush  10 mL IntraVENous 2 times per day    sennosides-docusate sodium  1 tablet Oral BID     PRN Meds: ipratropium-albuterol, sodium chloride flush, sodium chloride, promethazine **OR** ondansetron, polyethylene glycol, acetaminophen **OR** acetaminophen      Intake/Output Summary (Last 24 hours) at 4/10/2022 1039  Last data filed at 4/10/2022 0856  Gross per 24 hour   Intake 220 ml   Output 350 ml   Net -130 ml       Physical Exam Performed:    BP (!) 167/81   Pulse 62   Temp 98.1 °F (36.7 °C) (Oral)   Resp 16   Ht 6' (1.829 m)   Wt 215 lb (97.5 kg)   SpO2 94%   BMI 29.16 kg/m²     General appearance: chronically ill appearing, appears stated age and cooperative. HEENT: Pupils equal, round, and reactive to light. Conjunctivae/corneas clear. Neck: Supple, with full range of motion. No jugular venous distention. Trachea midline. Respiratory:  Normal respiratory effort.  Clear to auscultation, bilaterally without Rales/Wheezes/Rhonchi. Cardiovascular: Regular rate and rhythm with normal S1/S2 without murmurs, rubs or gallops. Abdomen: Soft, non-tender, non-distended with normal bowel sounds. Musculoskeletal: No clubbing, cyanosis or edema bilaterally. Full range of motion without deformity. Skin: Skin color, texture, turgor normal.  No rashes or lesions. Neurologic:  Neurovascularly intact without any focal sensory/motor deficits. Cranial nerves: II-XII intact, grossly non-focal. Generalized weakness  Psychiatric: has some level of confusion  Capillary Refill: Brisk,3 seconds, normal   Peripheral Pulses: +2 palpable, equal bilaterally       Labs:   Recent Labs     04/09/22  0850 04/10/22  0716 04/10/22  0911   WBC 10.7  --  11.6*   HGB 13.0* 12.8* 13.1*   HCT 38.0* 38.2* 39.6*     --  283     Recent Labs     04/09/22  0850 04/10/22  0716    137   K 4.1 3.7    99   CO2 27 28   BUN 26* 27*   CREATININE 1.2 1.1   CALCIUM 9.5 9.2     Recent Labs     04/09/22  0850 04/10/22  0716   AST 26 23   ALT 27 28   BILITOT 1.0 0.8   ALKPHOS 105 105     No results for input(s): INR in the last 72 hours. No results for input(s): Connee Matar in the last 72 hours. Urinalysis:      Lab Results   Component Value Date    NITRU Negative 04/06/2022    WBCUA 1 03/20/2022    RBCUA NEGATIVE 03/23/2022    BLOODU Negative 04/06/2022    SPECGRAV 1.025 04/06/2022    GLUCOSEU Negative 04/06/2022       Radiology:  MRI BRAIN WO CONTRAST   Final Result      1. Moderate cortical atrophy. 2. Mild to moderate white matter disease. XR CHEST (2 VW)   Final Result      Mild linear atelectasis/scarring left lung base. Lungs are otherwise clear. Cardiomediastinal silhouette. Cardiac valve replacement noted. Surgical clips overlie the left upper thorax.               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]     Central stenosis of spinal canal [M48.00]     RADHA (acute kidney injury) ([facility] [address] [N17.9]     S/P TAVR (transcatheter aortic valve replacement) [Z95.2]     GI hemorrhage [K92.2]      RADHA  - improving  - can stop IVF  - Hold lisinopril   - awaiting for repeat lab     Melana due to duodenal ulcers and gastritis  FOBT positive  IV PPI BID  Trend H&H  GI consulted s/p EGD which showed multiple duodenal ulcers and antral gastritis  - will hold ASA for now  - can probably change asa to plavix start 1 week later     Subacute on chronic encephalopathy  Normal pressure hydrocephalus  S/p recent LP 2022 and had LP 2017  Check TSH, ammonia, B12  UA neg  Troponin negative  Chest x-ray negative  Neurology consulted  - CSF showed mildly elevated protein, no WBC or RBC  - LP did not remove enough CSF fluid due to not enough for them to remove  - openning pressure was normal  - MRI neg for stroke  - I think this is multifactorial, will change gabapentin to bed time only  - melatonin at bedtime  - PT/OT  - pending rehab placement     Hypertension  - holding lisinopril due to RADHA  - restart torsemide     COPD not in exacerbation  - duoneb PRN    Hyperlipidemia  - statin    Peripheral vascular disease  Hx of TAVR  - statin  - holding asa due to melana  - discussed with GI  - will hold antiplelet for 1 week  - can switch to plavix  - consulted card       DVT Prophylaxis: SCDs  Diet: ADULT DIET;  Regular  ADULT ORAL NUTRITION SUPPLEMENT; Breakfast, Dinner; Clear Liquid Oral Supplement  Code Status: Full Code    PT/OT Eval Status: pending    Dispo -inpatient , pending improvement    Kasia Thompson MD

## 2022-04-10 NOTE — PROGRESS NOTES
Patient is A&O x3 (disoriented to year/month), VSS, and denies any pain or discomfort. No bloody BM's thus far this shift. Voiding per urinal with RN assistance. Call light and personal belongings within reach, fall precautions in place. Continue to monitor.

## 2022-04-11 ENCOUNTER — CARE COORDINATION (OUTPATIENT)
Dept: CASE MANAGEMENT | Age: 77
End: 2022-04-11

## 2022-04-11 LAB
A/G RATIO: 1.1 (ref 1.1–2.2)
ALBUMIN SERPL-MCNC: 3.7 G/DL (ref 3.4–5)
ALP BLD-CCNC: 105 U/L (ref 40–129)
ALT SERPL-CCNC: 29 U/L (ref 10–40)
ANION GAP SERPL CALCULATED.3IONS-SCNC: 14 MMOL/L (ref 3–16)
AST SERPL-CCNC: 24 U/L (ref 15–37)
BASOPHILS ABSOLUTE: 0 K/UL (ref 0–0.2)
BASOPHILS RELATIVE PERCENT: 0.4 %
BILIRUB SERPL-MCNC: 0.8 MG/DL (ref 0–1)
BUN BLDV-MCNC: 25 MG/DL (ref 7–20)
CALCIUM SERPL-MCNC: 9.4 MG/DL (ref 8.3–10.6)
CHLORIDE BLD-SCNC: 99 MMOL/L (ref 99–110)
CO2: 25 MMOL/L (ref 21–32)
CREAT SERPL-MCNC: 1.2 MG/DL (ref 0.8–1.3)
EOSINOPHILS ABSOLUTE: 0.2 K/UL (ref 0–0.6)
EOSINOPHILS RELATIVE PERCENT: 1.5 %
GFR AFRICAN AMERICAN: >60
GFR NON-AFRICAN AMERICAN: 59
GLUCOSE BLD-MCNC: 118 MG/DL (ref 70–99)
HCT VFR BLD CALC: 39 % (ref 40.5–52.5)
HCT VFR BLD CALC: 39.8 % (ref 40.5–52.5)
HEMOGLOBIN: 13 G/DL (ref 13.5–17.5)
HEMOGLOBIN: 13.6 G/DL (ref 13.5–17.5)
LACTIC ACID: 1.1 MMOL/L (ref 0.4–2)
LACTIC ACID: 1.4 MMOL/L (ref 0.4–2)
LACTIC ACID: 2 MMOL/L (ref 0.4–2)
LACTIC ACID: 2.3 MMOL/L (ref 0.4–2)
LYMPHOCYTES ABSOLUTE: 1.8 K/UL (ref 1–5.1)
LYMPHOCYTES RELATIVE PERCENT: 14.8 %
MCH RBC QN AUTO: 29.7 PG (ref 26–34)
MCHC RBC AUTO-ENTMCNC: 34 G/DL (ref 31–36)
MCV RBC AUTO: 87.2 FL (ref 80–100)
MONOCYTES ABSOLUTE: 0.9 K/UL (ref 0–1.3)
MONOCYTES RELATIVE PERCENT: 7.2 %
NEUTROPHILS ABSOLUTE: 9.2 K/UL (ref 1.7–7.7)
NEUTROPHILS RELATIVE PERCENT: 76.1 %
PDW BLD-RTO: 14.3 % (ref 12.4–15.4)
PLATELET # BLD: 256 K/UL (ref 135–450)
PMV BLD AUTO: 9.7 FL (ref 5–10.5)
POTASSIUM REFLEX MAGNESIUM: 3.7 MMOL/L (ref 3.5–5.1)
RBC # BLD: 4.57 M/UL (ref 4.2–5.9)
SODIUM BLD-SCNC: 138 MMOL/L (ref 136–145)
TOTAL PROTEIN: 7 G/DL (ref 6.4–8.2)
WBC # BLD: 12 K/UL (ref 4–11)

## 2022-04-11 PROCEDURE — 97110 THERAPEUTIC EXERCISES: CPT

## 2022-04-11 PROCEDURE — 85018 HEMOGLOBIN: CPT

## 2022-04-11 PROCEDURE — 80053 COMPREHEN METABOLIC PANEL: CPT

## 2022-04-11 PROCEDURE — 36415 COLL VENOUS BLD VENIPUNCTURE: CPT

## 2022-04-11 PROCEDURE — 83605 ASSAY OF LACTIC ACID: CPT

## 2022-04-11 PROCEDURE — 6370000000 HC RX 637 (ALT 250 FOR IP): Performed by: INTERNAL MEDICINE

## 2022-04-11 PROCEDURE — 97116 GAIT TRAINING THERAPY: CPT

## 2022-04-11 PROCEDURE — 97530 THERAPEUTIC ACTIVITIES: CPT

## 2022-04-11 PROCEDURE — 85014 HEMATOCRIT: CPT

## 2022-04-11 PROCEDURE — 97535 SELF CARE MNGMENT TRAINING: CPT

## 2022-04-11 PROCEDURE — 1200000000 HC SEMI PRIVATE

## 2022-04-11 PROCEDURE — 85025 COMPLETE CBC W/AUTO DIFF WBC: CPT

## 2022-04-11 RX ADMIN — GABAPENTIN 100 MG: 100 CAPSULE ORAL at 21:24

## 2022-04-11 RX ADMIN — PANTOPRAZOLE SODIUM 40 MG: 40 TABLET, DELAYED RELEASE ORAL at 06:16

## 2022-04-11 RX ADMIN — TORSEMIDE 20 MG: 20 TABLET ORAL at 08:58

## 2022-04-11 RX ADMIN — ATORVASTATIN CALCIUM 80 MG: 80 TABLET, FILM COATED ORAL at 08:58

## 2022-04-11 RX ADMIN — SENNOSIDES AND DOCUSATE SODIUM 1 TABLET: 50; 8.6 TABLET ORAL at 21:24

## 2022-04-11 RX ADMIN — PANTOPRAZOLE SODIUM 40 MG: 40 TABLET, DELAYED RELEASE ORAL at 16:34

## 2022-04-11 RX ADMIN — SENNOSIDES AND DOCUSATE SODIUM 1 TABLET: 50; 8.6 TABLET ORAL at 08:59

## 2022-04-11 RX ADMIN — Medication 6 MG: at 21:24

## 2022-04-11 RX ADMIN — AMLODIPINE BESYLATE 5 MG: 5 TABLET ORAL at 08:58

## 2022-04-11 ASSESSMENT — PAIN SCALES - GENERAL
PAINLEVEL_OUTOF10: 0

## 2022-04-11 NOTE — PROGRESS NOTES
Occupational Therapy  Facility/Department: [facility] [address] [place] [address]  Daily Treatment Note  NAME: Jacob Matthew  : 1945  MRN: [id number]    Date of Service: 2022    Discharge Recommendations:  Jacob Matthew scored a 16/24 on the AM-PAC ADL Inpatient form. Current research shows that an AM-PAC score of 17 or less is typically not associated with a discharge to the patient's home setting. Based on the patient's AM-PAC score and their current ADL deficits, it is recommended that the patient have 5-7 sessions per week of Occupational Therapy at d/c to increase the patient's independence. At this time, this patient demonstrates the endurance, and/or tolerance for 3 hours of therapy each day, with a treatment frequency of 5-7x/wk. Please see assessment section for further patient specific details. If patient discharges prior to next session this note will serve as a discharge summary. Please see below for the latest assessment towards goals. OT Equipment Recommendations  Equipment Needed: No  Other: defer    Assessment   Performance deficits / Impairments: Decreased functional mobility ; Decreased ADL status; Decreased endurance;Decreased cognition;Decreased posture;Decreased balance    Assessment: Pt progressing this session demonstrating functional mobility to end of bed and back with Min +Mod  A and heavy VCs for posterior lean and RW mangament. Transfers requiring Mod A x2. Pt with increased processing time and difficulty with divided attention with increase posterior lean when distracted. Pt very motivated for therapy. Pt would benefit from intensive inp OT for maximizing functional indpendence and safety.  Continue OT per POC      Treatment Diagnosis: impaired mobility, transfers, ADL  OT Education: OT Role;Plan of Care;ADL Adaptive Strategies;Transfer Training  Patient Education: cont to reinforce  Barriers to Learning: cognition  REQUIRES OT FOLLOW UP: Yes  Activity Tolerance  Activity Tolerance: Patient Tolerated treatment well;Patient limited by fatigue;Treatment limited secondary to decreased cognition  Activity Tolerance: Pt pleasant and cooperative  Safety Devices  Safety Devices in place: Yes  Type of devices: Left in chair;Nurse notified;Call light within reach; Chair alarm in place;Gait belt         Patient Diagnosis(es): The primary encounter diagnosis was RADHA (acute kidney injury) ([facility] [address] [facility] A diagnosis of Altered mental status, unspecified altered mental status type was also pertinent to this visit. has a past medical history of Allergic rhinitis, Aortic valve disorders, COPD (chronic obstructive pulmonary disease) (HCC), Hydrocephalus ([facility] [address] Hyperlipidemia, Hypertension, Hypertrophy of prostate without urinary obstruction and other lower urinary tract symptoms (LUTS), Irritable bowel syndrome, Pancreatitis, Peptic ulcer, unspecified site, unspecified as acute or chronic, without mention of hemorrhage, perforation, or obstruction, and Peripheral vascular disease ([facility] [address] [facility] 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     Restrictions  Position Activity Restriction  Other position/activity restrictions: up as tolerated     Diagnosis: RADHA  Treatment Diagnosis: impaired mobility, transfers, ADL    Subjective:   Pt met supine in bed and agreeable to OT treatment      Pain:   Denies      Objective:    Cognition/Orientation:  Pt oriented to self and place and disoriented to time    Bed mobility Supine to sit: Min A + Mod A with increased time and VCs for sequencing task  Scooting: CGA scooting forward in chair. CGA scooting to EOB    Functional Mobility   Sit to Stand: Min A + Mod A from EOB and recliner chair. VCs and reminders about hand placement   Stand to Sit: Mod A x2 with with decreased eccentric control. Bed to Chair Transfer: Mod A x2 from EOB to Recliner chair  Other: Functional mobility demonstrated to end of bed and back to chair. With Min A + Mod A with posterior lean and difficulty managing RW . Pt requiring seqencing cues throughout. Pt sitting EOB initially requiring Min A for sitting balance due to posterior lean but progressing to SBA.     ADLs   LB dressing: Min A + Min A (second person for steadying stance to pull over hips)  Toileting:  Other:      Safety Devices in Place:  Pt left seated in recliner chair and agreeable to OT treatment                         Plan  If pt discharges prior to next treatment, this note will serve as discharge summary  Plan  Times per week: 2-5  Times per day: Daily                         AM-PAC Score        AM-[facility] Inpatient Daily Activity Raw Score: 16 (04/11/22 1125)  AM-PAC Inpatient ADL T-Scale Score : 35.96 (04/11/22 1125)  ADL Inpatient CMS 0-100% Score: 53.32 (04/11/22 1125)  ADL Inpatient CMS G-Code Modifier : CK (04/11/22 1125)    Goals (as determined and assessed by primary OT)  Short term goals  Time Frame for Short term goals: dc  Short term goal 1: supine to sit SBA - ongoing  Short term goal 2: sit<>stand CGA - ongoing  Short term goal 3: bed to chair/BSC fx transfers Kalen - ongoing  Short term goal 4: toileting Kalen - ongoing  Short term goal 5: UB/LB dressing Kalen - ongoing       Therapy Time   Individual Concurrent Group Co-treatment   Time In 1057         Time Out 1123         Minutes 26         Timed Code Treatment Minutes: Serina 40, R Rogelio Valles 46

## 2022-04-11 NOTE — PROGRESS NOTES
Hospitalist Progress Note      PCP: Gaston Galan MD    Date of Admission: 4/6/2022    Chief Complaint: AMS, weakness, [facility] Course: 82-year-old male who presents to [facility] with chief complaint of altered mental status, weakness and dark stool. since January the patient has had progressively worsening confusion as well as anorexia. The have been evaluated in the emergency department times and have seen neurology as an outpatient with no etiology established. He was scheduled for an MRI however he presents because he had a dark black bowel movement. Subjective: Patient seen and examined  Appears in good spirits today, wife at bedside  No new complaints  waiting for placement      Medications:  Reviewed    Infusion Medications    sodium chloride       Scheduled Medications    amLODIPine  5 mg Oral Daily    pantoprazole  40 mg Oral BID AC    melatonin  6 mg Oral Nightly    gabapentin  100 mg Oral Nightly    torsemide  20 mg Oral Daily    atorvastatin  80 mg Oral Daily    sodium chloride flush  10 mL IntraVENous 2 times per day    sennosides-docusate sodium  1 tablet Oral BID     PRN Meds: ipratropium-albuterol, sodium chloride flush, sodium chloride, promethazine **OR** ondansetron, polyethylene glycol, acetaminophen **OR** acetaminophen      Intake/Output Summary (Last 24 hours) at 4/11/2022 0942  Last data filed at 4/11/2022 0930  Gross per 24 hour   Intake 300 ml   Output 600 ml   Net -300 ml       Physical Exam Performed:    BP (!) 165/73   Pulse 60   Temp 98.2 °F (36.8 °C) (Oral)   Resp 16   Ht 6' (1.829 m)   Wt 215 lb (97.5 kg)   SpO2 95%   BMI 29.16 kg/m²     General appearance: chronically ill appearing, appears stated age and cooperative. HEENT: Pupils equal, round, and reactive to light. Conjunctivae/corneas clear. Neck: Supple, with full range of motion. No jugular venous distention. Trachea midline. Respiratory:  Normal respiratory effort.  Clear to auscultation, bilaterally without Rales/Wheezes/Rhonchi. Cardiovascular: Regular rate and rhythm with normal S1/S2 without murmurs, rubs or gallops. Abdomen: Soft, non-tender, non-distended with normal bowel sounds. Musculoskeletal: No clubbing, cyanosis or edema bilaterally. Full range of motion without deformity. Skin: Skin color, texture, turgor normal.  No rashes or lesions. Neurologic:  Neurovascularly intact without any focal sensory/motor deficits. Cranial nerves: II-XII intact, grossly non-focal. Generalized weakness  Psychiatric: has some level of confusion  Capillary Refill: Brisk,3 seconds, normal   Peripheral Pulses: +2 palpable, equal bilaterally       Labs:   Recent Labs     04/09/22  0850 04/10/22  0716 04/10/22  0911 04/10/22  1948 04/11/22  0714   WBC 10.7  --  11.6*  --  12.0*   HGB 13.0*   < > 13.1* 13.5 13.6   HCT 38.0*   < > 39.6* 40.0* 39.8*     --  283  --  256    < > = values in this interval not displayed. Recent Labs     04/09/22  0850 04/10/22  0716 04/11/22  0714    137 138   K 4.1 3.7 3.7    99 99   CO2 27 28 25   BUN 26* 27* 25*   CREATININE 1.2 1.1 1.2   CALCIUM 9.5 9.2 9.4     Recent Labs     04/09/22  0850 04/10/22  0716 04/11/22  0714   AST 26 23 24   ALT 27 28 29   BILITOT 1.0 0.8 0.8   ALKPHOS 105 105 105     No results for input(s): INR in the last 72 hours. No results for input(s): Les Mehran in the last 72 hours. Urinalysis:      Lab Results   Component Value Date    NITRU Negative 04/06/2022    WBCUA 1 03/20/2022    RBCUA NEGATIVE 03/23/2022    BLOODU Negative 04/06/2022    SPECGRAV 1.025 04/06/2022    GLUCOSEU Negative 04/06/2022       Radiology:  MRI BRAIN WO CONTRAST   Final Result      1. Moderate cortical atrophy. 2. Mild to moderate white matter disease. XR CHEST (2 VW)   Final Result      Mild linear atelectasis/scarring left lung base. Lungs are otherwise clear. Cardiomediastinal silhouette.       Cardiac valve replacement noted. Surgical clips overlie the left upper thorax. 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]     Central stenosis of spinal canal [M48.00]     RADHA (acute kidney injury) ([facility] [address] [N17.9]     S/P TAVR (transcatheter aortic valve replacement) [Z95.2]     GI hemorrhage [K92.2]      RADHA  - improving, cr 1.2  Lisinopril on hold     Melana due to duodenal ulcers and gastritis  FOBT positive  IV PPI BID  Trend H&H  GI consulted s/p EGD which showed multiple duodenal ulcers and antral gastritis  - will hold ASA for now  Per cardiology note, okay to resume aspirin in 1 week (GI deferred aspirin to cardiology)     Subacute on chronic encephalopathy  Normal pressure hydrocephalus  S/p recent LP 2022 and had LP 2017  Check TSH, ammonia, B12  UA neg  Troponin negative  Chest x-ray negative  Neurology consulted  - CSF showed mildly elevated protein, no WBC or RBC  - LP did not remove enough CSF fluid due to not enough for them to remove  - openning pressure was normal  - MRI neg for stroke  - I think this is multifactorial, will change gabapentin to bed time only  - melatonin at bedtime  - PT/OT  - pending rehab placement  To follow-up with neurologist after discharge for further management     Hypertension  - holding lisinopril due to RADHA  - restart torsemide     COPD not in exacerbation  - duoneb PRN    Hyperlipidemia  - statin    Peripheral vascular disease  Hx of TAVR  - statin  - holding asa due to melana  - discussed with GI  - will hold antiplelet for 1 week  - can switch to plavix?  - consulted card       DVT Prophylaxis: SCDs  Diet: ADULT DIET;  Regular  ADULT ORAL NUTRITION SUPPLEMENT; Breakfast, Dinner; Clear Liquid Oral Supplement  Code Status: Full Code    PT/OT Eval Status: pending    Dispo -inpatient, pending placement    Gaston Barragan MD

## 2022-04-11 NOTE — PROGRESS NOTES
Pt alert and oriented this shift. Pleasant with care. Denied pain/doscomfort. Slept very well. Continent of bladder. No BM overnight. BP slightly e\levated but stable. Able to turn self in bed. No acute issus overnight. Wheels locked, bed at lowest position. Call light within reach. Will monitor.

## 2022-04-11 NOTE — FLOWSHEET NOTE
04/11/22 1205   Encounter Summary   Services provided to: Patient   Referral/Consult From: Rounding   Continue Visiting   (4/11, lashon )   Complexity of Encounter Moderate   Length of Encounter 15 minutes   Routine   Type Initial   Assessment Calm; Approachable; Hopeful   Intervention Active listening;Explored feelings, thoughts, concerns;Nurtured hope   Outcome Comfort;Expressed gratitude   Staff Rona Lopez, [place]

## 2022-04-11 NOTE — PROGRESS NOTES
Physical Therapy  Facility/Department: [facility]  Daily Treatment Note  NAME: Calista Halsted  : 1945  MRN: [id number]    Date of Service: 2022    Discharge Recommendations:  Calista Halsted scored a 13/24 on the AM-PAC short mobility form. Current research shows that an AM-PAC score of 17 or less is typically not associated with a discharge to the patient's home setting. Based on the patient's AM-PAC score and their current functional mobility deficits, it is recommended that the patient have 5-7 sessions per week of Physical Therapy at d/c to increase the patient's independence. At this time, this patient demonstrates the endurance, and/or tolerance for 3 hours of therapy each day, with a treatment frequency of 5-7x/wk. Please see assessment section for further patient specific details. If patient discharges prior to next session this note will serve as a discharge summary. Please see below for the latest assessment towards goals. Patient would benefit from continued therapy after discharge   PT Equipment Recommendations  Equipment Needed: No    Assessment   Assessment: Pt with fair tolerance to activity, but limited at times due to slow processing and requirement of extra cues. Pt does follow commands with time. Pt shows decreased seated and standing balance with tendency to lean posteriorly. Pt requires assist of 2 for all mobility. Pt showing increased gait distance using the wheeled walker, although does need heavy cues and assist with walker management. Pt presents as a fall risk. Rec continued inpt PT at d/c prior to return home. Will follow.   Treatment Diagnosis: Decreased functional mobility  Prognosis: Good  Decision Making: Medium Complexity  PT Education: PT Role;General Safety  Patient Education: Pt will benefit from reinforcement  REQUIRES PT FOLLOW UP: Yes  Activity Tolerance  Activity Tolerance: Patient limited by fatigue;Patient limited by cognitive status     Patient Diagnosis(es): The primary encounter diagnosis was RADHA (acute kidney injury) ([facility] [address] A diagnosis of Altered mental status, unspecified altered mental status type was also pertinent to this visit. has a past medical history of Allergic rhinitis, Aortic valve disorders, COPD (chronic obstructive pulmonary disease) (HCC), Hydrocephalus ([address] Hyperlipidemia, Hypertension, Hypertrophy of prostate without urinary obstruction and other lower urinary tract symptoms (LUTS), Irritable bowel syndrome, Pancreatitis, Peptic ulcer, unspecified site, unspecified as acute or chronic, without mention of hemorrhage, perforation, or obstruction, and Peripheral vascular disease ([facility] [address] 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 Restrictions  Position Activity Restriction  Other position/activity restrictions: up as tolerated     Subjective   General  Chart Reviewed: Yes  Additional Pertinent Hx: Pt is a 77 yo male presenting with black stools and progressive weakness over the last few weeks experiencing difficulty with ambulation and balance, EGD on 4/7 showed multiple ulcers. Neuro following for diagnosis of NPH in 2018  Family / Caregiver Present: Yes (Wife)  Referring Practitioner: Anabel Weinberg MD  Subjective  Subjective: Pt supine in bed and agreeable to PT. Pt with delayed responses and difficulty processing commands at times, although follows them with cueing and demonstration. Pt cooperative.   Pain Screening  Patient Currently in Pain: Denies     Orientation  Orientation  Overall Orientation Status: Impaired  Orientation Level: Oriented to person;Disoriented to time;Oriented to place     Objective   Bed mobility  Supine to Sit: 2 Person assistance; Moderate assistance;Minimal assistance  Scooting: Stand by assistance  Comment: Slow and effortful, heavy cues     Transfers  Sit to Stand: 2 Person Assistance; Moderate Assistance;Minimal Assistance  Stand to sit: 2 Person Assistance; Moderate Assistance     Ambulation 1  Device: [address]: 2 Person assistance; Moderate assistance;Minimal assistance  Quality of Gait: Unsteady gait, posterior lean, trunk flexion, assist for walker management at times, heavy verbal cues  Gait Deviations: Decreased step length; Slow Kaylee;Decreased step height  Distance: 10 feet     Balance  Posture: Fair  Sitting - Static: Fair;- (Min to SBA due to posterior lean initially)  Standing - Static: Poor;+ (At walker)  Standing - Dynamic: Poor (At walker)     Exercises  Heelslides: B x 5 with min assist for R LE  Hip Abduction: B x 10 in supine, hip abduction B x 10 in sitting  Knee Short Arc Quad: B x 10  Ankle Pumps: B x 10  Comments: Minimal cues needed     AM-PAC Score  AM-PAC Inpatient Mobility Raw Score : 13 (04/11/22 1120)  AM-PAC Inpatient T-Scale Score : 36.74 (04/11/22 1120)  Mobility Inpatient CMS 0-100% Score: 64.91 (04/11/22 1120)  Mobility Inpatient CMS G-Code Modifier : CL (04/11/22 1120)    Goals  Short term goals  Time Frame for Short term goals: by discharge  Short term goal 1: pt will perform bed mobility with supervision  ONGOING  Short term goal 2: pt will perform functional transfers with min A at [address] term goal 3: pt will ambulate 22' with LRAD and mod A  ONGOING  Patient Goals   Patient goals : to get stronger    Plan    Plan  Times per week: 5-7  Current Treatment Recommendations: Ninfa Aguilar Training,Balance Training,Gait Training,Functional Mobility Training,Safety Education & Training,Cognitive Reorientation  Safety Devices  Type of devices: Call light within reach,Left in chair,Chair alarm in place,Nurse notified     Therapy Time   Individual Concurrent Group Co-treatment   Time In 1050         Time Out 1123         Minutes 33           Timed Code Treatment Minutes:   33    Total Treatment Minutes:  [address], PT

## 2022-04-11 NOTE — CARE COORDINATION
CM following, Spoke with Levon Smoker at [facility] [phone] they are reviewing to see if they can take will need updated PT OT evals to start precert today. Message sent to therapy manager and CM manager for expedited PT OT updates. Electronically signed by Lali Campo RN on 4/11/2022 at 9:59 AM   [phone]       UPDATE:  Called and spoke with Levon Smoker at [facility] [phone] they are still reviewing with , Updated PT OT evals are in. Levon Smoker will call me back, she is aware plan to DC Tuesday and would need precert started today. Electronically signed by Lali Campo RN on 4/11/2022 at 1:33 PM       UPDATE:  Levon Smoker called back they can accept pt and will start precert today. Updated clinical sent to [phone].   Electronically signed by Lali Campo RN on 4/11/2022 at 2:13 PM  [phone]

## 2022-04-12 VITALS
DIASTOLIC BLOOD PRESSURE: 78 MMHG | WEIGHT: 215 LBS | BODY MASS INDEX: 29.12 KG/M2 | RESPIRATION RATE: 16 BRPM | SYSTOLIC BLOOD PRESSURE: 145 MMHG | HEART RATE: 71 BPM | OXYGEN SATURATION: 97 % | HEIGHT: 72 IN | TEMPERATURE: 97.7 F

## 2022-04-12 LAB
A/G RATIO: 1.3 (ref 1.1–2.2)
ALBUMIN SERPL-MCNC: 3.8 G/DL (ref 3.4–5)
ALP BLD-CCNC: 98 U/L (ref 40–129)
ALT SERPL-CCNC: 27 U/L (ref 10–40)
ANION GAP SERPL CALCULATED.3IONS-SCNC: 12 MMOL/L (ref 3–16)
AST SERPL-CCNC: 20 U/L (ref 15–37)
BASOPHILS ABSOLUTE: 0.1 K/UL (ref 0–0.2)
BASOPHILS RELATIVE PERCENT: 1 %
BILIRUB SERPL-MCNC: 0.9 MG/DL (ref 0–1)
BUN BLDV-MCNC: 24 MG/DL (ref 7–20)
CALCIUM SERPL-MCNC: 9.3 MG/DL (ref 8.3–10.6)
CHLORIDE BLD-SCNC: 101 MMOL/L (ref 99–110)
CO2: 27 MMOL/L (ref 21–32)
CREAT SERPL-MCNC: 1.1 MG/DL (ref 0.8–1.3)
EOSINOPHILS ABSOLUTE: 0.2 K/UL (ref 0–0.6)
EOSINOPHILS RELATIVE PERCENT: 1.8 %
GFR AFRICAN AMERICAN: >60
GFR NON-AFRICAN AMERICAN: >60
GLUCOSE BLD-MCNC: 121 MG/DL (ref 70–99)
HCT VFR BLD CALC: 38.2 % (ref 40.5–52.5)
HEMOGLOBIN: 12.9 G/DL (ref 13.5–17.5)
INFLUENZA A: NOT DETECTED
INFLUENZA B: NOT DETECTED
LACTIC ACID: 1 MMOL/L (ref 0.4–2)
LACTIC ACID: 1 MMOL/L (ref 0.4–2)
LACTIC ACID: 2.1 MMOL/L (ref 0.4–2)
LACTIC ACID: 4 MMOL/L (ref 0.4–2)
LYMPHOCYTES ABSOLUTE: 1.5 K/UL (ref 1–5.1)
LYMPHOCYTES RELATIVE PERCENT: 13.4 %
MCH RBC QN AUTO: 29.7 PG (ref 26–34)
MCHC RBC AUTO-ENTMCNC: 33.9 G/DL (ref 31–36)
MCV RBC AUTO: 87.6 FL (ref 80–100)
MONOCYTES ABSOLUTE: 0.7 K/UL (ref 0–1.3)
MONOCYTES RELATIVE PERCENT: 6 %
NEUTROPHILS ABSOLUTE: 8.6 K/UL (ref 1.7–7.7)
NEUTROPHILS RELATIVE PERCENT: 77.8 %
PDW BLD-RTO: 14.3 % (ref 12.4–15.4)
PLATELET # BLD: 236 K/UL (ref 135–450)
PMV BLD AUTO: 9.3 FL (ref 5–10.5)
POTASSIUM REFLEX MAGNESIUM: 3.8 MMOL/L (ref 3.5–5.1)
RBC # BLD: 4.36 M/UL (ref 4.2–5.9)
SARS-COV-2 RNA, RT PCR: NOT DETECTED
SODIUM BLD-SCNC: 140 MMOL/L (ref 136–145)
TOTAL PROTEIN: 6.7 G/DL (ref 6.4–8.2)
WBC # BLD: 11 K/UL (ref 4–11)

## 2022-04-12 PROCEDURE — 83605 ASSAY OF LACTIC ACID: CPT

## 2022-04-12 PROCEDURE — 87636 SARSCOV2 & INF A&B AMP PRB: CPT

## 2022-04-12 PROCEDURE — 6370000000 HC RX 637 (ALT 250 FOR IP): Performed by: INTERNAL MEDICINE

## 2022-04-12 PROCEDURE — 85025 COMPLETE CBC W/AUTO DIFF WBC: CPT

## 2022-04-12 PROCEDURE — 36415 COLL VENOUS BLD VENIPUNCTURE: CPT

## 2022-04-12 PROCEDURE — 97530 THERAPEUTIC ACTIVITIES: CPT

## 2022-04-12 PROCEDURE — 80053 COMPREHEN METABOLIC PANEL: CPT

## 2022-04-12 PROCEDURE — 97116 GAIT TRAINING THERAPY: CPT

## 2022-04-12 PROCEDURE — 97535 SELF CARE MNGMENT TRAINING: CPT

## 2022-04-12 RX ORDER — AMLODIPINE BESYLATE 5 MG/1
5 TABLET ORAL DAILY
Qty: 30 TABLET | Refills: 3 | Status: SHIPPED | OUTPATIENT
Start: 2022-04-13 | End: 2022-05-19 | Stop reason: ALTCHOICE

## 2022-04-12 RX ORDER — PANTOPRAZOLE SODIUM 40 MG/1
40 TABLET, DELAYED RELEASE ORAL
Qty: 30 TABLET | Refills: 3 | Status: SHIPPED | OUTPATIENT
Start: 2022-04-12 | End: 2022-04-12

## 2022-04-12 RX ORDER — ASPIRIN 81 MG/1
81 TABLET ORAL DAILY
Qty: 30 TABLET | Refills: 3 | Status: SHIPPED | OUTPATIENT
Start: 2022-04-18

## 2022-04-12 RX ORDER — GABAPENTIN 100 MG/1
100 CAPSULE ORAL NIGHTLY
Qty: 360 CAPSULE | Refills: 3 | Status: SHIPPED | OUTPATIENT
Start: 2022-04-12 | End: 2022-05-19 | Stop reason: ALTCHOICE

## 2022-04-12 RX ORDER — PANTOPRAZOLE SODIUM 40 MG/1
40 TABLET, DELAYED RELEASE ORAL
Qty: 60 TABLET | Refills: 3 | Status: SHIPPED | OUTPATIENT
Start: 2022-04-12 | End: 2022-05-19 | Stop reason: ALTCHOICE

## 2022-04-12 RX ADMIN — PANTOPRAZOLE SODIUM 40 MG: 40 TABLET, DELAYED RELEASE ORAL at 06:15

## 2022-04-12 RX ADMIN — TORSEMIDE 20 MG: 20 TABLET ORAL at 08:52

## 2022-04-12 RX ADMIN — SENNOSIDES AND DOCUSATE SODIUM 1 TABLET: 50; 8.6 TABLET ORAL at 08:52

## 2022-04-12 RX ADMIN — ATORVASTATIN CALCIUM 80 MG: 80 TABLET, FILM COATED ORAL at 08:52

## 2022-04-12 RX ADMIN — AMLODIPINE BESYLATE 5 MG: 5 TABLET ORAL at 08:52

## 2022-04-12 ASSESSMENT — PAIN SCALES - GENERAL
PAINLEVEL_OUTOF10: 0

## 2022-04-12 NOTE — DISCHARGE SUMMARY
Hospital Discharge Summary    Patient's PCP: Jamal Joiner MD  Admit Date: 4/6/2022   Discharge Date: 4/12/2022    Admitting Physician: Dr. Galilea Garcia admitting provider for patient encounter. Discharge Physician: Dr. Amari Chen MD     Consults:   IP CONSULT TO HOSPITALIST  IP CONSULT TO NEUROLOGY  IP CONSULT TO GI  IP CONSULT TO CASE MANAGEMENT  IP CONSULT TO DIETITIAN  IP CONSULT TO CARDIOLOGY    Brief HPI: Mr. Kenisha Guerrero is a 47-year-old male who presents today with chief complaint of altered mental status. His wife who was at bedside in the ED and provided most of the history. Since January the patient has had progressively worsening confusion as well as anorexia. The have been evaluated in the emergency department times and have seen neurology as an outpatient with no etiology established. He was scheduled for an MRI tomorrow however he presents today because he had a dark black bowel movement today as well as on Friday.     Of note he does carry a diagnosis of NPH. An LP was recently done with removal of 7 cc of fluid.       Brief hospital course:      1. Melana due to duodenal ulcers and gastritis  GI consulted s/p EGD which showed multiple duodenal ulcers and antral gastritis  -Per cardiology note, okay to resume aspirin in 1 week (GI deferred aspirin to cardiology)  -Continue PPI twice daily for 8 weeks and then once daily  -Follow-up with with cardiology in 2 to 3 weeks     2. Subacute on chronic encephalopathy  Normal pressure hydrocephalus  S/p recent LP 2022 and had LP 2017  UA neg  Neurology consulted  - CSF showed mildly elevated protein, no WBC or RBC  - LP did not remove enough CSF fluid due to not enough for them to remove.  openning pressure was normal  - MRI neg for stroke  - I think this is multifactorial, will change gabapentin to bed time only  - melatonin at bedtime  -Patient worked with PT OT and recommending SNF placement  To follow-up with neurologist after discharge for bilaterally. Full range of motion without deformity. Skin: Skin color, texture, turgor normal.  No rashes or lesions. Neurologic:  Neurovascularly intact without any focal sensory/motor deficits. Cranial nerves: II-XII intact, grossly non-focal. Generalized weakness  Psychiatric: has some level of confusion  Capillary Refill: Brisk,3 seconds, normal   Peripheral Pulses: +2 palpable, equal bilaterally     Significant diagnostic studies that may require follow up:  CT ABDOMEN PELVIS WO CONTRAST Additional Contrast? None    Result Date: 3/28/2022  EXAMINATION: CT OF THE ABDOMEN AND PELVIS WITHOUT CONTRAST 3/28/2022 12:06 am TECHNIQUE: CT of the abdomen and pelvis was performed without the administration of intravenous contrast. Multiplanar reformatted images are provided for review. Dose modulation, iterative reconstruction, and/or weight based adjustment of the mA/kV was utilized to reduce the radiation dose to as low as reasonably achievable. COMPARISON: 02/12/2022 HISTORY: ORDERING SYSTEM PROVIDED HISTORY: abd pain pain ro sbo, hx sma syndrome, decrease po intake, pain, TECHNOLOGIST PROVIDED HISTORY: Reason for exam:->abd pain pain ro sbo, hx sma syndrome, decrease po intake, pain, Additional Contrast?->None Decision Support Exception - unselect if not a suspected or confirmed emergency medical condition->Emergency Medical Condition (MA) Reason for Exam: Loss of Consciousness ([facility] d/t syncope after standing up from chair to go to restroom; n/v/d; Vitals 107/80; no IV; hx of bladder cancer) FINDINGS: Lower Chest: Mild depended bronchial thickening and interstitial prominence which may represent interstitial edema. No focal consolidation or pleural effusion. Postprocedural changes related to prior TAVR. Heavy coronary artery atherosclerosis is identified. No distal esophageal thickening is identified. Organs: No hypodense mass identified in the liver or spleen. Cholecystectomy clips. replacement noted. Surgical clips overlie the left upper thorax. XR LUMBAR SPINE (2-3 VIEWS)    Result Date: 3/21/2022  Radiology exam is complete. No Radiologist dictation. Please follow up with ordering provider. MRI LUMBAR SPINE WO CONTRAST    Result Date: 3/24/2022  [facility] [id number] #: [id number] Procedure: MR Lumbar Spine w/o Contrast ; Reason for Exam: Radiculopathy, lumbar region This document is confidential medical information. Unauthorized disclosure or use of this information is prohibited by law. If you are not the intended recipient of this document, please advise us by calling immediately [phone]. [facility] [address] Patient Name: Jim Johnson Case ID: [id number] Patient : 1945 Referring Physician: Giovanny Patricio MD Exam Date: 2022 Exam Description: MR Lumbar Spine w/o Contrast HISTORY:  Right leg pain and weakness since 01/15/2022 TECHNICAL FACTORS:  Long- and short-axis fat- and water-weighted images were performed. COMPARISON:  None. FINDINGS:  No evidence of lumbar spine fracture and vertebral body heights are normal. Thoracolumbar junction is intact and lumbar spine alignment is anatomic. Anterior and middle columns are intact as well as the anterior and posterior longitudinal ligaments. No focal ligamentous disruption or epidural fluid collection is appreciated. A relatively large Schmorl's node is present in the inferior endplate of L3 with extensive adjacent edema. T12 vertebral body hemangioma. Conus medullaris is normal and visualized spinal cord and cauda equina nerve roots appear normal.  No evidence of an intradural or extradural mass is present. L1-L2: Disc desiccation. Bulging annulus and bilateral facet hypertrophy and facet joint effusions with mild central canal and mild bilateral foraminal stenosis. L2-L3: Disc desiccation.   Bulging annulus and bilateral facet hypertrophy with mild central canal and mild bilateral foraminal stenosis. L3-L4: Disc desiccation. Bulging annulus and bilateral facet and ligamentum flavum hypertrophy  with severe central canal stenosis and mild bilateral foraminal stenosis. L4-L5: Disc desiccation. Bulging annulus and bilateral facet and ligamentum flavum hypertrophy  with superimposed focal central disc protrusion. Moderate central canal stenosis and moderate  right and mild left foraminal stenosis. L5-S1: Disc desiccation. Bulging annulus and bilateral facet hypertrophy with focal central disc protrusion. Moderate left and mild right foraminal stenosis. No significant paraspinal soft tissue abnormalities are seen. CONCLUSION: 1. Multilevel degenerative changes. The following compressive sequelae are noted: 2. Severe central canal stenosis at L3-4. 3. Moderate central canal stenosis at L4-5. Moderate foraminal stenoses on the right at L4-5 and on the left at L5-S1. 4. Mild central canal stenosis at L1-2 and L2-3. Mild foraminal stenoses are present bilaterally at L1-2, bilaterally at L2-3, bilaterally at L3-4, on the left at L4-5, and on the right at L5-S1. 5. A relatively large Schmorl's node is present in the inferior endplate of L3 with extensive adjacent edema. Thank you for the opportunity to provide your interpretation. Citlaly Ponce MD A: JAY JAY 03/24/2022 9:20 AM     MRI BRAIN WO CONTRAST    Result Date: 4/7/2022  EXAM: MRI BRAIN WO CONTRAST INDICATION: Stroke symptoms COMPARISON: None TECHNIQUE: Multiplanar, multisequence MR imaging of the head obtained. IV contrast: None. FINDINGS: MIDLINE STRUCTURES: The sella turcica and pituitary gland are normal. Craniovertebral alignment and cerebellar tonsils are normal. VENTRICLES: Ventricles and sulci are diffusely prominent. INTRACRANIAL HEMORRHAGE: None. BRAIN PARENCHYMA: No midline shift or mass effect. Mild to moderate periventricular T2 hyperintensity. No diffusion restriction. ORBITS: Normal. BONE MARROW: Bone marrow signal within normal limits. PARANASAL SINUSES/MASTOID BONES: X-ray sinus mucosal thickening. EXTRACRANIAL SOFT TISSUES: Normal     1. Moderate cortical atrophy. 2. Mild to moderate white matter disease. EGD    Result Date: 4/7/2022  No dictation     FL LUMBAR PUNCTURE DIAG    Result Date: 4/4/2022  EXAMINATION: FLUOROSCOPIC GUIDED LUMBAR PUNCTURE 4/4/2022 10:34 am HISTORY: ORDERING SYSTEM PROVIDED HISTORY: Normal pressure hydrocephalus ([address] TECHNOLOGIST PROVIDED HISTORY: Please ask radiologist to remove 25 cc of CSF during procedure Reason for exam:->Normal pressure hydrocephalus Reason for exam:->Please ask radiologist to remove 25 cc of CSF during procedure Reason for Exam: lumbar puncture for NPH FLUOROSCOPY DOSE AND TYPE OR TIME AND EXPOSURES: 1 minute of fluoro time and 1 image obtained PROCEDURE: :  Maria Guadalupe Mancini with Dr. Adan Cooley Informed consent was obtained after the risks and benefits of the procedure were discussed with the patient and all questions were answered fully. Ulman protocol was observed and a standard timeout was performed. The patient was positioned prone and the back was prepped and draped in the normal sterile fashion. 1% lidocaine was used for local anesthesia. The subarachnoid space was accessed with a 22-gauge 3.5\" spinal needle at the L2/L3 level. Free flow of clear CSF was noted. The opening pressure was 18 cm of water. Approximately 6 ml of CSF was removed and sent for analysis. Additional CSF fluid was not obtained as the CSF pressure dropped to 11 cm of water. The stylet was reinserted, spinal needle was removed and brief pressure was applied at the puncture site. There were no immediate complications and the patient tolerated the procedure well. Patient was observed for couple hours after the procedure and remained stable. Successful fluoroscopic-guided lumbar puncture.  The service has requested 25 cc of CSF removal. However, only 6 cc of CSF was removed and sent for analysis. Additional CSF fluid was not obtained as the CSF pressure dropped 11 cm water. Patient remained stable throughout. IR INJ INTERLAMINAR EPI/SUBARACHNOID LUMB/SAC    Result Date: 4/6/2022  Epidural steroid injection, Epidurography History : Low back pain. Lumbar radiculopathy. COMMENTS : The low back was prepped and draped in sterile fashion and lidocaine used for local anesthesia. Under fluoroscopic guidance, a 22 gauge Tuohy needle was advanced into the epidural space at the L4/5 level from an interlaminar approach and needle position was documented with contrast injection. 80 mg Kenalog, 1cc (2.5 mg) 0.25% Sensorcaine, and 2 cc sterile saline were injected. The patient tolerated the procedure without complication. DIAGNOSIS : L4/5 translaminar epidural injection of Kenalog and Sensorcaine. Fluoroscopy time : 0.4 minutes Number of exposures obtained : 1 Estimated blood loss: Less than 5 mL. Treatments: As above. Discharge Medications:     Medication List      START taking these medications    amLODIPine 5 MG tablet  Commonly known as: NORVASC  Take 1 tablet by mouth daily  Start taking on: April 13, 2022     pantoprazole 40 MG tablet  Commonly known as: PROTONIX  Take 1 tablet by mouth 2 times daily (before meals)        CHANGE how you take these medications    aspirin 81 MG EC tablet  Take 1 tablet by mouth daily  Start taking on: April 18, 2022  What changed: These instructions start on April 18, 2022. If you are unsure what to do until then, ask your doctor or other care provider. gabapentin 100 MG capsule  Commonly known as: NEURONTIN  Take 1 capsule by mouth at bedtime for 90 days.   What changed: when to take this        CONTINUE taking these medications    albuterol sulfate  (90 Base) MCG/ACT inhaler  Inhale 2 puffs into the lungs every 4 hours as needed for Wheezing     atorvastatin 80 MG tablet  Commonly known as: LIPITOR  TAKE ONE TABLET BY MOUTH DAILY     HYDROcodone-acetaminophen 5-325 MG per tablet  Commonly known as: NORCO     Klor-Con M20 20 MEQ extended release tablet  Generic drug: potassium chloride  TAKE ONE TABLET BY MOUTH DAILY     ondansetron 4 MG tablet  Commonly known as: ZOFRAN  Take 1 tablet by mouth daily as needed for Nausea or Vomiting     polyethylene glycol 17 GM/SCOOP powder  Commonly known as: GLYCOLAX  Take 17 g by mouth daily as needed (constipation)     torsemide 20 MG tablet  Commonly known as: DEMADEX  Take 1 tablet by mouth daily        STOP taking these medications    lisinopril 5 MG tablet  Commonly known as: PRINIVIL;ZESTRIL           Where to Get Your Medications      These medications were sent to [facility] [id number] [facility], [place] - [address] [facility] [phone] Yifan Romeo [phone] [address] [facility], [address]    Phone: [phone]   · amLODIPine 5 MG tablet  · aspirin 81 MG EC tablet  · gabapentin 100 MG capsule  · pantoprazole 40 MG tablet         Activity: activity as tolerated  Diet: ADULT DIET; Regular  ADULT ORAL NUTRITION SUPPLEMENT; Breakfast, Dinner; Clear Liquid Oral Supplement      Disposition: snf   Discharged Condition: Stable  Follow Up:   Karen Szymanski MD  [address] [facility]  [phone]    In 1 week      Haylee Davis MD  [address] [place] [address]. [facility]  [phone]    In 2 weeks          Code status:  Full Code         Total time spent on discharge, finalizing medications, referrals and arranging outpatient follow up was more than 45 minutes      Thank you Dr. Molina Candelario MD for the opportunity to be involved in this patients care.

## 2022-04-12 NOTE — PROGRESS NOTES
Patient alert and oriented x4. VSS. Has denied any pain throughout shift. Continent of bladder. BP slightly elevated throughout shift, scheduled meds given per orders. Tolerating diet. No nausea at this time. Patient denies any other needs at this time. Bed is in the lowest position, call light and bedside table within reach. Patient bed alarm is on. Will continue to monitor for changes in patient status.      Electronically signed by Ceferino Echevarria RN on 4/12/2022 at 4:33 PM

## 2022-04-12 NOTE — DISCHARGE INSTR - COC
Continuity of Care Form    Patient Name: Elias Marmolejo   :  1945  MRN:  [id number]    Admit date:  2022  Discharge date:  ***    Code Status Order: Full Code   Advance Directives:   Advance Care Flowsheet Documentation       Date/Time Healthcare Directive Type of Healthcare Directive Copy in [address] Agent's Name Healthcare Agent's Phone Number    [phone] No, patient does not have an advance directive for healthcare treatment -- -- -- -- --            Admitting Physician:  No admitting provider for patient encounter.   PCP: Leslie Reddy MD    Discharging Nurse: [facility] Unit/Room#: 3508/4422-52  Discharging Unit Phone Number: ***    Emergency Contact:   Extended Emergency Contact Information  Primary Emergency Contact: Chandni Dunlap  Address: [address], [facility]. [facility] [address]  Phone: [phone]  Mobile Phone: [phone]  Relation: Spouse  Secondary Emergency Contact: [address] Phone: [phone]  Mobile Phone: [phone]  Relation: Child    Past Surgical History:  Past Surgical History:   Procedure Laterality Date    ABDOMINAL AORTIC ANEURYSM REPAIR N/A     ANGIOPLASTY      RT femoral artery    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REPAIR  2018    [place]    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REPAIR      Aortic valve replacement    AORTIC VALVE REPLACEMENT  2018    CHOLECYSTECTOMY      COLONOSCOPY N/A 2021    COLONOSCOPY POLYPECTOMY SNARE/COLD BIOPSY performed by Sweta Waggoner MD at [facility]      bifemoral bypass    FOOT SURGERY Right 12/2/15    HERNIA REPAIR Left     OTHER SURGICAL HISTORY  10/23/2017    lumbar puncture    OR ESOPHAGOGASTRODUODENOSCOPY TRANSORAL DIAGNOSTIC N/A 2018    EGD performed by Sweta Waggoner MD at [address] [facility]  2018    WITH BIOPSY    UPPER GASTROINTESTINAL ENDOSCOPY N/A 4/7/2022    EGD BIOPSY performed by Aden Cantu MD at [facility] ENDOSCOPY       Immunization History:   Immunization History   Administered Date(s) Administered    COVID-19, J&J, PF, 0.5 mL 03/04/2021, 11/26/2021    Influenza Whole 10/01/2015    Influenza, High Dose (Fluzone 65 yrs and older) 11/25/2014, 09/08/2017, 09/12/2018, 10/08/2019, 09/30/2020    Influenza, Intradermal, Preservative free 12/05/2012    Pneumococcal Conjugate 13-valent (Axugiwd48) 05/04/2016    Pneumococcal Polysaccharide (Wwrlsijwa73) 12/19/2002, 06/24/2013    Td, unspecified formulation 12/01/2006    Tdap (Boostrix, Adacel) 11/19/2020       Active Problems:  Patient Active Problem List   Diagnosis Code    Hypercholesteremia E78.00    Essential hypertension I10    Nonrheumatic aortic valve disorder I35.9    Hypertrophy of prostate without urinary obstruction and other lower urinary tract symptoms (LUTS) N40.0    Peptic ulcer K27.9    PVD (peripheral vascular disease) ([facility]) I73.9    Allergic rhinitis J30.9    COPD (chronic obstructive pulmonary disease) ([facility]) J44.9    Edema R60.9    Hallux valgus with bunions M20.10, M21.619    Carotid stenosis I65.29    Rosacea L71.9    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 I65.23    Normal pressure hydrocephalus ([facility]) G91.2    Ataxia R27.0    Mild cognitive impairment G31.84    Chronic idiopathic constipation K59.04    Encounter for follow-up for aortic valve replacement Z09, Z95.2    GI hemorrhage K92.2    Nonrheumatic aortic valve stenosis I35.0    Anticoagulated Z79.01    S/P TAVR (transcatheter aortic valve replacement) Z95.2    Hyperglycemia R73.9    Abdominal pain R10.9    RADHA (acute kidney injury) ([facility] [address] N17.9    Severe malnutrition ([facility]) E43    Bilateral leg weakness R29.898    Polyneuropathy, 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    Central stenosis of spinal canal M48.00       Isolation/Infection:   Isolation            No Isolation          Patient Infection Status       Infection Onset Added Last Indicated Last Indicated By Review Planned Expiration Resolved Resolved By    None active    Resolved    C-diff Rule Out 02/12/22 02/12/22 02/12/22 Clostridium difficile toxin/antigen (Ordered)   04/07/22 Renuka Her RN    From previous admission    COVID-19 (Rule Out) 02/12/22 02/12/22 02/12/22 COVID-19 (Ordered)   02/14/22 Rule-Out Test Resulted            Nurse Assessment:  Last Vital Signs: BP (!) 156/79   Pulse 68   Temp 97.3 °F (36.3 °C) (Oral)   Resp 16   Ht 6' (1.829 m)   Wt 215 lb (97.5 kg)   SpO2 93%   BMI 29.16 kg/m²     Last documented pain score (0-10 scale): Pain Level: 0  Last Weight:   Wt Readings from Last 1 Encounters:   04/06/22 215 lb (97.5 kg)     Mental Status:  oriented, alert, coherent, and thought processes intact    IV Access:  - None    Nursing Mobility/ADLs:  Walking   Assisted  Transfer  Assisted  Bathing  Assisted  Dressing  Assisted  Toileting  Assisted  Feeding  Independent  Med Admin  Independent  Med Delivery   whole    Wound Care Documentation and Therapy:  Incision 12/02/15 Foot Right (Active)   Number of days: 2322       Incision 10/23/17 Back Lower (Active)   Number of days: 1631        Elimination:  Continence: Bowel: No  Bladder: Yes  Urinary Catheter: None   Colostomy/Ileostomy/Ileal Conduit: No       Date of Last BM: 04/12/2022    Intake/Output Summary (Last 24 hours) at 4/12/2022 1052  Last data filed at 4/12/2022 0940  Gross per 24 hour   Intake 275 ml   Output 325 ml   Net -50 ml     I/O last 3 completed shifts:   In: 320 [P.O.:320]  Out: 250 [Urine:250]    Safety Concerns:     History of Falls (last 30 days)    Impairments/Disabilities:      None    Nutrition Therapy:  Current Nutrition Therapy:   - Oral Diet:  General    Routes of Feeding: Oral  Liquids: No Restrictions  Daily Fluid Restriction: no  Last Modified Barium Swallow with Video (Video Swallowing Test): not done    Treatments at the Time of Hospital Discharge:   Respiratory Treatments: ***  Oxygen Therapy:  is not on home oxygen therapy. Ventilator:    - No ventilator support    Rehab Therapies: Physical Therapy and Occupational Therapy  Weight Bearing Status/Restrictions: No weight bearing restrictions  Other Medical Equipment (for information only, NOT a DME order):  walker  Other Treatments: ***    Patient's personal belongings (please select all that are sent with patient):  None    RN SIGNATURE:  Electronically signed by Karen Diaz RN on 4/12/22 at 4:45 PM EDT    CASE MANAGEMENT/SOCIAL WORK SECTION    Inpatient Status Date: ***    Readmission Risk Assessment Score:  Readmission Risk              Risk of Unplanned Readmission:  16           Discharging to Facility/ Agency   Name: Jeanne Santiago [facility]  Address:  [facility]:[phone]  Fax:[phone]        / signature: Electronically signed by Annemarie Peña RN on 4/12/22 at 12:43 PM EDT    PHYSICIAN SECTION    Prognosis: Fair    Condition at Discharge: Stable    Rehab Potential (if transferring to Rehab): Fair    Recommended Labs or Other Treatments After Discharge: Ramez Delaney     Physician Certification: I certify the above information and transfer of Dora Back  is necessary for the continuing treatment of the diagnosis listed and that he requires [facility] for less 30 days.      Update Admission H&P: No change in H&P    PHYSICIAN SIGNATURE:  Electronically signed by Conner Medrano MD on 4/12/22 at 10:53 AM EDT

## 2022-04-12 NOTE — CARE COORDINATION
Case Management Assessment            Discharge Note                    Date / Time of Note: 4/12/2022 1:42 PM                  Discharge Note Completed by: Jignesh Diaz RN    Patient Name: Lazarus Stabile   YOB: 1945  Diagnosis: RADHA (acute kidney injury) ([facility] [address] [N17.9]  Altered mental status, unspecified altered mental status type [R41.82]   Date / Time: 4/6/2022  1:35 PM    Current PCP: Jeaneth Pineda MD  Clinic patient: No    Hospitalization in the last 30 days: No    Advance Directives:  Code Status: Full Code  [place] DNR form completed and on chart: Not Indicated    Financial:  Payor: Omero Fitzgerald / Plan: Omero Fitzgerald / Product Type: *No Product type* /      Pharmacy:    [facility], [address] [place] [address] [phone] - F [phone]  [place] [address]  Phone: [phone] Fax: [address] [place] [address] [phone] - [place] [address] [phone] [facility] [phone]  [address] [place] [address]  Phone: [phone] Fax: [phone]      Assistance purchasing medications?: Potential Assistance Purchasing Medications: No  Assistance provided by Case Management: None at this time    Does patient want to participate in local refill/ meds to beds program?:      Meds To Beds General Rules:  1. Can ONLY be done Monday- Friday between 8:30am-5pm  2. Prescription(s) must be in pharmacy by 3pm to be filled same day  3. Copy of patient's insurance/ prescription drug card and patient face sheet must be sent along with the prescription(s)  4. Cost of Rx cannot be added to hospital bill. If financial assistance is needed, please contact unit  or ;  or  CANNOT provide pharmacy voucher for patients co-pays  5.  Patients can then  the prescription on their way out of the hospital at discharge, or pharmacy can deliver to the bedside if Dr April Penny paged regarding hgb 7.1    Orders for type and screen in am for possible transfusion 2230    Primary Nurse Devorah Peng RN and apoorva eason RN performed a dual skin assessment on this patient Impairment noted- sacral excoriation  Rubio score is 18 staff is available. (payment due at time of pick-up or delivery - cash, check, or card accepted)     Able to afford home medications/ co-pay costs: Yes    ADLS:  Current PT AM-PAC Score: 11 /24  Current OT AM-PAC Score: 16 /24      DISCHARGE Disposition: Inpatient Rehab: [facility]  Phone: [phone] Fax: [phone]    LOC at discharge: Acute rehab unit  MIKY Completed: Not Indicated    Notification completed in HENS/PAS?:  Not Applicable    IMM Completed:   Not Indicated    Transportation:  Transportation PLAN for discharge: EMS transportation   Mode of Transport: Ambulance stretcher - BLS  Reason for medical transport: Confused due to CVA and requires ambulance transport due to high fall risk  Name of Transport Company: [employer] [facility]   Phone: [phone]  Time of Transport: 6p    Transport form completed: Yes        Additional CM Notes: Pt will DC to [address] aware of DC today at 6pm with [employer] [facility],    Nurse to call report to [phone] orders faxed to [phone] pt and wife aware of DC today. The Plan for Transition of Care is related to the following treatment goals of RADHA (acute kidney injury) ([facility] [address] [N17.9]  Altered mental status, unspecified altered mental status type [R41.82]    The Patient and/or patient representative Santiago Silverman and his family were provided with a choice of provider and agrees with the discharge plan Yes    Freedom of choice list was provided with basic dialogue that supports the patient's individualized plan of care/goals and shares the quality data associated with the providers.  Not Indicated    Care Transitions patient: Yes    Rodriguez Guerrero RN  [facility]  Case Management Department  Ph: [phone]  Fax: [phone]

## 2022-04-12 NOTE — PROGRESS NOTES
Occupational Therapy  Facility/Department: [facility] SURGERY  Daily Treatment Note  NAME: Yamilet Osborne  : 1945  MRN: [id number]    Date of Service: 2022    Discharge Recommendations: Yamilet Osborne scored a 16/24 on the AM-PAC ADL Inpatient form. Current research shows that an AM-PAC score of 17 or less is typically not associated with a discharge to the patient's home setting. Based on the patient's AM-PAC score and their current ADL deficits, it is recommended that the patient have 5-7 sessions per week of Occupational Therapy at d/c to increase the patient's independence. At this time, this patient demonstrates the endurance, and/or tolerance for 3 hours of therapy each day, with a treatment frequency of 5-7x/wk. Please see assessment section for further patient specific details. If patient discharges prior to next session this note will serve as a discharge summary. Please see below for the latest assessment towards goals. OT Equipment Recommendations  Other: defer    Assessment   Performance deficits / Impairments: Decreased functional mobility ; Decreased ADL status; Decreased endurance;Decreased cognition;Decreased posture;Decreased balance  Assessment: Pt cont to demo weakness, decreased cognition, max cues with sequencing and processing. Pt completing mobility with ModA x2 this date, posterior lean. Pt completing bed to chair, sit<>stands, short mobility in room. [address] x2 for transfers, Min + Mod with short mobility, modA x2 bed to chair. Increased time and cues for all tasks. Assist with ADls seated in recliner. Would benefit from cont skilled OT while in acute care and cont inpt at [place].   Treatment Diagnosis: impaired mobility, transfers, ADL  OT Education: OT Role;Plan of Care;ADL Adaptive Strategies;Transfer Training  Patient Education: cont to reinforce  Barriers to Learning: cognition  REQUIRES OT FOLLOW UP: Yes  Activity Tolerance  Activity Tolerance: Patient Tolerated treatment well;Patient limited by fatigue;Treatment limited secondary to decreased cognition  Safety Devices  Safety Devices in place: Yes  Type of devices: Left in chair;Nurse notified;Call light within reach; Chair alarm in place;Gait belt;Patient at risk for falls; All fall risk precautions in place         Patient Diagnosis(es): The primary encounter diagnosis was RADHA (acute kidney injury) ([facility] [address] Diagnoses of Altered mental status, unspecified altered mental status type and Sciatica of right side were also pertinent to this visit. has a past medical history of Allergic rhinitis, Aortic valve disorders, COPD (chronic obstructive pulmonary disease) (HCC), Hydrocephalus ([facility] [address] Hyperlipidemia, Hypertension, Hypertrophy of prostate without urinary obstruction and other lower urinary tract symptoms (LUTS), Irritable bowel syndrome, Pancreatitis, Peptic ulcer, unspecified site, unspecified as acute or chronic, without mention of hemorrhage, perforation, or obstruction, and Peripheral vascular disease ([facility] [address] 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 Restrictions  Position Activity Restriction  Other position/activity restrictions: up as tolerated  Subjective   General  Chart Reviewed: Yes  Additional Pertinent Hx: 78 y.o. male with a history of HTN, HLD, COPD, normal pressure hydrocephalus, who presents with concerns for altered mental status as well as hematemesis and black, tarry stools.   Most of the history is obtained by the patient's wife, who is at bedside. She states that ever since January the patient has had somewhat sudden onset of confusion, generalized weakness, anorexia. UGI bleed, OR 4/7 ENDO. Referring Practitioner: Sheree Ayers MD  Diagnosis: RADHA  Subjective  Subjective: In bed, agreeable to session, reporting no pain      Orientation  Orientation  Overall Orientation Status: Impaired  Objective    ADL  Feeding: Setup;Supervision  Grooming: Setup;Stand by assistance (wipe face, oral care)  LE Dressing: Moderate assistance (donning socks and shoes)  Toileting:  (declined need)        Balance  Sitting Balance: Minimal assistance (to CGA, posterior lean)  Standing Balance: Maximum assistance (Mod to Max post lean, L lean)  Standing Balance  Activity: sit<>stand, bed to chair, short mobility in room  Functional Mobility  Functional - Mobility Device: Rolling Walker  Activity: Other  Functional Mobility Comments: ModA x1-2  Bed mobility  Supine to Sit: Minimal assistance  Comment: left in chair end of session  Transfers  Sit to stand: Moderate assistance;2 Person assistance  Stand to sit: Moderate assistance;2 Person assistance                       Cognition  Overall Cognitive Status: Exceptions  Arousal/Alertness: Delayed responses to stimuli; Appropriate responses to stimuli  Following Commands: Follows one step commands with increased time; Follows one step commands with repetition; Inconsistently follows commands  Attention Span: Attends with cues to redirect; Difficulty attending to directions; Difficulty dividing attention  Memory: Decreased recall of biographical Information;Decreased recall of recent events  Safety Judgement: Decreased awareness of need for assistance;Decreased awareness of need for safety  Problem Solving: Decreased awareness of errors  Insights: Decreased awareness of deficits  Initiation: Requires cues for all  Sequencing: Requires cues for all Plan   Plan  Times per week: 2-5  Times per day: Daily    AM-PAC Score        AM-PAC Inpatient Daily Activity Raw Score: 16 (04/12/22 1240)  AM-PAC Inpatient ADL T-Scale Score : 35.96 (04/12/22 1240)  ADL Inpatient CMS 0-100% Score: 53.32 (04/12/22 1240)  ADL Inpatient CMS G-Code Modifier : CK (04/12/22 1240)    Goals  Short term goals  Time Frame for Short term goals: dc  Short term goal 1: supine to sit SBA - ongoing  Short term goal 2: sit<>stand CGA - ongoing  Short term goal 3: bed to chair/BSC fx transfers Kal[place] - ongoing  Short term goal 4: toileting [place] - ongoing  Short term goal 5: UB/LB dressing [place] - ongoing       Therapy Time   Individual Concurrent Group Co-treatment   Time In 1018         Time Out 1056         Minutes 38              Timed Code Treatment Minutes:   38    Total Treatment Minutes:  [address], OT

## 2022-04-12 NOTE — CONSULTS
Comprehensive Nutrition Assessment    RD did not conduct direct, in-person nutrition evaluation in efforts to reduce exposure and use of PPE for high risk persons, PUI persons, patients who have tested positive for Covid-19 or those awaiting respiratory panel results. EMR was screened for nutrition risk factors, as defined per nutrition standards of care. RECOMMENDATIONS:  1. PO Diet: ***  2. ONS: ***  3. Nutrition Education:   4. ***    NUTRITION ASSESSMENT:   Nutritional summary & status: Pt triggered for positive nutrition screen. NFPE findings indicate pt with severe malnutrition. Pt complains of decreased appetite since february. Pt wife states he takes a few bites at each meal and that is all he can manage. Daughter added that this is not normal for him as he usually has a big appetite. Pt with self reported wt loss of 15 lbs, however EMR shows wt loss of 20lbs in 2 months. Pt with early satiety. Pt reports test with findings of ulcers. Pt reports he is not drinking much either. RD encouraged pt to stay consistent with small, frequent intakes throghout the day as his appetite is returning. pt advanced to clear liquid diet. Pt dislikes ons as he has tried them in the past. Pt agreed to try Ensure Clears. RD will continue to monitor pt through admission.  Admission/PMH: Pt admitted with altered mental status, RADHA. PMHx: COPD, HTN, HLD, Irritable bowel syndrome, Pancreatitis.     MALNUTRITION ASSESSMENT  Context of Malnutrition: Acute Illness   Malnutrition Status: Severe malnutrition  Findings of the 6 clinical characteristics of malnutrition (Minimum of 2 out of 6 clinical characteristics is required to make the diagnosis of moderate or severe Protein Calorie Malnutrition based on AND/ASPEN Guidelines):  Energy Intake: {MALNUTR Energy Intake %:07877}   Energy Intake Time: {MALNUTR Timeframes:63421}   Weight Loss %: {MALNUTR Weight Loss %:23972}   Weight loss Time: {MALNUTR Timeframes:01802}   Body Fat Loss: {MALNUTR Body Muscle/Fat Loss:78250}   Body Fat Location: {MALNUTR Body Fat Loss Location:03339}   Body Muscle Loss: {MALNUTR Body Muscle/Fat Loss:41051}   Body Muscle Loss Location: {MALNUTR Muscle Loss Location:94737}   Fluid Accumulation: {MALNUTR Edema:36729}   Fluid Accumulation Location: {MALNUTR Edema Location:84236}    Strength: Not Performed; Not Measured     NUTRITION DIAGNOSIS   Inadequate oral intake related to altered GI function as evidenced by NPO or clear liquid status due to medical condition,intake 0-25%,poor intake prior to [address] and/or Nutrient Delivery:  Continue Current Diet,Start Oral Nutrition Supplement  Nutrition Education/Counseling:  No recommendation at this time   Goals:  Pt will tolerate diet advancement and consume 50% of meals and ONS through admission        Nutrition Monitoring and Evaluation:   Food/Nutrient Intake Outcomes:  Food and Nutrient Intake,Supplement Intake  Physical Signs/Symptoms Outcomes:  Biochemical Data,Nutrition Focused Physical Findings,Weight     OBJECTIVE DATA: Significant to nutrition assessment  · Nutrition-Focused Physical Findings: lbm: unknown   · Labs: Reviewed; ***  · Meds: Reviewed; ***  · Wounds: None       CURRENT NUTRITION THERAPIES  ADULT DIET;  Regular  ADULT ORAL NUTRITION SUPPLEMENT; Breakfast, Dinner; Clear Liquid Oral Supplement     {Current Nutrition Therapy:84123}  PO Intake: NPO   PO Supplement Intake:None Ordered  Additional Sources of Calories/IVF:NS @ 100 ml/hr     ANTHROPOMETRICS  Current Height: 6' (182.9 cm)  Current Weight: 215 lb (97.5 kg)    Admission weight: 215 lb (97.5 kg)  Ideal Body Weight (IBW): 178 lbs  (81 kg)    Usual Bodyweight 230 lb (104.3 kg) (per pt )   Weight Changes ***       BMI: 29.2    Wt Readings from Last 50 Encounters:   04/06/22 215 lb (97.5 kg)   03/30/22 216 lb (98 kg)   03/21/22 225 lb 1.4 oz (102.1 kg)   03/20/22 225 lb (102.1 kg) 03/10/22 225 lb (102.1 kg)   02/12/22 240 lb (108.9 kg)   09/29/21 236 lb (107 kg)   09/23/21 236 lb (107 kg)   05/04/21 240 lb (108.9 kg)   03/25/21 240 lb 9.6 oz (109.1 kg)   11/19/20 244 lb (110.7 kg)   10/01/20 242 lb (109.8 kg)   06/29/20 234 lb (106.1 kg)   05/21/20 234 lb 3.2 oz (106.2 kg)   04/29/20 238 lb (108 kg)   03/13/20 227 lb (103 kg)   02/06/20 227 lb (103 kg)   02/03/20 227 lb (103 kg)   01/15/20 228 lb (103.4 kg)   01/02/20 238 lb (108 kg)   12/23/19 240 lb (108.9 kg)   11/29/19 243 lb 6.4 oz (110.4 kg)   11/18/19 240 lb 3.2 oz (109 kg)   10/29/19 240 lb (108.9 kg)   10/10/19 238 lb (108 kg)   08/15/19 239 lb (108.4 kg)   07/26/19 242 lb (109.8 kg)   05/15/19 239 lb (108.4 kg)   04/04/19 236 lb (107 kg)   02/15/19 237 lb (107.5 kg)   02/04/19 236 lb (107 kg)   12/15/18 230 lb (104.3 kg)   12/04/18 232 lb (105.2 kg)   09/18/18 230 lb (104.3 kg)   09/18/18 230 lb 12.8 oz (104.7 kg)   09/11/18 235 lb (106.6 kg)   07/12/18 227 lb (103 kg)   04/19/18 226 lb (102.5 kg)   04/05/18 230 lb (104.3 kg)   03/21/18 235 lb 14.3 oz (107 kg)   03/15/18 239 lb (108.4 kg)   03/05/18 244 lb (110.7 kg)   02/08/18 231 lb (104.8 kg)   01/29/18 231 lb 9.6 oz (105.1 kg)   01/25/18 233 lb (105.7 kg)   01/24/18 230 lb (104.3 kg)   01/18/18 231 lb (104.8 kg)   12/05/17 232 lb (105.2 kg)   11/07/17 234 lb (106.1 kg)   10/23/17 230 lb 3.2 oz (104.4 kg)       COMPARATIVE STANDARDS  Energy (kcal):  8571-5514 (20-25)     Protein (g):   (1.2-1.4)       Fluid (ml/day):  6152-9546    The patient will still be monitored per nutrition standards of care. Consult dietitian if nutrition interventions essential to patient care is needed.      Gerber Guerrero, [address],   [place]:  [phone]  Office:  [phone]

## 2022-04-12 NOTE — PROGRESS NOTES
Physical Therapy  Facility/Department: [facility]'[address]  Daily Treatment Note  NAME: Dragan Valera  : 1945  MRN: [id number]    Date of Service: 2022    Discharge Recommendations: Dragan Valera scored a  on the AM-PAC short mobility form. Current research shows that an AM-PAC score of 17 or less is typically not associated with a discharge to the patient's home setting. Based on the patient's AM-PAC score and their current functional mobility deficits, it is recommended that the patient have 5-7 sessions per week of Physical Therapy at d/c to increase the patient's independence. At this time, this patient demonstrates the endurance, and/or tolerance for 3 hours of therapy each day, with a treatment frequency of 5-7x/wk. Please see assessment section for further patient specific details. If patient discharges prior to next session this note will serve as a discharge summary. Please see below for the latest assessment towards goals. PT Equipment Recommendations  Equipment Needed: No    Assessment   Assessment: Pt with fair tolerance to activity, but limited at times due to slow processing and requirement of extra cues for initiation and completion of tasks. Pt does follow commands with time. Pt continues to require assist of 2 for OOB mobility demonstrating significant posterior lean in standing and difficulty coordinating LEs in order to take steps with RW.  pt ambulating up to 10' with RW and assist of 2 however needs increased assist and cues for turns. Pt shows decreased seated and standing balance with tendency to lean posteriorly. Pt presents as a fall risk. Rec continued inpt PT at d/c. Will follow. Treatment Diagnosis: Decreased functional mobility  Prognosis: Good  Decision Making: Medium Complexity  PT Education: PT Role;General Safety; Functional Mobility Training  Patient Education: Pt will benefit from reinforcement  REQUIRES PT FOLLOW UP: Yes  Activity Tolerance  Activity Tolerance: Patient limited by fatigue;Patient limited by cognitive status     Patient Diagnosis(es): The primary encounter diagnosis was RADHA (acute kidney injury) ([facility] [address] A diagnosis of Altered mental status, unspecified altered mental status type was also pertinent to this visit. has a past medical history of Allergic rhinitis, Aortic valve disorders, COPD (chronic obstructive pulmonary disease) (HCC), Hydrocephalus ([address] Hyperlipidemia, Hypertension, Hypertrophy of prostate without urinary obstruction and other lower urinary tract symptoms (LUTS), Irritable bowel syndrome, Pancreatitis, Peptic ulcer, unspecified site, unspecified as acute or chronic, without mention of hemorrhage, perforation, or obstruction, and Peripheral vascular disease ([facility] [address] 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 Restrictions  Position Activity Restriction  Other position/activity restrictions: up as tolerated  Subjective   General  Chart Reviewed: Yes  Additional Pertinent Hx: Pt is a 77 yo male presenting with black stools and progressive weakness over the last few weeks experiencing difficulty with ambulation and balance, EGD on 4/7 showed multiple ulcers. Neuro following for diagnosis of NPH in 2018  Referring Practitioner: Violetta Fisher MD  Subjective  Subjective: Pt supine in bed and agreeable to PT.   Pt with delayed responses and difficulty processing commands at times, although follows them with cueing and demonstration. Pain Screening  Patient Currently in Pain: Denies  Vital Signs  Patient Currently in Pain: Denies       Orientation  Orientation  Overall Orientation Status: Impaired  Orientation Level: Oriented to person;Disoriented to time;Oriented to place  Cognition      Objective   Bed mobility  Supine to Sit: Minimal assistance  Scooting: Modified independent  Comment: max VC for sequencing, slow and effortful  Transfers  Sit to Stand: 2 Person Assistance (mod A of 2 from EOB and recliner x 2 trials)  Stand to sit: 2 Person Assistance (mod A of 2- difficulty with controlled discent)  Bed to Chair: 2 Person Assistance  Stand Pivot Transfers: 2 Person Assistance (EOB to recliner with RW and mod A of 2)  Comment: significant posterior lean with stance at RW, max VC for sequencing and initiation of tasks  Ambulation 1  Device: [address]: 2 Person assistance; Moderate assistance;Minimal assistance (fluctuating from mod A + min A to mod A of 2 with turns)  Quality of Gait: Unsteady gait, posterior lean, trunk flexion, assist for walker management at times, heavy verbal cues  Gait Deviations: Decreased step length; Slow Kaylee;Decreased step height  Distance: 10 feet  Comments: pt requiring constant VC for sequencing of steps especially with turns     Balance  Posture: Fair  Sitting - Static: Fair  Sitting - Dynamic: Fair;-  Standing - Static: Poor  Standing - Dynamic: Poor  Comments: sitting balance EOB required CGA- min A due to posterior lean, pt performed dynamic reaching 2 sets of 10 with BUEs to correct posterior lean.  pt required mod- max A + occasional min A for standing balance at RW with significant posterior and left lateral lean- max VC for anterior lean through balls of feet to correct            Other Activities: Other (see comment)  Comment: pt assisted with ADLs in sitting with OT              G-Code     OutComes Score                                                     AM-PAC Score  AM-[facility] Inpatient Mobility Raw Score : 11 (04/12/22 1146)  AM-PAC Inpatient T-Scale Score : 33.86 (04/12/22 1146)  Mobility Inpatient CMS 0-100% Score: 72.57 (04/12/22 1146)  Mobility Inpatient CMS G-Code Modifier : CL (04/12/22 1146)          Goals  Short term goals  Time Frame for Short term goals: by discharge  Short term goal 1: pt will perform bed mobility with supervision  ONGOING  Short term goal 2: pt will perform functional transfers with min A at [address] [place] [address] term goal 3: pt will ambulate 22' with LRAD and mod A  ONGOING  Patient Goals   Patient goals : to get stronger    Plan    Plan  Times per week: 2-5  Current Treatment Recommendations: Strengthening,Transfer Training,Endurance Training,Balance Training,Gait Training,Functional Mobility Training,Safety Education & Training,Cognitive Reorientation,Neuromuscular Re-education,Patient/Caregiver Education & Training  Safety Devices  Type of devices: Call light within reach,Left in chair,Chair alarm in place,Nurse notified     Therapy Time   Individual Concurrent Group Co-treatment   Time In 1015         Time Out 1053         Minutes 38                 Imani Garcia, PT

## 2022-04-12 NOTE — PROGRESS NOTES
Pt alert and oriented this shift. Pleasant with care. Denied pain. Slept very well. Continent of bladder. No BM. BP slightly elevated but stable. Able to turn self in bed. No acute issus overnight. Wheels locked, bed at lowest position. Call light within reach. Will monitor.

## 2022-04-13 ENCOUNTER — TELEPHONE (OUTPATIENT)
Dept: CARDIOLOGY CLINIC | Age: 77
End: 2022-04-13

## 2022-04-13 NOTE — TELEPHONE ENCOUNTER
FYI- [facility] pt was released from [address] and was told to schedule fu in 2-3 wks. Pt is admitted into [facility] and will call when released to schedule fu with cardiology.

## 2022-04-25 ENCOUNTER — CARE COORDINATION (OUTPATIENT)
Dept: CARE COORDINATION | Age: 77
End: 2022-04-25

## 2022-04-25 NOTE — CARE COORDINATION
LUZ MARINAM made outreach to follow up with Care Management. LUZ MARINAM spoke with patient's spouse and was advised that patient is currently at [facility] for Rehab and will be discharged home later this week. LUZ MARINA has made plans to follow up with patient and his wife.

## 2022-04-29 ENCOUNTER — CARE COORDINATION (OUTPATIENT)
Dept: CARE COORDINATION | Age: 77
End: 2022-04-29

## 2022-04-29 RX ORDER — TAMSULOSIN HYDROCHLORIDE 0.4 MG/1
0.4 CAPSULE ORAL DAILY
COMMUNITY
Start: 2022-04-28 | End: 2022-05-19 | Stop reason: ALTCHOICE

## 2022-04-29 NOTE — CARE COORDINATION
Ambulatory Care Coordination Note  4/29/2022  CM Risk Score: 2  [place] 10 Year Mortality Risk Score: 100%     ACC: Guy Gaucher, RN    Summary Note: ACM made outreach for Care Management to follow up from patient's recent discharge from [facility]. ACM spoke with patient's wife Nevaeh. She states that patient is home and adjusting. He complains of feeling tired today but patient's wife states that she believes it is due to being home and having to adjust. Patient is doing well physically and is up and walking using a walker. Patient's wife states that it has been a while since he has been able to do that. LUZ MARINAM discussed fall risk and patient safety and per Nevaeh a safe environment has been created to allow safe ambulation. Patient will have a [address] and PT at the house weekly. Nevaeh believes that at this time things are moving in the right direction. She states that patient has an upcoming appointment with [address] [place] [address] Nerve Specialist for walking and balance on 5/2/2022, as well as an upcoming vascular appointment. Patient's ASA was d/c due to GI bleed, but has been added back to patient's medications. Patients wife discussed this with prescriber and was advised that it is necessary for patient to take due to his medical history. Nevaeh states that she is watching for signs of bleeding and checking stools for any signs of concerns for bleeding. ACM discussed general concerns with patient's wife and none were identified at this time. Currently she has no questions or concerns. AC advised patient's wife to call with any and has arranged for future outreach. Plan  F/U at home PT  F/U [facility] Nerve Specialist visit  F/U COPD        Care Coordination Interventions    Program Enrollment: Rising Risk  Referral from Primary Care Provider: No  Suggested Interventions and [address]: In Process  Occupational Therapy: In Process  Physical Therapy:  In Process  Zone Management Tools: In Process         Goals Addressed                    This Visit's Progress      Patient Stated (pt-stated)         Will continue to build strength with at home PT and walk with use of walker    Barriers: overwhelmed by complexity of regimen and stress  Plan for overcoming my barriers: work with [employer], PT, PCP and spouse   Confidence: 8/10  Anticipated Goal Completion Date: 7/29/2022            Prior to Admission medications    Medication Sig Start Date End Date Taking? Authorizing Provider   tamsulosin (FLOMAX) 0.4 MG capsule Take 0.4 mg by mouth daily 4/28/22 5/28/22 Yes Historical Provider, MD   psyllium (METAMUCIL) 58.6 % packet Take 1 packet by mouth daily 4/27/22 5/27/22 Yes Historical Provider, MD   aspirin 81 MG EC tablet Take 1 tablet by mouth daily 4/18/22   Madan Pena MD   amLODIPine (NORVASC) 5 MG tablet Take 1 tablet by mouth daily 4/13/22   Madan Pena MD   pantoprazole (PROTONIX) 40 MG tablet Take 1 tablet by mouth 2 times daily (before meals) 4/12/22   Madan Pena MD   gabapentin (NEURONTIN) 100 MG capsule Take 1 capsule by mouth at bedtime for 90 days. 4/12/22 7/11/22  Madan Pena MD   HYDROcodone-acetaminophen (NORCO) 5-325 MG per tablet Take 1 tablet by mouth every 6 hours as needed for Pain.     Historical Provider, MD   ondansetron (ZOFRAN) 4 MG tablet Take 1 tablet by mouth daily as needed for Nausea or Vomiting 0/01/73   Lyubov Mccurdy MD   atorvastatin (LIPITOR) 80 MG tablet TAKE ONE TABLET BY MOUTH DAILY 3/8/22   Mikaela Moya MD   torsemide [facility]) 20 MG tablet Take 1 tablet by mouth daily 1/18/22   Eddie Gomez MD   albuterol sulfate  (90 Base) MCG/ACT inhaler Inhale 2 puffs into the lungs every 4 hours as needed for Wheezing 7/28/21   MD DARYA Cobos-CON M20 20 MEQ extended release tablet TAKE ONE TABLET BY MOUTH DAILY 5/17/21   Eddie Gomez MD       Future Appointments Date Time Provider Pawan Renner   5/10/2022  9:40 AM Misha Barber MD [facility] NEURO [facility]   5/17/2022 10:15 AM Denise Rivera MD  VASC/ENDO [facility]   7/14/2022 10:15 AM Nicole Palacios MD [facility] - DYD     ,   General Assessment    Do you have any symptoms that are causing concern?: No     , Care Coordination Episodes    Type: Amb Care Management  Episode: Rising Risk  Noted: 3/8/2022  Comments: COPD, HTN, HLD, PVD and 2

## 2022-05-03 ENCOUNTER — TELEPHONE (OUTPATIENT)
Dept: FAMILY MEDICINE CLINIC | Age: 77
End: 2022-05-03

## 2022-05-03 NOTE — TELEPHONE ENCOUNTER
Patient's wife called with a concern that her 's BP is too low. It was 102/60 this afternoon at OT . He has been put on different meds at each rehab.  [phone]    Please advise    amLODIPine (NORVASC) 5 MG tablet 30 tablet 3 4/13/2022     Sig - Route:  Take 1 tablet by mouth daily - Oral    Sent to pharmacy as: amLODIPine Besylate 5 MG Oral Tablet (NORVASC)    E-Prescribing Status: Receipt confirmed by pharmacy (4/12/2022 10:51 AM EDT)      Patient is taking 5 mg of Norvasc.      lisinopril (PRINIVIL;ZESTRIL) tablet 5 mg (Discontinued) 5 mg DAILY 4/7/2022 4/7/2022   Route: Oral

## 2022-05-03 NOTE — TELEPHONE ENCOUNTER
Patient is not taking the lisinopril  He is only taking norvasc 5 mg qd- and is also having more foot/ankle swelling  Per RB try holding the norvasc and see how he does without it. Wife advised, she will let us know.

## 2022-05-09 ENCOUNTER — TELEPHONE (OUTPATIENT)
Dept: CARDIOLOGY CLINIC | Age: 77
End: 2022-05-09

## 2022-05-09 NOTE — TELEPHONE ENCOUNTER
Ms Damon Gee called to scheduled appt with DCE, she stated that the pts feeet are swollen and was wondering if they should increase the pt torsemide, pt is scheduled 5/19 with DCE/ TAVR and ECHO      Pls advise thank you

## 2022-05-09 NOTE — TELEPHONE ENCOUNTER
Per DCE, he will address at office visit next week. Patient should call back if his breathing gets worse until then.

## 2022-05-09 NOTE — TELEPHONE ENCOUNTER
Called pt and relayed message per DCE with verbal understanding from wife and encouraged to call office with any other questions or concerns.

## 2022-05-11 ENCOUNTER — OFFICE VISIT (OUTPATIENT)
Dept: FAMILY MEDICINE CLINIC | Age: 77
End: 2022-05-11
Payer: COMMERCIAL

## 2022-05-11 VITALS — OXYGEN SATURATION: 98 % | TEMPERATURE: 99.1 F | HEART RATE: 76 BPM

## 2022-05-11 DIAGNOSIS — R22.43 LOCALIZED SWELLING OF BOTH LOWER LEGS: Primary | ICD-10-CM

## 2022-05-11 DIAGNOSIS — R19.7 DIARRHEA, UNSPECIFIED TYPE: ICD-10-CM

## 2022-05-11 PROCEDURE — 99214 OFFICE O/P EST MOD 30 MIN: CPT | Performed by: NURSE PRACTITIONER

## 2022-05-11 NOTE — PROGRESS NOTES
2022  Mary Lemons (:  1945)    Allergies: No Known Allergies  (review in Epic)      FLU/RESPIRATORY/COVID-19 CLINIC EVALUATION    HPI:   Chief Complaint   Patient presents with    Diarrhea      SYMPTOMS:    INSTRUCTIONS:  \"[x]\" Indicates a positive item  \"[]\" Indicates a negative item      Symptom duration, days:    Date symptoms started : __2 weeks____    [] 1   [] 2   [] 3   [] 4 - 7   [] 8 - 10   [] 11 - 13   [] >14    [] Fevers    [] Symptom (not measured)  [] Measured (Result:  degrees)  [] Chills  [] Cough [] Dry [] Productive   []Loss of Taste  [] Loss of Smell  []Decreased Appetite  [] Coughing up blood  }  [] Chest Congestion  [] Nasal Congestion  [] Runny  Nose  [] Sneezing  [] Feeling short of breath   []Sometimes    [] Frequently    [] All the time     [] Chest pain     [] Headaches  []Tolerable  [] Severe     [] Fatigue  [] Sore throat  [] Muscle aches  [] Nausea  [] Vomiting  []Unable to keep fluids down     [] Diarrhea  [] Mild  []Severe       [] Vaccinated for COVID 19  [] History of COVID 19 (Date:           )      [] OTHER SYMPTOMS: Reports concerns for stool incontinence along with some diarrhea this has been ongoing since he was discharged from the hospital/rehab. Was recently dx with GI bleed. No blood in stool. Concerned also about leg swelling that has been present for the past 2 weeks. Follows with Cardiology. Blood pressure has been good. Symptom course:   [] Worsening     [] Stable     [] Improving    RISK FACTORS:1INSTRUCTIONS:  \"[x]\" Indicates a positive item. Negative  for risk factors if not checked.     [] Close contact with a lab confirmed COVID-19 patient within 14 days of symptom onset  [] History of travel from affected geographical areas within 14 days of symptom onset        PHYSICAL EXAMINATION:    Vitals:    22 1403   Pulse: 76   Temp: 99.1 °F (37.3 °C)   TempSrc: Tympanic   SpO2: 98%          [x] Alert  [x] Oriented to person/place/time    [x] No apparent distress   [] Toxic appearing  [] Face flushed appearing     [x] Normal Mood  [] Anxious appearing      [] Sclera clear    [] Pinna, TMs,  Canals normal bilaterally  [] TM Red  [] Right [] Left [] Bilateral  [] TM Bulging [] Right [] Left []  Billateral    [] Oropharynx [] Clear [] Red [] Exudate [] Swollen    [] No adenopathy [] Adenopathy __________    [x] Lungs clear with good movement and effort  [x] Breathing appears normal     [x] Speaks in complete sentences  [] Appears tachypneic   [] Wheezing           [] Rhonchi   [] Decreased    [x] CV RRR  [x] No Murmur  [] Murmur  [] Irregular  [] Tachycardic    [] OTHER:  1}      TESTS ORDERED:    [] POCT FLU  [] POCT STREP  [] COVID-19 Test sent  [] Appointment made at testing clinic for patient to get a COVID test.       TEST RESULTS:    POCT FLU test:  [] Positive  [] Negative  POCT STREP test:  [] Positive  [] Negative    ASSESSMENT:  [] Allergic Rhinitis  [] Asthma Exacerbation  [] Bronchitis  [] COPD Exacerbation  [] Gastroenteritis  [] Influenza  [] Sinusitis  [] Strep Throat [] Sore Throat  [] Viral URI   [] Possible COVID-19   [] Exposure to COVID -19  [] Positive for COVID  [] Screening for Viral Disease (COVID test no sx)        Cheri Hughes was seen today for diarrhea. Diagnoses and all orders for this visit:    Localized swelling of both lower legs  Reviewed telephone encounter in [facility]. Per Cardiology note patient to continue on current dosage of lasix and use compression stockings if able and keep follow up with him. To check daily weights and if becomes SOB report to ED. Has Cardiology follow up already scheduled. Diarrhea, unspecified type  Unsure of etiology of stool incontinence/intermittent diarrhea. Does not seem infectious in nature as he is still at times having formed stool. Needs to have hospital follow up with Dr. Alize Knapp for continued monitoring/evaluation.   Schedule follow up with him after his appointments/ECHO with Cardiology and Vascular.             [] Low risk for complications from [place] [address]  [] Moderate risk for complications from [place] [address]  [] High risk for complications from [address]:    [x] Discharge home with written instructions for:  [] Flu management  [] Strep throat management  [] Viral respiratory illness management  [] Sinusitis management  [] Bronchitis Management  [] Possible COVID-19 infection with self-quarantine and management of symptoms  [x] Follow-up with primary care physician or emergency department if worsens  [] Note given for work    [] Referred to emergency department for evaluation

## 2022-05-16 NOTE — PROGRESS NOTES
[address]  H+P  Consult  OP Visit  FU Visit   CC HX HPI   GEN  Doing well. No new concerns. LE edema continued problem. AS TAVR Ø CP, SOB. HTN  Ambulatory BP in good range. Ø HA/dizziness. CHOL  Last lipid reviewed. On max dose/tolerated statin. MED  Compliant with CV meds. Ø reported SA. HISTORY/ALLERGY/ROS   MEDHx  has a past medical history of Allergic rhinitis, Aortic valve disorders, COPD (chronic obstructive pulmonary disease) (HCC), Hydrocephalus ([facility] [address] Hyperlipidemia, Hypertension, Hypertrophy of prostate without urinary obstruction and other lower urinary tract symptoms (LUTS), Irritable bowel syndrome, Pancreatitis, Peptic ulcer, unspecified site, unspecified as acute or chronic, without mention of hemorrhage, perforation, or obstruction, and Peripheral vascular disease ([facility] [address] 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 SOCHx  reports that he quit smoking about 20 years ago. His smoking use included cigarettes. He has a 45.00 pack-year smoking history. He has never used smokeless tobacco. He reports current alcohol use of about 2.0 standard drinks of alcohol per week. He reports that he does not use drugs. FAMHx family history includes Alcohol Abuse in his father; Cancer in his brother; Diabetes in his father; Heart Disease in his father; Other in his mother. ALLERG Patient has no known allergies.    ROS Full ROS obtained and negative except as mentioned in HPI MEDICATIONS   Current Outpatient Medications   Medication Sig Dispense Refill    tamsulosin (FLOMAX) 0.4 MG capsule Take 0.4 mg by mouth daily      psyllium (METAMUCIL) 58.6 % packet Take 1 packet by mouth daily      aspirin 81 MG EC tablet Take 1 tablet by mouth daily 30 tablet 3    amLODIPine (NORVASC) 5 MG tablet Take 1 tablet by mouth daily 30 tablet 3    pantoprazole (PROTONIX) 40 MG tablet Take 1 tablet by mouth 2 times daily (before meals) 60 tablet 3    gabapentin (NEURONTIN) 100 MG capsule Take 1 capsule by mouth at bedtime for 90 days. 360 capsule 3    HYDROcodone-acetaminophen (NORCO) 5-325 MG per tablet Take 1 tablet by mouth every 6 hours as needed for Pain.  ondansetron (ZOFRAN) 4 MG tablet Take 1 tablet by mouth daily as needed for Nausea or Vomiting 30 tablet 0    atorvastatin (LIPITOR) 80 MG tablet TAKE ONE TABLET BY MOUTH DAILY 90 tablet 3    torsemide (DEMADEX) 20 MG tablet Take 1 tablet by mouth daily 30 tablet 6    albuterol sulfate  (90 Base) MCG/ACT inhaler Inhale 2 puffs into the lungs every 4 hours as needed for Wheezing 1 Inhaler 1    KLOR-CON M20 20 MEQ extended release tablet TAKE ONE TABLET BY MOUTH DAILY 30 tablet 6     No current facility-administered medications for this visit.       PHYSICAL EXAM   Vitals Vitals:    05/19/22 0831   BP: 114/60   Pulse: 55   SpO2: 98%      Gen Alert, coop, no distress Heart  Rrr, no mrg   Head NC, AT, no abnorm Abd  Soft, NT, +BS, no mass, no OM   Eyes PER, conj/corn clear Ext  Ext nl, AT, no C/C, +edema   Nose Nares nl, no drain, NT Pulse 2+ and symmetric   Throat Lips, mucosa, tongue nl Skin Col/text/turg nl, no vis rash/les   Neck S/S, TM, NT, no bruit/JVD Psych Nl mood and affect   Lung CTA-B, unlabored, no DTP Lymph   No cervical or axillary LA   Ch wall NT, no deform Neuro  Nl gross M/S exam      CODING   SCI (95686) - AS, HTN, CHOL  30-39 minutes preparing to see pt including review hx, tests, consults, perf exam, , educating pt, fam, caregiver, ordering meds/tests/procedures, referring and comm with pcps and other consultants, documenting info in EMR, interpreting results and communicating to fam and coordination of pt care. ASSESSMENT AND PLAN     *AS   Date EF Detail   Sx   No concerning   Hx 3/18  [facility] TAVR [place] S3 26mm   [facility] 1/18  Nonobstructive   TTE 7/17  3/18  4/18  4/19  2/20  3/21  5/22 55%  65%  60%  60%  65%  60%  55% AS MG 38  TAVR  well seated, MG 14, no AI  TAVR MG 10, no AI  TAVR MG 14, no AI  TAVR MG 12, no AI  TAVR MG 9, no AI  TAVR MG 8, no AI   Plan   Continue current medications listed above  Echo yearly   *HTN  Status Controlled  Plan Counseled on diet/salt/exercise/weight, continue meds at doses above  *CHOL  Status  Uncontrolled with last LDL of 111 (goal <70) and HDL of 56 (11/20)  Plan Counseled on diet/exercise/weight, continue statin, lipid/liver surveillance per PCP  *EDEMA  Status worsened  Plan Increase torsemide to 40 and kcl to 40  *COMPLIANCE  Status Compliant  Plan Discussed importance of compliance with meds/diet/salt/exercise; avoid tob/alc/drugs; patient verbalized understanding  *FOLLOWUP  3 months with NP to assess LE edema    Scribe Attestation  Rudy BALES, am scribing for and in the presence of Maite Zuniga MD.   SignedRudy 05/16/22 9:56 AM   Provider Darshana Watts is working as a scribe for and in the presence of me (Maite Zuniga MD). Working as a scribe, Rudy Bess may have prepopulated components of this note with my historical  intellectual property under my direct supervision. Any additions to this intellectual property were performed in my presence and at my direction.   Furthermore, the content and accuracy of this note have been reviewed by me Maite Zuniga MD).  5/19/2022 7:05 AM

## 2022-05-16 NOTE — PATIENT INSTRUCTIONS
Increase torsemide to 40mg daily. You can double up on your current 20mg tablets until you run out    Increase potassium to 40mEq daily.  Also double up on what you have at home    Weigh yourself daily in the morning and call me in 1 week

## 2022-05-17 ENCOUNTER — OFFICE VISIT (OUTPATIENT)
Dept: VASCULAR SURGERY | Age: 77
End: 2022-05-17
Payer: COMMERCIAL

## 2022-05-17 VITALS
WEIGHT: 209 LBS | DIASTOLIC BLOOD PRESSURE: 76 MMHG | BODY MASS INDEX: 28.31 KG/M2 | SYSTOLIC BLOOD PRESSURE: 126 MMHG | HEIGHT: 72 IN

## 2022-05-17 DIAGNOSIS — I65.23 CAROTID ATHEROSCLEROSIS, BILATERAL: Primary | ICD-10-CM

## 2022-05-17 DIAGNOSIS — I70.213 ATHEROSCLEROSIS OF NATIVE ARTERIES OF EXTREMITIES WITH INTERMITTENT CLAUDICATION, BILATERAL LEGS (HCC): ICD-10-CM

## 2022-05-17 PROCEDURE — 99213 OFFICE O/P EST LOW 20 MIN: CPT | Performed by: SURGERY

## 2022-05-17 NOTE — LETTER
[facility]) - Vascular and Endovascular Surgeons  [place] [address]  Phone: [phone]  Fax: 91 Alan Moreira II, MD      May 17, 2022     Patient: Martina Ramos   MR Number: [id number]   YOB: 1945   Date of Visit: 5/17/2022       Dear Dr. Kylah Olguin: Thank you for referring Winifred Castellanos to me for evaluation/treatment. Below are the relevant portions of my assessment and plan of care. If you have questions, please do not hesitate to call me. I look forward to following Maude White along with you.     Sincerely,        Sona Raya MD    CC providers:  Shawnee Connelly., MD  [address] [place] [address] [id number] [place] [address]

## 2022-05-17 NOTE — PROGRESS NOTES
Cancer in his brother; Diabetes in his father; Heart Disease in his father; Other in his mother. Home Medications:  Current Outpatient Medications   Medication Sig Dispense Refill    tamsulosin (FLOMAX) 0.4 MG capsule Take 0.4 mg by mouth daily      psyllium (METAMUCIL) 58.6 % packet Take 1 packet by mouth daily      aspirin 81 MG EC tablet Take 1 tablet by mouth daily 30 tablet 3    amLODIPine (NORVASC) 5 MG tablet Take 1 tablet by mouth daily 30 tablet 3    pantoprazole (PROTONIX) 40 MG tablet Take 1 tablet by mouth 2 times daily (before meals) 60 tablet 3    gabapentin (NEURONTIN) 100 MG capsule Take 1 capsule by mouth at bedtime for 90 days. 360 capsule 3    HYDROcodone-acetaminophen (NORCO) 5-325 MG per tablet Take 1 tablet by mouth every 6 hours as needed for Pain.  ondansetron (ZOFRAN) 4 MG tablet Take 1 tablet by mouth daily as needed for Nausea or Vomiting 30 tablet 0    atorvastatin (LIPITOR) 80 MG tablet TAKE ONE TABLET BY MOUTH DAILY 90 tablet 3    torsemide (DEMADEX) 20 MG tablet Take 1 tablet by mouth daily 30 tablet 6    albuterol sulfate  (90 Base) MCG/ACT inhaler Inhale 2 puffs into the lungs every 4 hours as needed for Wheezing 1 Inhaler 1    KLOR-CON M20 20 MEQ extended release tablet TAKE ONE TABLET BY MOUTH DAILY 30 tablet 6     No current facility-administered medications for this visit. Allergies:  Patient has no known allergies. Review of Systems:   · Constitutional: there has been no unanticipated weight loss. There's been no change in energy level, sleep pattern, or activity level. · Eyes: No visual changes or diplopia. No scleral icterus. · ENT: No Headaches, hearing loss or vertigo. No mouth sores or sore throat. · Cardiovascular: Reviewed in HPI  · Respiratory: No cough or wheezing, no sputum production. No hematemesis. · Gastrointestinal: No abdominal pain, appetite loss, blood in stools.  No change in bowel or bladder habits. · Genitourinary: No dysuria, trouble voiding, or hematuria. · Musculoskeletal:  No gait disturbance, weakness or joint complaints. · Integumentary: No rash or pruritis. · Neurological: No headache, diplopia, change in muscle strength, numbness or tingling. No change in gait, balance, coordination, mood, affect, memory, mentation, behavior. · Psychiatric: No anxiety, no depression. · Endocrine: No malaise, fatigue or temperature intolerance. No excessive thirst, fluid intake, or urination. No tremor. · Hematologic/Lymphatic: No abnormal bruising or bleeding, blood clots or swollen lymph nodes. · Allergic/Immunologic: No nasal congestion or hives. Physical Examination:    There were no vitals filed for this visit. General appearance: alert, appears stated age, cooperative and no distress  Head: Normocephalic, without obvious abnormality, atraumatic  Neck: no adenopathy, no carotid bruit, no JVD, supple, symmetrical, trachea midline and thyroid: not enlarged, symmetric, no tenderness/mass/nodules  Lungs: clear to auscultation bilaterally  Heart: regular rate and rhythm, S1, S2 normal, no murmur, click, rub or gallop  Abdomen: soft, non-tender.  Bowel sounds normal. 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 Carotid atherosclerosis, bilateral  Patient with known asymptomatic carotid atherosclerosis. Recommend follow-up carotid duplex scan in 6 months with his next visit  -  DUP CAROTID BILATERAL; Future    2. Atherosclerosis of native arteries of extremities with intermittent claudication, bilateral legs ([facility])  History of aortobifemoral bypass in 2001. No signs of peripheral vascular disease at this time. Will obtain bilateral lower extremity duplex for follow-up in 6-month  -  DUP LOWER EXTREMITY ARTERIES BILATERAL; Future        Thank you for allowing me to participate in the care of this individual.  Please do not hesitate to contact me with any questions. Bijan He M.D., FACS.   5/17/2022  10:11 AM

## 2022-05-19 ENCOUNTER — OFFICE VISIT (OUTPATIENT)
Dept: CARDIOLOGY CLINIC | Age: 77
End: 2022-05-19
Payer: COMMERCIAL

## 2022-05-19 ENCOUNTER — TELEPHONE (OUTPATIENT)
Dept: CARDIOLOGY CLINIC | Age: 77
End: 2022-05-19

## 2022-05-19 ENCOUNTER — CARE COORDINATION (OUTPATIENT)
Dept: CARE COORDINATION | Age: 77
End: 2022-05-19

## 2022-05-19 ENCOUNTER — HOSPITAL ENCOUNTER (OUTPATIENT)
Dept: NON INVASIVE DIAGNOSTICS | Age: 77
Discharge: HOME | End: 2022-05-19
Payer: COMMERCIAL

## 2022-05-19 VITALS
HEART RATE: 55 BPM | SYSTOLIC BLOOD PRESSURE: 114 MMHG | BODY MASS INDEX: 24.73 KG/M2 | HEIGHT: 72 IN | OXYGEN SATURATION: 98 % | DIASTOLIC BLOOD PRESSURE: 60 MMHG | WEIGHT: 182.6 LBS

## 2022-05-19 DIAGNOSIS — Z95.2 S/P TAVR (TRANSCATHETER AORTIC VALVE REPLACEMENT): ICD-10-CM

## 2022-05-19 DIAGNOSIS — I35.0 NONRHEUMATIC AORTIC VALVE STENOSIS: Primary | ICD-10-CM

## 2022-05-19 DIAGNOSIS — I10 ESSENTIAL HYPERTENSION: ICD-10-CM

## 2022-05-19 DIAGNOSIS — E78.00 HYPERCHOLESTEREMIA: ICD-10-CM

## 2022-05-19 DIAGNOSIS — I35.0 NONRHEUMATIC AORTIC VALVE STENOSIS: ICD-10-CM

## 2022-05-19 LAB
LV EF: 55 %
LVEF MODALITY: NORMAL

## 2022-05-19 PROCEDURE — 93306 TTE W/DOPPLER COMPLETE: CPT

## 2022-05-19 PROCEDURE — 99214 OFFICE O/P EST MOD 30 MIN: CPT | Performed by: INTERNAL MEDICINE

## 2022-05-19 RX ORDER — POTASSIUM CHLORIDE 20 MEQ/1
40 TABLET, EXTENDED RELEASE ORAL DAILY
Qty: 30 TABLET | Refills: 6 | Status: SHIPPED | OUTPATIENT
Start: 2022-05-19 | End: 2022-07-21 | Stop reason: SDUPTHER

## 2022-05-19 NOTE — TELEPHONE ENCOUNTER
Spoke with the pharmacist, Gabriela Monson would like patient to take 2 tablets of torsemide 20 mg once a day. Betsy expressed understanding.

## 2022-05-19 NOTE — TELEPHONE ENCOUNTER
Samuel called and states torsemide only comes in 20mg or 100 mg. Please call Tatianna Mitchell [phone] to have this RX changed.

## 2022-05-24 ENCOUNTER — CARE COORDINATION (OUTPATIENT)
Dept: CARE COORDINATION | Age: 77
End: 2022-05-24

## 2022-05-24 DIAGNOSIS — G60.8 POLYNEUROPATHY, PERIPHERAL SENSORIMOTOR AXONAL: ICD-10-CM

## 2022-05-24 DIAGNOSIS — I73.9 PVD (PERIPHERAL VASCULAR DISEASE) (HCC): ICD-10-CM

## 2022-05-24 DIAGNOSIS — R29.898 BILATERAL LEG WEAKNESS: ICD-10-CM

## 2022-05-24 NOTE — CARE COORDINATION
Ambulatory Care Coordination Note  5/24/2022  CM Risk Score: 6  Charlson 10 Year Mortality Risk Score: 100%     ACC: Preeti Walton, RN    Summary Note: ACM made outreach to patient's wife Zee Majano to follow up with Care Management. ACM discussed PT with patient's wife. Per patient's wife patient is doing well with PT. He is continuing to walk with a walker and physically and mentally improving. Per Dr. Mundo Hall patient is to weigh daily to monitor weight d/t edema of the lower extremities. Patient fell today while getting on the scale. He was with PT during fall and full evaluation was done on patient. He his his hip and may have a bruise. Per Zee Majano it took about 40 minutes before patient was able to get back on his feet. Once on his feet he was a little off balance but still able to walk and completed PT. Per PT recommendations Mrs. Marisol Flynn will encourage patient to rest for the remainder of the day. Patient has follow up arranged with Dr. Elayne Grayson office regarding edema 5/31/2022. Zee Majano has requested an order for a lift chair sent to [facility] in [facility]. She has spoke with [employer] and is aware that Medicare will not cover the cost of the chair but if an order is sent from PCP they will cover the tax. She is willing to pay out of pocket. Patient has appointment with PCP 5/27/2022. Conversation between Zee Majano and ACM was cut short due to patient requiring wife for assistance. ACM will follow up for further Care Management at a later date time. Plan  F/U Fall Risk education  F/U COPD  F/U PT  F/U Edema    Lab Results     None          Care Coordination Interventions    Program Enrollment: Rising Risk  Referral from Primary Care Provider: No  Suggested Interventions and Community Resources  Fall Risk Prevention: In Process  Home Health Services: In Process  Occupational Therapy: In Process  Physical Therapy:  In Process  Zone Management Tools: Completed         Goals Addressed This Visit's Progress      Patient Stated (pt-stated)   Improving      Will continue to build strength with at home PT and walk with use of walker    Barriers: overwhelmed by complexity of regimen and stress  Plan for overcoming my barriers: work with ACM, PT, PCP and spouse   Confidence: 8/10  Anticipated Goal Completion Date: 7/29/2022        Wellness Goal   Improving      Patient Self-Management Goal for Health Maintenance  Goal: I will schedule a yearly preventative care visit. Barriers: overwhelmed by complexity of regimen and time constraints  Plan for overcoming my barriers: work with ACM and PCP  Confidence: 10/10  Anticipated Goal Completion Date: 6/17/2022            Prior to Admission medications    Medication Sig Start Date End Date Taking?  Authorizing Provider   torsemide 40 MG TABS Take 40 mg by mouth daily 5/19/22  Yes Lajuan Favre, MD   potassium chloride (KLOR-CON M20) 20 MEQ extended release tablet Take 2 tablets by mouth daily 5/19/22  Yes Lajuan Favre, MD   aspirin 81 MG EC tablet Take 1 tablet by mouth daily 4/18/22   Kayleigh Douglas MD   atorvastatin (LIPITOR) 80 MG tablet TAKE ONE TABLET BY MOUTH DAILY 3/8/22   Cali Grace MD       Future Appointments   Date Time Provider Pawan Renner   5/27/2022  1:45 PM Cali Grace MD [facility]   7/14/2022 10:15 AM Cali Grace MD [facility] [address]   8/16/2022 11:30 AM Danny Bynum APRN - CNP [facility]   11/17/2022 10:00 AM Lajuan Favre, MD [facility]   11/28/2022  9:45 AM [facility], VASCULAR LAB SCHED [facility]   11/28/2022 11:30 AM Sameer Roach MD [facility]     ,   General Assessment    Do you have any symptoms that are causing concern?: Yes  Progression since Onset: Intermittent - Waxing/Waning  Reported Symptoms: Other (Comment: leg edema)     , Care Coordination Episodes    Type: Amb Care Management  Episode: Rising Risk  Noted: 3/8/2022  Comments: COPD, HTN, HLD, PVD and 6

## 2022-05-27 ENCOUNTER — OFFICE VISIT (OUTPATIENT)
Dept: FAMILY MEDICINE CLINIC | Age: 77
End: 2022-05-27
Payer: COMMERCIAL

## 2022-05-27 VITALS
WEIGHT: 216 LBS | SYSTOLIC BLOOD PRESSURE: 118 MMHG | BODY MASS INDEX: 29.29 KG/M2 | TEMPERATURE: 97.2 F | DIASTOLIC BLOOD PRESSURE: 80 MMHG

## 2022-05-27 DIAGNOSIS — I10 ESSENTIAL HYPERTENSION: ICD-10-CM

## 2022-05-27 DIAGNOSIS — J44.9 CHRONIC OBSTRUCTIVE PULMONARY DISEASE, UNSPECIFIED COPD TYPE (HCC): ICD-10-CM

## 2022-05-27 DIAGNOSIS — G91.2 NORMAL PRESSURE HYDROCEPHALUS (HCC): ICD-10-CM

## 2022-05-27 DIAGNOSIS — N17.9 AKI (ACUTE KIDNEY INJURY) (HCC): ICD-10-CM

## 2022-05-27 DIAGNOSIS — Z95.2 S/P TAVR (TRANSCATHETER AORTIC VALVE REPLACEMENT): Primary | ICD-10-CM

## 2022-05-27 DIAGNOSIS — G31.84 MILD COGNITIVE IMPAIRMENT: ICD-10-CM

## 2022-05-27 DIAGNOSIS — I73.9 PVD (PERIPHERAL VASCULAR DISEASE) (HCC): ICD-10-CM

## 2022-05-27 PROCEDURE — 99214 OFFICE O/P EST MOD 30 MIN: CPT | Performed by: FAMILY MEDICINE

## 2022-05-27 PROCEDURE — 1123F ACP DISCUSS/DSCN MKR DOCD: CPT | Performed by: FAMILY MEDICINE

## 2022-05-27 ASSESSMENT — ENCOUNTER SYMPTOMS
CONSTIPATION: 0
SHORTNESS OF BREATH: 0
DIARRHEA: 0
BACK PAIN: 0
RHINORRHEA: 0

## 2022-05-27 NOTE — PROGRESS NOTES
SUBJECTIVE:    Paulino Barthel is a 78 y.o. male who presents for a follow up visit. Chief Complaint   Patient presents with    Follow-Up from [facility]ICAL[facility] x 7 days        HPI   GI bleed  Patient presents today in follow-up after hospitalization in April of this year. He was having back pain and was undergoing epidural steroid injections. He had an injection on the morning of the sixth at [facility] in felt bad and ended up in the emergency room there he was found to have GI bleed. He had passed a dark stool that was Hemoccult positive. GI was consulted. Esophagogastroduodenoscopy was done. Protonix was ordered and he was supposed to been taking it twice daily for 2 months. Patient wife is giving some of the history and states that he was feeling bad over the past few weeks and stopped his PPI after getting diarrhea. The only other medicine that was started about same time was Flomax. He actually stopped both of these medicines. Normal pressure hydrocephalus  Patient has been followed by neurology during this admission neurology was consulted. He had mental status changes that were most likely from his GI bleed. MRI was done and was negative. He had been given gabapentin but this may contributed to his mental status changes and this was stopped. Hypertension  This is a chronic problem. During his hospitalization his blood pressure was low and lisinopril has been held. It was also held because of acute kidney injury. Blood pressure has been in control and recently his torsemide dosage was increased. He has previously been on amlodipine and it was stopped as well. Today's blood pressure is in good control with the use of torsemide only. History of TAVR  Patient is followed by Dr. Radha Cooper, [facility] cardiology    Patient's medications, allergies, past medical,surgical, social and family histories were reviewed and updated as appropriate.      Past Medical History: Diagnosis Date    Allergic rhinitis     Aortic valve disorders     COPD (chronic obstructive pulmonary disease) (HCC)     Hydrocephalus (HCC)     normal pressure,    Hyperlipidemia     Hypertension     Hypertrophy of prostate without urinary obstruction and other lower urinary tract symptoms (LUTS)     Irritable bowel syndrome     Pancreatitis     Peptic ulcer, unspecified site, unspecified as acute or chronic, without mention of hemorrhage, perforation, or obstruction     Peripheral vascular disease ([facility] [address]      Past Surgical History:   Procedure Laterality Date    ABDOMINAL AORTIC ANEURYSM REPAIR N/A     ANGIOPLASTY      RT femoral artery    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REPAIR  2018    [place]    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REPAIR      Aortic valve replacement    AORTIC VALVE REPLACEMENT  2018    CHOLECYSTECTOMY      COLONOSCOPY N/A 2021    COLONOSCOPY POLYPECTOMY SNARE/COLD BIOPSY performed by Emi Lopez MD at [address]      bifemoral bypass    FOOT SURGERY Right 12/2/15    HERNIA REPAIR Left     OTHER SURGICAL HISTORY  10/23/2017    lumbar puncture    CT ESOPHAGOGASTRODUODENOSCOPY TRANSORAL DIAGNOSTIC N/A 2018    EGD performed by Emi Lopez MD at [address] [facility] ENDOSCOPY  2018    WITH BIOPSY    UPPER GASTROINTESTINAL ENDOSCOPY N/A 2022    EGD BIOPSY performed by Michael Byrd MD at [address] History   Problem Relation Age of Onset    Heart Disease Father     Diabetes Father     Alcohol Abuse Father     Other Mother     Cancer Brother         liver     Social History     Tobacco Use    Smoking status: Former Smoker     Packs/day: 1.50     Years: 30.00     Pack years: 45.00     Types: Cigarettes     Quit date: 2001     Years since quittin.4    Smokeless tobacco: Never Used    Tobacco comment: H.O.smoking at age 25 / smoked up to 1.5p.p.d /quit     Substance Use Topics    Alcohol use: Yes     Alcohol/week: 2.0 standard drinks     Types: 2 Cans of beer per week     Comment: occasional beer      No Known Allergies  Current Outpatient Medications on File Prior to Visit   Medication Sig Dispense Refill    torsemide 40 MG TABS Take 40 mg by mouth daily 90 tablet 3    potassium chloride (KLOR-CON M20) 20 MEQ extended release tablet Take 2 tablets by mouth daily (Patient taking differently: Take 20 mEq by mouth daily ) 30 tablet 6    aspirin 81 MG EC tablet Take 1 tablet by mouth daily 30 tablet 3    atorvastatin (LIPITOR) 80 MG tablet TAKE ONE TABLET BY MOUTH DAILY 90 tablet 3    Lift Chair MISC by Does not apply route 1 each 0     No current facility-administered medications on file prior to visit. Review of Systems   Constitutional: Negative for diaphoresis and unexpected weight change. HENT: Negative for congestion, postnasal drip and rhinorrhea. Respiratory: Negative for shortness of breath. Cardiovascular: Positive for leg swelling. Negative for chest pain. Gastrointestinal: Negative for constipation and diarrhea. Genitourinary: Negative for difficulty urinating. Musculoskeletal: Negative for arthralgias and back pain. Neurological: Negative for dizziness and light-headedness. Psychiatric/Behavioral: Negative for dysphoric mood and sleep disturbance. The patient is not nervous/anxious. OBJECTIVE:    /80   Temp 97.2 °F (36.2 °C)   Wt 216 lb (98 kg)   BMI 29.29 kg/m²    Physical Exam  Constitutional:       General: He is not in acute distress. Appearance: Normal appearance. HENT:      Head: Normocephalic and atraumatic. Right Ear: Tympanic membrane normal.      Left Ear: Tympanic membrane normal.      Nose: Nose normal. No rhinorrhea. Mouth/Throat:      Mouth: Mucous membranes are moist.      Pharynx: No posterior oropharyngeal erythema. Cardiovascular:      Rate and Rhythm: Normal rate and regular rhythm. Pulses: Normal pulses. Heart sounds: Normal heart sounds. Pulmonary:      Effort: Pulmonary effort is normal.      Breath sounds: Normal breath sounds. Musculoskeletal:      Cervical back: Neck supple. No rigidity. Right lower leg: Edema present. Left lower leg: Edema present. Neurological:      Mental Status: He is alert. Gait: Gait abnormal ( Slow and using a walker). Psychiatric:         Mood and Affect: Mood normal.         Behavior: Behavior normal.         ASSESSMENT/PLAN:    Suman Stevens was seen today for follow-up from hospital.    Diagnoses and all orders for this visit:    S/P TAVR (transcatheter aortic valve replacement)  Doing well. Followed by Dr. Clover Piper, [facility] cardiologist  RADHA (acute kidney injury) [facility])  Followed by nephrology  Mild cognitive impairment  Followed by neurology  Normal pressure hydrocephalus ([facility] [address]  Followed by neurology  Essential hypertension  Currently in good control with use of torsemide only  PVD (peripheral vascular disease) ([facility] [address]  Followed by vascular  Chronic obstructive pulmonary disease, unspecified COPD type ([facility] [address]  Followed by pulmonology      Return in about 3 months (around 8/27/2022). Please note portions of this note were completed with a voicerecognition program.  Efforts were made to edit the dictations but occasionally words are mis-transcribed.

## 2022-06-01 ENCOUNTER — TELEPHONE (OUTPATIENT)
Dept: CARDIOLOGY | Age: 77
End: 2022-06-01

## 2022-06-01 NOTE — TELEPHONE ENCOUNTER
Pts wife called in stating that since doubling diuretic, LE edema has somewhat lessened but persists. Weight is stable at 216lbs. No SOB. States pt is more active with PT. Discussed with Dr. Steven Trevino. Continue current diuretic dose. Moved OV up with NPKA to 6/14/22. Wife verbalized understanding.  Cyndy PARKER

## 2022-06-10 ENCOUNTER — CARE COORDINATION (OUTPATIENT)
Dept: CARE COORDINATION | Age: 77
End: 2022-06-10

## 2022-06-10 ASSESSMENT — ENCOUNTER SYMPTOMS: DYSPNEA ASSOCIATED WITH: EXERTION

## 2022-06-10 NOTE — CARE COORDINATION
Ambulatory Care Coordination Note  6/10/2022  CM Risk Score: 2  Charlson 10 Year Mortality Risk Score: 100%     ACC: Demarcus Dutta, RN    Summary Note: ACM made outreach to patient and his wife to follow up with Care Management. Fall Risk - patient had another fall Memorial Day. He was examined by [facility] nurse and determined to be fine. Per patient's wife he was being careless and not using walker. Patient has been educated on safety while ambulating and is aware of importance of using walker for safety. ACM sent resources via my chart. Patient continues to work with PT and is improving with strength and stability. Per Emeli Monzon improvement is evident and he is moving in the right direction. F/U COPD -doing really well he has a HHC that comes to house and checks in on him weekly. They continue to monitor for any concerning symptoms and will report any changes to pulmonologist or PCP. HTN - patient has blood pressure checked frequently through out the week. Blood pressure remains controlled with only taking torsemide as directed. Per Emeli Monzon there are no current concerns with BP at this time. F/U Edema - patients cardiologist has doubled his diuretic to help decrease leg edema. Per patients wife he continues with the increased dose. His weight has remained stable, and no SOB, but there has not been a lot of change with edema. He has a follow up appointment scheduled 6/14/2022. They will continue to monitor and report any new or worsening symptoms to cardiologist.    Patient has lift chair. Per patients wife it is making a big difference for patient. He is able to get out of the chair much easier and has made a difference in patients comfort and stability. ACM discussed any questions or general concerns and none were identified at this time. Patient is continuing to build strength and is starting to feel better and do more things. Plan  F/U Fall  F/U Cardiology appt.    Lab Results     None Care Coordination Interventions    Program Enrollment: Rising Risk  Referral from Primary Care Provider: No  Suggested Interventions and Community Resources  Fall Risk Prevention: In Process  Home Health Services: In Process  Occupational Therapy: In Process  Physical Therapy: In Process  Zone Management Tools: Completed         Goals Addressed                    This Visit's Progress      Patient Stated (pt-stated)   Improving      Will continue to build strength with at home PT and walk with use of walker    Barriers: overwhelmed by complexity of regimen and stress  Plan for overcoming my barriers: work with ACM, PT, PCP and spouse   Confidence: 8/10  Anticipated Goal Completion Date: 7/29/2022        Wellness Goal   No change      Patient Self-Management Goal for Health Maintenance  Goal: I will schedule a yearly preventative care visit. Barriers: overwhelmed by complexity of regimen and time constraints  Plan for overcoming my barriers: work with ACM and PCP  Confidence: 10/10  Anticipated Goal Completion Date: 6/17/2022            Prior to Admission medications    Medication Sig Start Date End Date Taking?  Authorizing Provider   Lift Chair MISC by Does not apply route 5/24/22   Ye Noland MD   torsemide 40 MG TABS Take 40 mg by mouth daily 5/19/22   Magdalena Díaz MD   potassium chloride (KLOR-CON M20) 20 MEQ extended release tablet Take 2 tablets by mouth daily  Patient taking differently: Take 20 mEq by mouth daily  5/19/22   Magdalena Díaz MD   aspirin 81 MG EC tablet Take 1 tablet by mouth daily 4/18/22   Carolyn Daniel MD   atorvastatin (LIPITOR) 80 MG tablet TAKE ONE TABLET BY MOUTH DAILY 3/8/22   Ye Noland MD       Future Appointments   Date Time Provider Pawan Renner   6/14/2022 10:30 AM ROSA MARIA Pabon CNP Cardio MMA   7/14/2022 10:15 AM Ye Noland MD [address]   8/16/2022 11:30 AM ROSA MARIA Pabon CNP Cardio MMA   8/30/2022  1:15 PM [facility]   11/17/2022 10:00 AM Aline De Los Santos MD FF Cardio MMA   11/28/2022  9:45 AM [facility] FF VASC & ENDO, VASCULAR LAB SCHED FF VASC/ENDO MMA   11/28/2022 11:30 AM Amarilis Meadows MD FF VASC/ENDO MMA     ,   COPD Assessment    Does the patient understand envrionmental exposure?: Yes  Is the patient able to verbalize Rescue vs. Long Acting medications?: Yes  Does the patient have a nebulizer?: Yes  Does the patient use a space with inhaled medications?: No            Symptoms:  None: Yes      Symptom course: stable  Breathlessness: exertion  Increase use of rapid acting/rescue inhaled medications?: No  Change in chronic cough?: No/At Baseline  Change in sputum?: No/At Baseline     , Care Coordination Episodes    Type: Amb Care Management  Episode: Rising Risk  Noted: 3/8/2022  Comments: COPD, HTN, HLD, PVD and 2

## 2022-06-14 ENCOUNTER — HOSPITAL ENCOUNTER (OUTPATIENT)
Age: 77
Discharge: HOME | End: 2022-06-14
Payer: COMMERCIAL

## 2022-06-14 ENCOUNTER — OFFICE VISIT (OUTPATIENT)
Dept: CARDIOLOGY CLINIC | Age: 77
End: 2022-06-14
Payer: COMMERCIAL

## 2022-06-14 VITALS
HEIGHT: 72 IN | OXYGEN SATURATION: 98 % | SYSTOLIC BLOOD PRESSURE: 122 MMHG | BODY MASS INDEX: 29.87 KG/M2 | WEIGHT: 220.5 LBS | HEART RATE: 70 BPM | DIASTOLIC BLOOD PRESSURE: 70 MMHG

## 2022-06-14 DIAGNOSIS — R06.02 SHORTNESS OF BREATH: ICD-10-CM

## 2022-06-14 DIAGNOSIS — I25.10 CORONARY ARTERY DISEASE INVOLVING NATIVE CORONARY ARTERY OF NATIVE HEART WITHOUT ANGINA PECTORIS: ICD-10-CM

## 2022-06-14 DIAGNOSIS — R60.0 LOWER EXTREMITY EDEMA: ICD-10-CM

## 2022-06-14 DIAGNOSIS — I35.9 NONRHEUMATIC AORTIC VALVE DISORDER: Chronic | ICD-10-CM

## 2022-06-14 DIAGNOSIS — R60.0 LOWER EXTREMITY EDEMA: Primary | ICD-10-CM

## 2022-06-14 DIAGNOSIS — E78.2 MIXED HYPERLIPIDEMIA: ICD-10-CM

## 2022-06-14 LAB
ANION GAP SERPL CALCULATED.3IONS-SCNC: 16 MMOL/L (ref 3–16)
BUN BLDV-MCNC: 13 MG/DL (ref 7–20)
CALCIUM SERPL-MCNC: 9.8 MG/DL (ref 8.3–10.6)
CHLORIDE BLD-SCNC: 101 MMOL/L (ref 99–110)
CO2: 26 MMOL/L (ref 21–32)
CREAT SERPL-MCNC: 1 MG/DL (ref 0.8–1.3)
GFR AFRICAN AMERICAN: >60
GFR NON-AFRICAN AMERICAN: >60
GLUCOSE BLD-MCNC: 107 MG/DL (ref 70–99)
POTASSIUM SERPL-SCNC: 4.9 MMOL/L (ref 3.5–5.1)
PRO-BNP: 186 PG/ML (ref 0–449)
SODIUM BLD-SCNC: 143 MMOL/L (ref 136–145)

## 2022-06-14 PROCEDURE — 83880 ASSAY OF NATRIURETIC PEPTIDE: CPT

## 2022-06-14 PROCEDURE — 36415 COLL VENOUS BLD VENIPUNCTURE: CPT

## 2022-06-14 PROCEDURE — 1123F ACP DISCUSS/DSCN MKR DOCD: CPT | Performed by: NURSE PRACTITIONER

## 2022-06-14 PROCEDURE — 80048 BASIC METABOLIC PNL TOTAL CA: CPT

## 2022-06-14 PROCEDURE — 99214 OFFICE O/P EST MOD 30 MIN: CPT | Performed by: NURSE PRACTITIONER

## 2022-06-14 NOTE — PROGRESS NOTES
[address]     Outpatient Follow Up Note    CHIEF COMPLAINT / HPI:  Follow Up secondary to swelling    Subjective:   Sudarshan Rodriguez is 78 y.o. 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 Shelbie CASTLE torsemide was increased to 40 mg daily for worsening LE edema. Today, Mr Gregory Posada is here with his wife for follow up. They say the swelling has minimally improved. He can now get his shoes on. However, his weight is still up. Denies shortness of breath. Per the chart he has gained 38 lbs since . However, his wife says he was weighed incorrectly at that visit but she didn't correct anyone. Mr Gregory Posada has been working with PT and is doing exercises and elevating feet. His wife tried to ace wrap legs but there was no change. Says feet are too tender for support stockings due to neuropathy. Mr Gregory Posada denies chest pain or palpitations. With regard to medication therapy the patient has been compliant with prescribed regimen. They have tolerated therapy to date.      Past Medical History:   Diagnosis Date    Allergic rhinitis     Aortic valve disorders     COPD (chronic obstructive pulmonary disease) (HCC)     Hydrocephalus (HCC)     normal pressure,    Hyperlipidemia     Hypertension     Hypertrophy of prostate without urinary obstruction and other lower urinary tract symptoms (LUTS)     Irritable bowel syndrome     Pancreatitis     Peptic ulcer, unspecified site, unspecified as acute or chronic, without mention of hemorrhage, perforation, or obstruction     Peripheral vascular disease ([address]      Social History:    Social History     Tobacco Use   Smoking Status Former Smoker    Packs/day: 1.50    Years: 30.00    Pack years: 45.00    Types: Cigarettes    Quit date: 2001    Years since quittin.5   Smokeless Tobacco Never Used   Tobacco Comment    H.O.smoking at age 25 / smoked up to 1.5p.p.d /quit       Current gallops, rubs, or abnormal heart sounds, normal PMI. The apical impulses not displaced  Heart tones are crisp and normal  Cervical veins are not engorged  The carotid upstroke is normal in amplitude and contour without delay or bruit  JVP is not elevated  ABDOMEN:  Normal bowel sounds, non-distended and non-tender to palpation  EXT: +3 BLE edema  no calf tenderness. Pulses are present bilaterally. DATA:    Lab Results   Component Value Date    ALT 27 04/12/2022    AST 20 04/12/2022    ALKPHOS 98 04/12/2022    BILITOT 0.9 04/12/2022     Lab Results   Component Value Date    CREATININE 1.1 04/12/2022    BUN 24 (H) 04/12/2022     04/12/2022    K 3.8 04/12/2022     04/12/2022    CO2 27 04/12/2022     Lab Results   Component Value Date    TSH 1.82 11/07/2017     Lab Results   Component Value Date    WBC 11.0 04/12/2022    HGB 12.9 (L) 04/12/2022    HCT 38.2 (L) 04/12/2022    MCV 87.6 04/12/2022     04/12/2022     No components found for: CHLPL  Lab Results   Component Value Date    TRIG 97 09/05/2017    TRIG 129 03/06/2017    TRIG 112 09/12/2016     Lab Results   Component Value Date    HDL 56 11/20/2020    HDL 54 11/21/2019    HDL 43 05/09/2019     Lab Results   Component Value Date    LDLCALC 111 (H) 11/20/2020    LDLCALC 58 11/21/2019    LDLCALC 61 05/09/2019     Lab Results   Component Value Date    LABVLDL 22 11/20/2020    LABVLDL 23 11/21/2019    LABVLDL 30 05/09/2019      No results found for: BNP  Radiology Review:  Pertinent images / reports were reviewed as a part of this visit and reveals the following:    Last Echo: 5/19/22   Summary   *Technically difficult study due to poor acoustical window. Patient refused   Definity. Suboptimal image quality.    *Left ventricle - normal size, thickness and function with EF of 55%   *Aortic valve - well seated TAVR valve, PV 1.9m/s, MG 8mmHg, no significant   regurgitation    Last Angiogram: 1/24/2018  Findings:                      LM       Short, normal              LAD     40% mid               Cx        20% distal              RCA     Nondominant, normal              LVG     Not performed              EDP     Not obtained  Intervention:  None  Recs: Will consult CTS - Dr. Chavez Mack given insurance issues with Lukas He                          Not sure how [facility] will affect candidacy    Last EC22  EKG performed in ER and to be interpreted by ER physician. Assessment:      Diagnosis Orders   1. Lower extremity edema   ~ torsemide increased to 40 mg daily 1 month ago > minimal improvement in swelling Basic Metabolic Panel   2. Nonrheumatic aortic valve disorder   ~ Echo 22 well seated TAVR valve, MG 8mmHg, no significantregurgitation  ~ s/p TAVR 3/2018    3. Coronary artery disease involving native coronary artery of native heart without angina pectoris   ~ stable, no complaints of angina   ~ [address] [place] [address] 22 nonobstructive disease  ~ asa / statin     4. Mixed hyperlipidemia   ~ atorvasttin 80  ~ lIpids 2020  HDL 56  Trig 112       I had the opportunity to review the clinical symptoms and presentation of Paulino Barthel. Plan:     1. Get blood work checked today. I will call you with results/plan. 2. Weigh yourself daily and keep a log. RTO in 2 weeks and bring log with you. Overall the patient is stable from CV standpoint    I have addresed the patient's cardiac risk factors and adjusted pharmacologic treatment as needed. In addition, I have reinforced the need for patient directed risk factor modification. Further evaluation will be based upon the patient's clinical course and testing results. All questions and concerns were addressed to the patient/family (wife)    The patient is not currently smoking. The risks related to smoking were reviewed with the patient. Recommend maintaining a smoke-free lifestyle.      Patient is not on a beta-blocker; no MI  Patient is not on an ace-i/ARB; no sHF  Patient is on a statin    Dual antiplatelet therapy has not been recommended / prescribed for this patient. The patient verbalizes understanding not to stop medications without discussing with us. Discussed exercise: 30-60 minutes 7 days/week  Discussed Low saturated fat/CARI diet. Thank you for allowing to us to participate in the care of Landis Hammans.     Electronically signed by ROSA MARIA Oneil CNP on 6/13/2022 at 9:13 PM     Documentation of today's visit sent to PCP

## 2022-06-15 ENCOUNTER — TELEPHONE (OUTPATIENT)
Dept: CARDIOLOGY CLINIC | Age: 77
End: 2022-06-15

## 2022-06-15 NOTE — TELEPHONE ENCOUNTER
Discussed lab results with patient's wife. Clarissa Pan is to increase his torsemide to 40 mg BID x5 days. Then resume 40 mg daily dosing. Weigh daily and bring a log of weights with him at appointment scheduled 6/28/22.

## 2022-06-27 NOTE — PROGRESS NOTES
[address]     Outpatient Follow Up Note    CHIEF COMPLAINT / HPI:  Follow Up secondary to swelling    Subjective:   Kev Hester is 78 y.o. male who presents today with a history of AI s/p TAVR 3/2018, HLD, nonobstructive CAD per [facility] 2018. Fowlkes Adeline LOV torsemide increased to 40 BID x5 days for worsening swelling. Today, Mr Yordan Forde wife says she noticed minimal improvement, if any, with increased torsemide dose. Weight is actually up 7 lbs since LOV. Patient says he \"feels great. \" Denies shortness of breath, palpitations, or dizziness. Baseline alonso was 209 lbs about a month ago. Today, he was 227 per office scale. With regard to medication therapy the patient has been compliant with prescribed regimen. They have tolerated therapy to date.      Past Medical History:   Diagnosis Date    Allergic rhinitis     Aortic valve disorders     COPD (chronic obstructive pulmonary disease) (HCC)     Hydrocephalus (HCC)     normal pressure,    Hyperlipidemia     Hypertension     Hypertrophy of prostate without urinary obstruction and other lower urinary tract symptoms (LUTS)     Irritable bowel syndrome     Pancreatitis     Peptic ulcer, unspecified site, unspecified as acute or chronic, without mention of hemorrhage, perforation, or obstruction     Peripheral vascular disease [address] [address]      Social History:    Social History     Tobacco Use   Smoking Status Former Smoker    Packs/day: 1.50    Years: 30.00    Pack years: 45.00    Types: Cigarettes    Quit date: 2001    Years since quittin.5   Smokeless Tobacco Never Used   Tobacco Comment    H.O.smoking at age 25 / smoked up to 1.5p.p.d /quit       Current Medications:  Current Outpatient Medications   Medication Sig Dispense Refill    Lift Chair MISC by Does not apply route 1 each 0    torsemide 40 MG TABS Take 40 mg by mouth daily 90 tablet 3    potassium chloride (KLOR-CON M20) 20 MEQ extended release tablet Take 2 tablets by normal in amplitude and contour without delay or bruit  JVP is not elevated  ABDOMEN:  Normal bowel sounds, non-distended and non-tender to palpation  EXT: +2-3 BLE edema  no calf tenderness. Pulses are present bilaterally. DATA:    Lab Results   Component Value Date    ALT 27 04/12/2022    AST 20 04/12/2022    ALKPHOS 98 04/12/2022    BILITOT 0.9 04/12/2022     Lab Results   Component Value Date    CREATININE 1.0 06/14/2022    BUN 13 06/14/2022     06/14/2022    K 4.9 06/14/2022     06/14/2022    CO2 26 06/14/2022     Lab Results   Component Value Date    TSH 1.82 11/07/2017     Lab Results   Component Value Date    WBC 11.0 04/12/2022    HGB 12.9 (L) 04/12/2022    HCT 38.2 (L) 04/12/2022    MCV 87.6 04/12/2022     04/12/2022     No components found for: CHLPL  Lab Results   Component Value Date    TRIG 97 09/05/2017    TRIG 129 03/06/2017    TRIG 112 09/12/2016     Lab Results   Component Value Date    HDL 56 11/20/2020    HDL 54 11/21/2019    HDL 43 05/09/2019     Lab Results   Component Value Date    LDLCALC 111 (H) 11/20/2020    LDLCALC 58 11/21/2019    LDLCALC 61 05/09/2019     Lab Results   Component Value Date    LABVLDL 22 11/20/2020    LABVLDL 23 11/21/2019    LABVLDL 30 05/09/2019      No results found for: BNP  Radiology Review:  Pertinent images / reports were reviewed as a part of this visit and reveals the following:    Last Echo: 5/19/22   Summary   *Technically difficult study due to poor acoustical window. Patient refused   Definity. Suboptimal image quality.    *Left ventricle - normal size, thickness and function with EF of 55%   *Aortic valve - well seated TAVR valve, PV 1.9m/s, MG 8mmHg, no significant   regurgitation    Last Angiogram: 1/24/2018  Findings:                      LM       Short, normal              LAD     40% mid               Cx        20% distal              RCA     Nondominant, normal              LVG     Not performed              EDP     Not obtained  Intervention:  None  Recs: Will consult CTS - Dr. Cr Lopez given insurance issues with Mauro Zee                          Not sure how [facility] will affect candidacy    Last EC22  EKG performed in ER and to be interpreted by ER physician. Assessment:      Diagnosis Orders   1. Lower extremity edema   ~ torsemide increased to 40 mg BID x5 days, minimal improvement  ~ weight up 7 lbs since LOV Basic Metabolic Panel   2. Nonrheumatic aortic valve disorder   ~ Echo 22 well seated TAVR valve, MG 8mmHg, no significantregurgitation  ~ s/p TAVR 3/2018    3. Coronary artery disease involving native coronary artery of native heart without angina pectoris   ~ stable, no complaints of angina   ~ [facility] 22 nonobstructive disease  ~ asa / statin     4. Mixed hyperlipidemia   ~ atorvasttin 80  ~ lIpids 2020  HDL 56  Trig 112       I had the opportunity to review the clinical symptoms and presentation of Arch Trey. Plan:     1. Plan for IV lasix infusion today  2. Repeat blood work in one week  3. Continue with 40 mg of torsemide daily  4. RTO 2-3 weeks to reassess symptoms / swelling     Overall the patient is stable from CV standpoint    I have addresed the patient's cardiac risk factors and adjusted pharmacologic treatment as needed. In addition, I have reinforced the need for patient directed risk factor modification. Further evaluation will be based upon the patient's clinical course and testing results. All questions and concerns were addressed to the patient/family (wife)    The patient is not currently smoking. The risks related to smoking were reviewed with the patient. Recommend maintaining a smoke-free lifestyle. Patient is not on a beta-blocker; no MI  Patient is not on an ace-i/ARB; no sHF  Patient is on a statin    Dual antiplatelet therapy has not been recommended / prescribed for this patient.      The patient verbalizes understanding not to stop medications without discussing with us. Discussed exercise: 30-60 minutes 7 days/week  Discussed Low saturated fat/CARI diet. Thank you for allowing to us to participate in the care of Natalya Pate.     Electronically signed by ROSA MARIA Alegria CNP on 6/27/2022 at 2:11 PM     Documentation of today's visit sent to PCP

## 2022-06-28 ENCOUNTER — OFFICE VISIT (OUTPATIENT)
Dept: CARDIOLOGY CLINIC | Age: 77
End: 2022-06-28
Payer: COMMERCIAL

## 2022-06-28 ENCOUNTER — HOSPITAL ENCOUNTER (OUTPATIENT)
Dept: ONCOLOGY | Age: 77
Setting detail: INFUSION SERIES
Discharge: HOME | End: 2022-06-28
Payer: COMMERCIAL

## 2022-06-28 VITALS
BODY MASS INDEX: 30.83 KG/M2 | HEART RATE: 64 BPM | OXYGEN SATURATION: 99 % | SYSTOLIC BLOOD PRESSURE: 124 MMHG | WEIGHT: 227.6 LBS | DIASTOLIC BLOOD PRESSURE: 64 MMHG | HEIGHT: 72 IN

## 2022-06-28 DIAGNOSIS — E78.2 MIXED HYPERLIPIDEMIA: ICD-10-CM

## 2022-06-28 DIAGNOSIS — R06.02 SHORTNESS OF BREATH: ICD-10-CM

## 2022-06-28 DIAGNOSIS — I35.9 NONRHEUMATIC AORTIC VALVE DISORDER: Chronic | ICD-10-CM

## 2022-06-28 DIAGNOSIS — R60.0 LOWER EXTREMITY EDEMA: Primary | ICD-10-CM

## 2022-06-28 DIAGNOSIS — I25.10 CORONARY ARTERY DISEASE INVOLVING NATIVE CORONARY ARTERY OF NATIVE HEART WITHOUT ANGINA PECTORIS: ICD-10-CM

## 2022-06-28 PROCEDURE — 99211 OFF/OP EST MAY X REQ PHY/QHP: CPT

## 2022-06-28 PROCEDURE — 1123F ACP DISCUSS/DSCN MKR DOCD: CPT | Performed by: NURSE PRACTITIONER

## 2022-06-28 PROCEDURE — 96374 THER/PROPH/DIAG INJ IV PUSH: CPT

## 2022-06-28 PROCEDURE — 2580000003 HC RX 258: Performed by: NURSE PRACTITIONER

## 2022-06-28 PROCEDURE — 6360000002 HC RX W HCPCS: Performed by: NURSE PRACTITIONER

## 2022-06-28 PROCEDURE — 99215 OFFICE O/P EST HI 40 MIN: CPT | Performed by: NURSE PRACTITIONER

## 2022-06-28 RX ORDER — SODIUM CHLORIDE 0.9 % (FLUSH) 0.9 %
5-40 SYRINGE (ML) INJECTION 2 TIMES DAILY
Status: DISCONTINUED | OUTPATIENT
Start: 2022-06-28 | End: 2022-06-29 | Stop reason: HOSPADM

## 2022-06-28 RX ORDER — FUROSEMIDE 10 MG/ML
100 INJECTION INTRAMUSCULAR; INTRAVENOUS ONCE
Status: COMPLETED | OUTPATIENT
Start: 2022-06-28 | End: 2022-06-28

## 2022-06-28 RX ADMIN — FUROSEMIDE 100 MG: 10 INJECTION, SOLUTION INTRAMUSCULAR; INTRAVENOUS at 11:22

## 2022-06-28 RX ADMIN — Medication 10 ML: at 11:25

## 2022-06-28 NOTE — PROGRESS NOTES
Patient to department for outpatient IV lasix. Here from cardiology office. Lasix 100 mg at 20 mg per minute. Tolerated well. Patient aware that he will have urinary urgency and frequency. Also aware that this tx may lower his b/p and he may feel dizzy upon standing. All questions answered. To follow up with cardiology office.

## 2022-07-06 ENCOUNTER — HOSPITAL ENCOUNTER (OUTPATIENT)
Age: 77
Discharge: HOME | End: 2022-07-06
Payer: COMMERCIAL

## 2022-07-06 DIAGNOSIS — R06.02 SHORTNESS OF BREATH: ICD-10-CM

## 2022-07-06 DIAGNOSIS — I25.10 CORONARY ARTERY DISEASE INVOLVING NATIVE CORONARY ARTERY OF NATIVE HEART WITHOUT ANGINA PECTORIS: ICD-10-CM

## 2022-07-06 LAB
ANION GAP SERPL CALCULATED.3IONS-SCNC: 16 MMOL/L (ref 3–16)
BUN BLDV-MCNC: 14 MG/DL (ref 7–20)
CALCIUM SERPL-MCNC: 9.6 MG/DL (ref 8.3–10.6)
CHLORIDE BLD-SCNC: 102 MMOL/L (ref 99–110)
CO2: 25 MMOL/L (ref 21–32)
CREAT SERPL-MCNC: 0.9 MG/DL (ref 0.8–1.3)
GFR AFRICAN AMERICAN: >60
GFR NON-AFRICAN AMERICAN: >60
GLUCOSE BLD-MCNC: 101 MG/DL (ref 70–99)
POTASSIUM SERPL-SCNC: 5 MMOL/L (ref 3.5–5.1)
PRO-BNP: 214 PG/ML (ref 0–449)
SODIUM BLD-SCNC: 143 MMOL/L (ref 136–145)

## 2022-07-06 PROCEDURE — 80048 BASIC METABOLIC PNL TOTAL CA: CPT

## 2022-07-06 PROCEDURE — 36415 COLL VENOUS BLD VENIPUNCTURE: CPT

## 2022-07-06 PROCEDURE — 83880 ASSAY OF NATRIURETIC PEPTIDE: CPT

## 2022-07-07 ENCOUNTER — TELEPHONE (OUTPATIENT)
Dept: CARDIOLOGY CLINIC | Age: 77
End: 2022-07-07

## 2022-07-07 NOTE — TELEPHONE ENCOUNTER
----- Message from ROSA MARIA Jones CNP sent at 7/7/2022 12:57 PM EDT -----  [facility] asking patient to call back Blood work is stable. Can you please ask how swelling is doing? Thanks!

## 2022-07-07 NOTE — TELEPHONE ENCOUNTER
I spoke with pt's wife and relayed lab results per NPKA. She stated that swelling has gone little bit but not changed yet.

## 2022-07-07 NOTE — TELEPHONE ENCOUNTER
Called and left message again for [facility] to call back to discuss Dorian's swelling.

## 2022-07-07 NOTE — TELEPHONE ENCOUNTER
Called pt for the message per ORLANDO. Someone  my call and said she will call me later, she is not able to listen.

## 2022-07-12 ENCOUNTER — CARE COORDINATION (OUTPATIENT)
Dept: CARE COORDINATION | Age: 77
End: 2022-07-12

## 2022-07-12 ASSESSMENT — ENCOUNTER SYMPTOMS: DYSPNEA ASSOCIATED WITH: EXERTION

## 2022-07-12 NOTE — CARE COORDINATION
Ambulatory Care Coordination Note  7/12/2022  CM Risk Score: 6  Charlson 10 Year Mortality Risk Score: 100%     ACC: Joy García, RN    Summary Note: [facility] made outreach to patient and his wife Amita Dobbins to follow up with Care Management. Patient continues to have edema, and weight gain. He is following up regularly with cardiologist and is following all recommendations. Patient's wife spoke with NP today with cardiology office and she has doubled his lasix. She states that his labs are closely monitored and he is tolerating the changes well. They are aware of what to report to provider and RED flags for ED. Patient has follow up scheduled next Tuesday. Patient continues with Joel Valadez. He was reassessed last week and care was extended for another month. His COPD is monitored regularly with no current concerns. Patient has not had any recent falls and continues to use walker and caution while ambulating. Plan  F/U edema  COPD    Lab Results     None          Care Coordination Interventions    Program Enrollment: Rising Risk  Referral from Primary Care Provider: No  Suggested Interventions and Community Resources  Fall Risk Prevention: In Process  Home Health Services: In Process  Occupational Therapy: In Process  Physical Therapy: In Process  Zone Management Tools: Completed         Goals Addressed                    This Visit's Progress      Patient Stated (pt-stated)   Improving      Will continue to build strength with at home PT and walk with use of walker    Barriers: overwhelmed by complexity of regimen and stress  Plan for overcoming my barriers: work with ACM, PT, PCP and spouse   Confidence: 8/10  Anticipated Goal Completion Date: 7/29/2022        Wellness Goal   No change      Patient Self-Management Goal for Health Maintenance  Goal: I will schedule a yearly preventative care visit.   Barriers: overwhelmed by complexity of regimen and time constraints  Plan for overcoming my barriers: work with ACM and PCP  Confidence: 10/10  Anticipated Goal Completion Date: 6/17/2022            Prior to Admission medications    Medication Sig Start Date End Date Taking?  Authorizing Provider   Torsemide 40 MG TABS Take 40 mg by mouth in the morning and at bedtime 7/12/22   ROSA MARIA Flower CNP   Lift Chair MISC by Does not apply route 5/24/22   Eugene Clay MD   potassium chloride (KLOR-CON M20) 20 MEQ extended release tablet Take 2 tablets by mouth daily  Patient taking differently: Take 20 mEq by mouth daily  5/19/22   Wesley Snowden MD   aspirin 81 MG EC tablet Take 1 tablet by mouth daily 4/18/22   Torey Gomez MD   atorvastatin (LIPITOR) 80 MG tablet TAKE ONE TABLET BY MOUTH DAILY 3/8/22   Eugene Clay MD       Future Appointments   Date Time Provider Pawan Renner   7/19/2022 10:30 AM ROSA MARIA Flower CNP FF Cardio MMA   8/16/2022 11:30 AM ROSA MARIA Flower CNP FF Cardio MMA   8/30/2022  1:15 PM Eugene Clay MD [facility]   11/17/2022 10:00 AM Wesley Snowden MD FF Cardio MMA   11/28/2022  9:45 AM [facility] FF VASC & ENDO, VASCULAR LAB SCHED FF VASC/ENDO MMA   11/28/2022 11:30 AM Raad Vo MD FF VASC/ENDO MMA     ,   COPD Assessment    Does the patient understand envrionmental exposure?: Yes  Is the patient able to verbalize Rescue vs. Long Acting medications?: Yes  Does the patient have a nebulizer?: Yes  Does the patient use a space with inhaled medications?: No            Symptoms:  None: Yes      Symptom course: stable  Breathlessness: exertion  Increase use of rapid acting/rescue inhaled medications?: No  Change in chronic cough?: No/At Baseline  Change in sputum?: No/At Baseline  Self Monitoring - SaO2: Yes  Have you had a recent diagnosis of pneumonia either by PCP or at a hospital?: No     ,   General Assessment    Do you have any symptoms that are causing concern?: Yes  Progression since Onset: Unchanged  Reported Symptoms:

## 2022-07-12 NOTE — TELEPHONE ENCOUNTER
Called and spoke to Nevaeh, patient's wife. She says that his weights are fluctuating between 224-229 lbs. Legs are still swollen and it is hampering his walking. She says that his legs are so tender that he cannot wear compression stockings. He sits with legs dependent most of the day. Nevaeh tries to get him to lay in bed for a few hours during the afternoon to help with the swelling, but does doesn't see improvement. He is to increase torsemide to 40 BID until he sees me in follow up 7/19. I will repeat lab work at that time.

## 2022-07-15 ENCOUNTER — TELEPHONE (OUTPATIENT)
Dept: CARDIOLOGY CLINIC | Age: 77
End: 2022-07-15

## 2022-07-15 NOTE — TELEPHONE ENCOUNTER
Pharmacy call stating that the RX for the   Torsemide 40 MG TABS is invalid, RX only comes in 20mg and 10mg.     They will need new RX with the correct MG       Ean Muhammad, [address] [facility] [phone] - F [phone]   [place] [address]   Phone:  [phone]  Fax:  [phone]

## 2022-07-20 ENCOUNTER — TELEPHONE (OUTPATIENT)
Dept: FAMILY MEDICINE CLINIC | Age: 77
End: 2022-07-20

## 2022-07-20 NOTE — TELEPHONE ENCOUNTER
Jose Roe with [employer] calling to advise Dr. Bi Hurst that patient had a fall on 7/19 in his bedroom. He sustained no injuries but that his home care team did come out and do their assessments on him. Please advise. Provider out of the office.

## 2022-07-21 ENCOUNTER — TELEPHONE (OUTPATIENT)
Dept: CARDIOLOGY CLINIC | Age: 77
End: 2022-07-21

## 2022-07-21 RX ORDER — POTASSIUM CHLORIDE 20 MEQ/1
40 TABLET, EXTENDED RELEASE ORAL DAILY
Qty: 30 TABLET | Refills: 6 | Status: SHIPPED | OUTPATIENT
Start: 2022-07-21 | End: 2022-07-25 | Stop reason: ALTCHOICE

## 2022-07-21 NOTE — TELEPHONE ENCOUNTER
MRS. MILLER PT WIFE CALLED IN STATING THEY COULDN'T MAKE HIS LAST APPT DUE TO HIM FALLING. pT IS RECOVERING FROM THAT AND DEALING WITH SWELLING. PT HAS AN APPT ON THE 28 BUT THEY WOULD LIKE TO BEE SEEN SOONER IF . PLEASE CALL MRS. Brody Cosme AT [phone]

## 2022-07-25 ENCOUNTER — OFFICE VISIT (OUTPATIENT)
Dept: CARDIOLOGY CLINIC | Age: 77
End: 2022-07-25
Payer: COMMERCIAL

## 2022-07-25 VITALS
SYSTOLIC BLOOD PRESSURE: 124 MMHG | HEART RATE: 64 BPM | DIASTOLIC BLOOD PRESSURE: 68 MMHG | WEIGHT: 233 LBS | OXYGEN SATURATION: 99 % | HEIGHT: 72 IN | BODY MASS INDEX: 31.56 KG/M2

## 2022-07-25 DIAGNOSIS — R60.0 LOWER EXTREMITY EDEMA: Primary | ICD-10-CM

## 2022-07-25 DIAGNOSIS — I87.2 VENOUS INSUFFICIENCY: ICD-10-CM

## 2022-07-25 DIAGNOSIS — I10 ESSENTIAL HYPERTENSION: ICD-10-CM

## 2022-07-25 PROBLEM — D64.89 OTHER SPECIFIED ANEMIAS: Status: ACTIVE | Noted: 2022-07-25

## 2022-07-25 PROCEDURE — 1123F ACP DISCUSS/DSCN MKR DOCD: CPT | Performed by: NURSE PRACTITIONER

## 2022-07-25 PROCEDURE — 99214 OFFICE O/P EST MOD 30 MIN: CPT | Performed by: NURSE PRACTITIONER

## 2022-07-25 RX ORDER — PANTOPRAZOLE SODIUM 40 MG/1
40 TABLET, DELAYED RELEASE ORAL DAILY
COMMUNITY
End: 2022-09-30

## 2022-07-25 RX ORDER — SPIRONOLACTONE 25 MG/1
25 TABLET ORAL DAILY
Qty: 30 TABLET | Refills: 1 | Status: ON HOLD
Start: 2022-07-25 | End: 2022-08-03 | Stop reason: HOSPADM

## 2022-07-25 RX ORDER — METOLAZONE 5 MG/1
2.5 TABLET ORAL DAILY
Qty: 3 TABLET | Refills: 0 | Status: ON HOLD | OUTPATIENT
Start: 2022-07-25 | End: 2022-07-28

## 2022-07-25 ASSESSMENT — ENCOUNTER SYMPTOMS
RESPIRATORY NEGATIVE: 1
GASTROINTESTINAL NEGATIVE: 1

## 2022-07-25 NOTE — PATIENT INSTRUCTIONS
Instructions:   Medications: continue torsemide 40mg twice a day, take with breakfast and lunch, tomorrow take one pill metolazone 30min before morning torsemide only. Add spironolactone one pill once a day and stop potassium  Labs: in 2 weeks with PCP labs  Referrals: wear compression stockings and keep feet elevated as much as possible  Lifestyle Recommendations: Weigh yourself every day in the morning after urination, Limit sodium to 2000mg/day and fluids to 2L or 64oz/day. Follow up: as scheduled with Santi Humphries CHF Resource Line: [phone]    Heart Failure Websites:   www.heart. org  Www.cardiosmart. org    Websites with Low Sodium Recipes:   www.mayoclinic.org/healthy-lifestyle/recipes/low-sodium-recipes  www.[employer]/recipes    Smartphone chun's that can help you keep track of symptoms, weights, and medications:  HF Storylines  HF Path  My HF  CareZone  Point of Care Heart Failure  WOWME  H2O Overload    Nutrition Chun to track calories, carbohydrates and sodium content of food:   CalorieKing  MyFitnessPal    Low Sodium Meal Delivery    Top  Meals - offers senior discount  Meal Pro  Magic Kitchen  Mom's Meals - some insurance pays for  iLumen for 50  and over, AARP 10% discount     Balance for under 50  Metabolic Meals

## 2022-07-25 NOTE — PROGRESS NOTES
[facility]   Congestive Heart Failure    PrimaryCare Doctor:  Ramon Roy MD      Chief Complaint:  edema    History of Present Illness:  Kiera Mckeon is a 78 y.o. male with PMH AI, TAVR 2018, HLD, nonobs CAD who presents today for CHF f/u. At the last OV 4 weeks ago, he c/o edema, wt gain and had IV lasix in infusion center. Since then, no change in edema and torsemide increased to 40 bid. Today he c/o  edema, and continued wt gain. He tells me edema improves overnight but does not go away. He/denies CP, palpitations, SOB, orthopnea, or PND. ER Visit: No  Recent Hospitalization: No    Baseline Weight: 216  Wt Readings from Last 3 Encounters:   06/28/22 227 lb 9.6 oz (103.2 kg)   06/14/22 220 lb 8 oz (100 kg)   05/27/22 216 lb (98 kg)        Cardiac Testing:   Echo: 5/19/22   Summary   *Technically difficult study due to poor acoustical window. Patient refused   Definity. Suboptimal image quality. *Left ventricle - normal size, thickness and function with EF of 55%   *Aortic valve - well seated TAVR valve, PV 1.9m/s, MG 8mmHg, no significant   regurgitation     Last Angiogram: 1/24/2018  Findings:                      LM       Short, normal              LAD     40% mid               Cx        20% distal              RCA     Nondominant, normal              LVG     Not performed              EDP     Not obtained  Intervention:  None  Recs: Will consult CTS - Dr. Elvira Ryan given insurance issues with [employer]                          Not sure how [facility] will affect candidacy       Activity: below baseline due to edema  Can you walk 1-2 blocks or do a moderate amount of house/yard work? No      NYHA Class: II       Sodium Restrictions: 3g  Fluid Restrictions: 48-64 oz/day  Sodium and fluid restriction compliance: educated today    Pt Education: The patient has received education on the following topics: dietary sodium restriction, heart failure medications, the importance of physical activity, symptom management and weight monitoring        Past Medical History:   has a past medical history of Allergic rhinitis, Aortic valve disorders, COPD (chronic obstructive pulmonary disease) ([facility] Hydrocephalus ([facility] Hyperlipidemia, Hypertension, Hypertrophy of prostate without urinary obstruction and other lower urinary tract symptoms (LUTS), Irritable bowel syndrome, Pancreatitis, Peptic ulcer, unspecified site, unspecified as acute or chronic, without mention of hemorrhage, perforation, or obstruction, and Peripheral vascular disease ([facility] 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 Social History:   reports that he quit smoking about 20 years ago. His smoking use included cigarettes. He has a 45.00 pack-year smoking history. He has never used smokeless tobacco. He reports current alcohol use of about 2.0 standard drinks per week. He reports that he does not use drugs. Family History:   Family History   Problem Relation Age of Onset    Heart Disease Father     Diabetes Father     Alcohol Abuse Father     Other Mother     Cancer Brother         liver       HomeMedications:  Prior to Admission medications    Medication Sig Start Date End Date Taking?  Authorizing Provider   potassium chloride (KLOR-CON M20) 20 MEQ extended release tablet Take 2 tablets by mouth in the morning. 7/21/22   ROSA MARIA Rolle CNP   Torsemide 40 MG TABS Take 40 mg by mouth in the morning and at bedtime 7/17/22   Malgorzata Morrell, APRN - CNP   Lift Chair MISC by Does not apply route 5/24/22   [facility] MD   aspirin 81 MG EC tablet Take 1 tablet by mouth daily 4/18/22   John Christina MD   atorvastatin (LIPITOR) 80 MG tablet TAKE ONE TABLET BY MOUTH DAILY 3/8/22   [facility]        Allergies:  Patient has no known allergies. ROS:   Review of Systems   Constitutional: Negative. Respiratory: Negative. Cardiovascular:  Positive for leg swelling. Gastrointestinal: Negative. Genitourinary: Negative. Musculoskeletal:  Positive for myalgias. Skin: Negative. Neurological: Negative. Hematological: Negative. Psychiatric/Behavioral: Negative. Physical Examination:    Vitals:    07/25/22 1436   BP: 124/68   Site: Left Upper Arm   Position: Sitting   Cuff Size: Large Adult   Pulse: 64   SpO2: 99%   Weight: 233 lb (105.7 kg)   Height: 6' (1.829 m)           Physical Exam  Vitals reviewed. Constitutional:       Appearance: Normal appearance. He is normal weight. HENT:      Head: Normocephalic and atraumatic. Eyes:      Extraocular Movements: Extraocular movements intact. Pupils: Pupils are equal, round, and reactive to light. Cardiovascular:      Rate and Rhythm: Normal rate and regular rhythm. Pulses: Normal pulses. Heart sounds: Normal heart sounds. Pulmonary:      Effort: Pulmonary effort is normal.      Breath sounds: Normal breath sounds. Abdominal:      Palpations: Abdomen is soft. Musculoskeletal:      Cervical back: Normal range of motion and neck supple. Right lower leg: Edema present. Left lower leg: Edema present. Comments: 2+ up to knees   Skin:     General: Skin is warm and dry. Neurological:      General: No focal deficit present. Mental Status: He is alert and oriented to person, place, and time. Mental status is at baseline.    Psychiatric:         Mood and Affect: Mood normal. Behavior: Behavior normal.         Thought Content:  Thought content normal.         Judgment: Judgment normal.       Lab Data:    CBC:   Lab Results   Component Value Date/Time    WBC 11.0 04/12/2022 07:21 AM    WBC 12.0 04/11/2022 07:14 AM    WBC 11.6 04/10/2022 09:11 AM    RBC 4.36 04/12/2022 07:21 AM    RBC 4.57 04/11/2022 07:14 AM    RBC 4.46 04/10/2022 09:11 AM    HGB 12.9 04/12/2022 07:21 AM    HGB 13.0 04/11/2022 06:43 PM    HGB 13.6 04/11/2022 07:14 AM    HCT 38.2 04/12/2022 07:21 AM    HCT 39.0 04/11/2022 06:43 PM    HCT 39.8 04/11/2022 07:14 AM    MCV 87.6 04/12/2022 07:21 AM    MCV 87.2 04/11/2022 07:14 AM    MCV 88.8 04/10/2022 09:11 AM    RDW 14.3 04/12/2022 07:21 AM    RDW 14.3 04/11/2022 07:14 AM    RDW 14.4 04/10/2022 09:11 AM     04/12/2022 07:21 AM     04/11/2022 07:14 AM     04/10/2022 09:11 AM     BMP:  Lab Results   Component Value Date/Time     07/06/2022 12:06 PM     06/14/2022 11:30 AM     04/12/2022 07:21 AM    K 5.0 07/06/2022 12:06 PM    K 4.9 06/14/2022 11:30 AM    K 3.8 04/12/2022 07:21 AM    K 3.7 04/11/2022 07:14 AM    K 3.7 04/10/2022 07:16 AM     07/06/2022 12:06 PM     06/14/2022 11:30 AM     04/12/2022 07:21 AM    CO2 25 07/06/2022 12:06 PM    CO2 26 06/14/2022 11:30 AM    CO2 27 04/12/2022 07:21 AM    PHOS 3.0 05/24/2021 10:32 AM    PHOS 3.5 05/08/2020 09:50 AM    PHOS 2.9 09/20/2018 08:06 AM    BUN 14 07/06/2022 12:06 PM    BUN 13 06/14/2022 11:30 AM    BUN 24 04/12/2022 07:21 AM    CREATININE 0.9 07/06/2022 12:06 PM    CREATININE 1.0 06/14/2022 11:30 AM    CREATININE 1.1 04/12/2022 07:21 AM     BNP:   Lab Results   Component Value Date/Time    PROBNP 214 07/06/2022 12:06 PM    PROBNP 186 06/14/2022 11:30 AM    PROBNP 214 04/06/2022 03:11 PM     Iron Studies:  No components found for: FE,  TIBC,  FERRITIN  Iron Deficiency Anemia:  check iron studies IV Iron Therapy:    2017 ACC/AHA HF Guidelines:   intravenous iron replacement in patients with New York Heart Association (NYHA) class II and III HF and iron deficiency (ferritin <100 ng/ml or 100-300 ng/ml if transferrin saturation <20%), to improve functional status and QoL. Assessment/Plan:    1. Lower extremity edema - continue torsemide, give one dose metolazone and add donna, labs in 2 weeks, make dietary changes and check cbc/iron studies   2. Essential hypertension - controlled   3. Venous insufficiency - wear compression stockings, keep feet up       Time   30-39, 40 + minutes spent preparing to see patient including reviewing patient history/prior tests/prior consults, performing a medical exam, counseling and educating patient/family/caregiver, ordering medications/tests/procedures, referring and communicating with PCPs and other pertinent consultants, documenting information in the EMR, independently interpreting results and communicating to family and coordination of patient care. Instructions:   Medications: continue torsemide 40mg twice a day, take with breakfast and lunch, tomorrow take one pill metolazone 30min before morning torsemide only. Add spironolactone one pill once a day and stop potassium  Labs: in 2 weeks with PCP labs  Referrals: wear compression stockings and keep feet elevated as much as possible  Lifestyle Recommendations: Weigh yourself every day in the morning after urination, Limit sodium to 2000mg/day and fluids to 2L or 64oz/day. Follow up: as scheduled with Santi Humphries CHF Resource Line: [phone]    Heart Failure Websites:   www.heart. org  Www.cardiosmart. org    Websites with Low Sodium Recipes:   www.mayoclinic.org/healthy-lifestyle/recipes/low-sodium-recipes  www.[employer]/recipes    Smartphone chun's that can help you keep track of symptoms, weights, and medications:  HF Storylines  HF Path  My HF  CareZone  Point of Care Heart Failure  WOWME  H2O Overload    Nutrition Chun to track calories, carbohydrates and sodium content of food:   CalorieKing  MyFitnessPal    Low Sodium Meal Delivery    Top  Meals - offers senior discount  Meal Pro  Magic Kitchen  [employer] - some insurance pays for  Rol[address] for 50  and over, AARP 10% discount     Balance for under 50  Metabolic Meals         I appreciate the opportunity of cooperating in the care of this individual.    Verner Manners, ROSA MARIA - CNP, 7/25/2022,8:42 AM

## 2022-07-28 ENCOUNTER — HOSPITAL ENCOUNTER (INPATIENT)
Age: 77
LOS: 6 days | Discharge: NURSING, REHAB OR LONG-TERM CARE | DRG: 683 | End: 2022-08-03
Attending: EMERGENCY MEDICINE | Admitting: FAMILY MEDICINE
Payer: COMMERCIAL

## 2022-07-28 ENCOUNTER — TELEPHONE (OUTPATIENT)
Dept: CARDIOLOGY CLINIC | Age: 77
End: 2022-07-28

## 2022-07-28 ENCOUNTER — APPOINTMENT (OUTPATIENT)
Dept: GENERAL RADIOLOGY | Age: 77
DRG: 683 | End: 2022-07-28
Payer: COMMERCIAL

## 2022-07-28 DIAGNOSIS — R55 SYNCOPE AND COLLAPSE: Primary | ICD-10-CM

## 2022-07-28 DIAGNOSIS — N17.9 AKI (ACUTE KIDNEY INJURY) (HCC): ICD-10-CM

## 2022-07-28 LAB
A/G RATIO: 1.3 (ref 1.1–2.2)
ALBUMIN SERPL-MCNC: 4.6 G/DL (ref 3.4–5)
ALP BLD-CCNC: 133 U/L (ref 40–129)
ALT SERPL-CCNC: 22 U/L (ref 10–40)
ANION GAP SERPL CALCULATED.3IONS-SCNC: 17 MMOL/L (ref 3–16)
AST SERPL-CCNC: 27 U/L (ref 15–37)
BASOPHILS ABSOLUTE: 0.1 K/UL (ref 0–0.2)
BASOPHILS RELATIVE PERCENT: 1 %
BILIRUB SERPL-MCNC: 1.1 MG/DL (ref 0–1)
BILIRUBIN URINE: NEGATIVE
BLOOD, URINE: NEGATIVE
BUN BLDV-MCNC: 29 MG/DL (ref 7–20)
CALCIUM SERPL-MCNC: 10.3 MG/DL (ref 8.3–10.6)
CHLORIDE BLD-SCNC: 91 MMOL/L (ref 99–110)
CLARITY: CLEAR
CO2: 30 MMOL/L (ref 21–32)
COLOR: YELLOW
CREAT SERPL-MCNC: 1.5 MG/DL (ref 0.8–1.3)
EKG ATRIAL RATE: 77 BPM
EKG DIAGNOSIS: NORMAL
EKG P AXIS: 67 DEGREES
EKG P-R INTERVAL: 206 MS
EKG Q-T INTERVAL: 404 MS
EKG QRS DURATION: 106 MS
EKG QTC CALCULATION (BAZETT): 457 MS
EKG R AXIS: 90 DEGREES
EKG T AXIS: 12 DEGREES
EKG VENTRICULAR RATE: 77 BPM
EOSINOPHILS ABSOLUTE: 0.1 K/UL (ref 0–0.6)
EOSINOPHILS RELATIVE PERCENT: 0.6 %
GFR AFRICAN AMERICAN: 55
GFR NON-AFRICAN AMERICAN: 46
GLUCOSE BLD-MCNC: 132 MG/DL (ref 70–99)
GLUCOSE URINE: NEGATIVE MG/DL
HCT VFR BLD CALC: 44.5 % (ref 40.5–52.5)
HEMOGLOBIN: 14.7 G/DL (ref 13.5–17.5)
INR BLD: 1.03 (ref 0.87–1.14)
KETONES, URINE: NEGATIVE MG/DL
LEUKOCYTE ESTERASE, URINE: NEGATIVE
LYMPHOCYTES ABSOLUTE: 1.5 K/UL (ref 1–5.1)
LYMPHOCYTES RELATIVE PERCENT: 10.6 %
MAGNESIUM: 2.1 MG/DL (ref 1.8–2.4)
MCH RBC QN AUTO: 29.1 PG (ref 26–34)
MCHC RBC AUTO-ENTMCNC: 33 G/DL (ref 31–36)
MCV RBC AUTO: 88 FL (ref 80–100)
MICROSCOPIC EXAMINATION: NORMAL
MONOCYTES ABSOLUTE: 1 K/UL (ref 0–1.3)
MONOCYTES RELATIVE PERCENT: 7 %
NEUTROPHILS ABSOLUTE: 11.3 K/UL (ref 1.7–7.7)
NEUTROPHILS RELATIVE PERCENT: 80.8 %
NITRITE, URINE: NEGATIVE
PDW BLD-RTO: 14.1 % (ref 12.4–15.4)
PH UA: 6 (ref 5–8)
PLATELET # BLD: 416 K/UL (ref 135–450)
PMV BLD AUTO: 9.4 FL (ref 5–10.5)
POTASSIUM REFLEX MAGNESIUM: 3.5 MMOL/L (ref 3.5–5.1)
PRO-BNP: 147 PG/ML (ref 0–449)
PROTEIN UA: NEGATIVE MG/DL
PROTHROMBIN TIME: 13.4 SEC (ref 11.7–14.5)
RBC # BLD: 5.05 M/UL (ref 4.2–5.9)
SODIUM BLD-SCNC: 138 MMOL/L (ref 136–145)
SPECIFIC GRAVITY UA: 1.01 (ref 1–1.03)
TOTAL PROTEIN: 8.2 G/DL (ref 6.4–8.2)
TROPONIN: 0.03 NG/ML
TROPONIN: 0.03 NG/ML
URINE REFLEX TO CULTURE: NORMAL
URINE TYPE: NORMAL
UROBILINOGEN, URINE: 1 E.U./DL
WBC # BLD: 14 K/UL (ref 4–11)

## 2022-07-28 PROCEDURE — 6370000000 HC RX 637 (ALT 250 FOR IP): Performed by: FAMILY MEDICINE

## 2022-07-28 PROCEDURE — 81003 URINALYSIS AUTO W/O SCOPE: CPT

## 2022-07-28 PROCEDURE — 6360000002 HC RX W HCPCS: Performed by: FAMILY MEDICINE

## 2022-07-28 PROCEDURE — 2060000000 HC ICU INTERMEDIATE R&B

## 2022-07-28 PROCEDURE — 93010 ELECTROCARDIOGRAM REPORT: CPT | Performed by: INTERNAL MEDICINE

## 2022-07-28 PROCEDURE — 80053 COMPREHEN METABOLIC PANEL: CPT

## 2022-07-28 PROCEDURE — 99285 EMERGENCY DEPT VISIT HI MDM: CPT

## 2022-07-28 PROCEDURE — 83880 ASSAY OF NATRIURETIC PEPTIDE: CPT

## 2022-07-28 PROCEDURE — 2580000003 HC RX 258: Performed by: EMERGENCY MEDICINE

## 2022-07-28 PROCEDURE — 85610 PROTHROMBIN TIME: CPT

## 2022-07-28 PROCEDURE — 84484 ASSAY OF TROPONIN QUANT: CPT

## 2022-07-28 PROCEDURE — 71045 X-RAY EXAM CHEST 1 VIEW: CPT

## 2022-07-28 PROCEDURE — 83735 ASSAY OF MAGNESIUM: CPT

## 2022-07-28 PROCEDURE — 2580000003 HC RX 258: Performed by: FAMILY MEDICINE

## 2022-07-28 PROCEDURE — 85025 COMPLETE CBC W/AUTO DIFF WBC: CPT

## 2022-07-28 PROCEDURE — 93005 ELECTROCARDIOGRAM TRACING: CPT | Performed by: EMERGENCY MEDICINE

## 2022-07-28 RX ORDER — ENOXAPARIN SODIUM 100 MG/ML
40 INJECTION SUBCUTANEOUS DAILY
Status: DISCONTINUED | OUTPATIENT
Start: 2022-07-28 | End: 2022-08-03 | Stop reason: HOSPADM

## 2022-07-28 RX ORDER — TORSEMIDE 20 MG/1
TABLET ORAL
Status: ON HOLD | COMMUNITY
Start: 2022-05-19 | End: 2022-07-28

## 2022-07-28 RX ORDER — AMLODIPINE BESYLATE 10 MG/1
TABLET ORAL
Status: ON HOLD | COMMUNITY
Start: 2022-04-27 | End: 2022-07-28

## 2022-07-28 RX ORDER — ONDANSETRON 2 MG/ML
4 INJECTION INTRAMUSCULAR; INTRAVENOUS EVERY 6 HOURS PRN
Status: DISCONTINUED | OUTPATIENT
Start: 2022-07-28 | End: 2022-08-03 | Stop reason: HOSPADM

## 2022-07-28 RX ORDER — POLYETHYLENE GLYCOL 3350 17 G/17G
17 POWDER, FOR SOLUTION ORAL DAILY PRN
Status: DISCONTINUED | OUTPATIENT
Start: 2022-07-28 | End: 2022-08-03 | Stop reason: HOSPADM

## 2022-07-28 RX ORDER — METOLAZONE 2.5 MG/1
2.5 TABLET ORAL DAILY
Status: DISCONTINUED | OUTPATIENT
Start: 2022-07-28 | End: 2022-07-29

## 2022-07-28 RX ORDER — SODIUM CHLORIDE 0.9 % (FLUSH) 0.9 %
5-40 SYRINGE (ML) INJECTION EVERY 12 HOURS SCHEDULED
Status: DISCONTINUED | OUTPATIENT
Start: 2022-07-28 | End: 2022-08-03 | Stop reason: HOSPADM

## 2022-07-28 RX ORDER — ASPIRIN 81 MG/1
81 TABLET ORAL DAILY
Status: DISCONTINUED | OUTPATIENT
Start: 2022-07-28 | End: 2022-08-03 | Stop reason: HOSPADM

## 2022-07-28 RX ORDER — PANTOPRAZOLE SODIUM 40 MG/1
40 TABLET, DELAYED RELEASE ORAL DAILY
Status: DISCONTINUED | OUTPATIENT
Start: 2022-07-28 | End: 2022-08-03 | Stop reason: HOSPADM

## 2022-07-28 RX ORDER — ATORVASTATIN CALCIUM 80 MG/1
80 TABLET, FILM COATED ORAL DAILY
Status: DISCONTINUED | OUTPATIENT
Start: 2022-07-28 | End: 2022-08-03 | Stop reason: HOSPADM

## 2022-07-28 RX ORDER — SODIUM CHLORIDE 0.9 % (FLUSH) 0.9 %
5-40 SYRINGE (ML) INJECTION PRN
Status: DISCONTINUED | OUTPATIENT
Start: 2022-07-28 | End: 2022-08-03 | Stop reason: HOSPADM

## 2022-07-28 RX ORDER — SODIUM CHLORIDE, SODIUM LACTATE, POTASSIUM CHLORIDE, CALCIUM CHLORIDE 600; 310; 30; 20 MG/100ML; MG/100ML; MG/100ML; MG/100ML
INJECTION, SOLUTION INTRAVENOUS ONCE
Status: COMPLETED | OUTPATIENT
Start: 2022-07-28 | End: 2022-07-28

## 2022-07-28 RX ORDER — ONDANSETRON 4 MG/1
4 TABLET, ORALLY DISINTEGRATING ORAL EVERY 8 HOURS PRN
Status: DISCONTINUED | OUTPATIENT
Start: 2022-07-28 | End: 2022-08-03 | Stop reason: HOSPADM

## 2022-07-28 RX ORDER — SODIUM CHLORIDE 9 MG/ML
INJECTION, SOLUTION INTRAVENOUS PRN
Status: DISCONTINUED | OUTPATIENT
Start: 2022-07-28 | End: 2022-08-03 | Stop reason: HOSPADM

## 2022-07-28 RX ORDER — ACETAMINOPHEN 325 MG/1
650 TABLET ORAL EVERY 6 HOURS PRN
Status: DISCONTINUED | OUTPATIENT
Start: 2022-07-28 | End: 2022-08-03 | Stop reason: HOSPADM

## 2022-07-28 RX ORDER — ACETAMINOPHEN 650 MG/1
650 SUPPOSITORY RECTAL EVERY 6 HOURS PRN
Status: DISCONTINUED | OUTPATIENT
Start: 2022-07-28 | End: 2022-08-03 | Stop reason: HOSPADM

## 2022-07-28 RX ADMIN — PANTOPRAZOLE SODIUM 40 MG: 40 TABLET, DELAYED RELEASE ORAL at 18:48

## 2022-07-28 RX ADMIN — ENOXAPARIN SODIUM 40 MG: 100 INJECTION SUBCUTANEOUS at 18:48

## 2022-07-28 RX ADMIN — ATORVASTATIN CALCIUM 80 MG: 80 TABLET, FILM COATED ORAL at 18:48

## 2022-07-28 RX ADMIN — Medication 10 ML: at 20:56

## 2022-07-28 RX ADMIN — SODIUM CHLORIDE, POTASSIUM CHLORIDE, SODIUM LACTATE AND CALCIUM CHLORIDE: 600; 310; 30; 20 INJECTION, SOLUTION INTRAVENOUS at 18:43

## 2022-07-28 RX ADMIN — ASPIRIN 81 MG: 81 TABLET, COATED ORAL at 18:48

## 2022-07-28 ASSESSMENT — PAIN - FUNCTIONAL ASSESSMENT: PAIN_FUNCTIONAL_ASSESSMENT: NONE - DENIES PAIN

## 2022-07-28 NOTE — TELEPHONE ENCOUNTER
Wife states pt BP is 138/87 91  Pt is dizzy and weak. A bit more disorientated. States pt's knees buckled and felt like he was going to pass out when he was getting out of shower this morning and he began to throw up and had sweats. Please call to advise.

## 2022-07-28 NOTE — ED PROVIDER NOTES
[address] [facility] Emergency Department      Pt Name: Marisa Mayfield  MRN: [id number]  [place] 1945  Date of evaluation: 7/28/2022  Provider: Armandina Barthel, MD  CHIEF COMPLAINT  Chief Complaint   Patient presents with    Dizziness     Pt from home, reports dizziness causing him to fall, wife reports new medication from [facility])      HPI  Marisa Mayfield is a 78 y.o. male who presents because of feeling dizzy. His wife provides the majority of the history. He apparently took shower this morning and afterwards, felt very lightheaded and fell back into a shower chair. His wife is there to help him to prevent injury and prevented his collapsed to the floor. He was extremely sweaty and nauseated. He had dry heaves. He has been feeling nauseated since he started spironolactone. His cardiologist has been trying to help him with leg edema. He has been on diuretic for long time and additional medicine was started earlier this week. He denies having any chest pain. He denies any injury from the falls. He did take a second fall this morning in getting out of the car. He fell to his knees. He denies any injury. REVIEW OF SYSTEMS:  No fever, vomiting this morning, no chest pain, no abdominal pain Pertinent positives and negatives as per the HPI. All other review of systems reviewed and negative. Nursing notes reviewed.     PAST MEDICAL HISTORY  Past Medical History:   Diagnosis Date    Allergic rhinitis     Aortic valve disorders     COPD (chronic obstructive pulmonary disease) (HCC)     Hydrocephalus (HCC)     normal pressure,    Hyperlipidemia     Hypertension     Hypertrophy of prostate without urinary obstruction and other lower urinary tract symptoms (LUTS)     Irritable bowel syndrome     Pancreatitis     Peptic ulcer, unspecified site, unspecified as acute or chronic, without mention of hemorrhage, perforation, or obstruction     Peripheral vascular disease ([facility] [address]      SURGICAL HISTORY  Past Surgical History:   Procedure Laterality Date    ABDOMINAL AORTIC ANEURYSM REPAIR N/A 2001    ANGIOPLASTY      RT femoral artery    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    [place]    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REPAIR      Aortic valve replacement    AORTIC VALVE REPLACEMENT  2018    CHOLECYSTECTOMY      COLONOSCOPY N/A 5/4/2021    COLONOSCOPY POLYPECTOMY SNARE/COLD BIOPSY performed by Lizzeth Valdez MD at [facility]      bifemoral bypass    FOOT SURGERY Right 12/2/15    HERNIA REPAIR Left     OTHER SURGICAL HISTORY  10/23/2017    lumbar puncture    IA ESOPHAGOGASTRODUODENOSCOPY TRANSORAL DIAGNOSTIC N/A 12/4/2018    EGD performed by Lizzeth Valdez MD at [address] [place] [address] ENDOSCOPY  09/19/2018    WITH BIOPSY    UPPER GASTROINTESTINAL ENDOSCOPY N/A 4/7/2022    EGD BIOPSY performed by Rosalia Swartz MD at [facility]:  No current facility-administered medications on file prior to encounter. Current Outpatient Medications on File Prior to Encounter   Medication Sig Dispense Refill    amLODIPine (NORVASC) 10 MG tablet       torsemide (DEMADEX) 20 MG tablet       pantoprazole (PROTONIX) 40 MG tablet Take 40 mg by mouth in the morning. spironolactone (ALDACTONE) 25 MG tablet Take 1 tablet by mouth in the morning. 30 tablet 1    metOLazone (ZAROXOLYN) 5 MG tablet Take 0.5 tablets by mouth in the morning. 3 tablet 0    Torsemide 40 MG TABS Take 40 mg by mouth in the morning and at bedtime 90 tablet 3    Lift Chair MISC by Does not apply route 1 each 0    aspirin 81 MG EC tablet Take 1 tablet by mouth daily 30 tablet 3    atorvastatin (LIPITOR) 80 MG tablet TAKE ONE TABLET BY MOUTH DAILY 90 tablet 3     ALLERGIES  Patient has no known allergies.   FAMILY HISTORY:  Family History   Problem Relation Age of Onset    Heart Disease Father     Diabetes Father     Alcohol Abuse Father     Other Mother     Cancer Brother liver     SOCIAL HISTORY:  Social History     Tobacco Use    Smoking status: Former     Packs/day: 1.50     Years: 30.00     Pack years: 45.00     Types: Cigarettes     Quit date: 2001     Years since quittin.6    Smokeless tobacco: Never    Tobacco comments:     H.O.smoking at age 25 / smoked up to 1.5p.p.d /quit     Vaping Use    Vaping Use: Never used   Substance Use Topics    Alcohol use: Yes     Alcohol/week: 2.0 standard drinks     Types: 2 Cans of beer per week     Comment: occasional beer    Drug use: Never     IMMUNIZATIONS:  Noncontributory    PHYSICAL EXAM  VITAL SIGNS:  /82   Pulse 78   Temp 98.2 °F (36.8 °C)   Resp 17   Wt 216 lb (98 kg)   SpO2 96%   BMI 29.29 kg/m²   Constitutional:  78 y.o. male alert, seated  HENT:  Atraumatic, mucous membranes moist  Eyes:   Conjunctiva clear, no discharge, no icterus  Neck:  Supple, no adenopathy, no visible JVD, no meningismus  Cardiovascular:  Regular, no rubs  Thorax & Lungs:  No accessory muscle usage, clear  Abdomen:  Soft, non distended, no pulsatility, bowel sounds present, no tenderness  Back:  No deformity  Genitalia:  Deferred  Rectal:  Deferred  Extremities:  No cyanosis, no edema  Skin:  Warm, dry  Neurologic:  Alert & oriented, GCS 15, no slurred speech, CN function intact, PERRL, no nystagmus  Psychiatric:  Affect appropriate    DIAGNOSTIC RESULTS:  Labs resulted at the time of this note reviewed.   Labs Reviewed   CBC WITH AUTO DIFFERENTIAL - Abnormal; Notable for the following components:       Result Value    WBC 14.0 (*)     Neutrophils Absolute 11.3 (*)     All other components within normal limits   COMPREHENSIVE METABOLIC PANEL W/ REFLEX TO MG FOR LOW K - Abnormal; Notable for the following components:    Chloride 91 (*)     Anion Gap 17 (*)     Glucose 132 (*)     BUN 29 (*)     Creatinine 1.5 (*)     GFR Non- 46 (*)     GFR  55 (*)     Total Bilirubin 1.1 (*)     Alkaline Phosphatase 133

## 2022-07-29 PROBLEM — I50.32 CHRONIC DIASTOLIC HEART FAILURE (HCC): Status: ACTIVE | Noted: 2022-07-29

## 2022-07-29 PROBLEM — R55 SYNCOPE AND COLLAPSE: Status: ACTIVE | Noted: 2022-07-29

## 2022-07-29 PROBLEM — E86.0 DEHYDRATION: Status: ACTIVE | Noted: 2022-07-29

## 2022-07-29 LAB
ANION GAP SERPL CALCULATED.3IONS-SCNC: 11 MMOL/L (ref 3–16)
BUN BLDV-MCNC: 27 MG/DL (ref 7–20)
CALCIUM SERPL-MCNC: 9.6 MG/DL (ref 8.3–10.6)
CHLORIDE BLD-SCNC: 96 MMOL/L (ref 99–110)
CO2: 33 MMOL/L (ref 21–32)
CREAT SERPL-MCNC: 1.5 MG/DL (ref 0.8–1.3)
CREATININE URINE: 115 MG/DL (ref 39–259)
GFR AFRICAN AMERICAN: 55
GFR NON-AFRICAN AMERICAN: 46
GLUCOSE BLD-MCNC: 121 MG/DL (ref 70–99)
HCT VFR BLD CALC: 38.1 % (ref 40.5–52.5)
HEMOGLOBIN: 12.9 G/DL (ref 13.5–17.5)
MCH RBC QN AUTO: 29.7 PG (ref 26–34)
MCHC RBC AUTO-ENTMCNC: 33.9 G/DL (ref 31–36)
MCV RBC AUTO: 87.6 FL (ref 80–100)
PDW BLD-RTO: 14.4 % (ref 12.4–15.4)
PLATELET # BLD: 312 K/UL (ref 135–450)
PMV BLD AUTO: 9 FL (ref 5–10.5)
POTASSIUM SERPL-SCNC: 3.6 MMOL/L (ref 3.5–5.1)
RBC # BLD: 4.34 M/UL (ref 4.2–5.9)
SODIUM BLD-SCNC: 140 MMOL/L (ref 136–145)
SODIUM URINE: 89 MMOL/L
WBC # BLD: 9.5 K/UL (ref 4–11)

## 2022-07-29 PROCEDURE — 80048 BASIC METABOLIC PNL TOTAL CA: CPT

## 2022-07-29 PROCEDURE — 99223 1ST HOSP IP/OBS HIGH 75: CPT | Performed by: INTERNAL MEDICINE

## 2022-07-29 PROCEDURE — 2580000003 HC RX 258: Performed by: FAMILY MEDICINE

## 2022-07-29 PROCEDURE — 6370000000 HC RX 637 (ALT 250 FOR IP): Performed by: FAMILY MEDICINE

## 2022-07-29 PROCEDURE — 85027 COMPLETE CBC AUTOMATED: CPT

## 2022-07-29 PROCEDURE — 82570 ASSAY OF URINE CREATININE: CPT

## 2022-07-29 PROCEDURE — 6360000002 HC RX W HCPCS: Performed by: FAMILY MEDICINE

## 2022-07-29 PROCEDURE — 2060000000 HC ICU INTERMEDIATE R&B

## 2022-07-29 PROCEDURE — 2580000003 HC RX 258: Performed by: INTERNAL MEDICINE

## 2022-07-29 PROCEDURE — 84300 ASSAY OF URINE SODIUM: CPT

## 2022-07-29 PROCEDURE — 36415 COLL VENOUS BLD VENIPUNCTURE: CPT

## 2022-07-29 PROCEDURE — 94760 N-INVAS EAR/PLS OXIMETRY 1: CPT

## 2022-07-29 RX ORDER — VALSARTAN 40 MG/1
80 TABLET ORAL DAILY
Status: DISCONTINUED | OUTPATIENT
Start: 2022-07-30 | End: 2022-08-03 | Stop reason: HOSPADM

## 2022-07-29 RX ORDER — SODIUM CHLORIDE 9 MG/ML
INJECTION, SOLUTION INTRAVENOUS CONTINUOUS
Status: DISCONTINUED | OUTPATIENT
Start: 2022-07-29 | End: 2022-07-30

## 2022-07-29 RX ORDER — SPIRONOLACTONE 25 MG/1
25 TABLET ORAL DAILY
Status: DISCONTINUED | OUTPATIENT
Start: 2022-07-30 | End: 2022-08-01

## 2022-07-29 RX ADMIN — ENOXAPARIN SODIUM 40 MG: 100 INJECTION SUBCUTANEOUS at 09:11

## 2022-07-29 RX ADMIN — SODIUM CHLORIDE: 9 INJECTION, SOLUTION INTRAVENOUS at 15:18

## 2022-07-29 RX ADMIN — ASPIRIN 81 MG: 81 TABLET, COATED ORAL at 09:11

## 2022-07-29 RX ADMIN — PANTOPRAZOLE SODIUM 40 MG: 40 TABLET, DELAYED RELEASE ORAL at 09:11

## 2022-07-29 RX ADMIN — Medication 10 ML: at 09:11

## 2022-07-29 RX ADMIN — ATORVASTATIN CALCIUM 80 MG: 80 TABLET, FILM COATED ORAL at 09:11

## 2022-07-29 RX ADMIN — Medication 10 ML: at 20:36

## 2022-07-29 RX ADMIN — METOLAZONE 2.5 MG: 2.5 TABLET ORAL at 09:11

## 2022-07-29 NOTE — CONSULTS
[facility]  Advanced CHF/Pulmonary Hypertension   Cardiac Evaluation      Gil Chicas  YOB: 1945    Requesting PHysician:  Dr. Brianna Hernandez      Chief Complaint   Patient presents with    Dizziness     Pt from home, reports dizziness causing him to fall, wife reports new medication from Cardiology Claudette Hurmatt)         History of Present Illness:  Obdulio Malagon is a 77 yo male who presented to the ED because of feeling dizzy. He got out of the shower, felt very lightheaded and fell back into a shower chair. His wife kept him from collapsing to the floor. He was sweaty and nauseated. He started spironolactone for peripheral edema earlier this week. He has been on diuretic for a long time, spironolactone added this week. Denies chest pain. Denies injury from the falls. He has a history of TAVR, COPD, HTN, PVD, CHF. His lab work revealed renal failure with creatinine 1.5, troponin elevated at 0.02.  ekg shoed NSR. Chest xray negative for pneumonia or edema. Denies orthopnea, PND. We are consulted for HF management and for medication adjustment. Labs:  Sodium 140  K 3.6  BUN/Cre 27/1.5 (14/0.9 on 7/6/22)  Bnp 147  Troponin 0.03 x 3  H/H 12.9/38.1    Cardiac Cath:  Jan, 2018  LAD proximal 40%    Cxr:     Impression   No radiographic evidence of acute pulmonary disease. Echo:  5/19/22:   Summary   *Technically difficult study due to poor acoustical window. Patient refused   Definity. Suboptimal image quality.    *Left ventricle - normal size, thickness and function with EF of 55%   *Aortic valve - well seated TAVR valve, PV 1.9m/s, MG 8mmHg, no significant   regurgitation    Meds prior to admission:  Amlodipine 10 mg qd  Torsemide 40 bid  Spironolactone 25 qd  Aspirin 81 qd  Lipitor 80 qd    No Known Allergies  Current Facility-Administered Medications   Medication Dose Route Frequency Provider Last Rate Last Admin    0.9 % sodium chloride infusion   IntraVENous Continuous Nahum Lama MD 75 mL/hr at 07/29/22 1518 New Bag at 07/29/22 1518    aspirin EC tablet 81 mg  81 mg Oral Daily John Dawn MD   81 mg at 07/29/22 0911    atorvastatin (LIPITOR) tablet 80 mg  80 mg Oral Daily John Dawn MD   80 mg at 07/29/22 5115    metOLazone (ZAROXOLYN) tablet 2.5 mg  2.5 mg Oral Daily John Dawn MD   2.5 mg at 07/29/22 0911    pantoprazole (PROTONIX) tablet 40 mg  40 mg Oral Daily John Dawn MD   40 mg at 07/29/22 0555    sodium chloride flush 0.9 % injection 5-40 mL  5-40 mL IntraVENous 2 times per day John Dawn MD   10 mL at 07/29/22 0911    sodium chloride flush 0.9 % injection 5-40 mL  5-40 mL IntraVENous PRN John Dawn MD        0.9 % sodium chloride infusion   IntraVENous PRN John Dawn MD        enoxaparin (LOVENOX) injection 40 mg  40 mg SubCUTAneous Daily John Dawn MD   40 mg at 07/29/22 0911    ondansetron (ZOFRAN-ODT) disintegrating tablet 4 mg  4 mg Oral Q8H PRN John Dawn MD        Or    ondansetron (ZOFRAN) injection 4 mg  4 mg IntraVENous Q6H PRN John Dawn MD        polyethylene glycol (GLYCOLAX) packet 17 g  17 g Oral Daily PRN John Dawn MD        acetaminophen (TYLENOL) tablet 650 mg  650 mg Oral Q6H PRN John Dawn MD        Or    acetaminophen (TYLENOL) suppository 650 mg  650 mg Rectal Q6H PRN John Dawn MD           Past Medical History:   Diagnosis Date    Allergic rhinitis     Aortic valve disorders     COPD (chronic obstructive pulmonary disease) (HCC)     Hydrocephalus (HCC)     normal pressure,    Hyperlipidemia     Hypertension     Hypertrophy of prostate without urinary obstruction and other lower urinary tract symptoms (LUTS)     Irritable bowel syndrome     Pancreatitis     Peptic ulcer, unspecified site, unspecified as acute or chronic, without mention of hemorrhage, perforation, or obstruction     Peripheral vascular disease ([facility] [address]      Past Surgical History:   Procedure Laterality Date    ABDOMINAL AORTIC ANEURYSM REPAIR N/A     ANGIOPLASTY      RT femoral artery    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REPAIR  2018    [place]    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REPAIR      Aortic valve replacement    AORTIC VALVE REPLACEMENT  2018    CHOLECYSTECTOMY      COLONOSCOPY N/A 2021    COLONOSCOPY POLYPECTOMY SNARE/COLD BIOPSY performed by Fernandez Trinh MD at [facility]      bifemoral bypass    FOOT SURGERY Right 12/2/15    HERNIA REPAIR Left     OTHER SURGICAL HISTORY  10/23/2017    lumbar puncture    WA ESOPHAGOGASTRODUODENOSCOPY TRANSORAL DIAGNOSTIC N/A 2018    EGD performed by Fernandez Trinh MD at [address] [place] [address] ENDOSCOPY  2018    WITH BIOPSY    UPPER GASTROINTESTINAL ENDOSCOPY N/A 2022    EGD BIOPSY performed by Delores Castellanos MD at [facility] ENDOSCOPY     Family History   Problem Relation Age of Onset    Heart Disease Father     Diabetes Father     Alcohol Abuse Father     Other Mother     Cancer Brother         liver     Social History     Socioeconomic History    Marital status:      Spouse name: dalia    Number of children: 2    Years of education: Not on file    Highest education level: Not on file   Occupational History    Not on file   Tobacco Use    Smoking status: Former     Packs/day: 1.50     Years: 30.00     Pack years: 45.00     Types: Cigarettes     Quit date: 2001     Years since quittin.6    Smokeless tobacco: Never    Tobacco comments:     H.O.smoking at age 25 / smoked up to 1.5p.p.d /quit     Vaping Use    Vaping Use: Never used   Substance and Sexual Activity    Alcohol use:  Yes     Alcohol/week: 2.0 standard drinks     Types: 2 Cans of beer per week     Comment: occasional beer    Drug use: Never    Sexual activity: Yes     Partners: Female     Birth control/protection: Post-menopausal   Other Topics Concern    Not on file   Social History Narrative    Not on file     Social Determinants of Health     Financial Resource Strain: Low Risk     Difficulty of Paying Living Expenses: Not hard at all   Food Insecurity: No Food Insecurity    Worried About [employer] in the Last Year: Never true    Ran Out of Food in the Last Year: Never true   Transportation Needs: No Transportation Needs    Lack of Transportation (Medical): No    Lack of Transportation (Non-Medical): No   Physical Activity: Inactive    Days of Exercise per Week: 0 days    Minutes of Exercise per Session: 0 min   Stress: Stress Concern Present    Feeling of Stress : Rather much   Social Connections: Moderately Integrated    Frequency of Communication with Friends and Family: More than three times a week    Frequency of Social Gatherings with Friends and Family: Once a week    Attends Hindu Services: Never    Active Member of Clubs or Organizations: Yes    Attends Club or Organization Meetings: Never    Marital Status:    Intimate Partner Violence: Not on file   Housing Stability: Low Risk     Unable to Pay for Housing in the Last Year: No    Number of [place] in the Last Year: 1    Unstable Housing in the Last Year: No       Review of Systems:   Constitutional: there has been no unanticipated weight loss. There's been no change in energy level, sleep pattern, or activity level. Eyes: No visual changes or diplopia. No scleral icterus. ENT: No Headaches, hearing loss or vertigo. No mouth sores or sore throat. Cardiovascular: Reviewed in HPI  Respiratory: No cough or wheezing, no sputum production. No hematemesis. Gastrointestinal: No abdominal pain, appetite loss, blood in stools. No change in bowel or bladder habits. Genitourinary: No dysuria, trouble voiding, or hematuria. Musculoskeletal:  No gait disturbance, weakness or joint complaints. Integumentary: No rash or pruritis. Neurological: No headache, diplopia, change in muscle strength, numbness or tingling.  No change in gait, balance, coordination, mood, affect, memory, mentation, behavior. Psychiatric: No anxiety, no depression. Endocrine: No malaise, fatigue or temperature intolerance. No excessive thirst, fluid intake, or urination. No tremor. Hematologic/Lymphatic: No abnormal bruising or bleeding, blood clots or swollen lymph nodes. Allergic/Immunologic: No nasal congestion or hives. Physical Examination:    Vitals:    07/28/22 2352 07/29/22 0318 07/29/22 0908 07/29/22 1109   BP: 129/77 (!) 147/80  129/71   Pulse: 69 70  71   Resp: 18 17  18   Temp: 97.8 °F (36.6 °C) 98 °F (36.7 °C)  97.6 °F (36.4 °C)   TempSrc: Oral Oral  Oral   SpO2: 94% 95% 94% 95%   Weight:       Height:         Body mass index is 30.24 kg/m². Wt Readings from Last 3 Encounters:   07/28/22 223 lb (101.2 kg)   07/25/22 233 lb (105.7 kg)   06/28/22 227 lb 9.6 oz (103.2 kg)     BP Readings from Last 3 Encounters:   07/29/22 129/71   07/25/22 124/68   06/28/22 124/64     Constitutional and General Appearance:   Chronically ill appearing  HEENT:  NC/AT  MARLENE  No problems with hearing  Skin:  Warm, dry  Respiratory:  Normal excursion and expansion without use of accessory muscles  Resp Auscultation: Normal breath sounds without dullness  Cardiovascular: The apical impulses not displaced  Heart tones are crisp and normal  Cervical veins are not engorged  The carotid upstroke is normal in amplitude and contour without delay or bruit  JVP less than 8 cm H2O  RRR with nl S1 and S2 without m,r,g  Peripheral pulses are symmetrical and full  There is no clubbing, cyanosis of the extremities.   Trace bilateral edema  Femoral Arteries: 2+ and equal  Pedal Pulses: 2+ and equal   Neck:  No thyromegaly  Abdomen:  No masses or tenderness  Liver/Spleen: No Abnormalities Noted  Neurological/Psychiatric:  Alert and oriented in all spheres  Moves all extremities well  Exhibits normal gait balance and coordination  No abnormalities of mood, affect, memory, mentation, or behavior are noted    Labs were reviewed including labs from other hospital systems through [facility]. Cardiac testing was reviewed including echos, nuclear scans, cardiac catheterization, including from other hospital systems through [facility]. Assessment:    1. Syncope and collapse    2. RADHA (acute kidney injury) ([address]     3. Chronic diastolic HF  4. H/o TAVR  5. Volume depletion  6. COPD      Plan:   He is volume depleted after adding spironolactone to torsemide  He is not on ACEI/ARB/ARNI, would benefit from addition  Continue spironolactone 25 mg po qd  Hold torsemide, when restarted, start torsemide 20 mg bid instead of 40 bid  Start valsartan 160 mg po qd  Stop amlodipine due to its propensity to contribute to peripheral edema  On discharge, recommend the following regimen:  Spironolactone 25 qd  Valsartan 160 qd  Torsemide 20 bid  CHF education reinforced. ~salt restriction  ~fluid restriction  ~medication compliance  ~daily weights and notify of any significant weight gain/loss  ~establish with CHF nurse  ~outpatient follow-up with our CHF team  Follow bmp daily  Stop IV fluids tomorrow  . The patient was seen for > 70 minutes. I reviewed interval history, physical exam, review of data including labs, imaging, development and implementation of treatment plan and coordination of complex care. More than 50% of the time was devoted to counseling the patient on their diagnoses/treatments, as well as coordination of care with the other care teams      I appreciate the opportunity of cooperating in the care of this patient.     Gato Whalen M.D., [place]

## 2022-07-29 NOTE — PROGRESS NOTES
0730-Patient awake, wife at bedside. Patient alert and oriented, denies needs or distress. Assessment complete and charted. Bed in lowest position, alarm on and call light in reach. Patient instructed to call for needs/assistance    1200-patient in bed, denies needs, wife at bedside    1400-Patient started on IVF per ordered    1800-patient awake, watching tv, denies needs.

## 2022-07-29 NOTE — PROGRESS NOTES
Hospitalist Progress Note      PCP: Yoav He MD    Date of Admission: 7/28/2022    Chief Complaint: dizziness    Hospital Course: 77 yo male with h/o HFPEF was recently diuresed with IV Lasix infusion and metolazone. Came in with weakness, nausea, vomiting. Found to have RADHA on admission. IV fluids given. Diuretics held. Subjective: Patient feeling better. Did not get out of the bed yet. Wife at bedside. No new complaints. On room air. No chest pain or shortness of breath. Medications:  Reviewed    Infusion Medications    sodium chloride      sodium chloride       Scheduled Medications    aspirin  81 mg Oral Daily    atorvastatin  80 mg Oral Daily    metOLazone  2.5 mg Oral Daily    pantoprazole  40 mg Oral Daily    sodium chloride flush  5-40 mL IntraVENous 2 times per day    enoxaparin  40 mg SubCUTAneous Daily     PRN Meds: sodium chloride flush, sodium chloride, ondansetron **OR** ondansetron, polyethylene glycol, acetaminophen **OR** acetaminophen      Intake/Output Summary (Last 24 hours) at 7/29/2022 1411  Last data filed at 7/29/2022 0318  Gross per 24 hour   Intake --   Output 400 ml   Net -400 ml       Physical Exam Performed:    /71   Pulse 71   Temp 97.6 °F (36.4 °C) (Oral)   Resp 18   Ht 6' (1.829 m)   Wt 223 lb (101.2 kg)   SpO2 95%   BMI 30.24 kg/m²     General appearance: No apparent distress, appears stated age and cooperative. HEENT: Pupils equal, round, and reactive to light. Conjunctivae/corneas clear. Neck: Supple, with full range of motion. No jugular venous distention. Trachea midline. Respiratory:  Normal respiratory effort. Clear to auscultation, bilaterally without Rales/Wheezes/Rhonchi. Cardiovascular: Regular rate and rhythm with normal S1/S2 without murmurs, rubs or gallops. Abdomen: Soft, non-tender, non-distended with normal bowel sounds. Musculoskeletal: No clubbing, cyanosis or edema bilaterally.   Full range of motion without deformity. Skin: Skin color, texture, turgor normal.  No rashes or lesions. Neurologic:  Neurovascularly intact without any focal sensory/motor deficits. Cranial nerves: II-XII intact, grossly non-focal.  Psychiatric: Alert and oriented, thought content appropriate, normal insight  Capillary Refill: Brisk,3 seconds, normal   Peripheral Pulses: +2 palpable, equal bilaterally       Labs:   Recent Labs     07/28/22  1319 07/29/22  0452   WBC 14.0* 9.5   HGB 14.7 12.9*   HCT 44.5 38.1*    312     Recent Labs     07/28/22  1319 07/29/22  0452    140   K 3.5 3.6   CL 91* 96*   CO2 30 33*   BUN 29* 27*   CREATININE 1.5* 1.5*   CALCIUM 10.3 9.6     Recent Labs     07/28/22  1319   AST 27   ALT 22   BILITOT 1.1*   ALKPHOS 133*     Recent Labs     07/28/22  1319   INR 1.03     Recent Labs     07/28/22  1319 07/28/22  2120   TROPONINI 0.03* 0.03*       Urinalysis:      Lab Results   Component Value Date/Time    NITRU Negative 07/28/2022 04:22 PM    WBCUA 1 03/20/2022 03:26 PM    RBCUA NEGATIVE 03/23/2022 12:30 PM    BLOODU Negative 07/28/2022 04:22 PM    SPECGRAV 1.015 07/28/2022 04:22 PM    GLUCOSEU Negative 07/28/2022 04:22 PM       Radiology:  XR CHEST PORTABLE   Final Result   No radiographic evidence of acute pulmonary disease. Assessment/Plan:    Active Hospital Problems    Diagnosis     RADHA (acute kidney injury) ([facility] [address] [N17.9]      RADHA: Given creatinine 1.5. Baseline creatinine is 0.9. Torsemide and Aldactone held. 1 L IV fluids given yesterday but Zaroxolyn continued. Creatinine still at 1.5. Suspect ongoing hypovolemia. Stop Zaroxolyn. Give IV fluids for another 12 hours. UAnormal.  Check urine lites to calculate Fena. Strict I's and O's.    Dizziness: Suspect secondary to RADHA. Resolved. Check orthostatics. Ambulate. PT OT consult. Heart failure with preserved ejection fraction: Diuretics on hold. Consult cardiology.     Status post TAVR in 2018    Hyperlipidemia    Hypertension    Venous insufficiency: Compression stockings    GERD: Protonix. DVT Prophylaxis: Lovenox  Diet: ADULT DIET;  Regular  Code Status: Full Code    PT/OT Eval Status: Order    Dispo : 1 more day at least.    Buck Christy MD

## 2022-07-29 NOTE — H&P
([facility] Hyperlipidemia, Hypertension, Hypertrophy of prostate without urinary obstruction and other lower urinary tract symptoms (LUTS), Irritable bowel syndrome, Pancreatitis, Peptic ulcer, unspecified site, unspecified as acute or chronic, without mention of hemorrhage, perforation, or obstruction, and Peripheral vascular disease ([facility] 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 Medications:  No current facility-administered medications on file prior to encounter. Current Outpatient Medications on File Prior to Encounter   Medication Sig Dispense Refill    pantoprazole (PROTONIX) 40 MG tablet Take 40 mg by mouth in the morning. spironolactone (ALDACTONE) 25 MG tablet Take 1 tablet by mouth in the morning.  30 tablet 1    Torsemide 40 MG TABS Take 40 mg by mouth in the morning and at bedtime 90 tablet 3    Lift Chair MISC by Does not apply route 1 each 0    aspirin 81 MG EC tablet Take 1 tablet by mouth daily 30 tablet 3    atorvastatin (LIPITOR) 80 MG tablet TAKE ONE TABLET BY MOUTH DAILY 90 tablet 3     Current Facility-Administered Medications   Medication Dose Route Frequency Provider Last Rate Last Admin    aspirin EC tablet 81 mg  81 mg Oral Daily Xenia Steinberg MD   81 mg at 07/28/22 1848    atorvastatin (LIPITOR) tablet 80 mg  80 mg Oral Daily Tai Banks MD   80 mg at 07/28/22 1848    metOLazone (ZAROXOLYN) tablet 2.5 mg  2.5 mg Oral Daily Tai Banks MD pantoprazole (PROTONIX) tablet 40 mg  40 mg Oral Daily Tiarra Quiroz MD   40 mg at 07/28/22 1848    sodium chloride flush 0.9 % injection 5-40 mL  5-40 mL IntraVENous 2 times per day Tiarra Quiroz MD        sodium chloride flush 0.9 % injection 5-40 mL  5-40 mL IntraVENous PRN Tiarra Quiroz MD        0.9 % sodium chloride infusion   IntraVENous PRN Tiarra Quiroz MD        enoxaparin (LOVENOX) injection 40 mg  40 mg SubCUTAneous Daily Tiarra Quiroz MD   40 mg at 07/28/22 1848    ondansetron (ZOFRAN-ODT) disintegrating tablet 4 mg  4 mg Oral Q8H PRN Tiarra Quiroz MD        Or    ondansetron (ZOFRAN) injection 4 mg  4 mg IntraVENous Q6H PRN Tiarra Quiroz MD        polyethylene glycol (GLYCOLAX) packet 17 g  17 g Oral Daily PRN Tiarra Quiroz MD        acetaminophen (TYLENOL) tablet 650 mg  650 mg Oral Q6H PRN Tiarra Quiroz MD        Or    acetaminophen (TYLENOL) suppository 650 mg  650 mg Rectal Q6H PRN Tiarra Quiroz MD          Allergies:  No Known Allergies     Social History:  Patient Lives    reports that he quit smoking about 20 years ago. His smoking use included cigarettes. He has a 45.00 pack-year smoking history. He has never used smokeless tobacco. He reports current alcohol use of about 2.0 standard drinks per week. He reports that he does not use drugs. Family History:  family history includes Alcohol Abuse in his father; Cancer in his brother; Diabetes in his father; Heart Disease in his father; Other in his mother. ,     Physical Exam:  /63   Pulse 64   Temp 97.8 °F (36.6 °C) (Oral)   Resp 18   Ht 6' (1.829 m)   Wt 223 lb (101.2 kg)   SpO2 97%   BMI 30.24 kg/m²     General appearance:  Appears comfortable. Well nourished  Eyes: Sclera clear, pupils equal  ENT: Moist mucus membranes, no thrush. Trachea midline. Cardiovascular: Regular rhythm, normal S1, S2. No murmur, gallop, rub.  No edema in lower extremities  Respiratory: Clear to auscultation bilaterally, no wheeze, good inspiratory effort  Gastrointestinal: Abdomen soft, non-tender, not distended, normal bowel sounds  Musculoskeletal: No cyanosis in digits, neck supple  Neurology: Cranial nerves grossly intact. Alert and oriented in time, place and person. No speech or motor deficits  Psychiatry: Appropriate affect. Not agitated  Skin: Warm, dry, normal turgor, no rash  Brisk capillary refill, peripheral pulses palpable   Labs:  CBC:   Lab Results   Component Value Date/Time    WBC 14.0 07/28/2022 01:19 PM    RBC 5.05 07/28/2022 01:19 PM    HGB 14.7 07/28/2022 01:19 PM    HCT 44.5 07/28/2022 01:19 PM    MCV 88.0 07/28/2022 01:19 PM    MCH 29.1 07/28/2022 01:19 PM    MCHC 33.0 07/28/2022 01:19 PM    RDW 14.1 07/28/2022 01:19 PM     07/28/2022 01:19 PM    MPV 9.4 07/28/2022 01:19 PM     BMP:    Lab Results   Component Value Date/Time     07/28/2022 01:19 PM    K 3.5 07/28/2022 01:19 PM    CL 91 07/28/2022 01:19 PM    CO2 30 07/28/2022 01:19 PM    BUN 29 07/28/2022 01:19 PM    CREATININE 1.5 07/28/2022 01:19 PM    CALCIUM 10.3 07/28/2022 01:19 PM    GFRAA 55 07/28/2022 01:19 PM    GFRAA >60 10/05/2011 05:40 PM    LABGLOM 46 07/28/2022 01:19 PM    LABGLOM 78 12/20/2013 09:56 AM    GLUCOSE 132 07/28/2022 01:19 PM     XR CHEST PORTABLE   Final Result   No radiographic evidence of acute pulmonary disease. Chest Xray:   EKG:    I visualized CXR images and EKG strips    Discussed case  with     Problem List  Principal Problem:    RADHA (acute kidney injury) ([address]  Resolved Problems:    * No resolved hospital problems. *        Assessment/Plan:   RADHA cr is up to 1.5  Baseline is around . 5  Was recently started on Spironolactone  Will hold Spironolactone and torsemide dose tonight    Dizziness   Check orthostatic vitals  Last ECHO resulted on 05/22 showed EF 55 %  And  TAVR with no regurgitation           DVT prophylaxisLovenox   Code status   Diet   IV access   Hong Catheter    Admit as inpatient.  I anticipate hospitalization spanning more than two midnights for investigation and treatment of the above medically necessary diagnoses. Please note that some part of this chart was generated using Dragon dictation software. Although every effort was made to ensure the accuracy of this automated transcription, some errors in transcription may have occurred inadvertently. If you may need any clarification, please do not hesitate to contact me through [facility].        Kami Cee MD    7/28/2022 8:39 PM

## 2022-07-30 LAB
ANION GAP SERPL CALCULATED.3IONS-SCNC: 9 MMOL/L (ref 3–16)
BUN BLDV-MCNC: 18 MG/DL (ref 7–20)
CALCIUM SERPL-MCNC: 8.9 MG/DL (ref 8.3–10.6)
CHLORIDE BLD-SCNC: 97 MMOL/L (ref 99–110)
CO2: 30 MMOL/L (ref 21–32)
CREAT SERPL-MCNC: 1 MG/DL (ref 0.8–1.3)
GFR AFRICAN AMERICAN: >60
GFR NON-AFRICAN AMERICAN: >60
GLUCOSE BLD-MCNC: 111 MG/DL (ref 70–99)
HCT VFR BLD CALC: 38 % (ref 40.5–52.5)
HEMOGLOBIN: 12.5 G/DL (ref 13.5–17.5)
MCH RBC QN AUTO: 29.1 PG (ref 26–34)
MCHC RBC AUTO-ENTMCNC: 33 G/DL (ref 31–36)
MCV RBC AUTO: 88.3 FL (ref 80–100)
PDW BLD-RTO: 13.9 % (ref 12.4–15.4)
PLATELET # BLD: 301 K/UL (ref 135–450)
PMV BLD AUTO: 8.9 FL (ref 5–10.5)
POTASSIUM SERPL-SCNC: 3.1 MMOL/L (ref 3.5–5.1)
RBC # BLD: 4.3 M/UL (ref 4.2–5.9)
SODIUM BLD-SCNC: 136 MMOL/L (ref 136–145)
WBC # BLD: 10.1 K/UL (ref 4–11)

## 2022-07-30 PROCEDURE — 97530 THERAPEUTIC ACTIVITIES: CPT

## 2022-07-30 PROCEDURE — 6360000002 HC RX W HCPCS: Performed by: FAMILY MEDICINE

## 2022-07-30 PROCEDURE — 6370000000 HC RX 637 (ALT 250 FOR IP): Performed by: INTERNAL MEDICINE

## 2022-07-30 PROCEDURE — 2060000000 HC ICU INTERMEDIATE R&B

## 2022-07-30 PROCEDURE — 6370000000 HC RX 637 (ALT 250 FOR IP): Performed by: FAMILY MEDICINE

## 2022-07-30 PROCEDURE — 85027 COMPLETE CBC AUTOMATED: CPT

## 2022-07-30 PROCEDURE — 80048 BASIC METABOLIC PNL TOTAL CA: CPT

## 2022-07-30 PROCEDURE — 1200000000 HC SEMI PRIVATE

## 2022-07-30 PROCEDURE — 36415 COLL VENOUS BLD VENIPUNCTURE: CPT

## 2022-07-30 PROCEDURE — 97162 PT EVAL MOD COMPLEX 30 MIN: CPT

## 2022-07-30 PROCEDURE — 2580000003 HC RX 258: Performed by: FAMILY MEDICINE

## 2022-07-30 RX ORDER — POTASSIUM CHLORIDE 20 MEQ/1
40 TABLET, EXTENDED RELEASE ORAL ONCE
Status: COMPLETED | OUTPATIENT
Start: 2022-07-30 | End: 2022-07-30

## 2022-07-30 RX ADMIN — Medication 10 ML: at 09:54

## 2022-07-30 RX ADMIN — PANTOPRAZOLE SODIUM 40 MG: 40 TABLET, DELAYED RELEASE ORAL at 09:54

## 2022-07-30 RX ADMIN — ENOXAPARIN SODIUM 40 MG: 100 INJECTION SUBCUTANEOUS at 09:53

## 2022-07-30 RX ADMIN — ATORVASTATIN CALCIUM 80 MG: 80 TABLET, FILM COATED ORAL at 09:53

## 2022-07-30 RX ADMIN — SPIRONOLACTONE 25 MG: 25 TABLET ORAL at 09:54

## 2022-07-30 RX ADMIN — Medication 10 ML: at 19:27

## 2022-07-30 RX ADMIN — POTASSIUM CHLORIDE 40 MEQ: 1500 TABLET, EXTENDED RELEASE ORAL at 09:54

## 2022-07-30 RX ADMIN — ASPIRIN 81 MG: 81 TABLET, COATED ORAL at 09:53

## 2022-07-30 RX ADMIN — VALSARTAN 80 MG: 40 TABLET, FILM COATED ORAL at 09:54

## 2022-07-30 NOTE — PROGRESS NOTES
Hospitalist Progress Note      PCP: Trice Biggs MD    Date of Admission: 7/28/2022    Chief Complaint: dizziness    Hospital Course: 79 yo male with h/o HFPEF was recently diuresed with IV Lasix infusion and metolazone. Came in with weakness, nausea, vomiting. Found to have RADHA on admission. IV fluids given. Diuretics held. Being followed by cardiology. Creatinine is better. Diuresis resumed. Subjective:   Feels better. Appetite is improving. Wife at bedside. No chest pain, no shortness of breath, no nausea or vomiting      Medications:  Reviewed    Infusion Medications    sodium chloride       Scheduled Medications    spironolactone  25 mg Oral Daily    valsartan  80 mg Oral Daily    aspirin  81 mg Oral Daily    atorvastatin  80 mg Oral Daily    pantoprazole  40 mg Oral Daily    sodium chloride flush  5-40 mL IntraVENous 2 times per day    enoxaparin  40 mg SubCUTAneous Daily     PRN Meds: sodium chloride flush, sodium chloride, ondansetron **OR** ondansetron, polyethylene glycol, acetaminophen **OR** acetaminophen      Intake/Output Summary (Last 24 hours) at 7/30/2022 1039  Last data filed at 7/30/2022 0338  Gross per 24 hour   Intake 225 ml   Output 2100 ml   Net -1875 ml         Physical Exam Performed:    BP (!) 152/72   Pulse 70   Temp 97.6 °F (36.4 °C) (Oral)   Resp 16   Ht 6' (1.829 m)   Wt 223 lb 8 oz (101.4 kg)   SpO2 94%   BMI 30.31 kg/m²     General appearance: No apparent distress, appears stated age and cooperative. HEENT: Pupils equal, round, and reactive to light. Conjunctivae/corneas clear. Neck: Supple, with full range of motion. No jugular venous distention. Trachea midline. Respiratory:  Normal respiratory effort. Clear to auscultation, bilaterally without Rales/Wheezes/Rhonchi. Cardiovascular: Regular rate and rhythm with normal S1/S2 without murmurs, rubs or gallops. Abdomen: Soft, non-tender, non-distended with normal bowel sounds.   Musculoskeletal: No clubbing or cyanosis. 1+ bilateral pitting lower extremity edema bilaterally. Full range of motion without deformity. Skin: Skin color, texture, turgor normal.  No rashes or lesions. Neurologic:  Neurovascularly intact without any focal sensory/motor deficits. Cranial nerves: II-XII intact, grossly non-focal.  Psychiatric: Alert and oriented, thought content appropriate, normal insight  Capillary Refill: Brisk, 3 seconds, normal   Peripheral Pulses: +2 palpable, equal bilaterally       Labs:   Recent Labs     07/28/22  1319 07/29/22  0452 07/30/22  0511   WBC 14.0* 9.5 10.1   HGB 14.7 12.9* 12.5*   HCT 44.5 38.1* 38.0*    312 301       Recent Labs     07/28/22  1319 07/29/22  0452 07/30/22  0511    140 136   K 3.5 3.6 3.1*   CL 91* 96* 97*   CO2 30 33* 30   BUN 29* 27* 18   CREATININE 1.5* 1.5* 1.0   CALCIUM 10.3 9.6 8.9       Recent Labs     07/28/22  1319   AST 27   ALT 22   BILITOT 1.1*   ALKPHOS 133*       Recent Labs     07/28/22  1319   INR 1.03       Recent Labs     07/28/22  1319 07/28/22  2120   TROPONINI 0.03* 0.03*         Urinalysis:      Lab Results   Component Value Date/Time    NITRU Negative 07/28/2022 04:22 PM    WBCUA 1 03/20/2022 03:26 PM    RBCUA NEGATIVE 03/23/2022 12:30 PM    BLOODU Negative 07/28/2022 04:22 PM    SPECGRAV 1.015 07/28/2022 04:22 PM    GLUCOSEU Negative 07/28/2022 04:22 PM       Radiology:  XR CHEST PORTABLE   Final Result   No radiographic evidence of acute pulmonary disease. Assessment/Plan:    Active Hospital Problems    Diagnosis     Syncope and collapse [R55]      Priority: Medium    Chronic diastolic heart failure (HCC) [I50.32]      Priority: Medium    Dehydration [E86.0]      Priority: Medium    RADHA (acute kidney injury) ([facility] [address] [N17.9]     Peripheral edema [R60.9]      PLAN:    Acute kidney injury  Admission creatinine 1.5, baseline creatinine 0.9, today's creatinine 1.0. No need for more IV fluids  Being followed by cardiology.   Oral diuretics resumed. Continue to monitor intake and output and renal function    Dizziness  Resolved with improvement in hydration status. Continue to monitor. Diastolic CHF, chronic  Oral diuretics resumed per cardiology    Dyslipidemia  Continue statin    Venous insufficiency  Could be the reason for lower extremity edema. In this case, diuretics cannot be titrated based on edema as the patient is likely to be over diuresed. Part of the edema will be resistant to diuretics due to being caused by local factors in the lower extremity. Discussed with the patient and wife. Questions answered      DVT Prophylaxis: Lovenox  Diet: ADULT DIET;  Regular  Code Status: Full Code    PT/OT Eval Status: Order    Dispo : 1 more day at least.    Dennys Davidson MD

## 2022-07-30 NOTE — PROGRESS NOTES
[address]   Daily Progress Note      Admit Date:  7/28/2022    CC: Dizziness    Subjective: Patient is slow to answer questions. He is alert and oriented x 3. He denies any SOB. He stated edema is the same. HPI:    is a 78year old male who presented to the ER due to dizziness. He got out of the shower, fell back into the shower chair. He did not hit his head. Patient was recently started on spironolactone. He does have history of CHF, HTN, COPD, HTN, and PVD. On admission creatinine was elevated to 1.5. His baseline 0.9-1.1. The patient was seen and examined. Notes, labs and recent testing reviewed. There were not complications over night. The patient is being seen for CHF, HTN, and PVD.       Objective:     /83   Pulse 71   Temp 98 °F (36.7 °C) (Oral)   Resp 16   Ht 6' (1.829 m)   Wt 223 lb (101.2 kg)   SpO2 94%   BMI 30.24 kg/m²      Intake/Output Summary (Last 24 hours) at 7/30/2022 0717  Last data filed at 7/30/2022 1806  Gross per 24 hour   Intake 225 ml   Output 2100 ml   Net -1875 ml       Physical Exam:  General:  Awake, alert, NAD  Skin:  Warm and dry  Neck:  JVD<8, no bruit  Chest:  Clear to auscultation, no wheezes/rhonchi/rales  Cardiovascular:  RRR S1S2  Abdomen:  Soft, nontender, +bowel sounds  Extremities: trace bilateral edema    Medications:    spironolactone  25 mg Oral Daily    valsartan  80 mg Oral Daily    aspirin  81 mg Oral Daily    atorvastatin  80 mg Oral Daily    pantoprazole  40 mg Oral Daily    sodium chloride flush  5-40 mL IntraVENous 2 times per day    enoxaparin  40 mg SubCUTAneous Daily      sodium chloride       sodium chloride flush, sodium chloride, ondansetron **OR** ondansetron, polyethylene glycol, acetaminophen **OR** acetaminophen    Lab Data:  CBC:   Recent Labs     07/28/22  1319 07/29/22  0452 07/30/22  0511   WBC 14.0* 9.5 10.1   HGB 14.7 12.9* 12.5*    312 301     BMP:    Recent Labs     07/28/22  1319 22  0452 22  0511    140 136   K 3.5 3.6 3.1*   CO2 30 33* 30   BUN 29* 27* 18   CREATININE 1.5* 1.5* 1.0     LIVR:   Recent Labs     22  1319   AST 27   ALT 22     PT/INR:   Recent Labs     22  1319   PROT 8.2   INR 1.03     APTT: No results for input(s): APTT in the last 72 hours. BNP:  No results for input(s): BNP in the last 72 hours. CARDIAC ENZYMES:  Recent Labs     22  1319 22  2120   TROPONINI 0.03* 0.03*     FASTING LIPID PANEL:  Lab Results   Component Value Date/Time    CHOL 166 2017 09:42 AM    HDL 56 2020 10:27 AM    TRIG 97 2017 09:42 AM       Diagnostics:    EK22: NSR  Cardiac Cath:  2018  LAD proximal 40%        Echo:  22:   Summary   *Technically difficult study due to poor acoustical window. Patient refused   Definity. Suboptimal image quality.    *Left ventricle - normal size, thickness and function with EF of 55%   *Aortic valve - well seated TAVR valve, PV 1.9m/s, MG 8mmHg, no significant   regurgitation      Patient Active Problem List   Diagnosis    Hypercholesteremia    Essential hypertension    Nonrheumatic aortic valve disorder    Hypertrophy of prostate without urinary obstruction and other lower urinary tract symptoms (LUTS)    Peptic ulcer    PVD (peripheral vascular disease) ([facility])    Allergic rhinitis    COPD (chronic obstructive pulmonary disease) ([facility])    Peripheral edema    Hallux valgus with bunions    Carotid stenosis    Rosacea    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    Normal pressure hydrocephalus ([facility])    Ataxia    Mild cognitive impairment    Chronic idiopathic constipation    Encounter for follow-up for aortic valve replacement    GI hemorrhage    Nonrheumatic aortic valve stenosis    Anticoagulated    S/P TAVR (transcatheter aortic valve replacement)    Hyperglycemia    Abdominal pain    RADHA (acute kidney injury) ([facility])    Severe malnutrition ([facility])    Bilateral leg weakness Polyneuropathy, peripheral sensorimotor axonal    Central stenosis of spinal canal    Coronary artery disease involving native coronary artery of native heart without angina pectoris    Mixed hyperlipidemia    Lower extremity edema    Venous insufficiency    Other specified anemias    Syncope and collapse    Chronic diastolic heart failure (HCC)    Dehydration       Assessment/plan:   Syncope/ collapse         Patient denies anymore episodes          He has not been out of the bed since hospital admission- waiting   on PT- he needs to ambulate, he stated he uses a walker independently at home. Discussed with the nurse. 2.  RADHA - acute kidney injury         7/28/22- creat. 1.5          7/30/22 creat- 1.0- stable, at base line creat. ( 0.9-1.1)     3. Chronic diastolic HF         Stable- on Aldactone 25 mg daily, Valsartan 80 mg daily, ASA 81 mg daily                     4.  H/o TAVR- stable    5. Volume depletion- 7/28/22/ 223 lbs, 7/30/2022- 223 lbs - stable     6. COPD- stable followed by medical        Plan: continue current medications            needs PT and ambulate             possibly add  Torsemide 20 mg BID tomorrow             needs orthostatic blood pressures               On discharge recommend :  Spironolactone 25 qd  Valsartan 160 qd  Torsemide 20 bid                   Dispo:      Please see orders. Discussed with patient and nursing.       The patient is not on a beta-blocker- no CAD disease , EF 55%  The patient is on an ace-i/ARB  The patient  is on a statin      Signed:  Suma Valente, [address]  [phone]

## 2022-07-30 NOTE — PROGRESS NOTES
[facility] Department   Phone: [phone]    Physical Therapy    [x] Initial Evaluation            [] Daily Treatment Note         [] Discharge Summary      Patient: Kristeen Aase   : 1945   MRN: [id number]   Date of Service:  2022  Admitting Diagnosis: RADHA (acute kidney injury) [facility])    Current Admission Summary: Kristeen Aase is a 78 y.o. male who presents because of feeling dizzy. His wife provides the majority of the history. He apparently took shower this morning and afterwards, felt very lightheaded and fell back into a shower chair. His wife is there to help him to prevent injury and prevented his collapsed to the floor. He was extremely sweaty and nauseated. He had dry heaves. He has been feeling nauseated since he started spironolactone. His cardiologist has been trying to help him with leg edema. He has been on diuretic for long time and additional medicine was started earlier this week. He denies having any chest pain. He denies any injury from the falls. He did take a second fall this morning in getting out of the car. He fell to his knees. He denies any injury. Past Medical History:  has a past medical history of Allergic rhinitis, Aortic valve disorders, COPD (chronic obstructive pulmonary disease) ([address] Hydrocephalus ([address] Hyperlipidemia, Hypertension, Hypertrophy of prostate without urinary obstruction and other lower urinary tract symptoms (LUTS), Irritable bowel syndrome, Pancreatitis, Peptic ulcer, unspecified site, unspecified as acute or chronic, without mention of hemorrhage, perforation, or obstruction, and Peripheral vascular disease ([address] 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 Discharge Recommendations: Racquel Henson scored a 8/24 on the AM-PAC short mobility form. Current research shows that an AM-PAC score of 17 or less is typically not associated with a discharge to the patient's home setting. Based on the patient's AM-PAC score and their current functional mobility deficits, it is recommended that the patient have 5-7 sessions per week of Physical Therapy at d/c to increase the patient's independence. At this time, this patient demonstrates complex nursing, medical, and rehabilitative needs, and would benefit from intensive rehabilitation services upon discharge from the Inpatient setting. This patient demonstrates the ability to participate in and benefit from an intensive therapy program with a coordinated interdisciplinary team approach to foster frequent, structured, and documented communication among disciplines, who will work together to establish, prioritize, and achieve treatment goals. Please see assessment section for further patient specific details. If patient discharges prior to next session this note will serve as a discharge summary. Please see below for the latest assessment towards goals.     DME Required For Discharge: DME to be determined at next level of care  Precautions/Restrictions: high fall risk  Weight Bearing Restrictions: no restrictions  [] Right Upper Extremity  [] Left Upper Extremity [] Right Lower Extremity  [] Left Lower Extremity     Required Braces/Orthotics: no braces required   [] Right  [] Left  Positional Restrictions:no positional restrictions    Pre-Admission Information   Lives With: spouse    Type of Home: house  Home Layout: two level  Home Access:  15 step to enter with handrail. Handrails are located on R side. Bathroom Layout: walk in shower  Bathroom Equipment: grab bars in shower, grab bars around toilet, shower chair  Toilet Height: standard height  Home Equipment: rolling walker  Transfer Assistance: modified independent with use of RW  Ambulation Assistance:modified independent with use of RW  ADL Assistance: requires assistance with bathing, requires assistance with dressing  IADL Assistance: requires assistance with meal prep, requires assistance with laundry, requires assistance with vacuuming, requires assistance with cleaning, requires assistance with shopping  Active :        [x] Yes  [] No  Hand Dominance: [] Left  [x] Right  Current Employment: retired. Occupation: maintenance [employer]  Hobbies: \"No, I'm lazy. \"  Recent Falls: Fall 3-4 days ago, multiple falls in the past.  How many levels is your home? \"I don't get into. Teresa Stands Teresa Stands \"  Examination   Vision:   Vision Gross Assessment: Impaired and Vision Corrective Device: wears glasses for reading  Hearing:   [facility] /l[facility]k â€¢ spl[facility]  Observation:   General Observation:  Patient supine in bed. Posture:   WFL  Sensation:   WFL  Proprioception:    WFL  Tone:   Normotonic  Coordination Testing:   WFL    ROM:   (B) LE AROM WFL  Strength:   (B) LE strength grossly WFL  Decision Making: medium complexity  Clinical Presentation: evolving      Subjective  General: Patient reports being independent at home with RW.  Pain: 0/10  Pain Interventions: not applicable       Functional Mobility  Bed Mobility  Supine to Sit: maximum assistance  Sit to Supine: moderate assistance  Comments:  MAX assist for BLE and trunk to pull to sit, MOD assist for BLEs to return supine. Patient repeatedly falling posteriorly unable to maintain sitting balance for longer than 10 seconds at a time. Transfers  No transfers completed on this date. Comments:  Ambulation  Ambulation not tested on this date.   Distance: NA  Gait Mechanics: NA  Comments:    Stair Mobility  Stair mobility not completed on this date. Comments:  Wheelchair Mobility:  No w/c mobility completed on this date. Comments:  Balance  Static Sitting Balance: poor (-): requires max (A) to maintain balance  Comments:    Other Therapeutic Interventions    Functional Outcomes  AM-PAC Inpatient Mobility Raw Score : 8              Cognition  Overall Cognitive Status: Impaired  Arousal/Alterness: delayed responses to stimuli  Following Commands: follows one step commands with repetition, follows one step commands with increased time  Attention Span: difficulty attending to directions, difficulty dividing attention  Memory: decreased recall of biographical information, decreased short term memory  Safety Judgement: decreased awareness of need for assistance  Initiation: requires cues for all  Orientation:    oriented to person, disoriented to place, disoriented to time , and disoriented to situation (Patient can't say where he is, knows he passed out but can't say what happened, he states it is March and is unable to give the year, asking for his wife to help)  Command Following:   impaired    Education  Barriers To Learning: cognition  Patient Education: patient educated on goals, PT role and benefits, plan of care, general safety, functional mobility training, transfer training, discharge recommendations  Learning Assessment:  patient will require reinforcement due to cognitive deficits    Assessment  Activity Tolerance: Limited d/t inability to maintain sitting balance/weakness. Impairments Requiring Therapeutic Intervention: decreased functional mobility, decreased ADL status, decreased strength, decreased safety awareness, decreased cognition, decreased balance, decreased posture  Prognosis: good  Clinical Assessment: Patient reports being MOD I w/ RW although he is a questionable historian.   He requires assistance for bed mobility and is unable to maintain sitting balance or stand up. He will need ongoing therapy to address current deficits and 24 hour assist for safety. Safety Interventions: patient left in bed, bed alarm in place, call light within reach, patient at risk for falls, and nurse notified    Plan  Frequency: 3-5 x/per week  Current Treatment Recommendations: strengthening, balance training, functional mobility training, transfer training, gait training, endurance training, patient/caregiver education, safety education, and equipment evaluation/education    Goals  Patient Goals: None verbalized. Short Term Goals:  Time Frame: Discharge.   Patient will complete bed mobility at minimal assistance   Patient will complete transfers at minimal assistance   Patient will ambulate 10 ft with use of rolling walker at moderate assistance    Therapy Session Time      Individual Group Co-treatment   Time In 1501       Time Out 1545       Minutes 44         Timed Code Treatment Minutes:  29 Minutes  Total Treatment Minutes:  44 minutes       Electronically Signed By: Rola Kirk, DPT, [id number]

## 2022-07-31 LAB
ANION GAP SERPL CALCULATED.3IONS-SCNC: 9 MMOL/L (ref 3–16)
BUN BLDV-MCNC: 17 MG/DL (ref 7–20)
CALCIUM SERPL-MCNC: 9.5 MG/DL (ref 8.3–10.6)
CHLORIDE BLD-SCNC: 99 MMOL/L (ref 99–110)
CO2: 27 MMOL/L (ref 21–32)
CREAT SERPL-MCNC: 0.9 MG/DL (ref 0.8–1.3)
GFR AFRICAN AMERICAN: >60
GFR NON-AFRICAN AMERICAN: >60
GLUCOSE BLD-MCNC: 117 MG/DL (ref 70–99)
HCT VFR BLD CALC: 39.2 % (ref 40.5–52.5)
HEMOGLOBIN: 12.9 G/DL (ref 13.5–17.5)
MCH RBC QN AUTO: 29.2 PG (ref 26–34)
MCHC RBC AUTO-ENTMCNC: 33.1 G/DL (ref 31–36)
MCV RBC AUTO: 88.4 FL (ref 80–100)
PDW BLD-RTO: 14.2 % (ref 12.4–15.4)
PLATELET # BLD: 300 K/UL (ref 135–450)
PMV BLD AUTO: 8.8 FL (ref 5–10.5)
POTASSIUM SERPL-SCNC: 3.7 MMOL/L (ref 3.5–5.1)
RBC # BLD: 4.43 M/UL (ref 4.2–5.9)
SODIUM BLD-SCNC: 135 MMOL/L (ref 136–145)
WBC # BLD: 13.3 K/UL (ref 4–11)

## 2022-07-31 PROCEDURE — 6360000002 HC RX W HCPCS: Performed by: FAMILY MEDICINE

## 2022-07-31 PROCEDURE — 80048 BASIC METABOLIC PNL TOTAL CA: CPT

## 2022-07-31 PROCEDURE — 2060000000 HC ICU INTERMEDIATE R&B

## 2022-07-31 PROCEDURE — 85027 COMPLETE CBC AUTOMATED: CPT

## 2022-07-31 PROCEDURE — 99233 SBSQ HOSP IP/OBS HIGH 50: CPT | Performed by: NURSE PRACTITIONER

## 2022-07-31 PROCEDURE — 6370000000 HC RX 637 (ALT 250 FOR IP): Performed by: INTERNAL MEDICINE

## 2022-07-31 PROCEDURE — 6370000000 HC RX 637 (ALT 250 FOR IP): Performed by: FAMILY MEDICINE

## 2022-07-31 PROCEDURE — 2580000003 HC RX 258: Performed by: FAMILY MEDICINE

## 2022-07-31 PROCEDURE — 36415 COLL VENOUS BLD VENIPUNCTURE: CPT

## 2022-07-31 PROCEDURE — 1200000000 HC SEMI PRIVATE

## 2022-07-31 PROCEDURE — 94760 N-INVAS EAR/PLS OXIMETRY 1: CPT

## 2022-07-31 PROCEDURE — 6370000000 HC RX 637 (ALT 250 FOR IP): Performed by: NURSE PRACTITIONER

## 2022-07-31 RX ORDER — TORSEMIDE 20 MG/1
20 TABLET ORAL 2 TIMES DAILY
Status: DISCONTINUED | OUTPATIENT
Start: 2022-07-31 | End: 2022-08-03

## 2022-07-31 RX ADMIN — VALSARTAN 80 MG: 40 TABLET, FILM COATED ORAL at 09:45

## 2022-07-31 RX ADMIN — Medication 10 ML: at 20:10

## 2022-07-31 RX ADMIN — ASPIRIN 81 MG: 81 TABLET, COATED ORAL at 09:45

## 2022-07-31 RX ADMIN — SPIRONOLACTONE 25 MG: 25 TABLET ORAL at 09:45

## 2022-07-31 RX ADMIN — ATORVASTATIN CALCIUM 80 MG: 80 TABLET, FILM COATED ORAL at 09:45

## 2022-07-31 RX ADMIN — TORSEMIDE 20 MG: 20 TABLET ORAL at 17:00

## 2022-07-31 RX ADMIN — ACETAMINOPHEN 650 MG: 325 TABLET ORAL at 09:45

## 2022-07-31 RX ADMIN — ENOXAPARIN SODIUM 40 MG: 100 INJECTION SUBCUTANEOUS at 09:46

## 2022-07-31 RX ADMIN — PANTOPRAZOLE SODIUM 40 MG: 40 TABLET, DELAYED RELEASE ORAL at 09:45

## 2022-07-31 RX ADMIN — Medication 10 ML: at 09:45

## 2022-07-31 ASSESSMENT — PAIN SCALES - GENERAL
PAINLEVEL_OUTOF10: 0

## 2022-07-31 NOTE — PLAN OF CARE
Problem: Discharge Planning  Goal: Discharge to home or other facility with appropriate resources  Outcome: Progressing     Problem: Skin/Tissue Integrity  Goal: Absence of new skin breakdown  Description: 1. Monitor for areas of redness and/or skin breakdown  2. Assess vascular access sites hourly  Outcome: Progressing     Problem: Safety - Adult  Goal: Free from fall injury  7/30/2022 1959 by Zulma Bynum RN  Outcome: Progressing  Note: Safety precautions in place. Bed locked, alarmed, lowest position.  Call light in      Problem: ABCDS Injury Assessment  Goal: Absence of physical injury  Outcome: Progressing

## 2022-07-31 NOTE — PROGRESS NOTES
Hospitalist Progress Note      PCP: Munir Muniz MD    Date of Admission: 7/28/2022    Chief Complaint: dizziness    Hospital Course: 77 yo male with h/o HFPEF was recently diuresed with IV Lasix infusion and metolazone. Came in with weakness, nausea, vomiting. Found to have RADHA on admission. IV fluids given. Diuretics held. Being followed by cardiology. Creatinine is stabilized at baseline. Diuresis resumed. Subjective:   Feels better. Appetite is improving. No chest pain, no shortness of breath, no nausea or vomiting      Medications:  Reviewed    Infusion Medications    sodium chloride       Scheduled Medications    spironolactone  25 mg Oral Daily    valsartan  80 mg Oral Daily    aspirin  81 mg Oral Daily    atorvastatin  80 mg Oral Daily    pantoprazole  40 mg Oral Daily    sodium chloride flush  5-40 mL IntraVENous 2 times per day    enoxaparin  40 mg SubCUTAneous Daily     PRN Meds: sodium chloride flush, sodium chloride, ondansetron **OR** ondansetron, polyethylene glycol, acetaminophen **OR** acetaminophen      Intake/Output Summary (Last 24 hours) at 7/31/2022 0956  Last data filed at 7/31/2022 0655  Gross per 24 hour   Intake 5 ml   Output 1200 ml   Net -1195 ml         Physical Exam Performed:    BP (!) 145/76   Pulse 71   Temp 97.9 °F (36.6 °C) (Oral)   Resp 18   Ht 6' (1.829 m)   Wt 224 lb 9.6 oz (101.9 kg)   SpO2 97%   BMI 30.46 kg/m²     General appearance: No apparent distress, appears stated age and cooperative. HEENT: Pupils equal, round, and reactive to light. Conjunctivae/corneas clear. Neck: Supple, with full range of motion. No jugular venous distention. Trachea midline. Respiratory:  Normal respiratory effort. Clear to auscultation, bilaterally without Rales/Wheezes/Rhonchi. Cardiovascular: Regular rate and rhythm with normal S1/S2 without murmurs, rubs or gallops. Abdomen: Soft, non-tender, non-distended with normal bowel sounds.   Musculoskeletal: No clubbing or cyanosis. 1+ bilateral pitting lower extremity edema bilaterally. Full range of motion without deformity. Skin: Skin color, texture, turgor normal.  No rashes or lesions. Neurologic:  Neurovascularly intact without any focal sensory/motor deficits. Cranial nerves: II-XII intact, grossly non-focal.  Psychiatric: Sleeping, was somewhat confused when he woke up later improved  Capillary Refill: Brisk, 3 seconds, normal   Peripheral Pulses: +2 palpable, equal bilaterally       Labs:   Recent Labs     07/29/22 0452 07/30/22  0511 07/31/22  0425   WBC 9.5 10.1 13.3*   HGB 12.9* 12.5* 12.9*   HCT 38.1* 38.0* 39.2*    301 300       Recent Labs     07/29/22 0452 07/30/22  0511 07/31/22  0425    136 135*   K 3.6 3.1* 3.7   CL 96* 97* 99   CO2 33* 30 27   BUN 27* 18 17   CREATININE 1.5* 1.0 0.9   CALCIUM 9.6 8.9 9.5       Recent Labs     07/28/22  1319   AST 27   ALT 22   BILITOT 1.1*   ALKPHOS 133*       Recent Labs     07/28/22  1319   INR 1.03       Recent Labs     07/28/22  1319 07/28/22  2120   TROPONINI 0.03* 0.03*         Urinalysis:      Lab Results   Component Value Date/Time    NITRU Negative 07/28/2022 04:22 PM    WBCUA 1 03/20/2022 03:26 PM    RBCUA NEGATIVE 03/23/2022 12:30 PM    BLOODU Negative 07/28/2022 04:22 PM    SPECGRAV 1.015 07/28/2022 04:22 PM    GLUCOSEU Negative 07/28/2022 04:22 PM       Radiology:  XR CHEST PORTABLE   Final Result   No radiographic evidence of acute pulmonary disease. Assessment/Plan:    Active Hospital Problems    Diagnosis     Syncope and collapse [R55]      Priority: Medium    Chronic diastolic heart failure (HCC) [I50.32]      Priority: Medium    Dehydration [E86.0]      Priority: Medium    RADHA (acute kidney injury) ([facility] [address] [N17.9]     Peripheral edema [R60.9]      PLAN:    Acute kidney injury  Admission creatinine 1.5, baseline creatinine 0.9, which is also today's level. No need for more IV fluids  Being followed by cardiology.   Oral diuretics resumed. Continue to monitor intake and output and renal function. Stable from this standpoint    Dizziness  Resolved with improvement in hydration status. Continue to monitor. No change    Diastolic CHF, chronic  Oral diuretics resumed per cardiology. Needs cardiology clearance for discharge    Dyslipidemia  Continue statin    Venous insufficiency  Could be the reason for lower extremity edema. In this case, diuretics cannot be titrated based on edema as the patient is likely to be over diuresed. Part of the edema will be resistant to diuretics due to being caused by local factors in the lower extremity. Discussed with the patient. Questions answered      DVT Prophylaxis: Lovenox  Diet: ADULT DIET;  Regular  Code Status: Full Code    PT/OT Eval Status: Order    Dispo : Discharge home when cleared by cardiology    Nikos Mensah MD

## 2022-07-31 NOTE — CARE COORDINATION
Notified MD via perfect serve,   A referral to ARU requires a PMR consult (Physical Medical rehabilitation). thank you    Discharge Planning Assessment    RN/SW discharge planner met with patient/ (and family member) to discuss reason for admission, current living situation, and potential needs at the time of discharge    Demographics/Insurance verified Yes [place]    Current type of dwelling: 15 steps to enter 2 floor home  Living arrangements: spouse    Patient POA/contact:    Level of function/Support: Ambulates with [facility] [place],   Assist with ADL's    PCP: Johnny Saleh MD    DME: [employer]    Active with any community resources/agencies/skilled home care/ HD: [facility], prior stay @ [facility].   4-12-22 prior Inpatient Rehab: [facility]  Phone: [phone] Fax: [phone]    Medication compliance issues: no    Pharmacy/Financial issues that could impact healthcare:[facility] [phone] - [address] [phone]    Transportation at the time of discharge: ambulance  Wife is requesting a referral to return to [facility] [address]

## 2022-07-31 NOTE — PROGRESS NOTES
[facility]   Cardiology Progress Note   Date: 7/31/2022  Admit Date: 7/28/2022     Reason for follow up: CHF    Chief Complaint:   Chief Complaint   Patient presents with    Dizziness     Pt from home, reports dizziness causing him to fall, wife reports new medication from Cardiology Tristan Driscoll)      History of Present Illness: History obtained from patient and medical record. Dorian Lynne is a 78 y.o. male with a past medical history of hypertension, hyperlipidemia, COPD, PVD, and CHF who presented with dizziness with fall in shower. He has been treated for RADHA. Patient and family deny LOC. He has been having intermittent deconditioning and multiple rehab stays recently. Interval Hx: Today, he is being seen for follow up. Remains weak, unable to obtain orthostatic BP due to poor mobility. He is drowsy, however denies symptoms and feels at baseline. SR on telemetry without arrhythmia. No new complaints today. No major events overnight. Denies having chest pain, palpitations, shortness of breath, orthopnea or dizziness. Patient seen and examined. Clinical notes reviewed. Telemetry reviewed.     Assessment and Plan:  Fall/Dizziness   - likely secondary to dehydration from increase in diuretics and N/V    - No recurrence   - PT/OT   - Orthostatic BP unable to be obtained due to poor mobility   - Amlodipine on hold  RADHA/Dehydration   - Cr back to baseline   - Resume diuretics at reduced dosage  Chronic diastolic HF   - Appears euvolemic   - Cr better today    - On spironolactone 25 mg daily and valsartan   - I&O remains negative, weight stable  S/p TAVR   - stable  COPD  Hypertension   - Conservative control in setting or recent dizziness    - Resume torsemide 20 mg bid  - Continue aldactone 25 mg daily   - Continue valsartan 80 mg daily   - Stop amlodipine, per wife this had been stopped in April  - Acceptable for discharge from CV perspective, recommend aggressive PT/OT and rehabilitation to prevent future falls    All pertinent information and plan of care discussed with the rounding/attending cardiologist.    Multiple medical conditions with risk of decompensation. All questions and concerns were addressed to the patient. Alternatives to my treatment were discussed. I have discussed the above stated plan with patient and spouse and the nurse. The patient and spouse verbalized understanding and agreed with the plan. Thank you for allowing to us to participate in the care of Kristeen Aase.     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    Central stenosis of spinal canal 04/07/2022    RADHA (acute kidney injury) ([address] 04/06/2022    Abdominal pain 02/12/2022    Hyperglycemia 11/29/2019    S/P TAVR (transcatheter aortic valve replacement) 10/02/2019    Nonrheumatic aortic valve stenosis 09/19/2018    Anticoagulated     GI hemorrhage 09/18/2018    Chronic idiopathic constipation 09/11/2018    Encounter for follow-up for aortic valve replacement 09/11/2018    Normal pressure hydrocephalus ([address] 01/25/2018    Ataxia 01/25/2018    Mild cognitive impairment 01/25/2018    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 12/19/2016    Carotid stenosis 11/25/2014    Rosacea 11/25/2014    Peripheral edema 07/30/2014    Hallux valgus with bunions 07/30/2014    COPD (chronic obstructive pulmonary disease) ([address] 02/24/2014    Allergic rhinitis 05/21/2013    Hypercholesteremia 12/02/2012    Essential hypertension 12/02/2012    Nonrheumatic aortic valve disorder 12/02/2012    Hypertrophy of prostate without urinary obstruction and other lower urinary tract symptoms (LUTS) 12/02/2012    Peptic ulcer 12/02/2012    PVD (peripheral vascular disease) ([facility] [address] 12/02/2012      Allergies:  No Known Allergies    Home Meds:  Prior to Visit Medications    Medication Sig Taking? Authorizing Provider   pantoprazole (PROTONIX) 40 MG tablet Take 40 mg by mouth in the morning. Historical Provider, MD   spironolactone (ALDACTONE) 25 MG tablet Take 1 tablet by mouth in the morning.   Siri Levo, APRN - CNP   Torsemide 40 MG TABS Take 40 mg by mouth in the morning and at bedtime  Bautista Thayerude, APRN - [address] by Does not apply route  Walt Serna MD   aspirin 81 MG EC tablet Take 1 tablet by mouth daily  Rosaura Hirsch MD   atorvastatin (LIPITOR) 80 MG tablet TAKE ONE TABLET BY MOUTH DAILY  Walt Serna MD      Scheduled Meds:   spironolactone  25 mg Oral Daily    valsartan  80 mg Oral Daily    aspirin  81 mg Oral Daily    atorvastatin  80 mg Oral Daily    pantoprazole  40 mg Oral Daily    sodium chloride flush  5-40 mL IntraVENous 2 times per day    enoxaparin  40 mg SubCUTAneous Daily     Continuous Infusions:   sodium chloride       PRN Meds:sodium chloride flush, sodium chloride, ondansetron **OR** ondansetron, polyethylene glycol, acetaminophen **OR** acetaminophen     Past Medical History:  Past Medical History:   Diagnosis Date    Allergic rhinitis     Aortic valve disorders     COPD (chronic obstructive pulmonary disease) (HCC)     Hydrocephalus (HCC)     normal pressure,    Hyperlipidemia     Hypertension     Hypertrophy of prostate without urinary obstruction and other lower urinary tract symptoms (LUTS)     Irritable bowel syndrome     Pancreatitis     Peptic ulcer, unspecified site, unspecified as acute or chronic, without mention of hemorrhage, perforation, or obstruction     Peripheral vascular disease ([facility] [address]         Past Surgical History:    has a past surgical history that includes femoral bypass; Cholecystectomy; hernia repair (Left); Tonsillectomy; angioplasty; Abdominal aortic aneurysm repair (N/A, 2001); Foot surgery (Right, 12/2/15); other surgical history (10/23/2017); 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 Social History:  Reviewed. reports that he quit smoking about 20 years ago. His smoking use included cigarettes. He has a 45.00 pack-year smoking history. He has never used smokeless tobacco. He reports current alcohol use of about 2.0 standard drinks per week. He reports that he does not use drugs. Family History:  Reviewed. family history includes Alcohol Abuse in his father; Cancer in his brother; Diabetes in his father; Heart Disease in his father; Other in his mother. Denies family history of sudden cardiac death, arrhythmia, premature CAD    Review of Systems:  Constitutional: Negative for fever, weight changes, or weakness  Skin: Negative for bruising, bleeding, blood clots, or changes in skin pigment  HEENT: Negative for vision changes or dysphagia  Respiratory: Reviewed in HPI  Cardiovascular: Reviewed in HPI  Gastrointestinal: Negative for abdominal pain or black/tarry stools  Genito-Urinary: Negative for hematuria  Musculoskeletal: No focal weakness  Neurological/Psych: Negative for confusion or TIA-like symptoms.       Physical Examination:  Vitals:    07/31/22 0725   BP: (!) 145/76   Pulse: 71   Resp: 18   Temp: 97.9 °F (36.6 °C)   SpO2: 97%      In: 5 [I.V.:5]  Out: 1200    Wt Readings from Last 3 Encounters:   07/31/22 224 lb 9.6 oz (101.9 kg)   07/25/22 Value Date/Time    ALT 22 2022 01:19 PM    AST 27 2022 01:19 PM    ALKPHOS 133 2022 01:19 PM    PROT 8.2 2022 01:19 PM    PROT 7.4 2012 10:51 AM    AGRATIO 1.3 2022 01:19 PM    BILITOT 1.1 2022 01:19 PM     Cardiac Enzymes:   Lab Results   Component Value Date/Time    TROPONINI 0.03 2022 09:20 PM    TROPONINI 0.03 2022 01:19 PM    TROPONINI <0.01 2022 05:42 PM     Coags:   Lab Results   Component Value Date/Time    PROTIME 13.4 2022 01:19 PM    INR 1.03 2022 01:19 PM     EC2022: Normal sinus rhythmRightward axisT wave abnormality, consider inferior ischemia    ECHO:  2022   Summary   *Technically difficult study due to poor acoustical window. Patient refused   Definity. Suboptimal image quality.    *Left ventricle - normal size, thickness and function with EF of 55%   *Aortic valve - well seated TAVR valve, PV 1.9m/s, MG 8mmHg, no significant   regurgitation    ROSA MARIA Catalan-CNP  [facility]   Office: [phone]

## 2022-08-01 LAB
ANION GAP SERPL CALCULATED.3IONS-SCNC: 12 MMOL/L (ref 3–16)
BUN BLDV-MCNC: 19 MG/DL (ref 7–20)
CALCIUM SERPL-MCNC: 9.5 MG/DL (ref 8.3–10.6)
CHLORIDE BLD-SCNC: 98 MMOL/L (ref 99–110)
CO2: 27 MMOL/L (ref 21–32)
CREAT SERPL-MCNC: 1 MG/DL (ref 0.8–1.3)
GFR AFRICAN AMERICAN: >60
GFR NON-AFRICAN AMERICAN: >60
GLUCOSE BLD-MCNC: 116 MG/DL (ref 70–99)
HCT VFR BLD CALC: 39.9 % (ref 40.5–52.5)
HEMOGLOBIN: 13.1 G/DL (ref 13.5–17.5)
MCH RBC QN AUTO: 29.1 PG (ref 26–34)
MCHC RBC AUTO-ENTMCNC: 33 G/DL (ref 31–36)
MCV RBC AUTO: 88.2 FL (ref 80–100)
PDW BLD-RTO: 14.2 % (ref 12.4–15.4)
PLATELET # BLD: 332 K/UL (ref 135–450)
PMV BLD AUTO: 8.8 FL (ref 5–10.5)
POTASSIUM SERPL-SCNC: 3.4 MMOL/L (ref 3.5–5.1)
RBC # BLD: 4.52 M/UL (ref 4.2–5.9)
SODIUM BLD-SCNC: 137 MMOL/L (ref 136–145)
WBC # BLD: 13.7 K/UL (ref 4–11)

## 2022-08-01 PROCEDURE — 36415 COLL VENOUS BLD VENIPUNCTURE: CPT

## 2022-08-01 PROCEDURE — 97530 THERAPEUTIC ACTIVITIES: CPT

## 2022-08-01 PROCEDURE — 97166 OT EVAL MOD COMPLEX 45 MIN: CPT

## 2022-08-01 PROCEDURE — 6370000000 HC RX 637 (ALT 250 FOR IP): Performed by: INTERNAL MEDICINE

## 2022-08-01 PROCEDURE — 99233 SBSQ HOSP IP/OBS HIGH 50: CPT | Performed by: NURSE PRACTITIONER

## 2022-08-01 PROCEDURE — 1200000000 HC SEMI PRIVATE

## 2022-08-01 PROCEDURE — 85027 COMPLETE CBC AUTOMATED: CPT

## 2022-08-01 PROCEDURE — 6370000000 HC RX 637 (ALT 250 FOR IP): Performed by: FAMILY MEDICINE

## 2022-08-01 PROCEDURE — 6360000002 HC RX W HCPCS: Performed by: FAMILY MEDICINE

## 2022-08-01 PROCEDURE — 2580000003 HC RX 258: Performed by: FAMILY MEDICINE

## 2022-08-01 PROCEDURE — 80048 BASIC METABOLIC PNL TOTAL CA: CPT

## 2022-08-01 PROCEDURE — 97110 THERAPEUTIC EXERCISES: CPT

## 2022-08-01 PROCEDURE — 97535 SELF CARE MNGMENT TRAINING: CPT

## 2022-08-01 PROCEDURE — 6370000000 HC RX 637 (ALT 250 FOR IP): Performed by: NURSE PRACTITIONER

## 2022-08-01 RX ORDER — CIPROFLOXACIN HYDROCHLORIDE 3.5 MG/ML
1 SOLUTION/ DROPS TOPICAL
DISCHARGE
Start: 2022-08-01 | End: 2022-08-06

## 2022-08-01 RX ORDER — SPIRONOLACTONE 25 MG/1
12.5 TABLET ORAL DAILY
Status: DISCONTINUED | OUTPATIENT
Start: 2022-08-02 | End: 2022-08-03 | Stop reason: HOSPADM

## 2022-08-01 RX ORDER — CIPROFLOXACIN HYDROCHLORIDE 3.5 MG/ML
1 SOLUTION/ DROPS TOPICAL
Status: DISCONTINUED | OUTPATIENT
Start: 2022-08-01 | End: 2022-08-03 | Stop reason: HOSPADM

## 2022-08-01 RX ORDER — SPIRONOLACTONE 25 MG/1
12.5 TABLET ORAL ONCE
Status: COMPLETED | OUTPATIENT
Start: 2022-08-01 | End: 2022-08-01

## 2022-08-01 RX ORDER — TORSEMIDE 20 MG/1
20 TABLET ORAL 2 TIMES DAILY
Qty: 30 TABLET | Refills: 3 | DISCHARGE
Start: 2022-08-01 | End: 2022-08-03 | Stop reason: SDUPTHER

## 2022-08-01 RX ORDER — VALSARTAN 80 MG/1
80 TABLET ORAL DAILY
Qty: 30 TABLET | Refills: 3 | DISCHARGE
Start: 2022-08-02 | End: 2022-08-16

## 2022-08-01 RX ADMIN — SPIRONOLACTONE 12.5 MG: 25 TABLET ORAL at 12:12

## 2022-08-01 RX ADMIN — CIPROFLOXACIN 1 DROP: 3 SOLUTION OPHTHALMIC at 18:30

## 2022-08-01 RX ADMIN — CIPROFLOXACIN 1 DROP: 3 SOLUTION OPHTHALMIC at 13:50

## 2022-08-01 RX ADMIN — PANTOPRAZOLE SODIUM 40 MG: 40 TABLET, DELAYED RELEASE ORAL at 10:16

## 2022-08-01 RX ADMIN — VALSARTAN 80 MG: 40 TABLET, FILM COATED ORAL at 10:28

## 2022-08-01 RX ADMIN — CIPROFLOXACIN 1 DROP: 3 SOLUTION OPHTHALMIC at 15:37

## 2022-08-01 RX ADMIN — ATORVASTATIN CALCIUM 80 MG: 80 TABLET, FILM COATED ORAL at 10:16

## 2022-08-01 RX ADMIN — Medication 10 ML: at 10:17

## 2022-08-01 RX ADMIN — ENOXAPARIN SODIUM 40 MG: 100 INJECTION SUBCUTANEOUS at 10:16

## 2022-08-01 RX ADMIN — Medication 10 ML: at 20:09

## 2022-08-01 RX ADMIN — ASPIRIN 81 MG: 81 TABLET, COATED ORAL at 10:16

## 2022-08-01 RX ADMIN — CIPROFLOXACIN 1 DROP: 3 SOLUTION OPHTHALMIC at 11:00

## 2022-08-01 RX ADMIN — CIPROFLOXACIN 1 DROP: 3 SOLUTION OPHTHALMIC at 22:30

## 2022-08-01 RX ADMIN — CIPROFLOXACIN 1 DROP: 3 SOLUTION OPHTHALMIC at 12:04

## 2022-08-01 RX ADMIN — TORSEMIDE 20 MG: 20 TABLET ORAL at 10:17

## 2022-08-01 RX ADMIN — TORSEMIDE 20 MG: 20 TABLET ORAL at 20:10

## 2022-08-01 RX ADMIN — CIPROFLOXACIN 1 DROP: 3 SOLUTION OPHTHALMIC at 20:09

## 2022-08-01 ASSESSMENT — PAIN SCALES - GENERAL
PAINLEVEL_OUTOF10: 0

## 2022-08-01 NOTE — PLAN OF CARE
Problem: Discharge Planning  Goal: Discharge to home or other facility with appropriate resources  8/1/2022 1200 by Anibal Mayes RN  Outcome: Progressing     Problem: Skin/Tissue Integrity  Goal: Absence of new skin breakdown  Description: 1. Monitor for areas of redness and/or skin breakdown  2. Assess vascular access sites hourly  3. Every 4-6 hours minimum:  Change oxygen saturation probe site  4. Every 4-6 hours:  If on nasal continuous positive airway pressure, respiratory therapy assess nares and determine need for appliance change or resting period.   8/1/2022 1200 by Anibal Mayes RN  Outcome: Progressing     Problem: Safety - Adult  Goal: Free from fall injury  8/1/2022 1200 by Anibal Mayes RN  Outcome: Progressing     Problem: ABCDS Injury Assessment  Goal: Absence of physical injury  8/1/2022 1200 by Anibal Mayes RN  Outcome: Progressing     Problem: Neurosensory - Adult  Goal: Achieves maximal functionality and self care  Outcome: Progressing     Problem: Cardiovascular - Adult  Goal: Maintains optimal cardiac output and hemodynamic stability  Outcome: Progressing     Problem: Musculoskeletal - Adult  Goal: Return mobility to safest level of function  Outcome: Progressing     Problem: Gastrointestinal - Adult  Goal: Maintains adequate nutritional intake  Outcome: Progressing     Problem: Metabolic/Fluid and Electrolytes - Adult  Goal: Hemodynamic stability and optimal renal function maintained  Outcome: Progressing

## 2022-08-01 NOTE — PROGRESS NOTES
[facility] [address]   Phone: [phone]    Occupational Therapy    [x] Initial Evaluation            [] Daily Treatment Note         [] Discharge Summary      Patient: Toya Waters   : 1945   MRN: [id number]   Date of Service:  2022    Admitting Diagnosis:  RADHA (acute kidney injury) [facility])  Current Admission Summary: Toya Waters is a 78 y.o. male who presents because of feeling dizzy. His wife provides the majority of the history. He apparently took shower this morning and afterwards, felt very lightheaded and fell back into a shower chair. His wife is there to help him to prevent injury and prevented his collapsed to the floor. He was extremely sweaty and nauseated. He had dry heaves. He has been feeling nauseated since he started spironolactone. His cardiologist has been trying to help him with leg edema. He has been on diuretic for long time and additional medicine was started earlier this week. He denies having any chest pain. He denies any injury from the falls. He did take a second fall this morning in getting out of the car. He fell to his knees. He denies any injury. Past Medical History:  has a past medical history of Allergic rhinitis, Aortic valve disorders, COPD (chronic obstructive pulmonary disease) ([address] Hydrocephalus ([address] Hyperlipidemia, Hypertension, Hypertrophy of prostate without urinary obstruction and other lower urinary tract symptoms (LUTS), Irritable bowel syndrome, Pancreatitis, Peptic ulcer, unspecified site, unspecified as acute or chronic, without mention of hemorrhage, perforation, or obstruction, and Peripheral vascular disease ([address] 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 Discharge Recommendations: Mellisa Amaya scored a 10/24 on the AM-PAC ADL Inpatient form. Current research shows that an AM-PAC score of 17 or less is typically not associated with a discharge to the patient's home setting. Based on the patient's AM-PAC score and their current ADL deficits, it is recommended that the patient have 5-7 sessions per week of Occupational Therapy at d/c to increase the patient's independence. At this time, this patient demonstrates complex nursing, medical, and rehabilitative needs, and would benefit from intensive rehabilitation services upon discharge from the Inpatient setting. This patient demonstrates the ability to participate in and benefit from an intensive therapy program with a coordinated interdisciplinary team approach to foster frequent, structured, and documented communication among disciplines, who will work together to establish, prioritize, and achieve treatment goals. Please see assessment section for further patient specific details. If patient discharges prior to next session this note will serve as a discharge summary. Please see below for the latest assessment towards goals.          DME Required For Discharge: no DME required at discharge    Precautions/Restrictions: high fall risk  Weight Bearing Restrictions: no restrictions  [] Right Upper Extremity  [] Left Upper Extremity [] Right Lower Extremity  [] Left Lower Extremity     Required Braces/Orthotics: no braces required   [] Right  [] Left  Positional Restrictions:no positional restrictions    Pre-Admission Information   Lives With: spouse                  Type of Home: house  Home Layout: two level  Home Access:  15 step to enter with handrail. Handrails are located on R side. Bathroom Layout: walk in shower  Bathroom Equipment: grab bars in shower, grab bars around toilet, shower chair  Toilet Height: standard height  Home Equipment: rolling walker  Transfer Assistance: modified independent with use of RW  Ambulation Assistance:modified independent with use of RW  ADL Assistance: requires assistance with bathing, requires assistance with dressing  IADL Assistance: requires assistance with meal prep, requires assistance with laundry, requires assistance with vacuuming, requires assistance with cleaning, requires assistance with shopping  Active :        [x] Yes                 [] No  Hand Dominance: [] Left                 [x] Right  Current Employment: retired. Occupation: maintenance [employer]  Hobbies: \"No, I'm lazy. \"  Recent Falls: Fall 3-4 days ago, multiple falls in the past.    Examination   Vision:   Vision Gross Assessment: Impaired and Vision Corrective Device: wears glasses for reading  Hearing:   [facility]  Perception:   Unilateral Attention: cues to attend (R) side of body, cues to maintain midline in sitting  Initiation: cues to initiate tasks, hand over hand to initiate tasks  Motor Planning: cues to use objects appropriately, hand over hand to sequence tasks  Observation:   General Observation:  Pt with significantly decreased processing (this is baseline)  Posture: Forward head, rounded shoulders. Sensation:   reports numbness and tingling in (B) LE    ROM:   (B) Shoulder AROM WFL  Strength:   (B) UE strength grossly +3    Decision Making: medium complexity  Clinical Presentation: evolving      Subjective  General: Pt presenting supine in bed upon entry. Spouse present and able to assist with social functional and baseline information.    Pain: 0/10  Pain Interventions: not applicable        Activities of Daily Living  Basic Activities of Daily Living  Upper Extremity Bathing: maximum assistance  Lower Extremity Bathing: maximum assistance   Upper Extremity Dressing: moderate assistance  Lower Extremity Dressing: maximum assistance  Comment: pt incontinent of urin upon entry. Brief changed in sitting/stance (max A to pull from knees to hips). Pt with decreased initiation and requires max cues (hand over hand) for sequencing. Instrumental Activities of Daily Living  No IADL completed on this date. Functional Mobility  Bed Mobility  Supine to Sit: minimal assistance, moderate assistance, . Comment Mod A + Min A with Max cues for sequencing. Rolling Right: 2 person assistance with Max A with max cues for sequencing (hand over hand to reach for bed rail)   Scooting: dependent assistance  Comments: Max cues for initiation and sequencing all activity in conjunction with significantly decreased processing. Transfers  Sit to stand transfer:2 person assistance with Mod A   Stand to sit transfer: 2 person assistance with Mod A--decreased eccentric control   Stand step transfer: minimal assistance, moderate assistance, . Comment Min A + Mod A with max cues for sequencing and walker navigation. Comments: Pt completed x3 sit>stand txfers at EOB; stand/pivot from EOB to recliner. Max cues to facilitate weight shifting. Pt with notable RLE weakness (possible neglect of R UE). Symptoms appear unilateral; however difficult to assess due to encephalopathy. Functional Mobility:  Sitting Balance: minimal assistance, . Comment pt sat EOB for ~7-8 min for ADL completion. Standing Balance: contact guard assistance, minimal assistance, . Comment Min A + CGA --requires x2 person assist due to instability. No functional mobility completed on this date.     Other Therapeutic Interventions    Functional Outcomes  AM-PAC Inpatient Daily Activity Raw Score: 10    Cognition  Overall Cognitive Status: Impaired  Arousal/Alterness: delayed responses to stimuli  Following Commands: follows one step commands with repetition, follows one step commands with increased time, inconsistently follows commands  Attention Span: attends with cues to redirect  Memory: decreased recall of biographical information, decreased recall of recent events  Safety Judgement: decreased awareness of need for assistance, decreased awareness of need for safety  Problem Solving: assistance required to generate solutions, assistance required to implement solutions, decreased awareness of errors, assistance required to identify errors made, assistance required to correct errors made  Insights: decreased awareness of deficits  Initiation: requires cues for all  Sequencing: requires cues for all  Orientation:    oriented to person, oriented to place, disoriented to time , and disoriented to situation  Command Following:   impaired     Education  Barriers To Learning: cognition  Patient Education: patient educated on goals, OT role and benefits, plan of care, IADL safety, family education, transfer training, discharge recommendations  Learning Assessment:  patient will require reinforcement due to cognitive deficits, patient is not an independent learner    Assessment  Activity Tolerance: Pt tolerated treatment well. Impairments Requiring Therapeutic Intervention: decreased functional mobility, decreased ADL status, decreased ROM, decreased strength, decreased safety awareness, decreased cognition, decreased endurance, decreased balance, decreased fine motor control, decreased coordination, decreased posture  Prognosis: fair  Clinical Assessment: Pt presenting below his functional baseline with the above deficits secondary to RADHA with metabolic encephalopathy. He presents with increased weakness and deconditioning and remains primarily limited by decreased cognition. Coordination substantially impacted by ataxic movements and apraxia affecting adequate ADL participation.  Continued OT indicated in order to maximize safety and independence with all occupational pursuits. Safety Interventions: patient left in chair, chair alarm in place, call light within reach, patient at risk for falls, nurse notified, and family/caregiver present    Plan  Frequency: 5-7 x/week  Current Treatment Recommendations: strengthening, ROM, balance training, functional mobility training, transfer training, gait training, endurance training, neuromuscular re-education, patient/caregiver education, ADL/self-care training, safety education, and equipment evaluation/education    Goals  Patient Goals: Return to home   Short Term Goals:  Time Frame: Discharge  Patient will complete upper body ADL at minimal assistance   Patient will complete toileting at 2 person assistance with Mod A    Patient to maintain standing at 2 person assistance with Min A  for 2 minutes. Patient to initiate functional tasks with min A within 3/5 attempts.      Therapy Session Time     Individual Group Co-treatment   Time In    4288   Time Out    1451   Minutes    54         Timed Code Treatment Minutes: 39 Minutes  Total Treatment Minutes:  54 minutes       Electronically Signed By: SHARLENE Hutchison OTR/L  [id number]

## 2022-08-01 NOTE — PLAN OF CARE
Problem: Discharge Planning  Goal: Discharge to home or other facility with appropriate resources  Outcome: Progressing     Problem: Skin/Tissue Integrity  Goal: Absence of new skin breakdown  Description: 1. Monitor for areas of redness and/or skin breakdown  2. Assess vascular access sites hourly  3. Every 4-6 hours minimum:  Change oxygen saturation probe site  4. Every 4-6 hours:  If on nasal continuous positive airway pressure, respiratory therapy assess nares and determine need for appliance change or resting period. Outcome: Progressing     Problem: Safety - Adult  Goal: Free from fall injury  8/1/2022 0014 by Luisana Valero RN  Outcome: Progressing  Note: Safety precautions in place. Bed locked, alarmed, lowest position, call light in reach, gripper/non-slid socks. Camera in room for safety. No falls or physical injury to pt.      Problem: ABCDS Injury Assessment  Goal: Absence of physical injury  Outcome: Progressing

## 2022-08-01 NOTE — CARE COORDINATION
CM received VM from Raffaele Ron ([phone]) at [place] [address] stating that she will call back when decision to accept or deny patient is made. Stated that [facility] physicians will review patient information. CM placed call back to Raffaele Ron at [facility] to leave CM phone number so that she may call CM directly. Had to leave . UPDATE: CM received call from Raffaele Ron at [place] [address] requesting information regarding camera in patient's room. Patient must be 24 hours camera free prior to admission to [facility] if accepted. Dunstan Bold also requests updated therapy notes. E-mail sent to [facility] requesting updated therapy notes. Update: Camera has been removed from patient room. PT/OT will provide updated therapy notes.          Electronically signed by Hill Olivera RN on 8/1/2022 at 11:23 AM

## 2022-08-01 NOTE — DISCHARGE INSTR - COC
Continuity of Care Form  CHF Pathway  _x_ Cardiovascular Assessment.  Titrate O2 to keep SaO2 greater than 90%    _x_ Daily Weights- Baseline Wt:224 lb   Call MD if:   3 pound weight gain or loss in one day OR 5 pound weight gain in one week       _x_No added salt diet (3-4 G sodium) and 64 oz fluid restriction      _x_ Labs:   BMP,BNP    Please start on    Frequency:  Weekly x 4              Fax results to: [facility]: [phone]        _x_ Med List attached:   Hold Coreg/Metoprolol if HR less than 45 or patient symptomatic*   Hold ACE/ARB if SBP less than 85 or patient symptomatic*   Do not hold Spironolactone (aldactone) for hypotension/bradycardia   Call MD for questions: [facility]: [phone]    _x_Follow up appointment with cardiology: date/time:  with Toi Souza NOTIFY CHF TEAM- CALL [phone] A MESSAGE      Patient Name: Vinod Green   :  1945  MRN:  [id number]    Admit date:  2022  Discharge date:  8/3/2022    Code Status Order: Full Code   Advance Directives:     Admitting Physician:  Sonal Alaniz MD  PCP: Renato Johnson MD    Discharging Nurse: [facility] Unit/Room#: 1TI-9869/8529-48  Discharging Unit Phone Number: [phone]    Emergency Contact:   Extended Emergency Contact Information  Primary Emergency Contact: Chandni Dunlap  Address: [address], [address] [facility] [address] [facility] Phone: [phone]  Mobile Phone: [phone]  Relation: Spouse  Secondary Emergency Contact: Hermila [facility] [address] [facility] Phone: [phone]  Mobile Phone: [phone]  Relation: Child    Past Surgical History:  Past Surgical History:   Procedure Laterality Date    ABDOMINAL AORTIC ANEURYSM REPAIR N/A     ANGIOPLASTY      RT femoral artery    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REPAIR  2018    Sharri James    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REPAIR      Aortic valve replacement    AORTIC VALVE REPLACEMENT  2018    CHOLECYSTECTOMY      COLONOSCOPY N/A 5/4/2021    COLONOSCOPY POLYPECTOMY SNARE/COLD BIOPSY performed by Chapin Montes MD at [facility]      bifemoral bypass    FOOT SURGERY Right 12/2/15    HERNIA REPAIR Left     OTHER SURGICAL HISTORY  10/23/2017    lumbar puncture    IA ESOPHAGOGASTRODUODENOSCOPY TRANSORAL DIAGNOSTIC N/A 12/4/2018    EGD performed by Chapin Montes MD at [address] [place] [address] ENDOSCOPY  09/19/2018    WITH BIOPSY    UPPER GASTROINTESTINAL ENDOSCOPY N/A 4/7/2022    EGD BIOPSY performed by Vangie Mejia MD at [facility] ENDOSCOPY       Immunization History:   Immunization History   Administered Date(s) Administered    COVID-19, J&J, (age 18y+), IM, 0.5 mL 03/04/2021, 11/26/2021    Influenza Whole 10/01/2015    Influenza, High Dose (Fluzone 65 yrs and older) 11/25/2014, 09/08/2017, 09/12/2018, 10/08/2019, 09/30/2020    Influenza, Intradermal, Preservative free 12/05/2012    Pneumococcal Conjugate 13-valent ([id number]) 05/04/2016    Pneumococcal Polysaccharide ([id number]) 12/19/2002, 06/24/2013    Td, unspecified formulation 12/01/2006    Tdap (Boostrix, Adacel) 11/19/2020       Active Problems:  Patient Active Problem List   Diagnosis Code    Hypercholesteremia E78.00    Essential hypertension I10    Nonrheumatic aortic valve disorder I35.9    Hypertrophy of prostate without urinary obstruction and other lower urinary tract symptoms (LUTS) N40.0    Peptic ulcer K27.9    PVD (peripheral vascular disease) ([facility]) I73.9    Allergic rhinitis J30.9    COPD (chronic obstructive pulmonary disease) ([facility]) J44.9    Peripheral edema R60.9    Hallux valgus with bunions M20.10, M21.619    Carotid stenosis I65.29    Rosacea L71.9    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 I65.23    Normal pressure hydrocephalus ([facility]) G91.2    Ataxia R27.0    Mild cognitive impairment G31.84    Chronic idiopathic constipation K59.04    Encounter for follow-up for aortic valve replacement Z09, Z95.2    GI hemorrhage K92.2    Nonrheumatic aortic valve stenosis I35.0    Anticoagulated Z79.01    S/P TAVR (transcatheter aortic valve replacement) Z95.2    Hyperglycemia R73.9    Abdominal pain R10.9    RADHA (acute kidney injury) ([facility]) N17.9    Severe malnutrition (H[facility]) E43    Bilateral leg weakness R29.898    Polyneuropathy, 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    Central stenosis of spinal canal M48.00    Coronary artery disease involving native coronary artery of native heart without angina pectoris I25.10    Mixed hyperlipidemia E78.2    Lower extremity edema R60.0    Venous insufficiency I87.2    Other specified anemias D64.89    Syncope and collapse R55    Chronic diastolic heart failure (HCC) I50.32    Dehydration E86.0       Isolation/Infection:   Isolation            No Isolation          Patient Infection Status       Infection Onset Added Last Indicated Last Indicated By Review Planned Expiration Resolved Resolved By    None active    Resolved    COVID-19 (Rule Out) 04/12/22 04/12/22 04/12/22 COVID-19 & Influenza Combo (Ordered)   04/12/22 Rule-Out Test Resulted    C-diff Rule Out 02/12/22 02/12/22 02/12/22 Clostridium difficile toxin/antigen (Ordered)   04/07/22 Miya Gann RN    From previous admission    COVID-19 (Rule Out) 02/12/22 02/12/22 02/12/22 COVID-19 (Ordered)   02/14/22 Rule-Out Test Resulted            Nurse Assessment:  Last Vital Signs: /66   Pulse 70   Temp 97.8 °F (36.6 °C) (Oral)   Resp 16   Ht 6' (1.829 m)   Wt 212 lb 12.8 oz (96.5 kg)   SpO2 96%   BMI 28.86 kg/m²     Last documented pain score (0-10 scale): Pain Level: 0  Last Weight:   Wt Readings from Last 1 Encounters:   08/03/22 212 lb 12.8 oz (96.5 kg)     Mental Status:  oriented and alert    IV Access:  - None    Nursing Mobility/ADLs:  Walking   Assisted  Transfer  Assisted  Bathing  Assisted  Dressing  Assisted  Toileting Assisted  Feeding  Assisted  Med Admin  Assisted  Med Delivery   whole    Wound Care Documentation and Therapy:  Incision 12/02/15 Foot Right (Active)   Number of days: 2436       Incision 10/23/17 Back Lower (Active)   Number of days: 4985        Elimination:  Continence: Bowel: No  Bladder: No  Urinary Catheter: Insertion Date: 8/02/2022    Colostomy/Ileostomy/Ileal Conduit: No       Date of Last BM: 8/3/2022    Intake/Output Summary (Last 24 hours) at 8/3/2022 1602  Last data filed at 8/3/2022 0345  Gross per 24 hour   Intake --   Output 850 ml   Net -850 ml     I/O last 3 completed shifts: In: 5 [I.V.:5]  Out: 850 [Urine:850]    Safety Concerns: At Risk for Falls    Impairments/Disabilities:      None    Nutrition Therapy:  Current Nutrition Therapy:   - Oral Diet:  General    Routes of Feeding: Oral  Liquids: No Restrictions  Daily Fluid Restriction: no  Last Modified Barium Swallow with Video (Video Swallowing Test): not done    Treatments at the Time of Hospital Discharge:   Respiratory Treatments: none  Oxygen Therapy:  is not on home oxygen therapy.   Ventilator:    - No ventilator support    Rehab Therapies: Physical Therapy and Occupational Therapy  Weight Bearing Status/Restrictions: No weight bearing restrictions  Other Medical Equipment (for information only, NOT a DME order):  walker  Other Treatments: ***    Patient's personal belongings (please select all that are sent with patient):  {[facility] DME Belongings:[id number]}    RN SIGNATURE:  Electronically signed by Cheri Lance RN on 8/3/22 at 2:50 PM EDT    CASE MANAGEMENT/SOCIAL WORK SECTION    Inpatient Status Date: 7-28-22    Readmission Risk Assessment Score:  Readmission Risk              Risk of Unplanned Readmission:  [id number]           Discharging to Facility/ Agency   Name: [facility] [place] [address]  Phone: [phone]  Fax: [phone]    Dialysis Facility (if applicable)     Case Manager/ signature: Electronically signed by KEARA Cavazos on 8/3/22 at 11:37 AM EDT    PHYSICIAN SECTION    Prognosis: Good    Condition at Discharge: Stable    Rehab Potential (if transferring to Rehab): Good    Recommended Labs or Other Treatments After Discharge: PT OT    Physician Certification: I certify the above information and transfer of Lugene Crigler  is necessary for the continuing treatment of the diagnosis listed and that he requires [place] for less 30 days.      Update Admission H&P: No change in H&P    PHYSICIAN SIGNATURE:  Electronically signed by Monica Lizama MD on 8/3/22 at 11:45 AM EDT

## 2022-08-01 NOTE — PROGRESS NOTES
Physical Exam:  General Appearance:  Non-obese/Well Nourished  Respiratory:  Resp Auscultation: Normal breath sounds without dullness  Cardiovascular: Auscultation: Regular rate and rhythm, normal S1S2, no m/g/r/c  Palpation: Normal    JVD: none  Pedal Pulses: 2+ and equal   Abdomen:  Soft, NT, ND, + bs  Extremities:  No Cyanosis or Clubbing  Extremities: negative  Neurological/Psychiatric:  Oriented to time, place, and person  Non-anxious    MEDICATIONS:   Scheduled Meds:   Scheduled Meds:   torsemide  20 mg Oral BID    spironolactone  25 mg Oral Daily    valsartan  80 mg Oral Daily    aspirin  81 mg Oral Daily    atorvastatin  80 mg Oral Daily    pantoprazole  40 mg Oral Daily    sodium chloride flush  5-40 mL IntraVENous 2 times per day    enoxaparin  40 mg SubCUTAneous Daily     Continuous Infusions:   sodium chloride       PRN Meds:.sodium chloride flush, sodium chloride, ondansetron **OR** ondansetron, polyethylene glycol, acetaminophen **OR** acetaminophen  Continuous Infusions:   sodium chloride         Intake/Output Summary (Last 24 hours) at 8/1/2022 0920  Last data filed at 7/31/2022 2010  Gross per 24 hour   Intake 5 ml   Output --   Net 5 ml       Lab Data:  CBC:   Lab Results   Component Value Date/Time    WBC 13.7 08/01/2022 04:44 AM    HGB 13.1 08/01/2022 04:44 AM     08/01/2022 04:44 AM     BMP:  Lab Results   Component Value Date/Time     08/01/2022 04:44 AM    K 3.4 08/01/2022 04:44 AM    K 3.5 07/28/2022 01:19 PM    CL 98 08/01/2022 04:44 AM    CO2 27 08/01/2022 04:44 AM    BUN 19 08/01/2022 04:44 AM    CREATININE 1.0 08/01/2022 04:44 AM    GLUCOSE 116 08/01/2022 04:44 AM     INR:   Lab Results   Component Value Date/Time    INR 1.03 07/28/2022 01:19 PM    INR 1.14 04/04/2022 10:25 AM    INR 1.08 09/18/2018 01:00 PM        CARDIAC LABS  ENZYMES:No results for input(s): CKMB, CKMBINDEX, TROPONINI in the last 72 hours.     Invalid input(s): CKTOTAL;3  FASTING LIPID PANEL:  Lab Results   Component Value Date/Time    HDL 56 11/20/2020 10:27 AM    LDLCALC 111 11/20/2020 10:27 AM    TRIG 97 09/05/2017 09:42 AM    TSH 1.82 11/07/2017 02:45 PM     LIVER PROFILE:  Lab Results   Component Value Date/Time    AST 27 07/28/2022 01:19 PM    AST 20 04/12/2022 07:21 AM    ALT 22 07/28/2022 01:19 PM    ALT 27 04/12/2022 07:21 AM     BNP:   Lab Results   Component Value Date/Time    PROBNP 147 07/28/2022 01:19 PM    PROBNP 214 07/06/2022 12:06 PM    PROBNP 186 06/14/2022 11:30 AM     Iron Studies:    Lab Results   Component Value Date/Time    FERRITIN 87.0 09/20/2018 08:05 AM     Lab Results   Component Value Date    IRON 47 (L) 09/20/2018    TIBC 285 09/20/2018    FERRITIN 87.0 09/20/2018      Iron Deficiency Anemia:  uj IV Iron Therapy:  No  2017 ACC/AHA HF Guidelines:   intravenous iron replacement in patients with New York Heart Association (NYHA) class II and III HF and iron deficiency(ferritin <100 ng/ml or 100-300 ng/ml if transferrin saturation <20%), to improve functional status and QoL. 1. WEIGHT: Admit Weight: 216 lb (98 kg)      Today  Weight: 224 lb 8 oz (101.8 kg)   2. I/O   Intake/Output Summary (Last 24 hours) at 8/1/2022 0920  Last data filed at 7/31/2022 2010  Gross per 24 hour   Intake 5 ml   Output --   Net 5 ml           Assessment/Plan:     CHF- compensated, continue torsemide, decrease donna to 12.5mg/day and do not give metolazone in the future, pt ok for d/c to SNF, will check weekly labs and f/u in CHF clinic  Edema - resolved  HTN - continue valsartan         The patient was seen for >25 minutes. I reviewed interval history, physical exam, review of data including labs, imaging, development and implementation of treatment plan and coordination of complex care.  More than 50% of the time was devoted to counseling the patient on their diagnoses/treatments, as well as coordination of care with the other care teams    I appreciate the opportunity of cooperating in the care of this individual.    Verner Manners, APRN - CNP, ACNP, AGPCNP 8/1/2022, 9:20 AM  Heart Failure  The [address], [address] [place] [address]  Ph: [phone]      Core Measures:   Discharge instructions:   LVEF documented:   ACEI for LV dysfunction:   Smoking Cessation:

## 2022-08-01 NOTE — PROGRESS NOTES
Hospitalist Progress Note      PCP: Florin Sanchez MD    Date of Admission: 7/28/2022    Chief Complaint: dizziness    Hospital Course: 79 yo male with h/o HFPEF was recently diuresed with IV Lasix infusion and metolazone. Came in with weakness, nausea, vomiting. Found to have RADHA on admission. IV fluids given. Diuretics held. Being followed by cardiology. Creatinine is stabilized at baseline. Diuresis resumed. No changes overnight    Subjective:   Feels better. Appetite is improving. No chest pain, no shortness of breath, no nausea or vomiting. Wife at bedside and has no new concerns      Medications:  Reviewed    Infusion Medications    sodium chloride       Scheduled Medications    ciprofloxacin  1 drop Left Eye Q2H While awake    torsemide  20 mg Oral BID    spironolactone  25 mg Oral Daily    valsartan  80 mg Oral Daily    aspirin  81 mg Oral Daily    atorvastatin  80 mg Oral Daily    pantoprazole  40 mg Oral Daily    sodium chloride flush  5-40 mL IntraVENous 2 times per day    enoxaparin  40 mg SubCUTAneous Daily     PRN Meds: sodium chloride flush, sodium chloride, ondansetron **OR** ondansetron, polyethylene glycol, acetaminophen **OR** acetaminophen      Intake/Output Summary (Last 24 hours) at 8/1/2022 1052  Last data filed at 7/31/2022 2010  Gross per 24 hour   Intake 5 ml   Output --   Net 5 ml         Physical Exam Performed:    /75   Pulse 73   Temp 97.9 °F (36.6 °C) (Oral)   Resp 16   Ht 6' (1.829 m)   Wt 224 lb 8 oz (101.8 kg)   SpO2 96%   BMI 30.45 kg/m²     General appearance: No apparent distress, appears stated age and cooperative. HEENT: Pupils equal, round, and reactive to light. Conjunctivae/corneas clear. Neck: Supple, with full range of motion. No jugular venous distention. Trachea midline. Respiratory:  Normal respiratory effort. Clear to auscultation, bilaterally without Rales/Wheezes/Rhonchi.   Cardiovascular: Regular rate and rhythm with normal S1/S2 without murmurs, rubs or gallops. Abdomen: Soft, non-tender, non-distended with normal bowel sounds. Musculoskeletal: No clubbing or cyanosis. 1+ bilateral pitting lower extremity edema bilaterally. Full range of motion without deformity. Skin: Skin color, texture, turgor normal.  No rashes or lesions. Neurologic:  Neurovascularly intact without any focal sensory/motor deficits. Cranial nerves: II-XII intact, grossly non-focal.  Psychiatric: Sleeping, was somewhat confused when he woke up later improved  Capillary Refill: Brisk, 3 seconds, normal   Peripheral Pulses: +2 palpable, equal bilaterally     I examined the patient today (08/01/22). Physical exam is similar to yesterday (7/31) except there is a pinkeye on the left    Labs:   Recent Labs     07/30/22  0511 07/31/22  0425 08/01/22  0444   WBC 10.1 13.3* 13.7*   HGB 12.5* 12.9* 13.1*   HCT 38.0* 39.2* 39.9*    300 332       Recent Labs     07/30/22  0511 07/31/22  0425 08/01/22  0444    135* 137   K 3.1* 3.7 3.4*   CL 97* 99 98*   CO2 30 27 27   BUN 18 17 19   CREATININE 1.0 0.9 1.0   CALCIUM 8.9 9.5 9.5       No results for input(s): AST, ALT, BILIDIR, BILITOT, ALKPHOS in the last 72 hours. No results for input(s): INR in the last 72 hours. No results for input(s): Assunta Walsh in the last 72 hours. Urinalysis:      Lab Results   Component Value Date/Time    NITRU Negative 07/28/2022 04:22 PM    WBCUA 1 03/20/2022 03:26 PM    RBCUA NEGATIVE 03/23/2022 12:30 PM    BLOODU Negative 07/28/2022 04:22 PM    SPECGRAV 1.015 07/28/2022 04:22 PM    GLUCOSEU Negative 07/28/2022 04:22 PM       Radiology:  XR CHEST PORTABLE   Final Result   No radiographic evidence of acute pulmonary disease.                  Assessment/Plan:    Active Hospital Problems    Diagnosis     Syncope and collapse [R55]      Priority: Medium    Chronic diastolic heart failure (HCC) [I50.32]      Priority: Medium    Dehydration [E86.0]      Priority: Medium RADHA (acute kidney injury) ([facility] [N17.9]     Peripheral edema [R60.9]      PLAN:    Acute kidney injury  Admission creatinine 1.5, baseline creatinine 0.9. Currently stabilized at 0.9-1.0, consistent with baseline  No need for more IV fluids  Being followed by cardiology. Oral diuretics resumed. Continue to monitor intake and output and renal function. Stable from this standpoint    Dizziness  Resolved with improvement in hydration status. Continue to monitor. No changes overnight    Diastolic CHF, chronic  Oral diuretics resumed per cardiology. Needs cardiology clearance for discharge    Dyslipidemia  Continue statin    Venous insufficiency  Could be the reason for lower extremity edema. In this case, diuretics cannot be titrated based on edema as the patient is likely to be over diuresed. Part of the edema will be resistant to diuretics due to being caused by local factors in the lower extremity. Acute bacterial conjunctivitis  Started on antibiotic eyedrops    Discussed with the patient and wife. Questions answered      DVT Prophylaxis: Lovenox  Diet: ADULT DIET; Regular  Code Status: Full Code    PT/OT Eval Status: Order    Dispo : Discharge to ARU when arrangements are made.     Ezra Garcia MD

## 2022-08-01 NOTE — PROGRESS NOTES
[facility]   Phone: [phone]    Physical Therapy    [] Initial Evaluation            [x] Daily Treatment Note         [] Discharge Summary      Patient: Gil Chicas   : 1945   MRN: [id number]   Date of Service:  2022  Admitting Diagnosis: RADHA (acute kidney injury) [facility])    Current Admission Summary: Gil Chicas is a 78 y.o. male who presents because of feeling dizzy. His wife provides the majority of the history. He apparently took shower this morning and afterwards, felt very lightheaded and fell back into a shower chair. His wife is there to help him to prevent injury and prevented his collapsed to the floor. He was extremely sweaty and nauseated. He had dry heaves. He has been feeling nauseated since he started spironolactone. His cardiologist has been trying to help him with leg edema. He has been on diuretic for long time and additional medicine was started earlier this week. He denies having any chest pain. He denies any injury from the falls. He did take a second fall this morning in getting out of the car. He fell to his knees. He denies any injury. Past Medical History:  has a past medical history of Allergic rhinitis, Aortic valve disorders, COPD (chronic obstructive pulmonary disease) ([address] Hydrocephalus ([address] Hyperlipidemia, Hypertension, Hypertrophy of prostate without urinary obstruction and other lower urinary tract symptoms (LUTS), Irritable bowel syndrome, Pancreatitis, Peptic ulcer, unspecified site, unspecified as acute or chronic, without mention of hemorrhage, perforation, or obstruction, and Peripheral vascular disease ([address] 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 Discharge Recommendations: Betzy Castellanos scored a 10/24 on the AM-PAC short mobility form. Current research shows that an AM-PAC score of 17 or less is typically not associated with a discharge to the patient's home setting. Based on the patient's AM-PAC score and their current functional mobility deficits, it is recommended that the patient have 5-7 sessions per week of Physical Therapy at d/c to increase the patient's independence. At this time, this patient demonstrates complex nursing, medical, and rehabilitative needs, and would benefit from intensive rehabilitation services upon discharge from the Inpatient setting. This patient demonstrates the ability to participate in and benefit from an intensive therapy program with a coordinated interdisciplinary team approach to foster frequent, structured, and documented communication among disciplines, who will work together to establish, prioritize, and achieve treatment goals. Please see assessment section for further patient specific details. If patient discharges prior to next session this note will serve as a discharge summary. Please see below for the latest assessment towards goals.       DME Required For Discharge: DME to be determined at next level of care  Precautions/Restrictions: high fall risk  Weight Bearing Restrictions: no restrictions  [] Right Upper Extremity  [] Left Upper Extremity [] Right Lower Extremity  [] Left Lower Extremity     Required Braces/Orthotics: no braces required   [] Right  [] Left  Positional Restrictions:no positional restrictions    Pre-Admission Information   Lives With: spouse    Type of Home: house  Home Layout: two level  Home Access:  15 step to enter with handrail. Handrails are located on R side. Bathroom Layout: walk in shower  Bathroom Equipment: grab bars in shower, grab bars around toilet, shower chair  Toilet Height: standard height  Home Equipment: rolling walker  Transfer Assistance: modified independent with use of RW  Ambulation Assistance:modified independent with use of RW  ADL Assistance: requires assistance with bathing, requires assistance with dressing  IADL Assistance: requires assistance with meal prep, requires assistance with laundry, requires assistance with vacuuming, requires assistance with cleaning, requires assistance with shopping  Active :        [x] Yes  [] No  Hand Dominance: [] Left  [x] Right  Current Employment: retired. Occupation: maintenance [employer]  Hobbies: \"No, I'm lazy. \"  Recent Falls: Fall 3-4 days ago, multiple falls in the past.  How many levels is your home? \"I don't get into. Algis Broaden Algis Broaden \"    Subjective  General: Pt semi-reclined in bed upon therapist arrival, with spouse present. Pt agreeable to PT/OT treatment  Pain: 0/10  Pain Interventions: not applicable     Functional Mobility  Bed Mobility  Supine to Sit: 2 person assistance with min A + mod A   Rolling Right: 2 person assistance with min A + mod A   Comments:  Pt required verbal cueing for sequencing, assist for emili LE and trunk, pt able to pull on therapist's UE for rolling onto side prior to sitting up. Transfers  Sit to stand transfer: 2 person assistance with mod A   Stand to sit transfer: 2 person assistance with mod A   Stand step transfer: 2 person assistance with mod A + min A   Comments: During all transfers pt requires increased cueing specifically for R side, pt pushes up from EOB and moves only L UE from bed to walker, leaves R UE hanging next to walker.    Sit<> stand: Pt requires assist for foot placement prior to sit<>stand transfers, verbal/tactile cueing to contract gluteals, verbal/tactile cueing for hand placement  Stand step: Pt requires cueing for all sequencing of transfer. Slow speed, increased effort  Ambulation  Ambulation not tested on this date. Distance: NA  Gait Mechanics: NA  Comments:    Stair Mobility  Stair mobility not completed on this date. Comments:  Wheelchair Mobility:  No w/c mobility completed on this date.   Comments:  Balance  Static Sitting Balance: poor (+): requires min (A) to maintain balance  Static Standing Balance: poor: requires mod (A) to maintain balance  Dynamic Standing Balance: poor: requires mod (A) to maintain balance  Comments: pt demonstrates posterior lean, required cues to contract gluteals, pt sat EOB x15 minutes total  Coordination   Alternating toe-tapping: impaired, R LE tapping slower than L LE  Heel - shin: L LE: WFL, R LE pt unable to trace shin in a straight line, slow speed    Other Therapeutic Interventions  ADLs - see OT note  Glute set 4sec x8  Diaphragmatic breathing x6 breaths    Functional Outcomes  AM-PAC Inpatient Mobility Raw Score : 10              Cognition  Overall Cognitive Status: Impaired  Arousal/Alterness: delayed responses to stimuli  Following Commands: follows one step commands with repetition, follows one step commands with increased time, inconsistently follows commands  Attention Span: difficulty attending to directions, difficulty dividing attention  Memory: decreased recall of biographical information, decreased recall of recent events  Safety Judgement: decreased awareness of need for assistance, decreased awareness of need for safety  Problem Solving: assistance required to generate solutions, assistance required to implement solutions, decreased awareness of errors, assistance required to identify errors made, assistance required to correct errors made  Initiation: requires cues for all  Orientation:    oriented to person, oriented to place, disoriented to time , and disoriented to situation   Command Following: impaired    Education  Barriers To Learning: cognition  Patient Education: patient educated on goals, PT role and benefits, plan of care, HEP, general safety, functional mobility training, family education, transfer training, discharge recommendations  Learning Assessment:  patient will require reinforcement due to cognitive deficits    Assessment  Activity Tolerance: Pt demonstrates decreased activity tolerance, fatiguing after ~15-20s of standing  Impairments Requiring Therapeutic Intervention: decreased functional mobility, decreased ADL status, decreased strength, decreased safety awareness, decreased cognition, decreased endurance, decreased balance, decreased coordination, decreased posture  Prognosis: good  Clinical Assessment: Pt is a 78 y.o male who presents with the above impairments. Interventions today limited secondary to deconditioning and decreased cognition. Pt required extensive cueing throughout session. Pt did not turn head to R side or attend to R side during transfers and bed mobility. Pt strength grossly 3+ on R side, 4 on L side, with impaired coordination on R as mentioned above. Due to discharge plan to inpatient rehab and neurologic presentation of deficits, POC frequency changed to 5-7x/week. Pt still presents below his baseline, and will continue to benefit from skilled physical therapy to address impairments. Safety Interventions: patient left in chair, chair alarm in place, call light within reach, gait belt, patient at risk for falls, nurse notified, and family/caregiver present    Plan  Frequency: 5-7 x/week  Current Treatment Recommendations: strengthening, balance training, functional mobility training, transfer training, gait training, endurance training, neuromuscular re-education, patient/caregiver education, safety education, and equipment evaluation/education    Goals  Patient Goals: None verbalized. Short Term Goals:  Time Frame: Discharge.   Patient will complete bed mobility at minimal assistance   Patient will complete transfers at minimal assistance   Patient will ambulate 10 ft with use of rolling walker at moderate assistance  **No goals met 8/1    Therapy Session Time      Individual Group Co-treatment   Time In     1357   Time Out     1451   Minutes     54     Timed Code Treatment Minutes:  54 Minutes  Total Treatment Minutes: 12[address] [facility] [place]  Therapist was present, directed the patient's care, made skilled judgement, and was responsible for assessment and treatment of the patient.         Electronically Signed By: ADRY Victor PT, DPT #[id number]

## 2022-08-02 ENCOUNTER — APPOINTMENT (OUTPATIENT)
Dept: GENERAL RADIOLOGY | Age: 77
DRG: 683 | End: 2022-08-02
Payer: COMMERCIAL

## 2022-08-02 LAB
ANION GAP SERPL CALCULATED.3IONS-SCNC: 10 MMOL/L (ref 3–16)
BILIRUBIN URINE: NEGATIVE
BLOOD, URINE: NEGATIVE
BUN BLDV-MCNC: 25 MG/DL (ref 7–20)
CALCIUM SERPL-MCNC: 9.5 MG/DL (ref 8.3–10.6)
CHLORIDE BLD-SCNC: 96 MMOL/L (ref 99–110)
CLARITY: CLEAR
CO2: 29 MMOL/L (ref 21–32)
COLOR: YELLOW
CREAT SERPL-MCNC: 1.2 MG/DL (ref 0.8–1.3)
GFR AFRICAN AMERICAN: >60
GFR NON-AFRICAN AMERICAN: 59
GLUCOSE BLD-MCNC: 116 MG/DL (ref 70–99)
GLUCOSE URINE: NEGATIVE MG/DL
HCT VFR BLD CALC: 40.5 % (ref 40.5–52.5)
HEMOGLOBIN: 13.6 G/DL (ref 13.5–17.5)
KETONES, URINE: NEGATIVE MG/DL
LEUKOCYTE ESTERASE, URINE: NEGATIVE
MCH RBC QN AUTO: 29.6 PG (ref 26–34)
MCHC RBC AUTO-ENTMCNC: 33.7 G/DL (ref 31–36)
MCV RBC AUTO: 88 FL (ref 80–100)
MICROSCOPIC EXAMINATION: NORMAL
NITRITE, URINE: NEGATIVE
PDW BLD-RTO: 14.2 % (ref 12.4–15.4)
PH UA: 5.5 (ref 5–8)
PLATELET # BLD: 358 K/UL (ref 135–450)
PMV BLD AUTO: 9.3 FL (ref 5–10.5)
POTASSIUM SERPL-SCNC: 3.3 MMOL/L (ref 3.5–5.1)
PROTEIN UA: NEGATIVE MG/DL
RBC # BLD: 4.6 M/UL (ref 4.2–5.9)
SODIUM BLD-SCNC: 135 MMOL/L (ref 136–145)
SPECIFIC GRAVITY UA: 1.01 (ref 1–1.03)
URINE REFLEX TO CULTURE: NORMAL
URINE TYPE: NORMAL
UROBILINOGEN, URINE: 1 E.U./DL
WBC # BLD: 13.1 K/UL (ref 4–11)

## 2022-08-02 PROCEDURE — 99232 SBSQ HOSP IP/OBS MODERATE 35: CPT | Performed by: NURSE PRACTITIONER

## 2022-08-02 PROCEDURE — 80048 BASIC METABOLIC PNL TOTAL CA: CPT

## 2022-08-02 PROCEDURE — 6370000000 HC RX 637 (ALT 250 FOR IP): Performed by: NURSE PRACTITIONER

## 2022-08-02 PROCEDURE — 97112 NEUROMUSCULAR REEDUCATION: CPT

## 2022-08-02 PROCEDURE — 6370000000 HC RX 637 (ALT 250 FOR IP): Performed by: FAMILY MEDICINE

## 2022-08-02 PROCEDURE — 97530 THERAPEUTIC ACTIVITIES: CPT

## 2022-08-02 PROCEDURE — 1200000000 HC SEMI PRIVATE

## 2022-08-02 PROCEDURE — 71045 X-RAY EXAM CHEST 1 VIEW: CPT

## 2022-08-02 PROCEDURE — 2580000003 HC RX 258: Performed by: FAMILY MEDICINE

## 2022-08-02 PROCEDURE — 85027 COMPLETE CBC AUTOMATED: CPT

## 2022-08-02 PROCEDURE — 87040 BLOOD CULTURE FOR BACTERIA: CPT

## 2022-08-02 PROCEDURE — 81003 URINALYSIS AUTO W/O SCOPE: CPT

## 2022-08-02 PROCEDURE — 36415 COLL VENOUS BLD VENIPUNCTURE: CPT

## 2022-08-02 PROCEDURE — 6370000000 HC RX 637 (ALT 250 FOR IP): Performed by: INTERNAL MEDICINE

## 2022-08-02 PROCEDURE — 97535 SELF CARE MNGMENT TRAINING: CPT

## 2022-08-02 PROCEDURE — 6360000002 HC RX W HCPCS: Performed by: FAMILY MEDICINE

## 2022-08-02 RX ADMIN — TORSEMIDE 20 MG: 20 TABLET ORAL at 09:18

## 2022-08-02 RX ADMIN — CIPROFLOXACIN 1 DROP: 3 SOLUTION OPHTHALMIC at 12:50

## 2022-08-02 RX ADMIN — CIPROFLOXACIN 1 DROP: 3 SOLUTION OPHTHALMIC at 16:47

## 2022-08-02 RX ADMIN — CIPROFLOXACIN 1 DROP: 3 SOLUTION OPHTHALMIC at 06:42

## 2022-08-02 RX ADMIN — ATORVASTATIN CALCIUM 80 MG: 80 TABLET, FILM COATED ORAL at 09:18

## 2022-08-02 RX ADMIN — ASPIRIN 81 MG: 81 TABLET, COATED ORAL at 09:18

## 2022-08-02 RX ADMIN — Medication 10 ML: at 22:28

## 2022-08-02 RX ADMIN — CIPROFLOXACIN 1 DROP: 3 SOLUTION OPHTHALMIC at 22:27

## 2022-08-02 RX ADMIN — CIPROFLOXACIN 1 DROP: 3 SOLUTION OPHTHALMIC at 07:57

## 2022-08-02 RX ADMIN — SPIRONOLACTONE 12.5 MG: 25 TABLET ORAL at 09:18

## 2022-08-02 RX ADMIN — TORSEMIDE 20 MG: 20 TABLET ORAL at 22:28

## 2022-08-02 RX ADMIN — VALSARTAN 80 MG: 40 TABLET, FILM COATED ORAL at 09:25

## 2022-08-02 RX ADMIN — CIPROFLOXACIN 1 DROP: 3 SOLUTION OPHTHALMIC at 09:19

## 2022-08-02 RX ADMIN — PANTOPRAZOLE SODIUM 40 MG: 40 TABLET, DELAYED RELEASE ORAL at 09:18

## 2022-08-02 RX ADMIN — CIPROFLOXACIN 1 DROP: 3 SOLUTION OPHTHALMIC at 18:36

## 2022-08-02 RX ADMIN — CIPROFLOXACIN 1 DROP: 3 SOLUTION OPHTHALMIC at 14:16

## 2022-08-02 RX ADMIN — ENOXAPARIN SODIUM 40 MG: 100 INJECTION SUBCUTANEOUS at 09:18

## 2022-08-02 RX ADMIN — Medication 10 ML: at 09:20

## 2022-08-02 ASSESSMENT — PAIN SCALES - GENERAL
PAINLEVEL_OUTOF10: 0
PAINLEVEL_OUTOF10: 0

## 2022-08-02 NOTE — PROGRESS NOTES
Hospitalist Progress Note      PCP: Shahbaz Stiles MD    Date of Admission: 7/28/2022    Chief Complaint: Dizziness      Subjective:   Some discomfort in eyes. Otherwise denies new acute complaints. Medications:  Reviewed    Infusion Medications    sodium chloride       Scheduled Medications    ciprofloxacin  1 drop Left Eye Q2H While awake    spironolactone  12.5 mg Oral Daily    torsemide  20 mg Oral BID    valsartan  80 mg Oral Daily    aspirin  81 mg Oral Daily    atorvastatin  80 mg Oral Daily    pantoprazole  40 mg Oral Daily    sodium chloride flush  5-40 mL IntraVENous 2 times per day    enoxaparin  40 mg SubCUTAneous Daily     PRN Meds: sodium chloride flush, sodium chloride, ondansetron **OR** ondansetron, polyethylene glycol, acetaminophen **OR** acetaminophen      Intake/Output Summary (Last 24 hours) at 8/2/2022 1122  Last data filed at 8/1/2022 2009  Gross per 24 hour   Intake 1205 ml   Output --   Net 1205 ml       Exam:    BP (!) 155/78   Pulse 75   Temp 98.1 °F (36.7 °C) (Oral)   Resp 17   Ht 6' (1.829 m)   Wt 217 lb (98.4 kg)   SpO2 93%   BMI 29.43 kg/m²     Gen/overall appearance: Not in acute distress. Alert. Head: Normocephalic, atraumatic  Eyes: EOMI, no scleral icterus, erythematous, mild discharge  CVS: regular rate and rhythm, Normal S1S2  Pulm: Clear to auscultation bilaterally. No crackles/wheezes  Extremities: trace bilat LE edema  Neuro: No gross focal deficits noted  Skin: Warm, dry    Labs:   Recent Labs     07/31/22 0425 08/01/22 0444 08/02/22  0435   WBC 13.3* 13.7* 13.1*   HGB 12.9* 13.1* 13.6   HCT 39.2* 39.9* 40.5    332 358     Recent Labs     07/31/22 0425 08/01/22 0444 08/02/22  0435   * 137 135*   K 3.7 3.4* 3.3*   CL 99 98* 96*   CO2 27 27 29   BUN 17 19 25*   CREATININE 0.9 1.0 1.2   CALCIUM 9.5 9.5 9.5     No results for input(s): AST, ALT, BILIDIR, BILITOT, ALKPHOS in the last 72 hours.   No results for input(s): INR in the last 72 hours. No results for input(s): Yesy Ybarra in the last 72 hours. Assessment/Plan:    Active Hospital Problems    Diagnosis Date Noted    Syncope and collapse [R55] 07/29/2022     Priority: Medium    Chronic diastolic heart failure ([facility] [I50.32] 07/29/2022     Priority: Medium    Dehydration [E86.0] 07/29/2022     Priority: Medium    RADHA (acute kidney injury) ([facility] [address] [N17.9] 04/06/2022    Peripheral edema [R60.9] 07/30/2014       Acute kidney injury. Now improving. Admission creatinine 1.5, baseline creatinine 0.9  S/p IV fluids; now d/lex  Restarted on oral diuretics  Continue to monitor intake and output   Trend Cr  Montior and replace lytes  Ins/outs  Avoid nephrotoxins     Dizziness. Possibly orthostatic hypotension  Resolved with improvement in hydration status. Continue to monitor. No changes overnight     Diastolic CHF, chronic  Oral diuretics resumed per cardiology     Venous insufficiency  Could be the reason for lower extremity edema. In this case, diuretics cannot be titrated based on edema as the patient is likely to be over diuresed. Part of the edema will be resistant to diuretics due to being caused by local factors in the lower extremity. Acute bacterial vs viral conjunctivitis  Started on antibiotic eyedrops    Leukocytosis. Possibly 2/2 conjunctivitis  Check CXR, UA, BCx  Trend WBC    Dyslipidemia  Continue statin    DVT Prophylaxis: lovenox  Diet: ADULT DIET; Regular  Code Status: Full Code    Dispo - ARU?     Stacie Duke MD

## 2022-08-02 NOTE — PROGRESS NOTES
[address]  HEART FAILURE  Progress Note      Admit Date 7/28/2022     Reason for Consult:      Reason for Consultation/Chief Complaint: dizziness    HPI:    Mike Montes De Oca is a 78 y.o. male with PMH HTN, HLD, COPD, PVD, AS s/p TAVR, HFpEF admitted with dizziness and RADHA. He was seen in CHF clinic last week with c/o edema, wt gain and no improvement after IV lasix and increased torsemide so I gave one dose metolazone and added donna. Subjective:  Patient is being seen for RADHA. There were no acute overnight cardiac events. Today  denies chest pain, shortness of breath, palpitations, and is working with therpy today - denies dizziness    Review of Systems - General ROS: positive for  - weakness  Hematological and Lymphatic ROS: negative  Respiratory ROS: no cough, shortness of breath, or wheezing  Cardiovascular ROS: no chest pain or dyspnea on exertion  Gastrointestinal ROS: no abdominal pain, change in bowel habits, or black or bloody stools  Musculoskeletal ROS: negative  Neurological ROS: no TIA or stroke symptoms     Baseline Weight: previously 216, now closer to 220-225   Wt Readings from Last 3 Encounters:   08/02/22 217 lb (98.4 kg)   07/25/22 233 lb (105.7 kg)   06/28/22 227 lb 9.6 oz (103.2 kg)         Cardiac Testing:   ECHO:  5/19/2022   Summary   *Technically difficult study due to poor acoustical window. Patient refused   Definity. Suboptimal image quality.    *Left ventricle - normal size, thickness and function with EF of 55%   *Aortic valve - well seated TAVR valve, PV 1.9m/s, MG 8mmHg, no significant   regurgitation      NYHA Class II      Objective:   BP (!) 150/75   Pulse 75   Temp 98.1 °F (36.7 °C) (Oral)   Resp 17   Ht 6' (1.829 m)   Wt 217 lb (98.4 kg)   SpO2 93%   BMI 29.43 kg/m²     Intake/Output Summary (Last 24 hours) at 8/2/2022 0932  Last data filed at 8/1/2022 2009  Gross per 24 hour   Intake 1205 ml   Output --   Net 1205 ml      In: 0123 [P.O.:1200; I.V.:5]  Out: -       Physical Exam:  General Appearance:  Non-obese/Well Nourished  Respiratory:  Resp Auscultation: Normal breath sounds without dullness  Cardiovascular:   Auscultation: Regular rate and rhythm, normal S1S2, no m/g/r/c  Palpation: Normal    JVD: none  Pedal Pulses: 2+ and equal   Abdomen:  Soft, NT, ND, + bs  Extremities:  No Cyanosis or Clubbing  Extremities: negative  Neurological/Psychiatric:  Oriented to time, place, and person  Non-anxious    MEDICATIONS:   Scheduled Meds:   Scheduled Meds:   ciprofloxacin  1 drop Left Eye Q2H While awake    spironolactone  12.5 mg Oral Daily    torsemide  20 mg Oral BID    valsartan  80 mg Oral Daily    aspirin  81 mg Oral Daily    atorvastatin  80 mg Oral Daily    pantoprazole  40 mg Oral Daily    sodium chloride flush  5-40 mL IntraVENous 2 times per day    enoxaparin  40 mg SubCUTAneous Daily     Continuous Infusions:   sodium chloride       PRN Meds:.sodium chloride flush, sodium chloride, ondansetron **OR** ondansetron, polyethylene glycol, acetaminophen **OR** acetaminophen  Continuous Infusions:   sodium chloride         Intake/Output Summary (Last 24 hours) at 8/2/2022 0932  Last data filed at 8/1/2022 2009  Gross per 24 hour   Intake 1205 ml   Output --   Net 1205 ml       Lab Data:  CBC:   Lab Results   Component Value Date/Time    WBC 13.1 08/02/2022 04:35 AM    HGB 13.6 08/02/2022 04:35 AM     08/02/2022 04:35 AM     BMP:  Lab Results   Component Value Date/Time     08/02/2022 04:35 AM    K 3.3 08/02/2022 04:35 AM    K 3.5 07/28/2022 01:19 PM    CL 96 08/02/2022 04:35 AM    CO2 29 08/02/2022 04:35 AM    BUN 25 08/02/2022 04:35 AM    CREATININE 1.2 08/02/2022 04:35 AM    GLUCOSE 116 08/02/2022 04:35 AM     INR:   Lab Results   Component Value Date/Time    INR 1.03 07/28/2022 01:19 PM    INR 1.14 04/04/2022 10:25 AM    INR 1.08 09/18/2018 01:00 PM        CARDIAC LABS  ENZYMES:No results for input(s): CKMB, CKMBINDEX, TROPONINI in the last 72 hours. Invalid input(s): CKTOTAL;3  FASTING LIPID PANEL:  Lab Results   Component Value Date/Time    HDL 56 11/20/2020 10:27 AM    LDLCALC 111 11/20/2020 10:27 AM    TRIG 97 09/05/2017 09:42 AM    TSH 1.82 11/07/2017 02:45 PM     LIVER PROFILE:  Lab Results   Component Value Date/Time    AST 27 07/28/2022 01:19 PM    AST 20 04/12/2022 07:21 AM    ALT 22 07/28/2022 01:19 PM    ALT 27 04/12/2022 07:21 AM     BNP:   Lab Results   Component Value Date/Time    PROBNP 147 07/28/2022 01:19 PM    PROBNP 214 07/06/2022 12:06 PM    PROBNP 186 06/14/2022 11:30 AM     Iron Studies:    Lab Results   Component Value Date/Time    FERRITIN 87.0 09/20/2018 08:05 AM     Lab Results   Component Value Date    IRON 47 (L) 09/20/2018    TIBC 285 09/20/2018    FERRITIN 87.0 09/20/2018      Iron Deficiency Anemia:    IV Iron Therapy:  No  2017 ACC/AHA HF Guidelines:   intravenous iron replacement in patients with New York Heart Association (NYHA) class II and III HF and iron deficiency(ferritin <100 ng/ml or 100-300 ng/ml if transferrin saturation <20%), to improve functional status and QoL. 1. WEIGHT: Admit Weight: 216 lb (98 kg)      Today  Weight: 217 lb (98.4 kg)   2.  I/O   Intake/Output Summary (Last 24 hours) at 8/2/2022 0932  Last data filed at 8/1/2022 2009  Gross per 24 hour   Intake 1205 ml   Output --   Net 1205 ml           Assessment/Plan:     CHF- compensated, continue torsemide,  spiro12.5mg/day and do not give metolazone in the future, pt ok for d/c to SNF, will check weekly labs and f/u in CHF clinic  Edema - resolved  HTN - elevated, continue valsartan, will continue to monitor for now given dizziness at admit          I appreciate the opportunity of cooperating in the care of this individual.    Kathryn De Leon, APRN - CNP, ACNP, [address] 8/2/2022, 9:32 AM  Heart Failure  [facility]  [address], [address]  Ph: [phone]      Core Measures:   Discharge instructions:   LVEF documented:   ACEI for LV dysfunction:   Smoking Cessation:

## 2022-08-02 NOTE — PROGRESS NOTES
[facility]   Phone: [phone]    Physical Therapy    [] Initial Evaluation            [x] Daily Treatment Note         [] Discharge Summary      Patient: Shani Solomon   : 1945   MRN: [id number]   Date of Service:  2022  Admitting Diagnosis: RADHA (acute kidney injury) [facility])    Current Admission Summary: Shani Solomon is a 78 y.o. male who presents because of feeling dizzy. His wife provides the majority of the history. He apparently took shower this morning and afterwards, felt very lightheaded and fell back into a shower chair. His wife is there to help him to prevent injury and prevented his collapsed to the floor. He was extremely sweaty and nauseated. He had dry heaves. He has been feeling nauseated since he started spironolactone. His cardiologist has been trying to help him with leg edema. He has been on diuretic for long time and additional medicine was started earlier this week. He denies having any chest pain. He denies any injury from the falls. He did take a second fall this morning in getting out of the car. He fell to his knees. He denies any injury. Past Medical History:  has a past medical history of Allergic rhinitis, Aortic valve disorders, COPD (chronic obstructive pulmonary disease) ([address] Hydrocephalus ([address] Hyperlipidemia, Hypertension, Hypertrophy of prostate without urinary obstruction and other lower urinary tract symptoms (LUTS), Irritable bowel syndrome, Pancreatitis, Peptic ulcer, unspecified site, unspecified as acute or chronic, without mention of hemorrhage, perforation, or obstruction, and Peripheral vascular disease ([address] 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 Discharge Recommendations: Gil Chicas scored a 8/24 on the AM-PAC short mobility form. Current research shows that an AM-PAC score of 17 or less is typically not associated with a discharge to the patient's home setting. Based on the patient's AM-PAC score and their current functional mobility deficits, it is recommended that the patient have 5-7 sessions per week of Physical Therapy at d/c to increase the patient's independence. At this time, this patient demonstrates complex nursing, medical, and rehabilitative needs, and would benefit from intensive rehabilitation services upon discharge from the Inpatient setting. This patient demonstrates the ability to participate in and benefit from an intensive therapy program with a coordinated interdisciplinary team approach to foster frequent, structured, and documented communication among disciplines, who will work together to establish, prioritize, and achieve treatment goals. Please see assessment section for further patient specific details. If patient discharges prior to next session this note will serve as a discharge summary. Please see below for the latest assessment towards goals.       DME Required For Discharge: DME to be determined at next level of care  Precautions/Restrictions: high fall risk  Weight Bearing Restrictions: no restrictions  [] Right Upper Extremity  [] Left Upper Extremity [] Right Lower Extremity  [] Left Lower Extremity     Required Braces/Orthotics: no braces required   [] Right  [] Left  Positional Restrictions:no positional restrictions    Pre-Admission Information   Lives With: spouse    Type of Home: house  Home Layout: two coordination and motor planning with stand pivot transfer requiring Max A of 2. Ambulation  Ambulation not tested on this date. Distance: NA  Gait Mechanics: NA  Comments:  Deferred due to poor standing balance and difficulty motor planning. Stair Mobility  Stair mobility not completed on this date. Comments:  Wheelchair Mobility:  No w/c mobility completed on this date. Comments:  Balance  Static Sitting Balance: fair (-): maintains balance at CGA with use of UE support  Dynamic Sitting Balance: fair (-): maintains balance at CGA with use of UE support  Static Standing Balance: poor: requires mod (A) to maintain balance  Dynamic Standing Balance: poor (-): requires max (A) to maintain balance  Comments: Pt sat EOB x10 minutes with increasing (L) lean with fatigue, ability to hold midline improved after intervention (see seated exercise)  Pt stood 1 minute + 1 minute + 30 sec with (B) UE support on RW with Mod A of 1-2 persons. Improved use of (R) UE on RW however pt continues to have a severe (L) midline shift with strong (L) and posterior lean. Minimal improvement to (R) UE/LE weight bearing despite multiple tactile/verbal cues and exercises (see below). Pt locks out (R) knee in standing.      Other Therapeutic Interventions  ADLs - see OT note  Supine exercises: bridges x5 reps with therapist stabilizing legs (B), heelslides x5 reps (B) - (R) LE noted to be weaker on all exercises  Seated exercises: 2 minutes in (R) lateral trunk flexion with (R) forearm weight bearing - improved midline in sitting following  Standing: (L) heel raises x5 reps (only minimal (R) weight shift), (B) heel raises x10 reps    Functional Outcomes  AM-PAC Inpatient Mobility Raw Score : 8              Cognition  Overall Cognitive Status: Impaired  Arousal/Alterness: delayed responses to stimuli  Following Commands: follows one step commands with repetition, follows one step commands with increased time, inconsistently follows commands  Attention Span: attends with cues to redirect  Memory: decreased recall of biographical information, decreased recall of recent events  Safety Judgement: decreased awareness of need for assistance, decreased awareness of need for safety  Problem Solving: assistance required to generate solutions, assistance required to implement solutions, decreased awareness of errors, assistance required to identify errors made, assistance required to correct errors made  Insights: decreased awareness of deficits  Initiation: requires cues for all  Sequencing: requires cues for all  Orientation:    oriented to person, oriented to place, disoriented to time , and disoriented to situation (pt knew month with increased time, but did not know year)  Command Following:   impaired    Education  Barriers To Learning: cognition  Patient Education: patient educated on goals, PT role and benefits, plan of care, HEP, general safety, functional mobility training, adaptive device training, family education, transfer training, discharge recommendations  Learning Assessment:  patient will require reinforcement due to cognitive deficits    Assessment  Activity Tolerance: Pt fatigues with 1 minute standing trials and requires seated rest breaks between. Increased (L) lateral lean noted as pt fatigued. No dizziness with activity. Impairments Requiring Therapeutic Intervention: decreased functional mobility, decreased ADL status, decreased strength, decreased safety awareness, decreased cognition, decreased endurance, decreased balance, decreased coordination, decreased posture  Prognosis: good  Clinical Assessment: Pt continues to present with (R) sided weakness. He did not demonstrate (R) neglect today, however he has a strong (L) lateral lean in sitting and standing. His ability to correct in standing is minimal. The patient also continues to have difficulty sequencing a stand pivot transfer and requires Max A x2.  The patient requires more assistance than his spouse can provide and is at a high risk of falling. Continue to recommend d/c to inpatient rehab to safely progress functional mobility and independence back to baseline. Safety Interventions: patient left in chair, chair alarm in place, call light within reach, gait belt, patient at risk for falls, telesitter in use, nurse notified, and family/caregiver present    Plan  Frequency: 5-7 x/week  Current Treatment Recommendations: strengthening, balance training, functional mobility training, transfer training, gait training, endurance training, neuromuscular re-education, patient/caregiver education, safety education, and equipment evaluation/education    Goals  Patient Goals: None verbalized. Short Term Goals:  Time Frame: Discharge.   Patient will complete bed mobility at minimal assistance   Patient will complete transfers at minimal assistance   Patient will ambulate 10 ft with use of rolling walker at moderate assistance  **No goals met 8/2    Therapy Session Time      Individual Group Co-treatment   Time In     0942   Time Out     1035   Minutes     53     Timed Code Treatment Minutes:  53 Minutes  Total Treatment Minutes: 53 Minutes       Electronically Signed By: ADRY Murphy PT, DPT #[id number]

## 2022-08-02 NOTE — PLAN OF CARE
Problem: Discharge Planning  Goal: Discharge to home or other facility with appropriate resources  8/2/2022 0002 by Trenna Brittle, RN  Outcome: Progressing     Problem: Skin/Tissue Integrity  Goal: Absence of new skin breakdown  Description: 1. Monitor for areas of redness and/or skin breakdown  2. Assess vascular access sites hourly  8/2/2022 0002 by Trenna Brittle, RN  Outcome: Progressing  Note: No sign of new skin breakdown noted     Problem: Safety - Adult  Goal: Free from fall injury  8/2/2022 0002 by Trenna Brittle, RN  Outcome: Progressing  Note: Safety precautions in place. Bed locked, alarmed, lowest position. Call light in reach, gripper/non-slid socks on. No falls or physical injury noted.      Problem: ABCDS Injury Assessment  Goal: Absence of physical injury  8/2/2022 0002 by Trenna Brittle, RN  Outcome: Progressing     Problem: Gastrointestinal - Adult  Goal: Maintains adequate nutritional intake  8/2/2022 0002 by Trenna Brittle, RN  Outcome: Progressing     Problem: Metabolic/Fluid and Electrolytes - Adult  Goal: Hemodynamic stability and optimal renal function maintained  8/2/2022 0002 by Trenna Brittle, RN  Outcome: Progressing

## 2022-08-02 NOTE — PLAN OF CARE
Problem: Discharge Planning  Goal: Discharge to home or other facility with appropriate resources  8/2/2022 1159 by An Calix RN  Outcome: Progressing     Problem: Skin/Tissue Integrity  Goal: Absence of new skin breakdown  Description: 1. Monitor for areas of redness and/or skin breakdown  2. Assess vascular access sites hourly  3. Every 4-6 hours minimum:  Change oxygen saturation probe site  4. Every 4-6 hours:  If on nasal continuous positive airway pressure, respiratory therapy assess nares and determine need for appliance change or resting period.   8/2/2022 1159 by An Calix RN  Outcome: Progressing     Problem: Safety - Adult  Goal: Free from fall injury  8/2/2022 1159 by An Calix RN  Outcome: Progressing     Problem: ABCDS Injury Assessment  Goal: Absence of physical injury  8/2/2022 1159 by An Calix RN  Outcome: Progressing     Problem: Neurosensory - Adult  Goal: Achieves maximal functionality and self care  Outcome: Progressing     Problem: Cardiovascular - Adult  Goal: Maintains optimal cardiac output and hemodynamic stability  Outcome: Progressing     Problem: Musculoskeletal - Adult  Goal: Return mobility to safest level of function  Outcome: Progressing     Problem: Gastrointestinal - Adult  Goal: Maintains adequate nutritional intake  8/2/2022 1159 by An Calix RN  Outcome: Progressing     Problem: Metabolic/Fluid and Electrolytes - Adult  Goal: Hemodynamic stability and optimal renal function maintained  8/2/2022 1159 by An Calix RN  Outcome: Progressing

## 2022-08-03 VITALS
WEIGHT: 212.8 LBS | HEART RATE: 73 BPM | HEIGHT: 72 IN | OXYGEN SATURATION: 96 % | BODY MASS INDEX: 28.82 KG/M2 | TEMPERATURE: 97.5 F | DIASTOLIC BLOOD PRESSURE: 67 MMHG | SYSTOLIC BLOOD PRESSURE: 114 MMHG | RESPIRATION RATE: 16 BRPM

## 2022-08-03 LAB
ANION GAP SERPL CALCULATED.3IONS-SCNC: 13 MMOL/L (ref 3–16)
BUN BLDV-MCNC: 29 MG/DL (ref 7–20)
CALCIUM SERPL-MCNC: 9.3 MG/DL (ref 8.3–10.6)
CHLORIDE BLD-SCNC: 99 MMOL/L (ref 99–110)
CO2: 27 MMOL/L (ref 21–32)
CREAT SERPL-MCNC: 1.2 MG/DL (ref 0.8–1.3)
GFR AFRICAN AMERICAN: >60
GFR NON-AFRICAN AMERICAN: 59
GLUCOSE BLD-MCNC: 114 MG/DL (ref 70–99)
HCT VFR BLD CALC: 41.4 % (ref 40.5–52.5)
HEMOGLOBIN: 13.7 G/DL (ref 13.5–17.5)
MCH RBC QN AUTO: 29.2 PG (ref 26–34)
MCHC RBC AUTO-ENTMCNC: 33.1 G/DL (ref 31–36)
MCV RBC AUTO: 88.2 FL (ref 80–100)
PDW BLD-RTO: 14.3 % (ref 12.4–15.4)
PLATELET # BLD: 373 K/UL (ref 135–450)
PMV BLD AUTO: 8.8 FL (ref 5–10.5)
POTASSIUM SERPL-SCNC: 3.3 MMOL/L (ref 3.5–5.1)
RBC # BLD: 4.69 M/UL (ref 4.2–5.9)
SODIUM BLD-SCNC: 139 MMOL/L (ref 136–145)
WBC # BLD: 11 K/UL (ref 4–11)

## 2022-08-03 PROCEDURE — 97535 SELF CARE MNGMENT TRAINING: CPT

## 2022-08-03 PROCEDURE — 6360000002 HC RX W HCPCS: Performed by: FAMILY MEDICINE

## 2022-08-03 PROCEDURE — 6370000000 HC RX 637 (ALT 250 FOR IP): Performed by: FAMILY MEDICINE

## 2022-08-03 PROCEDURE — 97112 NEUROMUSCULAR REEDUCATION: CPT

## 2022-08-03 PROCEDURE — 6370000000 HC RX 637 (ALT 250 FOR IP): Performed by: INTERNAL MEDICINE

## 2022-08-03 PROCEDURE — 97530 THERAPEUTIC ACTIVITIES: CPT

## 2022-08-03 PROCEDURE — 97116 GAIT TRAINING THERAPY: CPT

## 2022-08-03 PROCEDURE — 80048 BASIC METABOLIC PNL TOTAL CA: CPT

## 2022-08-03 PROCEDURE — 36415 COLL VENOUS BLD VENIPUNCTURE: CPT

## 2022-08-03 PROCEDURE — 85027 COMPLETE CBC AUTOMATED: CPT

## 2022-08-03 PROCEDURE — 2580000003 HC RX 258: Performed by: FAMILY MEDICINE

## 2022-08-03 PROCEDURE — 6370000000 HC RX 637 (ALT 250 FOR IP): Performed by: NURSE PRACTITIONER

## 2022-08-03 PROCEDURE — 94760 N-INVAS EAR/PLS OXIMETRY 1: CPT

## 2022-08-03 PROCEDURE — 99232 SBSQ HOSP IP/OBS MODERATE 35: CPT | Performed by: NURSE PRACTITIONER

## 2022-08-03 RX ORDER — POTASSIUM CHLORIDE 20 MEQ/1
40 TABLET, EXTENDED RELEASE ORAL PRN
Status: DISCONTINUED | OUTPATIENT
Start: 2022-08-03 | End: 2022-08-03 | Stop reason: HOSPADM

## 2022-08-03 RX ORDER — SPIRONOLACTONE 25 MG/1
12.5 TABLET ORAL DAILY
Qty: 30 TABLET | Refills: 3 | Status: SHIPPED | OUTPATIENT
Start: 2022-08-04 | End: 2022-09-06

## 2022-08-03 RX ORDER — TORSEMIDE 20 MG/1
20 TABLET ORAL DAILY
Status: DISCONTINUED | OUTPATIENT
Start: 2022-08-04 | End: 2022-08-03 | Stop reason: HOSPADM

## 2022-08-03 RX ORDER — POTASSIUM CHLORIDE 7.45 MG/ML
10 INJECTION INTRAVENOUS PRN
Status: DISCONTINUED | OUTPATIENT
Start: 2022-08-03 | End: 2022-08-03 | Stop reason: HOSPADM

## 2022-08-03 RX ORDER — TORSEMIDE 20 MG/1
20 TABLET ORAL 2 TIMES DAILY
Qty: 30 TABLET | Refills: 3 | Status: SHIPPED | OUTPATIENT
Start: 2022-08-03 | End: 2022-10-18

## 2022-08-03 RX ADMIN — ASPIRIN 81 MG: 81 TABLET, COATED ORAL at 08:30

## 2022-08-03 RX ADMIN — CIPROFLOXACIN 1 DROP: 3 SOLUTION OPHTHALMIC at 12:01

## 2022-08-03 RX ADMIN — CIPROFLOXACIN 1 DROP: 3 SOLUTION OPHTHALMIC at 08:34

## 2022-08-03 RX ADMIN — SPIRONOLACTONE 12.5 MG: 25 TABLET ORAL at 08:30

## 2022-08-03 RX ADMIN — ATORVASTATIN CALCIUM 80 MG: 80 TABLET, FILM COATED ORAL at 08:30

## 2022-08-03 RX ADMIN — ENOXAPARIN SODIUM 40 MG: 100 INJECTION SUBCUTANEOUS at 08:34

## 2022-08-03 RX ADMIN — CIPROFLOXACIN 1 DROP: 3 SOLUTION OPHTHALMIC at 10:04

## 2022-08-03 RX ADMIN — POTASSIUM CHLORIDE 40 MEQ: 1500 TABLET, EXTENDED RELEASE ORAL at 10:04

## 2022-08-03 RX ADMIN — CIPROFLOXACIN 1 DROP: 3 SOLUTION OPHTHALMIC at 16:16

## 2022-08-03 RX ADMIN — PANTOPRAZOLE SODIUM 40 MG: 40 TABLET, DELAYED RELEASE ORAL at 08:30

## 2022-08-03 RX ADMIN — VALSARTAN 80 MG: 40 TABLET, FILM COATED ORAL at 08:30

## 2022-08-03 RX ADMIN — CIPROFLOXACIN 1 DROP: 3 SOLUTION OPHTHALMIC at 06:33

## 2022-08-03 RX ADMIN — Medication 10 ML: at 08:34

## 2022-08-03 RX ADMIN — TORSEMIDE 20 MG: 20 TABLET ORAL at 08:30

## 2022-08-03 RX ADMIN — CIPROFLOXACIN 1 DROP: 3 SOLUTION OPHTHALMIC at 14:36

## 2022-08-03 ASSESSMENT — PAIN SCALES - GENERAL
PAINLEVEL_OUTOF10: 0
PAINLEVEL_OUTOF10: 0

## 2022-08-03 NOTE — PROGRESS NOTES
[facility] [address]   Phone: [phone]    Occupational Therapy    [] Initial Evaluation            [x] Daily Treatment Note         [] Discharge Summary      Patient: Gil Chicas   : 1945   MRN: [id number]   Date of Service:  8/3/2022    Admitting Diagnosis:  RADHA (acute kidney injury) [facility])  Current Admission Summary: Gil Chicas is a 78 y.o. male who presents because of feeling dizzy. His wife provides the majority of the history. He apparently took shower this morning and afterwards, felt very lightheaded and fell back into a shower chair. His wife is there to help him to prevent injury and prevented his collapsed to the floor. He was extremely sweaty and nauseated. He had dry heaves. He has been feeling nauseated since he started spironolactone. His cardiologist has been trying to help him with leg edema. He has been on diuretic for long time and additional medicine was started earlier this week. He denies having any chest pain. He denies any injury from the falls. He did take a second fall this morning in getting out of the car. He fell to his knees. He denies any injury. Past Medical History:  has a past medical history of Allergic rhinitis, Aortic valve disorders, COPD (chronic obstructive pulmonary disease) ([address] Hydrocephalus ([address] Hyperlipidemia, Hypertension, Hypertrophy of prostate without urinary obstruction and other lower urinary tract symptoms (LUTS), Irritable bowel syndrome, Pancreatitis, Peptic ulcer, unspecified site, unspecified as acute or chronic, without mention of hemorrhage, perforation, or obstruction, and Peripheral vascular disease ([address] 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 Discharge Recommendations: Dorian Lynne scored a 11/24 on the AM-PAC ADL Inpatient form. Current research shows that an AM-PAC score of 17 or less is typically not associated with a discharge to the patient's home setting. Based on the patient's AM-PAC score and their current ADL deficits, it is recommended that the patient have 5-7 sessions per week of Occupational Therapy at d/c to increase the patient's independence. At this time, this patient demonstrates complex nursing, medical, and rehabilitative needs, and would benefit from intensive rehabilitation services upon discharge from the Inpatient setting. This patient demonstrates the ability to participate in and benefit from an intensive therapy program with a coordinated interdisciplinary team approach to foster frequent, structured, and documented communication among disciplines, who will work together to establish, prioritize, and achieve treatment goals. Please see assessment section for further patient specific details. If patient discharges prior to next session this note will serve as a discharge summary. Please see below for the latest assessment towards goals.          DME Required For Discharge: no DME required at discharge    Precautions/Restrictions: high fall risk  Weight Bearing Restrictions: no restrictions  [] Right Upper Extremity  [] Left Upper Extremity [] Right Lower Extremity  [] Left Lower Extremity     Required Braces/Orthotics: no braces required   [] Right  [] Left  Positional Restrictions:no positional restrictions    Pre-Admission Information   Lives With: spouse                  Type of Home: house  Home Layout: two level  Home person assistance with Min to modA of 2   Bed / Chair transfer: 2 person assistance with min to modA of 2 with RW . Bed / Chair equipment: rolling walker  Bed / Chair comments: Cues (verbal and physical) to maintain upright posture, motor planning  Functional Mobility:  Sitting Balance: stand by assistance, contact guard assistance. Standing Balance: 2 person assistance with min to modA of 2 . Standing Balance Comment: multiple trials in stance at RW. Trunk rotation/dynamic reaching to R to promote wt shifts to R, trunk and cervical rotation. Patient with initial severe L lean, difficulty with dissociative movement, reaching in stance to promote, BUE reaching in sitting to promote increased trunk rotation (patient with extremely limited cervical rotation B, maximal assist and cueing to promote in stance)  Functional Mobility: .  2 person assistance with min to modA of 2 12' + 12' (seated rest break) 12'+12'   Functional Mobility Activity: to/from bathroom, min to modA standing balance at sink, standing at recliner during balance tasks  Functional Mobility Device Use: rolling walker  Functional Mobility Comment: .     Other Therapeutic Interventions    Functional Outcomes       Cognition  Overall Cognitive Status: Impaired  Arousal/Alterness: delayed responses to stimuli  Following Commands: follows one step commands with repetition, follows one step commands with increased time, inconsistently follows commands  Attention Span: attends with cues to redirect  Memory: decreased recall of biographical information, decreased recall of recent events  Safety Judgement: decreased awareness of need for assistance, decreased awareness of need for safety  Problem Solving: assistance required to generate solutions, assistance required to implement solutions, decreased awareness of errors, assistance required to identify errors made, assistance required to correct errors made  Insights: decreased awareness of deficits  Initiation: requires cues for all  Sequencing: requires cues for all  Orientation:    oriented to person, oriented to place, disoriented to time , and disoriented to situation  Command Following:   impaired     Education  Barriers To Learning: cognition  Patient Education: patient educated on goals, OT role and benefits, plan of care, family education, transfer training, discharge recommendations  Learning Assessment:  patient will require reinforcement due to cognitive deficits, patient is not an independent learner    Assessment  Activity Tolerance: Pt tolerated treatment well. Impairments Requiring Therapeutic Intervention: decreased functional mobility, decreased ADL status, decreased ROM, decreased strength, decreased safety awareness, decreased cognition, decreased endurance, decreased balance, decreased fine motor control, decreased coordination, decreased posture  Prognosis: fair  Clinical Assessment: Pt presenting below his functional baseline with the above deficits secondary to RADHA with metabolic encephalopathy. Pt requiring Mod A x 2 progressing to Max A x 2 for sit to stand/stand to sit and Max A x 2 for stand pivot transfer. Pt Dependent to don brief. Highly recommend continued inpt therapy at d/c to maximize functional independence, safety and reduce caregiver burden. Continue with POC.   Safety Interventions: patient left in chair, chair alarm in place, call light within reach, patient at risk for falls, nurse notified, and family/caregiver present    Plan  Frequency: 5-7 x/week  Current Treatment Recommendations: strengthening, ROM, balance training, functional mobility training, transfer training, gait training, endurance training, neuromuscular re-education, patient/caregiver education, ADL/self-care training, safety education, and equipment evaluation/education    Goals  Patient Goals: Return to home   Short Term Goals:  Time Frame: Discharge  Patient will complete upper body ADL at

## 2022-08-03 NOTE — PROGRESS NOTES
[address]  HEART FAILURE  Progress Note      Admit Date 7/28/2022     Reason for Consult:      Reason for Consultation/Chief Complaint: dizziness    HPI:    Vinod Green is a 78 y.o. male with PMH HTN, HLD, COPD, PVD, AS s/p TAVR, HFpEF admitted with dizziness and RADHA. Meds have been adjusted and volume status/dizziness improved but he remains very weak. Subjective:  Patient is being seen for RADHA. There were no acute overnight cardiac events. Today Mr. Mark Kelley denies chest pain, shortness of breath, palpitations, but c/o continued weakness. Review of Systems - General ROS: positive for  - weakness  Hematological and Lymphatic ROS: negative  Respiratory ROS: no cough, shortness of breath, or wheezing  Cardiovascular ROS: no chest pain or dyspnea on exertion  Gastrointestinal ROS: no abdominal pain, change in bowel habits, or black or bloody stools  Musculoskeletal ROS: negative  Neurological ROS: no TIA or stroke symptoms     Baseline Weight: previously 216, now closer to 220-225   Wt Readings from Last 3 Encounters:   08/03/22 212 lb 12.8 oz (96.5 kg)   07/25/22 233 lb (105.7 kg)   06/28/22 227 lb 9.6 oz (103.2 kg)         Cardiac Testing:   ECHO:  5/19/2022   Summary   *Technically difficult study due to poor acoustical window. Patient refused   Definity. Suboptimal image quality.    *Left ventricle - normal size, thickness and function with EF of 55%   *Aortic valve - well seated TAVR valve, PV 1.9m/s, MG 8mmHg, no significant   regurgitation      NYHA Class II      Objective:   BP (!) 145/74   Pulse 70   Temp 97.4 °F (36.3 °C) (Oral)   Resp 16   Ht 6' (1.829 m)   Wt 212 lb 12.8 oz (96.5 kg)   SpO2 94%   BMI 28.86 kg/m²     Intake/Output Summary (Last 24 hours) at 8/3/2022 0910  Last data filed at 8/3/2022 0345  Gross per 24 hour   Intake --   Output 850 ml   Net -850 ml      In: -   Out: 850       Physical Exam:  General Appearance:  Non-obese/Well Nourished  Respiratory:  Resp Auscultation: Normal breath sounds without dullness  Cardiovascular:   Auscultation: Regular rate and rhythm, normal S1S2, no m/g/r/c  Palpation: Normal    JVD: none  Pedal Pulses: 2+ and equal   Abdomen:  Soft, NT, ND, + bs  Extremities:  No Cyanosis or Clubbing  Extremities: negative  Neurological/Psychiatric:  Oriented to time, place, and person  Non-anxious    MEDICATIONS:   Scheduled Meds:   Scheduled Meds:   ciprofloxacin  1 drop Left Eye Q2H While awake    spironolactone  12.5 mg Oral Daily    torsemide  20 mg Oral BID    valsartan  80 mg Oral Daily    aspirin  81 mg Oral Daily    atorvastatin  80 mg Oral Daily    pantoprazole  40 mg Oral Daily    sodium chloride flush  5-40 mL IntraVENous 2 times per day    enoxaparin  40 mg SubCUTAneous Daily     Continuous Infusions:   sodium chloride       PRN Meds:.potassium chloride **OR** potassium alternative oral replacement **OR** potassium chloride, sodium chloride flush, sodium chloride, ondansetron **OR** ondansetron, polyethylene glycol, acetaminophen **OR** acetaminophen  Continuous Infusions:   sodium chloride         Intake/Output Summary (Last 24 hours) at 8/3/2022 0910  Last data filed at 8/3/2022 0345  Gross per 24 hour   Intake --   Output 850 ml   Net -850 ml       Lab Data:  CBC:   Lab Results   Component Value Date/Time    WBC 11.0 08/03/2022 05:29 AM    HGB 13.7 08/03/2022 05:29 AM     08/03/2022 05:29 AM     BMP:  Lab Results   Component Value Date/Time     08/03/2022 05:29 AM    K 3.3 08/03/2022 05:29 AM    K 3.5 07/28/2022 01:19 PM    CL 99 08/03/2022 05:29 AM    CO2 27 08/03/2022 05:29 AM    BUN 29 08/03/2022 05:29 AM    CREATININE 1.2 08/03/2022 05:29 AM    GLUCOSE 114 08/03/2022 05:29 AM     INR:   Lab Results   Component Value Date/Time    INR 1.03 07/28/2022 01:19 PM    INR 1.14 04/04/2022 10:25 AM    INR 1.08 09/18/2018 01:00 PM        CARDIAC LABS  ENZYMES:No results for input(s): CKMB, CKMBINDEX, TROPONINI in the last 72 hours.    Invalid input(s): CKTOTAL;3  FASTING LIPID PANEL:  Lab Results   Component Value Date/Time    HDL 56 11/20/2020 10:27 AM    LDLCALC 111 11/20/2020 10:27 AM    TRIG 97 09/05/2017 09:42 AM    TSH 1.82 11/07/2017 02:45 PM     LIVER PROFILE:  Lab Results   Component Value Date/Time    AST 27 07/28/2022 01:19 PM    AST 20 04/12/2022 07:21 AM    ALT 22 07/28/2022 01:19 PM    ALT 27 04/12/2022 07:21 AM     BNP:   Lab Results   Component Value Date/Time    PROBNP 147 07/28/2022 01:19 PM    PROBNP 214 07/06/2022 12:06 PM    PROBNP 186 06/14/2022 11:30 AM     Iron Studies:    Lab Results   Component Value Date/Time    FERRITIN 87.0 09/20/2018 08:05 AM     Lab Results   Component Value Date    IRON 47 (L) 09/20/2018    TIBC 285 09/20/2018    FERRITIN 87.0 09/20/2018      Iron Deficiency Anemia:  [place]  IV Iron Therapy:  No  2017 ACC/AHA HF Guidelines:   intravenous iron replacement in patients with New York Heart Association (NYHA) class II and III HF and iron deficiency(ferritin <100 ng/ml or 100-300 ng/ml if transferrin saturation <20%), to improve functional status and QoL. 1. WEIGHT: Admit Weight: 216 lb (98 kg)      Today  Weight: 212 lb 12.8 oz (96.5 kg)   2.  I/O   Intake/Output Summary (Last 24 hours) at 8/3/2022 0910  Last data filed at 8/3/2022 0345  Gross per 24 hour   Intake --   Output 850 ml   Net -850 ml           Assessment/Plan:     CHF- compensated, decrease torsemide,  continue spiro12.5mg/day and do not give metolazone in the future, pt ok for d/c to SNF, will check weekly labs and f/u in CHF clinic  Edema - resolved  HTN - stable, continue valsartan          I appreciate the opportunity of cooperating in the care of this individual.    Verona Burton, APRN - CNP, ACNP, AGPCNP 8/3/2022, 9:10 AM  Heart Failure  The [address], [address]  Ph: [phone]      Core Measures:   Discharge instructions:   LVEF documented:   ACEI for LV dysfunction: Smoking Cessation:

## 2022-08-03 NOTE — DISCHARGE SUMMARY
Hospital Medicine Discharge Summary    Patient ID: Vinod Green      Patient's PCP: Renato Johnson MD    Admit Date: 7/28/2022     Discharge Date: 8/3/2022    Admitting Physician: Sonal Alaniz MD     Discharge Physician: Natan Willams MD     Discharge Diagnoses and Hospital Course: Active Hospital Problems    Diagnosis Date Noted    Syncope and collapse [R55] 07/29/2022     Priority: Medium    Chronic diastolic heart failure ([facility] [I50.32] 07/29/2022     Priority: Medium    Dehydration [E86.0] 07/29/2022     Priority: Medium    RADHA (acute kidney injury) ([facility] [N17.9] 04/06/2022    Peripheral edema [R60.9] 07/30/2014       Acute kidney injury. Now resolved  Admission creatinine 1.5, baseline creatinine 0.9  S/p IV fluids; now d/lex  Restarted on oral diuretics  Continue to monitor intake and output   Trend Cr  Montior and replace lytes  Ins/outs  Avoid nephrotoxins     Dizziness. Possibly orthostatic hypotension  Resolved with improvement in hydration status. Diastolic CHF, chronic  Oral diuretics resumed per cardiology  Monitor Cr and lytes     Venous insufficiency  Could be the reason for lower extremity edema. In this case, diuretics cannot be titrated based on edema as the patient is likely to be over diuresed. Part of the edema will be resistant to diuretics due to being caused by local factors in the lower extremity. Acute bacterial vs viral conjunctivitis  Started on antibiotic eyedrops     Leukocytosis; now resolved. Possibly 2/2 conjunctivitis. CXR, UA unremarkable  Trend WBC     Dyslipidemia  Continue statin      Exam:     The patient was seen and examined on day of discharge and this discharge summary is in conjunction with any daily progress note from day of discharge.     Consults:     IP CONSULT TO CARDIOLOGY  IP CONSULT TO SOCIAL WORK  IP CONSULT TO PHYSICAL MEDICINE REHAB    Disposition:  SNF     Condition:  Stable    Discharge Instructions/Follow-up:   Pt to follow up with PCP within 1 week  Consultants as scheduled    Code Status:  Full Code    Activity: activity as tolerated    Diet: Resume home diet    Labs: For convenience and continuity at follow-up the following most recent labs are provided:      CBC:    Lab Results   Component Value Date/Time    WBC 11.0 08/03/2022 05:29 AM    HGB 13.7 08/03/2022 05:29 AM    HCT 41.4 08/03/2022 05:29 AM     08/03/2022 05:29 AM       Renal:    Lab Results   Component Value Date/Time     08/03/2022 05:29 AM    K 3.3 08/03/2022 05:29 AM    K 3.5 07/28/2022 01:19 PM    CL 99 08/03/2022 05:29 AM    CO2 27 08/03/2022 05:29 AM    BUN 29 08/03/2022 05:29 AM    CREATININE 1.2 08/03/2022 05:29 AM    CALCIUM 9.3 08/03/2022 05:29 AM    PHOS 3.0 05/24/2021 10:32 AM       Discharge Medications:     Current Discharge Medication List             Details   ciprofloxacin (CILOXAN) 0.3 % ophthalmic solution Place 1 drop into the left eye every 2 hours (while awake) for 45 doses      valsartan (DIOVAN) 80 MG tablet Take 1 tablet by mouth in the morning. Qty: 30 tablet, Refills: 3                Details   spironolactone (ALDACTONE) 25 MG tablet Take 0.5 tablets by mouth in the morning. Qty: 30 tablet, Refills: 3      torsemide (DEMADEX) 20 MG tablet Take 1 tablet by mouth in the morning and 1 tablet before bedtime. Qty: 30 tablet, Refills: 3                Details   pantoprazole (PROTONIX) 40 MG tablet Take 40 mg by mouth in the morning. Lift Chair MISC by Does not apply route  Qty: 1 each, Refills: 0    Associated Diagnoses: Bilateral leg weakness; PVD (peripheral vascular disease) ([facility]  Polyneuropathy, peripheral sensorimotor axonal      aspirin 81 MG EC tablet Take 1 tablet by mouth daily  Qty: 30 tablet, Refills: 3      atorvastatin (LIPITOR) 80 MG tablet TAKE ONE TABLET BY MOUTH DAILY  Qty: 90 tablet, Refills: 3    Associated Diagnoses: Hypercholesteremia             Time Spent on discharge is more than 45 minutes in the examination, evaluation, counseling and review of medications and discharge plan. Signed:    Natan Willams MD   8/3/2022    Thank you Renato Johnson MD for the opportunity to be involved in this patient's care.

## 2022-08-03 NOTE — CARE COORDINATION
Patient discharged 8-3-22 to [facility] AT [place] at 6:30 via [facility] today pending a discharge order. [facility] AT [place] [address]  Phone: [phone]  Fax: [phone]    Patient/Family aware of and agreeable to the discharge plan. Please complete & sign the MIKY/AVS/Prescriptions,  RN please print MIKY/AVS once completed. Thank you, CLARENCE Sierra, [phone]    4:11- Hens submitted and orders faxed.     All discharge needs met per case management

## 2022-08-03 NOTE — PROGRESS NOTES
Nutrition Note    RECOMMENDATIONS  PO Diet: Regular  ONS: dc per pt request     NUTRITION ASSESSMENT   Pt is eating at least 50% of most meals on a regular diet. Wife reports [facility] ordered supplements d/t a decreased appetite upon admission. Pt states is eating much better & denies need for supplements. Reports no wt loss & expresses no further nutrition concerns @ this time. Nutrition Related Findings: LBM 8/1; trace edema BLE; k+ 3.3  Wounds: None  Nutrition Education:  Education not indicated   Nutrition Goals: PO intake 75% or greater     MALNUTRITION ASSESSMENT   Acute Illness  Malnutrition Status: No malnutrition    NUTRITION DIAGNOSIS   No nutrition diagnosis at this time     CURRENT NUTRITION THERAPIES  ADULT DIET; Regular     PO Intake:     PO Supplement Intake:26-50%, 51-75%    ANTHROPOMETRICS  Current Height: 6' (182.9 cm)  Current Weight: 212 lb 12.8 oz (96.5 kg)    Ideal Body Weight (IBW): 178 lbs  (81 kg)      BMI: 28.7      The patient will be monitored per nutrition standards of care. Consult dietitian if additional nutrition interventions are needed prior to RD reassessment.      Yeimi Agarwal RD, LD    Contact: [phone]

## 2022-08-03 NOTE — PROGRESS NOTES
[facility] [address]   Phone: [phone]    Physical Therapy    [] Initial Evaluation            [x] Daily Treatment Note         [] Discharge Summary      Patient: Shani Solomon   : 1945   MRN: [id number]   Date of Service:  8/3/2022  Admitting Diagnosis: RADHA (acute kidney injury) [facility])    Current Admission Summary: Shani Solomon is a 78 y.o. male who presents because of feeling dizzy. His wife provides the majority of the history. He apparently took shower this morning and afterwards, felt very lightheaded and fell back into a shower chair. His wife is there to help him to prevent injury and prevented his collapsed to the floor. He was extremely sweaty and nauseated. He had dry heaves. He has been feeling nauseated since he started spironolactone. His cardiologist has been trying to help him with leg edema. He has been on diuretic for long time and additional medicine was started earlier this week. He denies having any chest pain. He denies any injury from the falls. He did take a second fall this morning in getting out of the car. He fell to his knees. He denies any injury. Past Medical History:  has a past medical history of Allergic rhinitis, Aortic valve disorders, COPD (chronic obstructive pulmonary disease) ([address] Hydrocephalus ([address] Hyperlipidemia, Hypertension, Hypertrophy of prostate without urinary obstruction and other lower urinary tract symptoms (LUTS), Irritable bowel syndrome, Pancreatitis, Peptic ulcer, unspecified site, unspecified as acute or chronic, without mention of hemorrhage, perforation, or obstruction, and Peripheral vascular disease ([address] 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 Discharge Recommendations: Laura Ugarte scored a 8/24 on the AM-PAC short mobility form. Current research shows that an AM-PAC score of 17 or less is typically not associated with a discharge to the patient's home setting. Based on the patient's AM-PAC score and their current functional mobility deficits, it is recommended that the patient have 5-7 sessions per week of Physical Therapy at d/c to increase the patient's independence. At this time, this patient demonstrates complex nursing, medical, and rehabilitative needs, and would benefit from intensive rehabilitation services upon discharge from the Inpatient setting. This patient demonstrates the ability to participate in and benefit from an intensive therapy program with a coordinated interdisciplinary team approach to foster frequent, structured, and documented communication among disciplines, who will work together to establish, prioritize, and achieve treatment goals. Please see assessment section for further patient specific details. If patient discharges prior to next session this note will serve as a discharge summary. Please see below for the latest assessment towards goals.       DME Required For Discharge: DME to be determined at next level of care  Precautions/Restrictions: high fall risk  Weight Bearing Restrictions: no restrictions  [] Right Upper Extremity  [] Left Upper Extremity [] Right Lower Extremity  [] Left Lower Extremity     Required Braces/Orthotics: no braces required   [] Right  [] Left  Positional Restrictions:no positional restrictions    Pre-Admission Information   Lives With: spouse    Type of Home: house  Home Layout: two level  Home Access:  15 step to enter with handrail. Handrails are located on R side. Bathroom Layout: walk in shower  Bathroom Equipment: grab bars in shower, grab bars around toilet, shower chair  Toilet Height: standard height  Home Equipment: rolling walker  Transfer Assistance: modified independent with use of RW  Ambulation Assistance:modified independent with use of RW  ADL Assistance: requires assistance with bathing, requires assistance with dressing  IADL Assistance: requires assistance with meal prep, requires assistance with laundry, requires assistance with vacuuming, requires assistance with cleaning, requires assistance with shopping  Active :        [x] Yes  [] No  Hand Dominance: [] Left  [x] Right  Current Employment: retired. Occupation: maintenance [employer]  Hobbies: \"No, I'm lazy. \"  Recent Falls: Fall 3-4 days ago, multiple falls in the past.  How many levels is your home? \"I don't get into. Vania Mendoza \"    Subjective  General: Pt semi-reclined in bed upon therapist arrival. Pt agreeable to PT/OT treatment  Pain: 0/10  Pain Interventions: not applicable     Functional Mobility    Bed Mobility  Supine to Sit: minimal assistance  Scooting: maximum assistance to scoot to EOB  Comments: HOB elevated  Transfers  Sit to stand transfer: 2 person assistance with min-mod A of 2   Stand to sit transfer: 2 person assistance with min-mod A of 2   Comments: with RW; bed, 2x recliner, 1x chair   Ambulation  Assistive Device: rolling walker  Assistance: 2 person assistance with min-mod A of 2   Distance: 12 ft to bathroom sink standing; 12 ft back to chair; 12 ft around bed to high back chair, then rested; amb back to recliner 12 ft  Gait Mechanics: Pt amb with significant left lean, flexed posture, assist to weight shift bilaterally and cues to advance LEs; pt has greater difficulty stepping with RLE  Comments:    Balance  Static Sitting Balance: fair: maintains balance at CGA without use of UE support  Static Standing Balance: poor: requires mod (A) to maintain balance  Dynamic Standing Balance: poor (-): requires max (A) to maintain balance  Comments:       Pt stood at sink to brush teeth with min-mod A of 1        Therapeutic Interventions   Pt stood at RW to work on dissociative motions with trunk, reaching L and R, with verbal and tactile cues; pt has poor cervical and trunk rotations      AMPAC score: 8  Cognition  Overall Cognitive Status: Impaired  Arousal/Alterness: delayed responses to stimuli  Following Commands: follows one step commands with repetition, follows one step commands with increased time, inconsistently follows commands  Attention Span: attends with cues to redirect  Memory: decreased recall of biographical information, decreased recall of recent events  Safety Judgement: decreased awareness of need for assistance, decreased awareness of need for safety  Problem Solving: assistance required to generate solutions, assistance required to implement solutions, decreased awareness of errors, assistance required to identify errors made, assistance required to correct errors made  Insights: decreased awareness of deficits  Initiation: requires cues for all  Sequencing: requires cues for all  Orientation:    oriented to person, oriented to place, disoriented to time , and disoriented to situation (pt knew month with increased time, but did not know year)  Command Following:   impaired    Education  Barriers To Learning: cognition  Patient Education: patient educated on goals, PT role and benefits, plan of care, HEP, general safety, functional mobility training, adaptive device training, family education, transfer training, discharge recommendations  Learning Assessment:  patient will require reinforcement due to cognitive deficits    Assessment  Activity Tolerance: Pt requires rest breaks, but able to tolerate longer treatment session; pt is cooperative and motivated with therapy  Impairments Requiring Therapeutic Intervention: decreased functional mobility, decreased ADL status, decreased strength, decreased safety awareness, decreased cognition, decreased endurance, decreased balance, decreased coordination, decreased posture  Prognosis: good  Clinical Assessment:  Pt is limited by the above deficits and would benefit from skilled PT services to maximize pt functional mobility and safety prior to discharge. Pt is below baseline and requires min-mod A of 2 for transfers and amb with RW. Recommend continued skilled PT rehab prior to discharge home. Safety Interventions: patient left in chair, chair alarm in place, call light within reach, gait belt, patient at risk for falls, telesitter in use, nurse notified, and family/caregiver present    Plan  Frequency: 5-7 x/week  Current Treatment Recommendations: strengthening, balance training, functional mobility training, transfer training, gait training, endurance training, neuromuscular re-education, patient/caregiver education, safety education, and equipment evaluation/education    Goals  Patient Goals: None verbalized. Short Term Goals:  Time Frame: Discharge.   Patient will complete bed mobility at minimal assistance   Patient will complete transfers at minimal assistance   Patient will ambulate 10 ft with use of rolling walker at moderate assistance  **No goals met 8/3    Therapy Session Time      Individual Group Co-treatment   Time In     1240   Time Out     1405   Minutes     85     Timed Code Treatment Minutes:  85 Minutes  Total Treatment Minutes: 85 Minutes       Electronically Signed By: Simon Ceja PT

## 2022-08-03 NOTE — ADT AUTH CERT
Utilization Reviews         Request for Reconsideration with Additional Clinical by Lety Mathis       Review Status Review Entered   In Primary 8/3/2022 10:55      Criteria Review   ++++Request for Reconsideration++++        Continued Stay Review Note     7/31/22     Patient Visit Status: inpt  Level of Care: pcu     Last set of vitals:  BP: (!) 145/76   Pulse: 71   Resp: 18   Temp: 97.9 °F (36.6 °C)   SpO2: 97%      Current Treatments: orthostatics, telemetry, pt/ot, cardiology following, daily weights, strict I&o     Labs:    Sodium 136 - 145 mmol/L 135 Low        WBC 4.0 - 11.0 K/uL 13.3 High     RBC 4.20 - 5.90 M/uL 4.43   Hemoglobin 13.5 - 17.5 g/dL 12.9 Low     Hematocrit 40.5 - 52.5 % 39.2 Low           Review/Comments:  Acute kidney injury  Admission creatinine 1.5, baseline creatinine 0.9, which is also today's level. No need for more IV fluids  Being followed by cardiology. Oral diuretics resumed. Continue to monitor intake and output and renal function. Stable from this standpoint     Dizziness  Resolved with improvement in hydration status. Continue to monitor. No change     Diastolic CHF, chronic  Oral diuretics resumed per cardiology. Needs cardiology clearance for discharge     Dyslipidemia  Continue statin     Venous insufficiency  Could be the reason for lower extremity edema. In this case, diuretics cannot be titrated based on edema as the patient is likely to be over diuresed. Part of the edema will be resistant to diuretics due to being caused by local factors in the lower extremity.      Cardiology MD Notes  Fall/Dizziness              - likely secondary to dehydration from increase in diuretics and N/V              - No recurrence              - PT/OT              - Orthostatic BP unable to be obtained due to poor mobility              - Amlodipine on hold  RADHA/Dehydration              - Cr back to baseline              - Resume diuretics at reduced dosage  Chronic diastolic HF - Appears euvolemic              - Cr better today              - On spironolactone 25 mg daily and valsartan              - I&O remains negative, weight stable  S/p TAVR              - stable  COPD  Hypertension              - Conservative control in setting or recent dizziness     Anticipated Discharge Plan: ARU vs SNF      Continued Stay Review Note     8/2/22     Patient Visit Status: inpt  Level of Care: pcu     Last set of vitals: BP (!) 150/75   Pulse 75   Temp 98.1 °F (36.7 °C) (Oral)   Resp 17   Ht 6' (1.829 m)   Wt 217 lb (98.4 kg)   SpO2 93%   BMI 29.43 kg/m²        Current Treatments: Patient down 16 pound since admission, after increase of demedex 20 mg to bid on admission; telemetry, pt/ot, cardiology following, daily weights, strict I&o     Labs:    Sodium 136 - 145 mmol/L 135 Low     Potassium 3.5 - 5.1 mmol/L 3.3 Low     Chloride 99 - 110 mmol/L 96 Low     CO2 21 - 32 mmol/L 29   Anion Gap 3 - 16 10   Glucose 70 - 99 mg/dL 116 High     BUN 7 - 20 mg/dL 25 High     Creatinine 0.8 - 1.3 mg/dL 1.2      WBC 4.0 - 11.0 K/uL 13.1 High           Review/Comments:  Acute kidney injury. Now improving. Admission creatinine 1.5, baseline creatinine 0.9  S/p IV fluids; now d/lex  Restarted on oral diuretics  Continue to monitor intake and output   Trend Cr  Montior and replace lytes  Ins/outs  Avoid nephrotoxins     Dizziness. Possibly orthostatic hypotension  Resolved with improvement in hydration status. Continue to monitor. No changes overnight     Diastolic CHF, chronic  Oral diuretics resumed per cardiology     Venous insufficiency  Could be the reason for lower extremity edema. In this case, diuretics cannot be titrated based on edema as the patient is likely to be over diuresed. Part of the edema will be resistant to diuretics due to being caused by local factors in the lower extremity. Acute bacterial vs viral conjunctivitis  Started on antibiotic eyedrops     Leukocytosis. Possibly 2/2 conjunctivitis  Check CXR, UA, BCx  Trend WBC     Dyslipidemia  Continue statin      Cardiology MD Notes  CHF- compensated, continue torsemide,  spiro12.5mg/day and do not give metolazone in the future, Edema - resolved  HTN - elevated, continue valsartan, will continue to monitor for now given dizziness at admit     Anticipated Discharge Plan: SNF        Renal Failure, Acute - Care Day 5 (8/1/2022) by Rohan Campo       Review Status Review Entered   Completed 8/2/2022 16:21      Criteria Review      Care Day: 5 Care Date: 8/1/2022 Level of Care: Inpatient Floor    Guideline Day 3    Clinical Status    (X) * Mental status at baseline or improved    8/2/2022 4:20 PM EDT by Lindbergh Good      Sleeping, was somewhat confused when he woke up later improved    (X) * Respiratory status at baseline or improved    8/2/2022 4:20 PM EDT by Shamir Valverde      [id number]    Activity    (X) Activity as tolerated    8/2/2022 4:20 PM EDT by Shamir Valverde      Up as tolerated    Routes    (X) * Oral hydration    8/2/2022 4:20 PM EDT by Shamir Valverde      Intake5 ml    (X) * Oral medications    8/2/2022 4:20 PM EDT by Lindbergh Good      Lipitor 80mg Daily PO,Protonix 40mg Daily PO,Aldactone 12.5mg PO Daily,Demadex 20mg BID PO,    (X) * Oral diet    8/2/2022 4:20 PM EDT by Shamir Valverde      Diet: ADULT DIET; Regular    * Milestone   Additional Notes   DATE:    8/1 day 5      VITALS:   T-98.1 RR-18 HR-90 BP-145/76 SpO2- 95% on room air      ABNL/PERTINENT LABS/RADIOLOGY/DIAGNOSTIC STUDIES:   CBC    WBC 13.7 High     Hemoglobin 13.1 Low    Hematocrit 39.9 Low        Basic Metabolic Panel    Potassium 3.4 Low    Chloride 98    Glucose 116 High        PHYSICAL EXAM:   General appearance: No apparent distress, appears stated age and cooperative. HEENT: Pupils equal, round, and reactive to light. Conjunctivae/corneas clear. Neck: Supple, with full range of motion. No jugular venous distention. Trachea midline.    Respiratory: Normal respiratory effort. Clear to auscultation, bilaterally without Rales/Wheezes/Rhonchi. Cardiovascular: Regular rate and rhythm with normal S1/S2 without murmurs, rubs or gallops. Abdomen: Soft, non-tender, non-distended with normal bowel sounds. Musculoskeletal: No clubbing or cyanosis. 1+ bilateral pitting lower extremity edema bilaterally. Full range of motion without deformity. Skin: Skin color, texture, turgor normal.  No rashes or lesions. Neurologic:  Neurovascularly intact without any focal sensory/motor deficits. Cranial nerves: II-XII intact, grossly non-focal.   Psychiatric: Sleeping, was somewhat confused when he woke up later improved   Capillary Refill: Brisk, 3 seconds, normal   Peripheral Pulses: +2 palpable, equal bilaterally       MD CONSULTS/ASSESSMENT AND PLAN:   IM notes   PLAN:       Acute kidney injury   Admission creatinine 1.5, baseline creatinine 0.9. Currently stabilized at 0.9-1.0, consistent with baseline   No need for more IV fluids   Being followed by cardiology. Oral diuretics resumed. Continue to monitor intake and output and renal function. Stable from this standpoint       Dizziness   Resolved with improvement in hydration status. Continue to monitor. No changes overnight       Diastolic CHF, chronic   Oral diuretics resumed per cardiology. Needs cardiology clearance for discharge       Dyslipidemia   Continue statin       Venous insufficiency   Could be the reason for lower extremity edema. In this case, diuretics cannot be titrated based on edema as the patient is likely to be over diuresed. Part of the edema will be resistant to diuretics due to being caused by local factors in the lower extremity. Acute bacterial conjunctivitis   Started on antibiotic eyedrops      Cardiology Notes   Assessment/Plan:       1.  CHF- compensated, continue torsemide, decrease donna to 12.5mg/day and do not give metolazone in the future, pt ok for d/c to SNF, will check weekly labs and f/u in CHF clinic   2. Edema - resolved   3. HTN - continue valsartan          MEDICATIONS:   aspirin EC tablet 81 mg   Dose: 81 mg   Freq: DAILY Route: PO      ciprofloxacin (CILOXAN) 0.3 % ophthalmic solution 1 drop   Dose: 1 drop   Freq: EVERY 2 HOURS WHILE AWAKE Route: LEFT EYE      enoxaparin (LOVENOX) injection 40 mg   Dose: 40 mg   Freq: DAILY Route: SC      valsartan (DIOVAN) tablet 80 mg   Dose: 80 mg   Freq: DAILY Route: PO      PT/OT/SLP/CM ASSESSMENT OR NOTES:   PT/OT NOTES   Assessment   Activity Tolerance: Pt demonstrates decreased activity tolerance, fatiguing after ~15-20s of standing   Impairments Requiring Therapeutic Intervention: decreased functional mobility, decreased ADL status, decreased strength, decreased safety awareness, decreased cognition, decreased endurance, decreased balance, decreased coordination, decreased posture   Prognosis: good   Clinical Assessment: Pt is a 78 y.o male who presents with the above impairments. Interventions today limited secondary to deconditioning and decreased cognition. Pt required extensive cueing throughout session. Pt did not turn head to R side or attend to R side during transfers and bed mobility. Pt strength grossly 3+ on R side, 4 on L side, with impaired coordination on R as mentioned above. Due to discharge plan to inpatient rehab and neurologic presentation of deficits, POC frequency changed to 5-7x/week. Pt still presents below his baseline, and will continue to benefit from skilled physical therapy to address impairments.         Safety Interventions: patient left in chair, chair alarm in place, call light within reach, gait belt, patient at risk for falls, nurse notified, and family/caregiver present       Plan   Frequency: 5-7 x/week   Current Treatment Recommendations: strengthening, balance training, functional mobility training, transfer training, gait training, endurance training, neuromuscular re-education, patient/caregiver education, safety education, and equipment evaluation/education      Discharge Recommendations: scored a 10/24 on the AM-PAC short mobility form. Current research shows that an AM-PAC score of 17 or less is typically not associated with a discharge to the patient's home setting. Based on the patient's AM-PAC score and their current functional mobility deficits, it is recommended that the patient have 5-7 sessions per week of Physical Therapy at d/c to increase the patient's independence. At this time, this patient demonstrates complex nursing, medical, and rehabilitative needs, and would benefit from intensive rehabilitation services upon discharge from the Inpatient setting. This patient demonstrates the ability to participate in and benefit from an intensive therapy program with a coordinated interdisciplinary team approach to foster frequent, structured, and documented communication among disciplines, who will work together to establish, prioritize, and achieve treatment goals      CM Notes   UPDATE: CM received call from  Nicki Waterman requesting information regarding camera in patient's room. Patient must be 24 hours camera free prior to admission to [facility] ARU if accepted.   Ozzy Muniz also requests updated therapy notes        Renal Failure, Acute - Care Day 3 (7/30/2022) by Ping Barrera       Review Status Review Entered   Completed 8/2/2022 15:58      Criteria Review      Care Day: 3 Care Date: 7/30/2022 Level of Care: Inpatient Floor    Guideline Day 2    Clinical Status    ( ) * Electrolyte abnormalities absent or improved    8/2/2022 3:58 PM EDT by Queen Last      Potassium 3.1    (X) * Acid-base abnormalities absent or improved    8/2/2022 3:58 PM EDT by Queen Last      none noted    (X) * Hypotension absent    8/2/2022 3:58 PM EDT by Queen Last      BP-121//72    Activity    (X) Activity as tolerated    8/2/2022 3:58 PM EDT by Queen Last      Up as tolerated    Routes    (X) Parenteral or oral medications    8/2/2022 3:58 PM EDT by Marry Padron      lipitor 80mg Daily PO,Lovenox 40mg Daily SC,Protonix 40mg Daily po,Klorcon 40MEq ONce po,Aldactone 12.5mg Daily po ,Diovan 80mg Daily po,    (X) Oral diet as tolerated    8/2/2022 3:58 PM EDT by Marry Padron      ADULT DIET; Regular    * Milestone   Additional Notes   DATE:    7/30 Day 3      PERTINENT UPDATES:    He stated edema is the same. VITALS:   T- 97.9 RR-16 HR- 76 BP-152/72 SPO2-94 On Room Air      ABNL/PERTINENT LABS/RADIOLOGY/DIAGNOSTIC STUDIES:   Basic Metabolic Panel    Potassium 3.1   Chloride 97   Glucose 111      CBC   HGB 12.5   HCT 38.0      PHYSICAL EXAM:   General appearance: No apparent distress, appears stated age and cooperative. HEENT: Pupils equal, round, and reactive to light. Conjunctivae/corneas clear. Neck: Supple, with full range of motion. No jugular venous distention. Trachea midline. Respiratory:  Normal respiratory effort. Clear to auscultation, bilaterally without Rales/Wheezes/Rhonchi. Cardiovascular: Regular rate and rhythm with normal S1/S2 without murmurs, rubs or gallops. Abdomen: Soft, non-tender, non-distended with normal bowel sounds. Musculoskeletal: No clubbing or cyanosis. 1+ bilateral pitting lower extremity edema bilaterally. Full range of motion without deformity. Skin: Skin color, texture, turgor normal.  No rashes or lesions. Neurologic:  Neurovascularly intact without any focal sensory/motor deficits. Cranial nerves: II-XII intact, grossly non-focal.   Psychiatric: Alert and oriented, thought content appropriate, normal insight   Capillary Refill: Brisk, 3 seconds, normal   Peripheral Pulses: +2 palpable, equal bilaterally         MD CONSULTS/ASSESSMENT AND PLAN:   IM NOTES   PLAN:       Acute kidney injury   Admission creatinine 1.5, baseline creatinine 0.9, today's creatinine 1.0. No need for more IV fluids   Being followed by cardiology. Oral diuretics resumed. Continue to monitor intake and output and renal function       Dizziness   Resolved with improvement in hydration status. Continue to monitor. Diastolic CHF, chronic   Oral diuretics resumed per cardiology       Dyslipidemia   Continue statin       Venous insufficiency   Could be the reason for lower extremity edema. In this case, diuretics cannot be titrated based on edema as the patient is likely to be over diuresed. Part of the edema will be resistant to diuretics due to being caused by local factors in the lower extremity. Discussed with the patient and wife. Questions answered       DVT Prophylaxis: Lovenox   Diet: ADULT DIET; Regular   Code Status: Full Code       PT/OT Eval Status: Order       Dispo : 1 more day at least.      Cardiology Notes   Plan: continue current medications             needs PT and ambulate              possibly add  Torsemide 20 mg BID tomorrow              needs orthostatic blood pressures               On discharge recommend :   Spironolactone 25 qd   Valsartan 160 qd   Torsemide 20 bid         MEDICATIONS:   aspirin EC tablet 81 mg   Dose: 81 mg   Freq: DAILY Route: PO      enoxaparin (LOVENOX) injection 40 mg   Dose: 40 mg   Freq: DAILY Route: SC         PT/OT/SLP/CM ASSESSMENT OR NOTES:   PT NOTES   Assessment   Activity Tolerance: Limited d/t inability to maintain sitting balance/weakness. Impairments Requiring Therapeutic Intervention: decreased functional mobility, decreased ADL status, decreased strength, decreased safety awareness, decreased cognition, decreased balance, decreased posture   Prognosis: good   Clinical Assessment: Patient reports being MOD I w/ RW although he is a questionable historian. He requires assistance for bed mobility and is unable to maintain sitting balance or stand up. He will need ongoing therapy to address current deficits and 24 hour assist for safety.    Safety Interventions: patient left in bed, bed alarm in place, call light within reach, patient at risk for falls, and nurse notified       Plan   Frequency: 3-5 x/per week   Current Treatment Recommendations: strengthening, balance training, functional mobility training, transfer training, gait training, endurance training, patient/caregiver education, safety education, and equipment evaluation/education      Discharge Recommendations: scored a 8/24 on the AM-PAC short mobility form. Current research shows that an AM-PAC score of 17 or less is typically not associated with a discharge to the patient's home setting. Based on the patient's AM-PAC score and their current functional mobility deficits, it is recommended that the patient have 5-7 sessions per week of Physical Therapy at d/ to increase the patient's independence. At this time, this patient demonstrates complex nursing, medical, and rehabilitative needs, and would benefit from intensive rehabilitation services upon discharge from the Inpatient setting. This patient demonstrates the ability to participate in and benefit from an intensive therapy program with a coordinated interdisciplinary team approach to foster frequent, structured, and documented communication among disciplines, who will work together to establish, prioritize, and achieve treatment goals.

## 2022-08-03 NOTE — PLAN OF CARE
Problem: Safety - Adult  Goal: Free from fall injury  Outcome: Progressing  Note: Patient is a high fall risk. Patient free of falls this shift. Bed low, locked and alarmed at all times. Call light and bedside table is within reach. Orange blanket on bed, arm band on wrist and fall sign posted in the room. Notified patient to ask for assistance when needed. Patient verbalized understanding.

## 2022-08-04 ENCOUNTER — CARE COORDINATION (OUTPATIENT)
Dept: CARE COORDINATION | Age: 77
End: 2022-08-04

## 2022-08-04 NOTE — CARE COORDINATION
JANINA made outreach to patient after receiving discharge notification from IP stay with [facility]. JANINA spoke with patient's wife Zulema Chery and was advised that patient is IP at [facility] for [employer]. She is unsure of the expected length of stay. She has a meeting scheduled for Monday at 10:30am and will have a better idea then. JANINA will make outreach later next week for update.

## 2022-08-06 LAB — BLOOD CULTURE, ROUTINE: NORMAL

## 2022-08-15 ENCOUNTER — TELEPHONE (OUTPATIENT)
Dept: FAMILY MEDICINE CLINIC | Age: 77
End: 2022-08-15

## 2022-08-15 NOTE — TELEPHONE ENCOUNTER
[facility] Provider signed orders and has been faxed to home care agency.

## 2022-08-15 NOTE — PROGRESS NOTES
[facility]     Outpatient Follow Up Note    CHIEF COMPLAINT / HPI:  Follow Up secondary to swelling    Subjective:   Vinod Green is 78 y.o. 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 Eli Kapoor LOV patient had a one time dose of metolazone and started on spironolactone. He was then admitted 7/28/22-8/3/22 for syncope/volume depletion. He was discharged on spironolactone 25, valsartan 160, and torsemide 20 BID. Today, Mr Mark Kelley is here for follow up with his wife. He is currently at [facility] for rehab. Denies chest pain, shortness of breath, palpitations, or dizziness. His wife says swelling has significantly improved and he is propping his feet up a lot at the facility. Weight on admission to the facility was 224 lbs and this past Saturday it was 206. However, she acknowledges that they used different scales. He says he has had no appetite and feels fatigued. His wife says that valsartan was stopped at the facility and thinks it was related to low BP. Amlodipine was stopped at discharge but patient continues to take it per facility medication list. She also adds that there was concern of UIT; he gave a urine sample last Thursday but they say they have not heard anything back. With regard to medication therapy the patient has been compliant with prescribed regimen. They have tolerated therapy to date.      Past Medical History:   Diagnosis Date    Allergic rhinitis     Aortic valve disorders     COPD (chronic obstructive pulmonary disease) (HCC)     Hydrocephalus (HCC)     normal pressure,    Hyperlipidemia     Hypertension     Hypertrophy of prostate without urinary obstruction and other lower urinary tract symptoms (LUTS)     Irritable bowel syndrome     Pancreatitis     Peptic ulcer, unspecified site, unspecified as acute or chronic, without mention of hemorrhage, perforation, or obstruction     Peripheral vascular disease ([facility] [address] Social History:    Social History     Tobacco Use   Smoking Status Former    Packs/day: 1.50    Years: 30.00    Pack years: 45.00    Types: Cigarettes    Quit date: 2001    Years since quittin.6   Smokeless Tobacco Never   Tobacco Comments    H.O.smoking at age 25 / smoked up to 1.5p.p.d /quit       Current Medications:  Current Outpatient Medications   Medication Sig Dispense Refill    spironolactone (ALDACTONE) 25 MG tablet Take 0.5 tablets by mouth in the morning. 30 tablet 3    torsemide (DEMADEX) 20 MG tablet Take 1 tablet by mouth in the morning and 1 tablet before bedtime. 30 tablet 3    valsartan (DIOVAN) 80 MG tablet Take 1 tablet by mouth in the morning. 30 tablet 3    pantoprazole (PROTONIX) 40 MG tablet Take 40 mg by mouth in the morning. Lift Chair MISC by Does not apply route 1 each 0    aspirin 81 MG EC tablet Take 1 tablet by mouth daily 30 tablet 3    atorvastatin (LIPITOR) 80 MG tablet TAKE ONE TABLET BY MOUTH DAILY 90 tablet 3     No current facility-administered medications for this visit. REVIEW OF SYSTEMS:    CONSTITUTIONAL: No major weight gain or loss, fatigue, night sweats or fever. + generalized weakness  HEENT: No new vision difficulties or ringing in the ears. RESPIRATORY: No new SOB, PND, orthopnea or cough. CARDIOVASCULAR: See HPI  GI: No nausea, vomiting, diarrhea, constipation, abdominal pain or changes in bowel habits. : No urinary frequency, urgency, incontinence hematuria or dysuria. SKIN: No cyanosis or skin lesions. MUSCULOSKELETAL: No new muscle or joint pain. + neuropathy BLE  NEUROLOGICAL: No syncope or TIA-like symptoms.   PSYCHIATRIC: No anxiety, pain, insomnia or depression    Objective:   PHYSICAL EXAM:      Wt Readings from Last 3 Encounters:   22 212 lb 12.8 oz (96.5 kg)   22 233 lb (105.7 kg)   22 227 lb 9.6 oz (103.2 kg)          VITALS:  /66 (Site: Left Upper Arm, Position: Sitting, Cuff Size: Medium Adult) Pulse 78   Ht 6' (1.829 m)   Wt 206 lb (93.4 kg) Comment: acc to pt  SpO2 95%   BMI 27.94 kg/m²   CONSTITUTIONAL: Cooperative, no apparent distress, and appears well nourished / developed + overweight   NEUROLOGIC:  Awake and orientated to person, place and time. PSYCH: Calm affect. SKIN: Warm and dry. HEENT: Sclera non-icteric, normocephalic, neck supple, no elevation of JVP, normal carotid pulses with no bruits and thyroid normal size. LUNGS:  No increased work of breathing and clear to auscultation, no crackles or wheezing  CARDIOVASCULAR:  Regular rate and rhythm with no murmurs, gallops, rubs, or abnormal heart sounds, normal PMI. The apical impulses not displaced  Heart tones are crisp and normal  Cervical veins are not engorged  The carotid upstroke is normal in amplitude and contour without delay or bruit  JVP is not elevated  ABDOMEN:  Normal bowel sounds, non-distended and non-tender to palpation  EXT: +trace edema BLE no calf tenderness. Pulses are present bilaterally.     DATA:    Lab Results   Component Value Date    ALT 22 07/28/2022    AST 27 07/28/2022    ALKPHOS 133 (H) 07/28/2022    BILITOT 1.1 (H) 07/28/2022     Lab Results   Component Value Date    CREATININE 1.2 08/03/2022    BUN 29 (H) 08/03/2022     08/03/2022    K 3.3 (L) 08/03/2022    CL 99 08/03/2022    CO2 27 08/03/2022     Lab Results   Component Value Date    TSH 1.82 11/07/2017     Lab Results   Component Value Date    WBC 11.0 08/03/2022    HGB 13.7 08/03/2022    HCT 41.4 08/03/2022    MCV 88.2 08/03/2022     08/03/2022     No components found for: CHLPL  Lab Results   Component Value Date    TRIG 97 09/05/2017    TRIG 129 03/06/2017    TRIG 112 09/12/2016     Lab Results   Component Value Date    HDL 56 11/20/2020    HDL 54 11/21/2019    HDL 43 05/09/2019     Lab Results   Component Value Date    LDLCALC 111 (H) 11/20/2020    LDLCALC 58 11/21/2019    LDLCALC 61 05/09/2019     Lab Results   Component Value Date LABVLDL 22 2020    LABVLDL 23 2019    LABVLDL 30 2019      No results found for: BNP  Radiology Review:  Pertinent images / reports were reviewed as a part of this visit and reveals the following:    Last Echo: 22   Summary   *Technically difficult study due to poor acoustical window. Patient refused   Definity. Suboptimal image quality. *Left ventricle - normal size, thickness and function with EF of 55%   *Aortic valve - well seated TAVR valve, PV 1.9m/s, MG 8mmHg, no significant   regurgitation    Last Angiogram: 2018  Findings:                      LM       Short, normal              LAD     40% mid               Cx        20% distal              RCA     Nondominant, normal              LVG     Not performed              EDP     Not obtained  Intervention:  None  Recs: Will consult CTS - Dr. Rafael Pantoja given insurance issues with Estefani Corado                          Not sure how [facility] will affect candidacy    Last EC22  EKG performed in ER and to be interpreted by ER physician. Assessment:      Diagnosis Orders   1. Lower extremity edema   ~ significantly improved  ~ weight back to baseline  ~ torsemide / spironolactone     2. Nonrheumatic aortic valve disorder   ~ Echo 22 well seated TAVR valve, MG 8mmHg, no significantregurgitation  ~ s/p TAVR 3/2018    3. Coronary artery disease involving native coronary artery of native heart without angina pectoris   ~ stable, no complaints of angina   ~ [facility] 22 nonobstructive disease  ~ asa / statin     4. Mixed hyperlipidemia   ~ atorvasttin 80  ~ lIpids 2020  HDL 56  Trig 112       I had the opportunity to review the clinical symptoms and presentation of Heidy Marie. Plan:     1. Get blood work checked today  2. Stop amlodipine and resume valsartan 40 mg daily. Recheck BMP in 1 week at facility.   3. RTO in 2 weeks to reassess BP / symptoms with medication changes     Overall the patient is stable from CV standpoint    I have addresed the patient's cardiac risk factors and adjusted pharmacologic treatment as needed. In addition, I have reinforced the need for patient directed risk factor modification. Further evaluation will be based upon the patient's clinical course and testing results. All questions and concerns were addressed to the patient/family (wife)    The patient is not currently smoking. The risks related to smoking were reviewed with the patient. Recommend maintaining a smoke-free lifestyle. Patient is not on a beta-blocker; no MI  Patient is on an ace-i/ARB  Patient is on a statin    Dual antiplatelet therapy has not been recommended / prescribed for this patient. The patient verbalizes understanding not to stop medications without discussing with us. Discussed exercise: 30-60 minutes 7 days/week  Discussed Low saturated fat/CARI diet. Thank you for allowing to us to participate in the care of Halina Boland.     Electronically signed by ROSA MARIA Henriquez CNP on 8/15/2022 at 2:17 PM     Documentation of today's visit sent to PCP

## 2022-08-16 ENCOUNTER — OFFICE VISIT (OUTPATIENT)
Dept: CARDIOLOGY CLINIC | Age: 77
End: 2022-08-16
Payer: COMMERCIAL

## 2022-08-16 ENCOUNTER — CARE COORDINATION (OUTPATIENT)
Dept: CARE COORDINATION | Age: 77
End: 2022-08-16

## 2022-08-16 ENCOUNTER — HOSPITAL ENCOUNTER (OUTPATIENT)
Age: 77
Discharge: HOME | End: 2022-08-16
Payer: COMMERCIAL

## 2022-08-16 VITALS
WEIGHT: 206 LBS | SYSTOLIC BLOOD PRESSURE: 126 MMHG | DIASTOLIC BLOOD PRESSURE: 66 MMHG | OXYGEN SATURATION: 95 % | HEART RATE: 78 BPM | HEIGHT: 72 IN | BODY MASS INDEX: 27.9 KG/M2

## 2022-08-16 DIAGNOSIS — I35.9 NONRHEUMATIC AORTIC VALVE DISORDER: Chronic | ICD-10-CM

## 2022-08-16 DIAGNOSIS — R60.9 PERIPHERAL EDEMA: Primary | ICD-10-CM

## 2022-08-16 DIAGNOSIS — E78.2 MIXED HYPERLIPIDEMIA: ICD-10-CM

## 2022-08-16 DIAGNOSIS — I25.10 CORONARY ARTERY DISEASE INVOLVING NATIVE CORONARY ARTERY OF NATIVE HEART WITHOUT ANGINA PECTORIS: ICD-10-CM

## 2022-08-16 DIAGNOSIS — I10 ESSENTIAL HYPERTENSION: ICD-10-CM

## 2022-08-16 DIAGNOSIS — R06.02 SHORTNESS OF BREATH: ICD-10-CM

## 2022-08-16 LAB
ANION GAP SERPL CALCULATED.3IONS-SCNC: 19 MMOL/L (ref 3–16)
BUN BLDV-MCNC: 16 MG/DL (ref 7–20)
CALCIUM SERPL-MCNC: 10.1 MG/DL (ref 8.3–10.6)
CHLORIDE BLD-SCNC: 96 MMOL/L (ref 99–110)
CO2: 28 MMOL/L (ref 21–32)
CREAT SERPL-MCNC: 1.2 MG/DL (ref 0.8–1.3)
GFR AFRICAN AMERICAN: >60
GFR NON-AFRICAN AMERICAN: 59
GLUCOSE BLD-MCNC: 117 MG/DL (ref 70–99)
HCT VFR BLD CALC: 45.1 % (ref 40.5–52.5)
HEMOGLOBIN: 15.1 G/DL (ref 13.5–17.5)
MCH RBC QN AUTO: 29.1 PG (ref 26–34)
MCHC RBC AUTO-ENTMCNC: 33.5 G/DL (ref 31–36)
MCV RBC AUTO: 87.1 FL (ref 80–100)
PDW BLD-RTO: 14 % (ref 12.4–15.4)
PLATELET # BLD: 492 K/UL (ref 135–450)
PMV BLD AUTO: 9.3 FL (ref 5–10.5)
POTASSIUM SERPL-SCNC: 3.5 MMOL/L (ref 3.5–5.1)
PRO-BNP: 119 PG/ML (ref 0–449)
RBC # BLD: 5.18 M/UL (ref 4.2–5.9)
SODIUM BLD-SCNC: 143 MMOL/L (ref 136–145)
WBC # BLD: 12.4 K/UL (ref 4–11)

## 2022-08-16 PROCEDURE — 80048 BASIC METABOLIC PNL TOTAL CA: CPT

## 2022-08-16 PROCEDURE — 85027 COMPLETE CBC AUTOMATED: CPT

## 2022-08-16 PROCEDURE — 36415 COLL VENOUS BLD VENIPUNCTURE: CPT

## 2022-08-16 PROCEDURE — 99214 OFFICE O/P EST MOD 30 MIN: CPT | Performed by: NURSE PRACTITIONER

## 2022-08-16 PROCEDURE — 83880 ASSAY OF NATRIURETIC PEPTIDE: CPT

## 2022-08-16 PROCEDURE — 1123F ACP DISCUSS/DSCN MKR DOCD: CPT | Performed by: NURSE PRACTITIONER

## 2022-08-16 RX ORDER — AMLODIPINE BESYLATE 5 MG/1
5 TABLET ORAL DAILY
COMMUNITY
End: 2022-08-16

## 2022-08-16 RX ORDER — DIMETHICONE 5 G/100ML
CREAM TOPICAL
COMMUNITY

## 2022-08-16 RX ORDER — VALSARTAN 40 MG/1
40 TABLET ORAL DAILY
Qty: 90 TABLET | Refills: 3 | Status: SHIPPED | OUTPATIENT
Start: 2022-08-16 | End: 2022-09-06 | Stop reason: ALTCHOICE

## 2022-08-16 RX ORDER — SENNA PLUS 8.6 MG/1
2 TABLET ORAL DAILY
COMMUNITY

## 2022-08-16 NOTE — CARE COORDINATION
M made outreach to patient and his wife Silvestre Saldana. Patient is still currently IP with [facility]. Patient's wife states that there is still not scheduled release for patient. [facility] has arranged follow up at a later date/time.

## 2022-08-17 ENCOUNTER — CARE COORDINATION (OUTPATIENT)
Dept: CARE COORDINATION | Age: 77
End: 2022-08-17

## 2022-08-17 NOTE — CARE COORDINATION
Patient's wife Jaya Gaviria called to request assistance with getting patient a hospital bed and Prince Falling (apparatus to help get out of bed). Per Jaya Gaviria. Dr. Harris Quintero had ordered a bed previously but the order has . She is requesting a new order for Hospital Bed with low air loss mattress. She is also requesting an order for a device called a Prince Falling. Per patient this is to help patient get out of bed more easily and safer. She is requesting both orders faxed to [address] [place] [address] at [phone].    Please reach out to Jaya Gaviria with any questions or concerns regarding request.

## 2022-08-19 ENCOUNTER — TELEPHONE (OUTPATIENT)
Dept: CARDIOLOGY CLINIC | Age: 77
End: 2022-08-19

## 2022-08-19 NOTE — TELEPHONE ENCOUNTER
Pt called  to let [place] to know that pt is at [facility], pt had a fainting episode while sitting in a chair.     Pls advise     Joaquim Sneed   [phone]

## 2022-08-22 ENCOUNTER — CARE COORDINATION (OUTPATIENT)
Dept: CARE COORDINATION | Age: 77
End: 2022-08-22

## 2022-08-22 NOTE — CARE COORDINATION
ACM made outreach to patient after receiving discharge notification from recent IP stay at [facility]. Per patient's wife Anna Francisco patient was sent by ambulance from [place] after patient passed out in his chair. Initially it was believed that patient had a stroke. Per Anna Francisco MRI, ECHO and EKG were all done and it was determined that patient was dehydrated and adjustments were needed on his BP meds. After 3 days at [facility] patient was d/c back to [place] and per wife he is doing well with med changes. Anna Francisco spoke with Rachael Nye today and fax for bed was received and is being processed now. Anna Francisco states that no discharge date is determined at this time but she is thankful to move forward and that patient will have bed when he does return home.

## 2022-08-24 ENCOUNTER — CARE COORDINATION (OUTPATIENT)
Dept: CARE COORDINATION | Age: 77
End: 2022-08-24

## 2022-08-24 NOTE — CARE COORDINATION
[facility] received call from patient's wife Vane Santa regarding hospital bed order. Per Vane Santa she was advised that order has been received but additional information is needed in order to process. Per Chandni's request AC called [facility] to check status. [facility] call [phone] and was advised that fax has been sent to Dr. Kajal Rodas office requesting further information. [facility] was unable to get what information was needed. Transferred to Andreas Keating in Intake who is working the order. He was not available, ACM left , waiting for return call. Please confirm that fax was received. Please call [phone] with any questions regarding fax. [facility] will advise office if/when Ben from [place] returns call.

## 2022-08-28 PROBLEM — E86.0 DEHYDRATION: Status: RESOLVED | Noted: 2022-07-29 | Resolved: 2022-08-28

## 2022-08-30 ENCOUNTER — TELEPHONE (OUTPATIENT)
Dept: FAMILY MEDICINE CLINIC | Age: 77
End: 2022-08-30

## 2022-08-30 ENCOUNTER — CARE COORDINATION (OUTPATIENT)
Dept: CASE MANAGEMENT | Age: 77
End: 2022-08-30

## 2022-08-30 ENCOUNTER — TELEPHONE (OUTPATIENT)
Dept: CARDIOLOGY CLINIC | Age: 77
End: 2022-08-30

## 2022-08-30 DIAGNOSIS — N17.9 AKI (ACUTE KIDNEY INJURY) (HCC): Primary | ICD-10-CM

## 2022-08-30 PROCEDURE — 1111F DSCHRG MED/CURRENT MED MERGE: CPT | Performed by: FAMILY MEDICINE

## 2022-08-30 NOTE — CARE COORDINATION
salt in drinks. He likes his coke and they limit it and only give him the half size can. He has really cut down. Declined dietician as this has been reviewed. Not on a fluid restriction. He wears his compression stokings and does elevate his feet. Reviewed meds and flomax is restarted. He takes Metamucil and currently stools are soft and regular. Educated to take Senna if needed. Reviewed upcoming appts. Educated on importance of cardiology appt and that to investigate if offices are willing to do a virtual. Explained this is determined by the MD but may be beneficial to ask. Agreeable to calls. Follow up plan: [place] hand to [address]      Do you have any ongoing symptoms?: No   Do you have all of your prescriptions and are they filled?: Yes   Do you have any questions related to your medications?: Yes   Patient Reports: has call into cardiologist to confirm which meds to take   Have you scheduled your follow up appointment?: Yes   How are you going to get to your appointment?: Car - family or friend to transport   Were you discharged with any Home Care or [facility] or do you currently have any active services?: Yes   Post Acute Services: [address] (Comment: [employer])         Do you have support at home?: Partner/Spouse/SO   Do you feel like you have everything you need to keep you well at home?: Yes   Patient DME: [facility]   Patient Home Equipment: [id number] Transitions Interventions         Future Appointments   Date Time Provider Pawan Renner   9/2/2022 11:30 AM ROSA MARIA Lopez - CNP FF Cardio MMA   11/17/2022 10:00 AM Sang Sierra MD FF Cardio [facility]   11/28/2022  9:45 AM [facility] FF VASC & ENDO, VASCULAR LAB SCHED FF VASC/ENDO [facility]   11/28/2022 11:30 AM Ralph Keene MD FF VASC/ENDO MMA     Transitions of Care Initial Call    Was this an external facility discharge?  Yes, 8/28  Discharge Facility: [facility]    Challenges to be reviewed by the provider   Additional needs identified to be addressed with provider: Yes  medications-routed message to cardiologist to call spouse and discuss heart meds. Since pt edema increasing. Spouse also calling Cardiologist office             Method of communication with provider : chart routing    Advance Care Planning:   Does patient have an Advance Directive: reviewed and current. Care Transition Nurse contacted the family by telephone to perform post hospital discharge assessment. Verified name and  with family as identifiers. Provided introduction to self, and explanation of the CTN role. CTN reviewed discharge instructions, medical action plan and red flags with family who verbalized understanding. Family given an opportunity to ask questions and does not have any further questions or concerns at this time. Were discharge instructions available to patient? Yes. Reviewed appropriate site of care based on symptoms and resources available to patient including: PCP. The family agrees to contact the PCP office for questions related to their healthcare. Medication reconciliation was performed with family, who verbalizes understanding of administration of home medications. Advised obtaining a 90-day supply of all daily and as-needed medications. Was patient discharged with a pulse oximeter? no    CTN provided contact information. Plan for follow-up call in 5-7 days based on severity of symptoms and risk factors.   Plan for next call: referral to ambulatory care manager-BELIA Reyes, RN   [address] [place] [address] Transition Nurse  [phone]

## 2022-08-30 NOTE — TELEPHONE ENCOUNTER
Called and spoke to Tracy Howe. She says that Mecca Pena has increased swelling in BLE but denies any SOB. He is now home from rehab but she has not weighed him yet. She will resume torsemide 20 mg BID. He has appointment with me 9/2 and I will give slips for blood work at that time.

## 2022-08-30 NOTE — TELEPHONE ENCOUNTER
Called and spoke to the patient wife and she stated that they was in [facility] and they stopped all Cardiac medications. Patient don't have sob or chest pain. Patient is currently only taking ASA and Lipitor. Patient wife is asking if the patient needs an appointment to be seen because of the swelling that he has started to have sense being off his cardiac medication.

## 2022-08-30 NOTE — TELEPHONE ENCOUNTER
Marques Fine,  wife  called to inform the office that the Pt is having some swelling and should he be taking his cardiac meds, torsemide. Pt has a scheduled appt on 09/2 withmatt PERRY. Marques Fine is requesting someone from the office calls her.   Please advise

## 2022-08-31 ENCOUNTER — TELEPHONE (OUTPATIENT)
Dept: CARDIOLOGY CLINIC | Age: 77
End: 2022-08-31

## 2022-08-31 NOTE — TELEPHONE ENCOUNTER
Rn called in stating that pt was in the hosp and on 8/3 he was transferred to [facility]. Pt came home on Sunday home, and they are starting back up his home care service and while she was going over his medication list she noticed there was changes. Rn stated that pt had been taken off all his diuretics but they kept him on the potassium. Pt wife started pt back on Torsemide 20mg on 8/30. Rn wants to know should pt still be taking his Diuretics as he did before. She wants to get clarification on what all he should be on since [place] took him off everything.     Please call Dirk Mendes [phone]

## 2022-09-01 NOTE — TELEPHONE ENCOUNTER
LMOM asking RN to call back to discuss diuretic plan. I had resumed torsemide 8/30 with plans to repeat blood work tomorrow at appointment and possibly resume spironolactone at that time.

## 2022-09-02 ENCOUNTER — HOSPITAL ENCOUNTER (OUTPATIENT)
Age: 77
Discharge: HOME | End: 2022-09-02
Payer: COMMERCIAL

## 2022-09-02 ENCOUNTER — CARE COORDINATION (OUTPATIENT)
Dept: CARE COORDINATION | Age: 77
End: 2022-09-02

## 2022-09-02 ENCOUNTER — OFFICE VISIT (OUTPATIENT)
Dept: CARDIOLOGY CLINIC | Age: 77
End: 2022-09-02
Payer: COMMERCIAL

## 2022-09-02 VITALS
DIASTOLIC BLOOD PRESSURE: 60 MMHG | BODY MASS INDEX: 29.93 KG/M2 | OXYGEN SATURATION: 97 % | WEIGHT: 221 LBS | HEART RATE: 68 BPM | HEIGHT: 72 IN | SYSTOLIC BLOOD PRESSURE: 112 MMHG

## 2022-09-02 DIAGNOSIS — R06.02 SHORTNESS OF BREATH: ICD-10-CM

## 2022-09-02 DIAGNOSIS — I35.9 NONRHEUMATIC AORTIC VALVE DISORDER: Chronic | ICD-10-CM

## 2022-09-02 DIAGNOSIS — I25.10 CORONARY ARTERY DISEASE INVOLVING NATIVE CORONARY ARTERY OF NATIVE HEART WITHOUT ANGINA PECTORIS: ICD-10-CM

## 2022-09-02 DIAGNOSIS — I10 ESSENTIAL HYPERTENSION: ICD-10-CM

## 2022-09-02 DIAGNOSIS — E78.2 MIXED HYPERLIPIDEMIA: ICD-10-CM

## 2022-09-02 DIAGNOSIS — R55 SYNCOPE, UNSPECIFIED SYNCOPE TYPE: ICD-10-CM

## 2022-09-02 DIAGNOSIS — R60.0 LOWER EXTREMITY EDEMA: Primary | ICD-10-CM

## 2022-09-02 LAB
ANION GAP SERPL CALCULATED.3IONS-SCNC: 10 MMOL/L (ref 3–16)
BUN BLDV-MCNC: 13 MG/DL (ref 7–20)
CALCIUM SERPL-MCNC: 9.4 MG/DL (ref 8.3–10.6)
CHLORIDE BLD-SCNC: 98 MMOL/L (ref 99–110)
CO2: 28 MMOL/L (ref 21–32)
CREAT SERPL-MCNC: 1 MG/DL (ref 0.8–1.3)
GFR AFRICAN AMERICAN: >60
GFR NON-AFRICAN AMERICAN: >60
GLUCOSE BLD-MCNC: 98 MG/DL (ref 70–99)
POTASSIUM SERPL-SCNC: 4.1 MMOL/L (ref 3.5–5.1)
PRO-BNP: 183 PG/ML (ref 0–449)
SODIUM BLD-SCNC: 136 MMOL/L (ref 136–145)

## 2022-09-02 PROCEDURE — 83880 ASSAY OF NATRIURETIC PEPTIDE: CPT

## 2022-09-02 PROCEDURE — 93270 REMOTE 30 DAY ECG REV/REPORT: CPT | Performed by: INTERNAL MEDICINE

## 2022-09-02 PROCEDURE — 36415 COLL VENOUS BLD VENIPUNCTURE: CPT

## 2022-09-02 PROCEDURE — 99214 OFFICE O/P EST MOD 30 MIN: CPT | Performed by: NURSE PRACTITIONER

## 2022-09-02 PROCEDURE — 80048 BASIC METABOLIC PNL TOTAL CA: CPT

## 2022-09-02 PROCEDURE — 1123F ACP DISCUSS/DSCN MKR DOCD: CPT | Performed by: NURSE PRACTITIONER

## 2022-09-02 RX ORDER — TAMSULOSIN HYDROCHLORIDE 0.4 MG/1
CAPSULE ORAL
COMMUNITY
Start: 2022-08-26 | End: 2022-09-30 | Stop reason: SDUPTHER

## 2022-09-02 NOTE — PROGRESS NOTES
Apply topically prn (Patient not taking: Reported on 8/30/2022)      Dimethicone 1.5 % CREA Apply topically prn (Patient not taking: Reported on 8/30/2022)      senna (SENOKOT) 8.6 MG tablet Take 2 tablets by mouth daily PRN      NONFORMULARY Sureprep no-sting barrier wipe miscellaneous(ostomy supplies) (Patient not taking: Reported on 8/30/2022)      valsartan (DIOVAN) 40 MG tablet Take 1 tablet by mouth in the morning. (Patient not taking: Reported on 8/30/2022) 90 tablet 3    spironolactone (ALDACTONE) 25 MG tablet Take 0.5 tablets by mouth in the morning. (Patient not taking: Reported on 8/30/2022) 30 tablet 3    torsemide (DEMADEX) 20 MG tablet Take 1 tablet by mouth in the morning and 1 tablet before bedtime. (Patient not taking: Reported on 8/30/2022) 30 tablet 3    pantoprazole (PROTONIX) 40 MG tablet Take 40 mg by mouth in the morning. (Patient not taking: Reported on 8/30/2022)      Lift Chair MISC by Does not apply route 1 each 0    aspirin 81 MG EC tablet Take 1 tablet by mouth daily 30 tablet 3    atorvastatin (LIPITOR) 80 MG tablet TAKE ONE TABLET BY MOUTH DAILY 90 tablet 3     No current facility-administered medications for this visit. REVIEW OF SYSTEMS:    CONSTITUTIONAL: No major weight gain or loss, fatigue, night sweats or fever. + generalized weakness  HEENT: No new vision difficulties or ringing in the ears. RESPIRATORY: No new SOB, PND, orthopnea or cough. CARDIOVASCULAR: See HPI  GI: No nausea, vomiting, diarrhea, constipation, abdominal pain or changes in bowel habits. : No urinary frequency, urgency, incontinence hematuria or dysuria. SKIN: No cyanosis or skin lesions. MUSCULOSKELETAL: No new muscle or joint pain. + neuropathy BLE  NEUROLOGICAL: No syncope or TIA-like symptoms.   PSYCHIATRIC: No anxiety, pain, insomnia or depression    Objective:   PHYSICAL EXAM:      Wt Readings from Last 3 Encounters:   08/16/22 206 lb (93.4 kg)   08/03/22 212 lb 12.8 oz (96.5 kg)   07/25/22 233 lb (105.7 kg)          VITALS:  There were no vitals taken for this visit. CONSTITUTIONAL: Cooperative, no apparent distress, and appears well nourished / developed + overweight   NEUROLOGIC:  Awake and orientated to person, place and time. PSYCH: Calm affect. SKIN: Warm and dry. HEENT: Sclera non-icteric, normocephalic, neck supple, no elevation of JVP, normal carotid pulses with no bruits and thyroid normal size. LUNGS:  No increased work of breathing and clear to auscultation, no crackles or wheezing  CARDIOVASCULAR:  Regular rate and rhythm with no murmurs, gallops, rubs, or abnormal heart sounds, normal PMI. The apical impulses not displaced  Heart tones are crisp and normal  Cervical veins are not engorged  The carotid upstroke is normal in amplitude and contour without delay or bruit  JVP is not elevated  ABDOMEN:  Normal bowel sounds, non-distended and non-tender to palpation  EXT: +2 edema BLE no calf tenderness. Pulses are present bilaterally.     DATA:    Lab Results   Component Value Date    ALT 22 07/28/2022    AST 27 07/28/2022    ALKPHOS 133 (H) 07/28/2022    BILITOT 1.1 (H) 07/28/2022     Lab Results   Component Value Date    CREATININE 1.2 08/16/2022    BUN 16 08/16/2022     08/16/2022    K 3.5 08/16/2022    CL 96 (L) 08/16/2022    CO2 28 08/16/2022     Lab Results   Component Value Date    TSH 1.82 11/07/2017     Lab Results   Component Value Date    WBC 12.4 (H) 08/16/2022    HGB 15.1 08/16/2022    HCT 45.1 08/16/2022    MCV 87.1 08/16/2022     (H) 08/16/2022     No components found for: CHLPL  Lab Results   Component Value Date    TRIG 97 09/05/2017    TRIG 129 03/06/2017    TRIG 112 09/12/2016     Lab Results   Component Value Date    HDL 56 11/20/2020    HDL 54 11/21/2019    HDL 43 05/09/2019     Lab Results   Component Value Date    LDLCALC 111 (H) 11/20/2020    LDLCALC 58 11/21/2019    LDLCALC 61 05/09/2019     Lab Results   Component Value Date    LABVLDL 22 11/20/2020 LABVLDL 23 2019    LABVLDL 30 2019      No results found for: BNP  Radiology Review:  Pertinent images / reports were reviewed as a part of this visit and reveals the following:    Limited Echo 22  1. Left ventricle: The cavity size was normal. Wall thickness was normal. Systolic function was normal. The estimated ejection  fraction was in the range of 55% to 60%. 2. Right ventricle: The cavity size was normal. Systolic function was normal.   3. Atrial septum: Agitated saline bubble study is negative for intra-cardiac shunting. 4. Pericardium, extracardiac: There was no pericardial effusion. Impressions: The previous study was not available, so comparison was made to the report of 2022. Patient refused Lumason. No significant change compared to study done on 2022. Echo: 22   Summary   *Technically difficult study due to poor acoustical window. Patient refused   Definity. Suboptimal image quality. *Left ventricle - normal size, thickness and function with EF of 55%   *Aortic valve - well seated TAVR valve, PV 1.9m/s, MG 8mmHg, no significant   regurgitation    Last Angiogram: 2018  Findings:                      LM       Short, normal              LAD     40% mid               Cx        20% distal              RCA     Nondominant, normal              LVG     Not performed              EDP     Not obtained  Intervention:  None  Recs: Will consult CTS - Dr. Virginia Mcneal given insurance issues with Arvin Barnard                          Not sure how [facility] will affect candidacy    Last EC22  EKG performed in ER and to be interpreted by ER physician. Assessment:      Diagnosis Orders   1. Lower extremity edema   ~ improved since diuretics resumed  but still present   ~ torsemide 20 BID    2. Nonrheumatic aortic valve disorder   ~ Echo 22 well seated TAVR valve, MG 8mmHg, no significantregurgitation  ~ s/p TAVR 3/2018    3.  Coronary artery disease involving native coronary artery of native heart without angina pectoris   ~ stable, no complaints of angina   ~ [facility] 1/24/22 nonobstructive disease  ~ asa / statin     4. Mixed hyperlipidemia   ~ atorvasttin 80  ~ lIpids 11/2020  HDL 56  Trig 112     5. Syncope        ~ syncopal episode 8/18/22, thought to be r/t dehydration/hypotension? I had the opportunity to review the clinical symptoms and presentation of Marques Staggers. Plan:     1. Continue with torsemide 20 BID. 2. Get blood work checked today. Depending on results, we will consider starting aldactone MWF. 3. Wear monitor with recent syncopal episode   4. RTO in 2 weeks to reassess symptoms     Overall the patient is stable from CV standpoint    I have addresed the patient's cardiac risk factors and adjusted pharmacologic treatment as needed. In addition, I have reinforced the need for patient directed risk factor modification. Further evaluation will be based upon the patient's clinical course and testing results. All questions and concerns were addressed to the patient/family (wife)    The patient is not currently smoking. The risks related to smoking were reviewed with the patient. Recommend maintaining a smoke-free lifestyle. Patient is not on a beta-blocker; no MI  Patient is not on an ace-i/ARB; on hold  Patient is on a statin    Dual antiplatelet therapy has not been recommended / prescribed for this patient. The patient verbalizes understanding not to stop medications without discussing with us. Discussed exercise: 30-60 minutes 7 days/week  Discussed Low saturated fat/CARI diet. Thank you for allowing to us to participate in the care of Marques Staggers.     Electronically signed by ROSA MARIA Wallace CNP on 9/2/2022 at 8:02 AM     Documentation of today's visit sent to PCP

## 2022-09-02 NOTE — CARE COORDINATION
LUZ MARINAM made outreach to patient for Care Management follow up. Patient was going to cardiology appointment. JANINA briefly spoke with patient's wife Tracy Howe who advised that patient is doing well. He has all his medications and services set up at home. She states that patient will have blood work done today to make sure everything is stable. She will call with any questions or concerns. LUZ MARINA has made arrangements to follow up at a later date time.

## 2022-09-06 ENCOUNTER — TELEPHONE (OUTPATIENT)
Dept: CARDIOLOGY CLINIC | Age: 77
End: 2022-09-06

## 2022-09-06 RX ORDER — SPIRONOLACTONE 25 MG/1
12.5 TABLET ORAL EVERY OTHER DAY
Qty: 30 TABLET | Refills: 1 | Status: SHIPPED | OUTPATIENT
Start: 2022-09-06

## 2022-09-06 NOTE — TELEPHONE ENCOUNTER
Called pt's wife and went over blood work with his wife. Will start spironolactone 12.5 mg every other day. He has appt scheduled with me 9/16 and will recheck blood work at that point.

## 2022-09-07 ENCOUNTER — TELEPHONE (OUTPATIENT)
Dept: ORTHOPEDIC SURGERY | Age: 77
End: 2022-09-07

## 2022-09-07 NOTE — TELEPHONE ENCOUNTER
[facility] called and they need the actual order for the patient's MRI. They received something else.  Fax # [phone]

## 2022-09-12 ENCOUNTER — CARE COORDINATION (OUTPATIENT)
Dept: CARE COORDINATION | Age: 77
End: 2022-09-12

## 2022-09-12 NOTE — CARE COORDINATION
Ambulatory Care Coordination Note  9/12/2022    ACC: Justo Oh, RN    Summary Note: ACM received VM from [facility] with [facility]. Eleni had home visit with patient today and was advised of  concerns with patient's recent kidney failure and diuretics use. Patient was was advised by cardiologist to continue with diuretic use, but patient's wife has concerns. Eleni is requesting a referral for nephrologist. Patient is wanting to have nephrologist monitor medications and decide best diuretic to take as needed for edema. Per Nahomy Hernandez patient and his wife have agreed to referral and would like to move forward with getting appointment. ACM left message on patient's VM. Plan  F/U Referral   Lab Results       None            Care Coordination Interventions    Program Enrollment: Rising Risk  Referral from Primary Care Provider: No  Suggested Interventions and Community Resources  Fall Risk Prevention: In Process  Home Health Services: In Process  Occupational Therapy: In Process  Physical Therapy: In Process  Zone Management Tools: Completed          Goals Addressed    None         Prior to Admission medications    Medication Sig Start Date End Date Taking?  Authorizing Provider   spironolactone (ALDACTONE) 25 MG tablet Take 0.5 tablets by mouth every other day 9/6/22   ROSA MARIA Correa - CNP   [facility] 0.4 MG capsule  8/26/22   Historical Provider, MD   METAMUCIL FIBER PO Take by mouth Pt takes 2 capsules qd    Historical Provider, MD   Menthol-Zinc Oxide (REMEDY CALAZIME) 0.4-20.5 % PSTE Apply topically prn    Historical Provider, MD   Dimethicone 1.5 % CREA Apply topically prn    Historical Provider, MD   senna (SENOKOT) 8.6 MG tablet Take 2 tablets by mouth daily PRN    Historical Provider, MD   NONFORMULARY Sureprep no-sting barrier wipe miscellaneous(ostomy supplies)  Patient not taking: Reported on 8/30/2022    Historical Provider, MD   torsemide (DEMADEX) 20 MG tablet Take 1 tablet

## 2022-09-12 NOTE — CARE COORDINATION
Reviewed recent labs and his kidney function is normal.  No indication to follow with Nephrology at this time. Patient is due for follow up with Dr. Idania Levine as he was last seen by our office in May and was recommended to follow up in 3 months. Has no appointment scheduled.

## 2022-09-13 NOTE — CARE COORDINATION
Ambulatory Care Coordination Note  9/13/2022    ACC: Tim Ward, RN    Summary Note: ACM made follow up call to patient for Care Management and spoke with patient's wife Soraya Norris. ACM advised Mrs. Vin Rosen of note from ROSA MARIA Salguero - [facility]HE[facility]. Mile Kumar agreed with plan. She states that everything appears stable at this time. She will call office and schedule AWV. ACM was advised per Mrs. Vin Rosen that patient is doing well at home. Patient continues to work with [facility] Nurse, PT and OT. He is regaining his strength, able to be more physically active, his edema is stable, he is following guidelines such as elevated his feet while sitting. He is stable in his CHF and COPD symptoms with no current concerns at this time. Per Mrs. Vin Rosen patient gets labs drawn every two weeks and she is keeping a close eye on his labs and will not hesitate to reach out with any questions. Patient has a follow up scheduled Friday 9/16/2022. Plan  F/U CHF, COPD  Graduation    Lab Results       None            Care Coordination Interventions    Program Enrollment: Rising Risk  Referral from Primary Care Provider: No  Suggested Interventions and Community Resources  Fall Risk Prevention: Completed  Home Health Services: In Process  Occupational Therapy: In Process  Physical Therapy: In Process  Zone Management Tools: Completed          Goals Addressed                      This Visit's Progress      Patient Stated (pt-stated)   Improving      Will continue to build strength with at home PT and walk with use of walker    Barriers: overwhelmed by complexity of regimen and stress  Plan for overcoming my barriers: work with Joceline Schumacher, PT, PCP and spouse   Confidence: 8/10  Anticipated Goal Completion Date: 7/29/2022        Wellness Goal   Improving      Patient Self-Management Goal for Health Maintenance  Goal: I will schedule a yearly preventative care visit.   Barriers: overwhelmed by complexity of regimen and time constraints  Plan for overcoming my barriers: work with ACM and PCP  Confidence: 10/10  Anticipated Goal Completion Date: 6/17/2022              Prior to Admission medications    Medication Sig Start Date End Date Taking? Authorizing Provider   spironolactone (ALDACTONE) 25 MG tablet Take 0.5 tablets by mouth every other day 9/6/22   ROSA MARIA Cummings CNP   tamsulosin [facility]) 0.4 MG capsule  8/26/22   Historical Provider, MD   METAMUCIL FIBER PO Take by mouth Pt takes 2 capsules qd    Historical Provider, MD   Menthol-Zinc Oxide (REMEDY CALAZIME) 0.4-20.5 % PSTE Apply topically prn    Historical Provider, MD   Dimethicone 1.5 % CREA Apply topically prn    Historical Provider, MD   senna (SENOKOT) 8.6 MG tablet Take 2 tablets by mouth daily PRN    Historical Provider, MD   NONFORMULARY Sureprep no-sting barrier wipe miscellaneous(ostomy supplies)  Patient not taking: Reported on 8/30/2022    Historical Provider, MD   torsemide (DEMADEX) 20 MG tablet Take 1 tablet by mouth in the morning and 1 tablet before bedtime. 8/3/22   Dudley No MD   pantoprazole (PROTONIX) 40 MG tablet Take 40 mg by mouth in the morning.   Patient not taking: No sig reported    Historical Provider, MD   Lift Chair [address] [facility] by Does not apply route 5/24/22   Johnny Saleh MD   aspirin 81 MG EC tablet Take 1 tablet by mouth daily 4/18/22   Marisa Garcia MD   atorvastatin (LIPITOR) 80 MG tablet TAKE ONE TABLET BY MOUTH DAILY 3/8/22   Johnny Saleh MD       Future Appointments   Date Time Provider Pawan Renner   9/16/2022 11:30 AM ROSA MARIA Cummings CNP FF Cardio MMA   11/17/2022 10:00 AM Won Ledesma MD [facility] Cardio MMA   11/28/2022  9:45 AM [facility] VASC & ENDO, VASCULAR LAB SCHED [facility] VASC/ENDO [facility]   11/28/2022 11:30 AM He Rausch MD [facility] VASC/ENDO MMA   ,   Congestive Heart Failure Assessment    Are you currently restricting fluids?: No Restriction  Do you understand a low sodium diet?: Yes  Do you understand how to read food labels?: Yes  How many restaurant meals do you eat per week?: 0  Do you salt your food before tasting it?: No         Symptoms:  None: Yes      Symptom course: stable  Salt intake watch compared to last visit: stable     ,   COPD Assessment    Does the patient understand envrionmental exposure?: Yes  Is the patient able to verbalize Rescue vs. Long Acting medications?: Yes  Does the patient have a nebulizer?: Yes  Does the patient use a space with inhaled medications?: No            Symptoms:  None: Yes      Symptom course: stable  Breathlessness: none  Increase use of rapid acting/rescue inhaled medications?: No  Change in chronic cough?: No/At Baseline  Change in sputum?: No/At Baseline     ,   General Assessment    Do you have any symptoms that are causing concern?: No     , and Care Coordination Episodes    Type: Amb Care Management  Episode: Rising Risk  Noted: 3/8/2022  Comments: COPD, HTN, HLD, PVD

## 2022-09-16 ENCOUNTER — HOSPITAL ENCOUNTER (OUTPATIENT)
Age: 77
Discharge: HOME | End: 2022-09-16
Payer: COMMERCIAL

## 2022-09-16 ENCOUNTER — OFFICE VISIT (OUTPATIENT)
Dept: CARDIOLOGY CLINIC | Age: 77
End: 2022-09-16
Payer: COMMERCIAL

## 2022-09-16 VITALS
WEIGHT: 220 LBS | OXYGEN SATURATION: 99 % | HEART RATE: 70 BPM | BODY MASS INDEX: 29.8 KG/M2 | HEIGHT: 72 IN | SYSTOLIC BLOOD PRESSURE: 116 MMHG | DIASTOLIC BLOOD PRESSURE: 60 MMHG

## 2022-09-16 DIAGNOSIS — I25.10 CORONARY ARTERY DISEASE INVOLVING NATIVE CORONARY ARTERY OF NATIVE HEART WITHOUT ANGINA PECTORIS: ICD-10-CM

## 2022-09-16 DIAGNOSIS — R60.0 LOWER EXTREMITY EDEMA: Primary | ICD-10-CM

## 2022-09-16 DIAGNOSIS — R60.0 LOWER EXTREMITY EDEMA: ICD-10-CM

## 2022-09-16 DIAGNOSIS — R55 SYNCOPE AND COLLAPSE: ICD-10-CM

## 2022-09-16 DIAGNOSIS — I35.9 NONRHEUMATIC AORTIC VALVE DISORDER: Chronic | ICD-10-CM

## 2022-09-16 DIAGNOSIS — E78.2 MIXED HYPERLIPIDEMIA: ICD-10-CM

## 2022-09-16 LAB
ANION GAP SERPL CALCULATED.3IONS-SCNC: 8 MMOL/L (ref 3–16)
BUN BLDV-MCNC: 14 MG/DL (ref 7–20)
CALCIUM SERPL-MCNC: 9.1 MG/DL (ref 8.3–10.6)
CHLORIDE BLD-SCNC: 104 MMOL/L (ref 99–110)
CO2: 25 MMOL/L (ref 21–32)
CREAT SERPL-MCNC: 1 MG/DL (ref 0.8–1.3)
GFR AFRICAN AMERICAN: >60
GFR NON-AFRICAN AMERICAN: >60
GLUCOSE BLD-MCNC: 99 MG/DL (ref 70–99)
POTASSIUM SERPL-SCNC: 4.3 MMOL/L (ref 3.5–5.1)
SODIUM BLD-SCNC: 137 MMOL/L (ref 136–145)

## 2022-09-16 PROCEDURE — 99214 OFFICE O/P EST MOD 30 MIN: CPT | Performed by: NURSE PRACTITIONER

## 2022-09-16 PROCEDURE — 1123F ACP DISCUSS/DSCN MKR DOCD: CPT | Performed by: NURSE PRACTITIONER

## 2022-09-16 PROCEDURE — 36415 COLL VENOUS BLD VENIPUNCTURE: CPT

## 2022-09-16 PROCEDURE — 80048 BASIC METABOLIC PNL TOTAL CA: CPT

## 2022-09-16 NOTE — PROGRESS NOTES
[facility]     Outpatient Follow Up Note    CHIEF COMPLAINT / HPI:  Follow Up secondary to swelling    Subjective:   Jeanne Dillard is 78 y.o. male who presents today with a history of AI s/p TAVR 3/2018, HLD, nonobstructive CAD per [facility] 2018. Teresa Stands LOV aldactone added MWF    Today, Mr Elysa Koyanagi is here for follow up with his wife. Denies dizziness or syncope. Weights are stable between 219-221 lbs. Baseline weight is 215-220 lbs. He is working on his leg exercises to help promote blood flow and he is working with PT. Overall his wife says swelling has slightly improved. With regard to medication therapy the patient has been compliant with prescribed regimen. They have tolerated therapy to date. Past Medical History:   Diagnosis Date    Allergic rhinitis     Aortic valve disorders     COPD (chronic obstructive pulmonary disease) (HCC)     Hydrocephalus (HCC)     normal pressure,    Hyperlipidemia     Hypertension     Hypertrophy of prostate without urinary obstruction and other lower urinary tract symptoms (LUTS)     Irritable bowel syndrome     Pancreatitis     Peptic ulcer, unspecified site, unspecified as acute or chronic, without mention of hemorrhage, perforation, or obstruction     Peripheral vascular disease ([facility] [address]      Social History:    Social History     Tobacco Use   Smoking Status Former    Packs/day: 1.50    Years: 30.00    Pack years: 45.00    Types: Cigarettes    Quit date: 2001    Years since quittin.7   Smokeless Tobacco Never   Tobacco Comments    H.O.smoking at age 25 / smoked up to 1.5p.p.d /quit       Current Medications:  Current Outpatient Medications   Medication Sig Dispense Refill    spironolactone (ALDACTONE) 25 MG tablet Take 0.5 tablets by mouth every other day 30 tablet 1    tamsulosin (FLOMAX) 0.4 MG capsule       torsemide (DEMADEX) 20 MG tablet Take 1 tablet by mouth in the morning and 1 tablet before bedtime.  30 tablet 3    aspirin 81 MG EC tablet Take 1 tablet by mouth daily 30 tablet 3    atorvastatin (LIPITOR) 80 MG tablet TAKE ONE TABLET BY MOUTH DAILY 90 tablet 3    METAMUCIL FIBER PO Take by mouth Pt takes 2 capsules qd      Menthol-Zinc Oxide (REMEDY CALAZIME) 0.4-20.5 % PSTE Apply topically prn      Dimethicone 1.5 % CREA Apply topically prn      senna (SENOKOT) 8.6 MG tablet Take 2 tablets by mouth daily PRN      NONFORMULARY Sureprep no-sting barrier wipe miscellaneous(ostomy supplies) (Patient not taking: Reported on 8/30/2022)      pantoprazole (PROTONIX) 40 MG tablet Take 40 mg by mouth in the morning. (Patient not taking: No sig reported)      Lift Chair MISC by Does not apply route 1 each 0     No current facility-administered medications for this visit. REVIEW OF SYSTEMS:    CONSTITUTIONAL: No major weight gain or loss, fatigue, night sweats or fever. + generalized weakness  HEENT: No new vision difficulties or ringing in the ears. RESPIRATORY: No new SOB, PND, orthopnea or cough. CARDIOVASCULAR: See HPI  GI: No nausea, vomiting, diarrhea, constipation, abdominal pain or changes in bowel habits. : No urinary frequency, urgency, incontinence hematuria or dysuria. SKIN: No cyanosis or skin lesions. MUSCULOSKELETAL: No new muscle or joint pain. + neuropathy BLE  NEUROLOGICAL: No syncope or TIA-like symptoms. PSYCHIATRIC: No anxiety, pain, insomnia or depression    Objective:   PHYSICAL EXAM:      Wt Readings from Last 3 Encounters:   09/16/22 220 lb (99.8 kg)   09/02/22 221 lb (100.2 kg)   08/16/22 206 lb (93.4 kg)          VITALS:  /60 (Site: Left Upper Arm, Position: Sitting, Cuff Size: Medium Adult)   Pulse 70   Ht 6' (1.829 m)   Wt 220 lb (99.8 kg)   SpO2 99%   BMI 29.84 kg/m²   CONSTITUTIONAL: Cooperative, no apparent distress, and appears well nourished / developed + overweight   NEUROLOGIC:  Awake and orientated to person, place and time. PSYCH: Calm affect. SKIN: Warm and dry.   HEENT: Sclera non-icteric, normocephalic, neck supple, no elevation of JVP, normal carotid pulses with no bruits and thyroid normal size. LUNGS:  No increased work of breathing and clear to auscultation, no crackles or wheezing  CARDIOVASCULAR:  Regular rate and rhythm with no murmurs, gallops, rubs, or abnormal heart sounds, normal PMI. The apical impulses not displaced  Heart tones are crisp and normal  Cervical veins are not engorged  The carotid upstroke is normal in amplitude and contour without delay or bruit  JVP is not elevated  ABDOMEN:  Normal bowel sounds, non-distended and non-tender to palpation  EXT: +2 edema BLE no calf tenderness. Pulses are present bilaterally. DATA:    Lab Results   Component Value Date    ALT 22 07/28/2022    AST 27 07/28/2022    ALKPHOS 133 (H) 07/28/2022    BILITOT 1.1 (H) 07/28/2022     Lab Results   Component Value Date    CREATININE 1.0 09/02/2022    BUN 13 09/02/2022     09/02/2022    K 4.1 09/02/2022    CL 98 (L) 09/02/2022    CO2 28 09/02/2022     Lab Results   Component Value Date    TSH 1.82 11/07/2017     Lab Results   Component Value Date    WBC 12.4 (H) 08/16/2022    HGB 15.1 08/16/2022    HCT 45.1 08/16/2022    MCV 87.1 08/16/2022     (H) 08/16/2022     No components found for: CHLPL  Lab Results   Component Value Date    TRIG 97 09/05/2017    TRIG 129 03/06/2017    TRIG 112 09/12/2016     Lab Results   Component Value Date    HDL 56 11/20/2020    HDL 54 11/21/2019    HDL 43 05/09/2019     Lab Results   Component Value Date    LDLCALC 111 (H) 11/20/2020    LDLCALC 58 11/21/2019    LDLCALC 61 05/09/2019     Lab Results   Component Value Date    LABVLDL 22 11/20/2020    LABVLDL 23 11/21/2019    LABVLDL 30 05/09/2019      No results found for: BNP  Radiology Review:  Pertinent images / reports were reviewed as a part of this visit and reveals the following:    Limited Echo 8/19/22  1. Left ventricle:  The cavity size was normal. Wall thickness was normal. Systolic function was

## 2022-09-19 ENCOUNTER — TELEPHONE (OUTPATIENT)
Dept: CARDIOLOGY CLINIC | Age: 77
End: 2022-09-19

## 2022-09-19 NOTE — TELEPHONE ENCOUNTER
Please let patient/wife know that blood work is stable. I'd like them to increase spironolactone to 4 days a week (half of a tablet) as there is no sign of dehydration on blood work.

## 2022-09-21 ENCOUNTER — TELEPHONE (OUTPATIENT)
Dept: FAMILY MEDICINE CLINIC | Age: 77
End: 2022-09-21

## 2022-09-21 NOTE — TELEPHONE ENCOUNTER
Maria L Beavers is calling to let the provider know the open wound is now scraped over and was wonder what you wanted him to do going forward. Please advise!

## 2022-09-30 ENCOUNTER — OFFICE VISIT (OUTPATIENT)
Dept: FAMILY MEDICINE CLINIC | Age: 77
End: 2022-09-30
Payer: COMMERCIAL

## 2022-09-30 VITALS
OXYGEN SATURATION: 98 % | SYSTOLIC BLOOD PRESSURE: 118 MMHG | HEART RATE: 61 BPM | BODY MASS INDEX: 30.52 KG/M2 | WEIGHT: 225 LBS | DIASTOLIC BLOOD PRESSURE: 80 MMHG

## 2022-09-30 DIAGNOSIS — R35.0 BENIGN PROSTATIC HYPERPLASIA WITH URINARY FREQUENCY: Primary | ICD-10-CM

## 2022-09-30 DIAGNOSIS — G91.2 NORMAL PRESSURE HYDROCEPHALUS (HCC): ICD-10-CM

## 2022-09-30 DIAGNOSIS — E78.2 MIXED HYPERLIPIDEMIA: ICD-10-CM

## 2022-09-30 DIAGNOSIS — N40.1 BENIGN PROSTATIC HYPERPLASIA WITH URINARY FREQUENCY: Primary | ICD-10-CM

## 2022-09-30 DIAGNOSIS — I50.32 CHRONIC DIASTOLIC HEART FAILURE (HCC): ICD-10-CM

## 2022-09-30 DIAGNOSIS — R73.9 HYPERGLYCEMIA: ICD-10-CM

## 2022-09-30 DIAGNOSIS — Z23 NEED FOR PROPHYLACTIC VACCINATION AND INOCULATION AGAINST INFLUENZA: ICD-10-CM

## 2022-09-30 DIAGNOSIS — E78.00 HYPERCHOLESTEREMIA: Chronic | ICD-10-CM

## 2022-09-30 DIAGNOSIS — I10 ESSENTIAL HYPERTENSION: ICD-10-CM

## 2022-09-30 LAB
A/G RATIO: 1.8 (ref 1.1–2.2)
ALBUMIN SERPL-MCNC: 4.4 G/DL (ref 3.4–5)
ALP BLD-CCNC: 110 U/L (ref 40–129)
ALT SERPL-CCNC: 15 U/L (ref 10–40)
ANION GAP SERPL CALCULATED.3IONS-SCNC: 15 MMOL/L (ref 3–16)
AST SERPL-CCNC: 17 U/L (ref 15–37)
BASOPHILS ABSOLUTE: 0.1 K/UL (ref 0–0.2)
BASOPHILS RELATIVE PERCENT: 0.9 %
BILIRUB SERPL-MCNC: 0.7 MG/DL (ref 0–1)
BUN BLDV-MCNC: 17 MG/DL (ref 7–20)
CALCIUM SERPL-MCNC: 9.6 MG/DL (ref 8.3–10.6)
CHLORIDE BLD-SCNC: 101 MMOL/L (ref 99–110)
CHOLESTEROL, FASTING: 170 MG/DL (ref 0–199)
CO2: 23 MMOL/L (ref 21–32)
CREAT SERPL-MCNC: 1.2 MG/DL (ref 0.8–1.3)
EOSINOPHILS ABSOLUTE: 0.5 K/UL (ref 0–0.6)
EOSINOPHILS RELATIVE PERCENT: 5.5 %
GFR AFRICAN AMERICAN: >60
GFR NON-AFRICAN AMERICAN: 59
GLUCOSE FASTING: 98 MG/DL (ref 70–99)
HCT VFR BLD CALC: 40 % (ref 40.5–52.5)
HDLC SERPL-MCNC: 57 MG/DL (ref 40–60)
HEMOGLOBIN: 13.2 G/DL (ref 13.5–17.5)
LDL CHOLESTEROL CALCULATED: 87 MG/DL
LYMPHOCYTES ABSOLUTE: 2.3 K/UL (ref 1–5.1)
LYMPHOCYTES RELATIVE PERCENT: 24.4 %
MCH RBC QN AUTO: 29.5 PG (ref 26–34)
MCHC RBC AUTO-ENTMCNC: 33.1 G/DL (ref 31–36)
MCV RBC AUTO: 89.2 FL (ref 80–100)
MONOCYTES ABSOLUTE: 0.8 K/UL (ref 0–1.3)
MONOCYTES RELATIVE PERCENT: 8.2 %
NEUTROPHILS ABSOLUTE: 5.7 K/UL (ref 1.7–7.7)
NEUTROPHILS RELATIVE PERCENT: 61 %
PDW BLD-RTO: 15.9 % (ref 12.4–15.4)
PLATELET # BLD: 303 K/UL (ref 135–450)
PMV BLD AUTO: 9.2 FL (ref 5–10.5)
POTASSIUM SERPL-SCNC: 4.5 MMOL/L (ref 3.5–5.1)
RBC # BLD: 4.49 M/UL (ref 4.2–5.9)
SODIUM BLD-SCNC: 139 MMOL/L (ref 136–145)
TOTAL PROTEIN: 6.8 G/DL (ref 6.4–8.2)
TRIGLYCERIDE, FASTING: 128 MG/DL (ref 0–150)
TSH REFLEX: 3.72 UIU/ML (ref 0.27–4.2)
VLDLC SERPL CALC-MCNC: 26 MG/DL
WBC # BLD: 9.4 K/UL (ref 4–11)

## 2022-09-30 PROCEDURE — 1123F ACP DISCUSS/DSCN MKR DOCD: CPT | Performed by: FAMILY MEDICINE

## 2022-09-30 PROCEDURE — 99214 OFFICE O/P EST MOD 30 MIN: CPT | Performed by: FAMILY MEDICINE

## 2022-09-30 PROCEDURE — 90694 VACC AIIV4 NO PRSRV 0.5ML IM: CPT | Performed by: FAMILY MEDICINE

## 2022-09-30 PROCEDURE — G0008 ADMIN INFLUENZA VIRUS VAC: HCPCS | Performed by: FAMILY MEDICINE

## 2022-09-30 RX ORDER — TAMSULOSIN HYDROCHLORIDE 0.4 MG/1
0.4 CAPSULE ORAL DAILY
Qty: 90 CAPSULE | Refills: 3 | Status: SHIPPED | OUTPATIENT
Start: 2022-09-30 | End: 2022-12-29

## 2022-09-30 ASSESSMENT — ENCOUNTER SYMPTOMS
CHEST TIGHTNESS: 0
SHORTNESS OF BREATH: 0

## 2022-09-30 NOTE — PROGRESS NOTES
Hypertension     Hypertrophy of prostate without urinary obstruction and other lower urinary tract symptoms (LUTS)     Irritable bowel syndrome     Pancreatitis     Peptic ulcer, unspecified site, unspecified as acute or chronic, without mention of hemorrhage, perforation, or obstruction     Peripheral vascular disease ([facility] [address]      Past Surgical History:   Procedure Laterality Date    ABDOMINAL AORTIC ANEURYSM REPAIR N/A     ANGIOPLASTY      RT femoral artery    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REPAIR  2018    [place]    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REPAIR      Aortic valve replacement    AORTIC VALVE REPLACEMENT  2018    CHOLECYSTECTOMY      COLONOSCOPY N/A 2021    COLONOSCOPY POLYPECTOMY SNARE/COLD BIOPSY performed by Elaina Bailey MD at [facility]      bifemoral bypass    FOOT SURGERY Right 12/2/15    HERNIA REPAIR Left     OTHER SURGICAL HISTORY  10/23/2017    lumbar puncture    AL ESOPHAGOGASTRODUODENOSCOPY TRANSORAL DIAGNOSTIC N/A 2018    EGD performed by Elaina Bailey MD at [address]  2018    WITH BIOPSY    UPPER GASTROINTESTINAL ENDOSCOPY N/A 2022    EGD BIOPSY performed by Connie King MD at [address] [facility] [place] History   Problem Relation Age of Onset    Heart Disease Father     Diabetes Father     Alcohol Abuse Father     Other Mother     Cancer Brother         liver     Social History     Tobacco Use    Smoking status: Former     Packs/day: 1.50     Years: 30.00     Pack years: 45.00     Types: Cigarettes     Quit date: 2001     Years since quittin.8    Smokeless tobacco: Never    Tobacco comments:     H.O.smoking at age 25 / smoked up to 1.5p.p.d /quit     Substance Use Topics    Alcohol use: Not Currently     Alcohol/week: 2.0 standard drinks     Types: 2 Cans of beer per week      No Known Allergies  Current Outpatient Medications on File Prior to Visit   Medication Sig Dispense Refill spironolactone (ALDACTONE) 25 MG tablet Take 0.5 tablets by mouth every other day 30 tablet 1    METAMUCIL FIBER PO Take by mouth Pt takes 2 capsules qd      Menthol-Zinc Oxide (REMEDY CALAZIME) 0.4-20.5 % PSTE Apply topically prn      Dimethicone 1.5 % CREA Apply topically prn      senna (SENOKOT) 8.6 MG tablet Take 2 tablets by mouth daily PRN      NONFORMULARY Sureprep no-sting barrier wipe miscellaneous(ostomy supplies)      torsemide (DEMADEX) 20 MG tablet Take 1 tablet by mouth in the morning and 1 tablet before bedtime. 30 tablet 3    Lift Chair MISC by Does not apply route 1 each 0    aspirin 81 MG EC tablet Take 1 tablet by mouth daily 30 tablet 3    atorvastatin (LIPITOR) 80 MG tablet TAKE ONE TABLET BY MOUTH DAILY 90 tablet 3     No current facility-administered medications on file prior to visit. Review of Systems   Constitutional:  Positive for fatigue. Respiratory:  Negative for chest tightness and shortness of breath. Cardiovascular:  Negative for chest pain. Genitourinary:  Positive for frequency, incomplete emptying and nocturia. Skin:  Positive for wound. Psychiatric/Behavioral:  Positive for confusion. Negative for sleep disturbance. OBJECTIVE:    /80   Pulse 61   Wt 225 lb (102.1 kg)   SpO2 98%   BMI 30.52 kg/m²    Physical Exam  Constitutional:       General: He is not in acute distress. Appearance: He is well-developed. HENT:      Head: Normocephalic and atraumatic. Right Ear: Tympanic membrane and external ear normal.      Left Ear: External ear normal. There is impacted cerumen. Nose: Nose normal.   Eyes:      General:         Right eye: No discharge. Conjunctiva/sclera: Conjunctivae normal.   Neck:      Thyroid: No thyromegaly. Vascular: No JVD. Trachea: No tracheal deviation. Cardiovascular:      Rate and Rhythm: Normal rate and regular rhythm. Heart sounds: Normal heart sounds.    Pulmonary:      Effort: Pulmonary effort is normal. No respiratory distress. Breath sounds: Normal breath sounds. No rales. Musculoskeletal:      Cervical back: Normal range of motion and neck supple. Lymphadenopathy:      Cervical: No cervical adenopathy. Skin:     General: Skin is warm and dry. Neurological:      Mental Status: He is alert and oriented to person, place, and time. Gait: Gait abnormal (Ambulating with a walker). ASSESSMENT/PLAN:    Jamal Felder was seen today for follow-up. Diagnoses and all orders for this visit:    Benign prostatic hyperplasia with urinary frequency  This urinary frequency may be aggravated by the use of diuretic as well as his NPH  -     tamsulosin (FLOMAX) 0.4 MG capsule; Take 1 capsule by mouth daily    Need for prophylactic vaccination and inoculation against influenza  -     Influenza, FLUAD, (age 72 y+), IM, PF, 0.5 mL    Mixed hyperlipidemia  -     Comprehensive Metabolic Panel, Fasting; Future  -     CBC with Auto Differential; Future  -     Lipid, Fasting; Future  -     TSH with Reflex; Future    Essential hypertension  Stable on current medications    Chronic diastolic heart failure ([facility] [address]  Followed by cardiology    Normal pressure hydrocephalus [facility])  Previously followed by neurology but he has not had a visit recently. Hyperglycemia  -     Hemoglobin A1C; Future      Return if symptoms worsen or fail to improve. I have asked them to follow-up with neurology and further disposition will depend on results of his labs. Please note portions of this note were completed with a voicerecognition program.  Efforts were made to edit the dictations but occasionally words are mis-transcribed.

## 2022-10-01 LAB
ESTIMATED AVERAGE GLUCOSE: 128.4 MG/DL
HBA1C MFR BLD: 6.1 %

## 2022-10-01 PROCEDURE — 93272 ECG/REVIEW INTERPRET ONLY: CPT | Performed by: INTERNAL MEDICINE

## 2022-10-08 NOTE — PATIENT INSTRUCTIONS
Doing great. No med changes.   Echo 3/22  FU in 6 months <-- Click to add NO significant Past Surgical History

## 2022-10-10 ENCOUNTER — CARE COORDINATION (OUTPATIENT)
Dept: CARE COORDINATION | Age: 77
End: 2022-10-10

## 2022-10-10 RX ORDER — TRAMADOL HYDROCHLORIDE 50 MG/1
50 TABLET ORAL EVERY 6 HOURS PRN
COMMUNITY
Start: 2022-10-07 | End: 2022-10-12

## 2022-10-10 RX ORDER — PREDNISONE 20 MG/1
20 TABLET ORAL 2 TIMES DAILY
COMMUNITY
Start: 2022-10-07 | End: 2022-10-18

## 2022-10-10 NOTE — CARE COORDINATION
Ambulatory Care Coordination  ED Follow up Call    Reason for ED visit:  Inflammation, wrist pain    Status:     significantly improved    Did you call your PCP prior to going to the ED? No      Did you receive a discharge instructions from the Emergency Room? Yes  Review of Instructions:     Understands what to report/when to return?:  Yes   Understands discharge instructions?:  Yes   Following discharge instructions?:  Yes       Are there any new complaints of pain? No  New Pain Meds? Yes    Constipation prophylaxis needed? No    If you have a wound is the dressing clean, dry, and intact? N/A  Understands wound care regimen? N/A    Are there any other complaints/concerns that you wish to tell your provider? No    FU appts/Provider:    Future Appointments   Date Time Provider Pawan Renner   10/18/2022 11:30 AM ROSA MARIA Eduardo - CNP FF Cardio MMA   11/17/2022 10:00 AM Marie Rao MD  Cardio MMA   11/28/2022  9:45 AM [facility] FF VASC & ENDO, VASCULAR LAB SCHED FF VASC/ENDO MMA   11/28/2022 11:30 AM Katie Benoit MD FF VASC/ENDO MMA           New Medications?:   Yes      Medication Reconciliation by phone - Yes  Understands Medications? Yes  Taking Medications? Not taking Tramadol, will check with PCP about continuing Prednisone  Can you swallow your pills? Yes    Any further needs in the home i.e. Equipment? No    Link to services in community?:  No       Challenges to be reviewed by the provider   Additional needs identified to be addressed with provider Yes  medications-Patient's wife is asking if patient should continue with Prednisone rx given during recent ED visit  10/7/2022. Patient has taken 6 doses out of 14. Per Elyce Coins. Patient has zero pain but did note some inflammation and tenderness. Please have office staff call patient/patient's wife to discuss. Patient's wife had no questions or concerns for ACM at this time.  She was advised to call PCP and report any new or worsening symptoms. She reviewed discharge instructions and verbalized understanding. AC has arranged for further follow up at a later date/time.

## 2022-10-18 ENCOUNTER — TELEPHONE (OUTPATIENT)
Dept: CARDIOLOGY CLINIC | Age: 77
End: 2022-10-18

## 2022-10-18 ENCOUNTER — OFFICE VISIT (OUTPATIENT)
Dept: CARDIOLOGY CLINIC | Age: 77
End: 2022-10-18
Payer: COMMERCIAL

## 2022-10-18 VITALS
DIASTOLIC BLOOD PRESSURE: 70 MMHG | BODY MASS INDEX: 29.66 KG/M2 | OXYGEN SATURATION: 99 % | WEIGHT: 219 LBS | SYSTOLIC BLOOD PRESSURE: 114 MMHG | HEIGHT: 72 IN | HEART RATE: 61 BPM

## 2022-10-18 DIAGNOSIS — E78.2 MIXED HYPERLIPIDEMIA: ICD-10-CM

## 2022-10-18 DIAGNOSIS — R60.0 LOWER EXTREMITY EDEMA: Primary | ICD-10-CM

## 2022-10-18 DIAGNOSIS — I35.0 NONRHEUMATIC AORTIC VALVE STENOSIS: ICD-10-CM

## 2022-10-18 DIAGNOSIS — I25.10 CORONARY ARTERY DISEASE INVOLVING NATIVE CORONARY ARTERY OF NATIVE HEART WITHOUT ANGINA PECTORIS: ICD-10-CM

## 2022-10-18 DIAGNOSIS — R55 SYNCOPE AND COLLAPSE: ICD-10-CM

## 2022-10-18 PROCEDURE — 99214 OFFICE O/P EST MOD 30 MIN: CPT | Performed by: NURSE PRACTITIONER

## 2022-10-18 PROCEDURE — 1123F ACP DISCUSS/DSCN MKR DOCD: CPT | Performed by: NURSE PRACTITIONER

## 2022-10-18 RX ORDER — TORSEMIDE 20 MG/1
20 TABLET ORAL DAILY
Qty: 30 TABLET | Refills: 3 | Status: SHIPPED | OUTPATIENT
Start: 2022-10-18

## 2022-10-18 NOTE — PROGRESS NOTES
[facility]     Outpatient Follow Up Note    CHIEF COMPLAINT / HPI:  Follow Up secondary to swelling    Subjective:   Karen Felder is 78 y.o. 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 Lonnie Patella LOV aldactone increased to 4 days a week. Today,  is here with his wife for follow up. She says his weights are stable (says baseline weight 215-220 lbs) and he is able to get his feet in his shoes easier than he could in the past. He denies chest pain, shortness of breath, palpitations, or dizziness. His wife is asking if we can back off of some diuretics. With regard to medication therapy the patient has been compliant with prescribed regimen. They have tolerated therapy to date.      Past Medical History:   Diagnosis Date    Allergic rhinitis     Aortic valve disorders     COPD (chronic obstructive pulmonary disease) (HCC)     Hydrocephalus (HCC)     normal pressure,    Hyperlipidemia     Hypertension     Hypertrophy of prostate without urinary obstruction and other lower urinary tract symptoms (LUTS)     Irritable bowel syndrome     Pancreatitis     Peptic ulcer, unspecified site, unspecified as acute or chronic, without mention of hemorrhage, perforation, or obstruction     Peripheral vascular disease ([facility] [address]      Social History:    Social History     Tobacco Use   Smoking Status Former    Packs/day: 1.50    Years: 30.00    Pack years: 45.00    Types: Cigarettes    Quit date: 2001    Years since quittin.8   Smokeless Tobacco Never   Tobacco Comments    H.O.smoking at age 25 / smoked up to 1.5p.p.d /quit       Current Medications:  Current Outpatient Medications   Medication Sig Dispense Refill    predniSONE (DELTASONE) 20 MG tablet Take 20 mg by mouth 2 times daily      tamsulosin (FLOMAX) 0.4 MG capsule Take 1 capsule by mouth daily 90 capsule 3    spironolactone (ALDACTONE) 25 MG tablet Take 0.5 tablets by mouth every other day 30 tablet 1    METAMUCIL FIBER PO Take by mouth Pt takes 2 capsules qd      Menthol-Zinc Oxide (REMEDY CALAZIME) 0.4-20.5 % PSTE Apply topically prn      Dimethicone 1.5 % CREA Apply topically prn      senna (SENOKOT) 8.6 MG tablet Take 2 tablets by mouth daily PRN      NONFORMULARY Sureprep no-sting barrier wipe miscellaneous(ostomy supplies)      torsemide (DEMADEX) 20 MG tablet Take 1 tablet by mouth in the morning and 1 tablet before bedtime. 30 tablet 3    Lift Chair MISC by Does not apply route 1 each 0    aspirin 81 MG EC tablet Take 1 tablet by mouth daily 30 tablet 3    atorvastatin (LIPITOR) 80 MG tablet TAKE ONE TABLET BY MOUTH DAILY 90 tablet 3     No current facility-administered medications for this visit. REVIEW OF SYSTEMS:    CONSTITUTIONAL: No major weight gain or loss, fatigue, night sweats or fever. + generalized weakness  HEENT: No new vision difficulties or ringing in the ears. RESPIRATORY: No new SOB, PND, orthopnea or cough. CARDIOVASCULAR: See HPI  GI: No nausea, vomiting, diarrhea, constipation, abdominal pain or changes in bowel habits. : No urinary frequency, urgency, incontinence hematuria or dysuria. SKIN: No cyanosis or skin lesions. MUSCULOSKELETAL: No new muscle or joint pain. + neuropathy BLE  NEUROLOGICAL: No syncope or TIA-like symptoms. PSYCHIATRIC: No anxiety, pain, insomnia or depression    Objective:   PHYSICAL EXAM:      Wt Readings from Last 3 Encounters:   09/30/22 225 lb (102.1 kg)   09/16/22 220 lb (99.8 kg)   09/02/22 221 lb (100.2 kg)          VITALS:  /70 (Site: Left Upper Arm, Position: Sitting, Cuff Size: Medium Adult)   Pulse 61   Ht 6' (1.829 m)   Wt 219 lb (99.3 kg)   SpO2 99%   BMI 29.70 kg/m²   CONSTITUTIONAL: Cooperative, no apparent distress, and appears well nourished / developed + overweight   NEUROLOGIC:  Awake and orientated to person, place and time. PSYCH: Calm affect. SKIN: Warm and dry.   HEENT: Sclera non-icteric, normocephalic, neck supple, no elevation of JVP, normal carotid pulses with no bruits and thyroid normal size. LUNGS:  No increased work of breathing and clear to auscultation, no crackles or wheezing  CARDIOVASCULAR:  Regular rate and rhythm with no murmurs, gallops, rubs, or abnormal heart sounds, normal PMI. The apical impulses not displaced  Heart tones are crisp and normal  Cervical veins are not engorged  The carotid upstroke is normal in amplitude and contour without delay or bruit  JVP is not elevated  ABDOMEN:  Normal bowel sounds, non-distended and non-tender to palpation  EXT: +2 edema BLE no calf tenderness. Pulses are present bilaterally. DATA:    Lab Results   Component Value Date    ALT 15 09/30/2022    AST 17 09/30/2022    ALKPHOS 110 09/30/2022    BILITOT 0.7 09/30/2022     Lab Results   Component Value Date    CREATININE 1.2 09/30/2022    BUN 17 09/30/2022     09/30/2022    K 4.5 09/30/2022     09/30/2022    CO2 23 09/30/2022     Lab Results   Component Value Date    TSH 1.82 11/07/2017     Lab Results   Component Value Date    WBC 9.4 09/30/2022    HGB 13.2 (L) 09/30/2022    HCT 40.0 (L) 09/30/2022    MCV 89.2 09/30/2022     09/30/2022     No components found for: CHLPL  Lab Results   Component Value Date    TRIG 97 09/05/2017    TRIG 129 03/06/2017    TRIG 112 09/12/2016     Lab Results   Component Value Date    HDL 57 09/30/2022    HDL 56 11/20/2020    HDL 54 11/21/2019     Lab Results   Component Value Date    LDLCALC 87 09/30/2022    LDLCALC 111 (H) 11/20/2020    LDLCALC 58 11/21/2019     Lab Results   Component Value Date    LABVLDL 26 09/30/2022    LABVLDL 22 11/20/2020    LABVLDL 23 11/21/2019      No results found for: BNP  Radiology Review:  Pertinent images / reports were reviewed as a part of this visit and reveals the following:    Limited Echo 8/19/22  1. Left ventricle:  The cavity size was normal. Wall thickness was normal. Systolic function was normal. The estimated ejection  fraction was in the range of 55% to 60%. 2. Right ventricle: The cavity size was normal. Systolic function was normal.   3. Atrial septum: Agitated saline bubble study is negative for intra-cardiac shunting. 4. Pericardium, extracardiac: There was no pericardial effusion. Impressions: The previous study was not available, so comparison was made to the report of 2022. Patient refused Lumason. No significant change compared to study done on 2022. Echo: 22   Summary   *Technically difficult study due to poor acoustical window. Patient refused   Definity. Suboptimal image quality. *Left ventricle - normal size, thickness and function with EF of 55%   *Aortic valve - well seated TAVR valve, PV 1.9m/s, MG 8mmHg, no significant   regurgitation    Last Angiogram: 2018  Findings:                      LM       Short, normal              LAD     40% mid               Cx        20% distal              RCA     Nondominant, normal              LVG     Not performed              EDP     Not obtained  Intervention:  None  Recs: Will consult CTS - Dr. Sera Pena given insurance issues with [facility]                          Not sure how [facility] will affect candidacy    Last EC22  EKG performed in ER and to be interpreted by ER physician. Assessment:      Diagnosis Orders   1. Lower extremity edema / HFpEF  ~ stable, appears compensated  ~ likely venous component   ~ lower BP preventing addition of ARB at this time   ~ torsemide 20 BID / aldactone 4 days/week    2. Nonrheumatic aortic valve disorder   ~ Echo 22 well seated TAVR valve, MG 8 mmHg, no significantregurgitation  ~ s/p TAVR 3/2018    3. Coronary artery disease involving native coronary artery of native heart without angina pectoris   ~ stable, no complaints of angina   ~ [facility] 22 nonobstructive disease  ~ asa / statin     4.  Mixed hyperlipidemia   ~ controlled on atorvastatin 80  ~ lipids 22  HDL 49 LDL 58 Trig 114    5. Syncope        ~ syncopal episode 8/18/22, thought to be r/t dehydration/hypotension?       ~ no recurrent episodes        ~ [facility] 9/2022 showed SR    I had the opportunity to review the clinical symptoms and presentation of Gonzalez Coello. Plan:     1. OK to decrease torsemide to 20 mg daily. Take afternoon dose if you notice increase swelling, SOB, or weight gain  2. Call for weight gain of 3 lbs overnight, 5 lbs in a week   3. Appointment scheduled with Dr Geoffrey Gillespie 11/17    Overall the patient is stable from CV standpoint    I have addresed the patient's cardiac risk factors and adjusted pharmacologic treatment as needed. In addition, I have reinforced the need for patient directed risk factor modification. Further evaluation will be based upon the patient's clinical course and testing results. All questions and concerns were addressed to the patient/family (wife)    The patient is not currently smoking. The risks related to smoking were reviewed with the patient. Recommend maintaining a smoke-free lifestyle. Patient is not on a beta-blocker; no MI  Patient is not on an ace-i/ARB; on hold  Patient is on a statin    Dual antiplatelet therapy has not been recommended / prescribed for this patient. The patient verbalizes understanding not to stop medications without discussing with us. Discussed exercise: 30-60 minutes 7 days/week  Discussed Low saturated fat/CARI diet. Thank you for allowing to us to participate in the care of Gonzalez Coello.     Electronically signed by ROSA MARIA Chrsitie CNP on 10/18/2022 at 8:07 AM     Documentation of today's visit sent to PCP

## 2022-10-18 NOTE — PATIENT INSTRUCTIONS
Take 20 mg of torsemide daily. OK to take an additional torsemide dose in the afternoon for swelling, shortness of breath, or worsening swelling.  But please call us and let us know

## 2022-10-20 ENCOUNTER — CARE COORDINATION (OUTPATIENT)
Dept: CARE COORDINATION | Age: 77
End: 2022-10-20

## 2022-10-20 ASSESSMENT — ENCOUNTER SYMPTOMS: DYSPNEA ASSOCIATED WITH: EXERTION

## 2022-10-20 NOTE — CARE COORDINATION
ACC: Ilsa Nathan RN    Care Coordination Interventions    Program Enrollment: Rising Risk  Referral from Primary Care Provider: No  Suggested Interventions and Community Resources  Fall Risk Prevention: Completed  Home Health Services: Completed  Occupational Therapy: Completed  Physical Therapy: Completed  Zone Management Tools: Completed         , Ambulatory Care Coordination Note  10/20/2022    ACC: Ilsa Nathna RN    ACM made outreach to patient to follow up with Care Management. ACM spoke with patient's wife Nevaeh. Per Nevaeh patient is doing well. He had his cardiologist appointment and received all good updates. Patient has continued to follow up guidelines received from Cardiologist, PCP and ACM. He takes medications as prescribed, eats a healthy well balanced meal, and gets a minimum of 30 minutes of exercise a day. Patient continues to monitor for s/sx of changes or concerns and is aware of concerns to report to provider vs when to go to ED. Patient was evaluated for speech therapy and is schedule to start with at home speech therapist in 2 weeks. He will continue to work with B[facility] and Sermo. Patient has no active care management concerns at this time. Nevaeh was advised that this is the final CM outreach but ACM will remain available for any future CM needs. Nevaeh has requested ACM send note to Dr. Johnnie Kayser and staff to have order for low air loss mattress. Per Nevaeh when original order was sent to [facility] for hospital bed and low air loss mattress it was received, processed and approved, but the mattress was out of stock.  Nevaeh advised that she called [facility] and was advised that a new order for mattress would need to be sent to [facility] at Fax number [phone]    Offered patient enrollment in the Remote Patient Monitoring (RPM) program for in-home monitoring: Patient is not eligible for RPM program.    Lab Results       None            Care Coordination Interventions    Program Enrollment: Rising Risk  Referral from Primary Care Provider: No  Suggested Interventions and Community Resources  Fall Risk Prevention: Completed  Home Health Services: Completed  Occupational Therapy: Completed  Physical Therapy: Completed  Zone Management Tools: Completed          Goals Addressed                      This Visit's Progress      Patient Stated (pt-stated)   On track      Will continue to build strength with at home PT and walk with use of walker    Barriers: overwhelmed by complexity of regimen and stress  Plan for overcoming my barriers: work with Kyle Gold, PT, PCP and spouse   Confidence: 8/10  Anticipated Goal Completion Date: 7/29/2022        Wellness Goal   Improving      Patient Self-Management Goal for Health Maintenance  Goal: I will schedule a yearly preventative care visit. Barriers: overwhelmed by complexity of regimen and time constraints  Plan for overcoming my barriers: work with ACM and PCP  Confidence: 10/10  Anticipated Goal Completion Date: 6/17/2022              Prior to Admission medications    Medication Sig Start Date End Date Taking? Authorizing Provider   torsemide (DEMADEX) 20 MG tablet Take 1 tablet by mouth daily OK to take an extra 20 mg in the afternoon for swelling, shortness of breath, or weight gain 10/18/22  Yes ROSA MARIA Ellington CNP   tamsulosin [facility]) 0.4 MG capsule Take 1 capsule by mouth daily 9/30/22 12/29/22  Catherine Alejandro MD   spironolactone (ALDACTONE) 25 MG tablet Take 0.5 tablets by mouth every other day  Patient taking differently: Take 12.5 mg by mouth every other day 1/2 tab 4 days a week.  9/6/22   ROSA MARIA Ellington CNP   METAMUCIL FIBER PO Take by mouth Pt takes 2 capsules qd    Historical Provider, MD   Menthol-Zinc Oxide (REMEDY CALAZIME) 0.4-20.5 % PSTE Apply topically prn    Historical Provider, MD   Dimethicone 1.5 % CREA Apply topically prn    Historical Provider, MD   senna (SENOKOT) 8.6 MG tablet Take 2 tablets by mouth daily PRN    Historical Provider, MD   NONFORMULARY Sureprep no-sting barrier wipe miscellaneous(ostomy supplies)    Historical Provider, MD   Lift Chair MISC by Does not apply route 5/24/22   Paul Mendoza MD   aspirin 81 MG EC tablet Take 1 tablet by mouth daily 4/18/22   Bird Winter MD   atorvastatin (LIPITOR) 80 MG tablet TAKE ONE TABLET BY MOUTH DAILY 3/8/22   Paul Mendoza MD       Future Appointments   Date Time Provider Pawan Renner   11/17/2022 10:00 AM Jeb Linn MD [facility] Cardio MMA   11/28/2022  9:45 AM [facility] FF VASC & ENDO, VASCULAR LAB SCHED FF VASC/ENDO MMA   11/28/2022 11:30 AM Renita Vitale MD FF VASC/ENDO MMA   ,   Congestive Heart Failure Assessment    Are you currently restricting fluids?: No Restriction  Do you understand a low sodium diet?: Yes  Do you understand how to read food labels?: Yes  How many restaurant meals do you eat per week?: 0  Do you salt your food before tasting it?: No         Symptoms:  None: Yes      Symptom course: stable  Patient-reported weight (lb): 219  Weight trend: stable  Salt intake watch compared to last visit: stable     ,   COPD Assessment    Does the patient understand envrionmental exposure?: Yes  Is the patient able to verbalize Rescue vs. Long Acting medications?: Yes  Does the patient have a nebulizer?: Yes  Does the patient use a space with inhaled medications?: No            Symptoms:  None: Yes      Symptom course: stable  Breathlessness: exertion  Increase use of rapid acting/rescue inhaled medications?: No  Change in chronic cough?: No/At Baseline  Change in sputum?: No/At Baseline  Self Monitoring - SaO2: Yes     ,   General Assessment    Do you have any symptoms that are causing concern?: No     , and Care Coordination Episodes    Type: Amb Care Management  Episode: Rising Risk  Noted: 3/8/2022  Comments: COPD, HTN, HLD, PVD

## 2022-11-10 NOTE — PROGRESS NOTES
[facility]  H+P  Consult  OP Visit  FU Visit   CC HX HPI   GEN  Doing well. No new concerns. AS TAVR Ø CP, SOB. HTN  Ambulatory BP in good range. Ø HA/dizziness. CHOL  Last lipid reviewed. On max dose/tolerated statin. EDEMA  Chronic problem. High salt, fluid diet. Drinking lots of coke and adding salt. MED  Compliant with CV meds. Ø reported SA. HISTORY/ALLERGY/ROS   MEDHx  has a past medical history of Allergic rhinitis, Aortic valve disorders, COPD (chronic obstructive pulmonary disease) ([facility]), Hydrocephalus ([facility] [address] Hyperlipidemia, Hypertension, Hypertrophy of prostate without urinary obstruction and other lower urinary tract symptoms (LUTS), Irritable bowel syndrome, Pancreatitis, Peptic ulcer, unspecified site, unspecified as acute or chronic, without mention of hemorrhage, perforation, or obstruction, and Peripheral vascular disease ([facility] [address] 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 SOCHx  reports that he quit smoking about 20 years ago. His smoking use included cigarettes. He has a 45.00 pack-year smoking history. He has never used smokeless tobacco. He reports that he does not currently use alcohol after a past usage of about 2.0 standard drinks per week. He reports that he does not use drugs. FAMx family history includes Alcohol Abuse in his father; Cancer in his brother; Diabetes in his father; Heart Disease in his father; Other in his mother.    ALLERG Patient has no known allergies. ROS Full ROS obtained and negative except as mentioned in HPI   MEDICATIONS   Current Outpatient Medications   Medication Sig Dispense Refill    torsemide (DEMADEX) 20 MG tablet Take 1 tablet by mouth daily OK to take an extra 20 mg in the afternoon for swelling, shortness of breath, or weight gain 30 tablet 3    tamsulosin (FLOMAX) 0.4 MG capsule Take 1 capsule by mouth daily 90 capsule 3    spironolactone (ALDACTONE) 25 MG tablet Take 0.5 tablets by mouth every other day (Patient taking differently: Take 12.5 mg by mouth every other day 1/2 tab 4 days a week.) 30 tablet 1    METAMUCIL FIBER PO Take by mouth Pt takes 2 capsules qd      Menthol-Zinc Oxide (REMEDY CALAZIME) 0.4-20.5 % PSTE Apply topically prn      Dimethicone 1.5 % CREA Apply topically prn      senna (SENOKOT) 8.6 MG tablet Take 2 tablets by mouth daily PRN      NONFORMULARY Sureprep no-sting barrier wipe miscellaneous(ostomy supplies)      Lift Chair MISC by Does not apply route 1 each 0    aspirin 81 MG EC tablet Take 1 tablet by mouth daily 30 tablet 3    atorvastatin (LIPITOR) 80 MG tablet TAKE ONE TABLET BY MOUTH DAILY 90 tablet 3     No current facility-administered medications for this visit.       PHYSICAL EXAM   Vitals /60 (Site: Left Upper Arm, Position: Sitting, Cuff Size: Medium Adult)   Pulse 67   Ht 6' (1.829 m)   Wt 229 lb 12.8 oz (104.2 kg)   SpO2 98%   BMI 31.17 kg/m²     Gen Alert, coop, no distress Heart  Rrr, no mrg   Head NC, AT, no abnorm Abd  Soft, NT, +BS, no mass, no OM   Eyes PER, conj/corn clear Ext  Ext nl, AT, no C/C, +edema   Nose Nares nl, no drain, NT Pulse 2+ and symmetric   Throat Lips, mucosa, tongue nl Skin Col/text/turg nl, no vis rash/les   Neck S/S, TM, NT, no bruit/JVD Psych Nl mood and affect   Lung CTA-B, unlabored, no DTP Lymph   No cervical or axillary LA   Ch wall NT, no deform Neuro  Nl gross M/S exam      CODING   SCI (54549) - AS, HTN. CHOL, EDEMA  30-39 minutes preparing to see pt including review hx, tests, consults, perf exam, , educating pt, fam, caregiver, ordering meds/tests/procedures, referring and comm with pcps and other consultants, documenting info in EMR, interpreting results and communicating to fam and coordination of pt care. SCRIBE   Nurse - Scribe Attestation  [employer], am scribing for and in the presence of Will Cobian MD.   Signed, [employer], RN. Doctor - Provider Sharon Russell is working as a scribe for and in the presence of adria Cobian MD). Working as a scribe, [employer] may have prepopulated components of this note with my historical  intellectual property under my direct supervision. Any additions to this intellectual property were performed in my presence and at my direction. Furthermore, the content and accuracy of this note have been reviewed by adria Cobian MD).   11/17/2022 8:06 AM   ASSESSMENT AND PLAN   *AS   Date EF Detail   Sx   No concerning   Hx 3/18  [facility] T[facility] S3 26mm   [facility] 1/18  Nonobstructive   TTE 7/17  3/18  4/18  4/19  2/20  3/21  5/22 55%  65%  60%  60%  65%  60%  55% AS MG 38  TAVR  well seated, MG 14, no AI  TAVR MG 10, no AI  TAVR MG 14, no AI  TAVR MG 12, no AI  TAVR MG 9, no AI  TAVR MG 8, no AI   Plan   Continue current medications listed above  Echo yearly   *HTN  Status Controlled   Plan Counseled on diet/salt/exercise/weight, continue meds at doses above   *CHOL  LDL 87, 9/22   Plan Counseled on diet/exercise/weight, continue HI/MT statin   *EDEMA  Status worsened  Plan Continue current meds  *COMPLIANCE  Status Compliant   Plan Discussed compliance with meds/diet/salt/exercise; avoid tob/alc/drugs   *FOLLOWUP  3 months with NP  6 months with IC with echo

## 2022-11-17 ENCOUNTER — OFFICE VISIT (OUTPATIENT)
Dept: CARDIOLOGY CLINIC | Age: 77
End: 2022-11-17
Payer: COMMERCIAL

## 2022-11-17 VITALS
OXYGEN SATURATION: 98 % | SYSTOLIC BLOOD PRESSURE: 130 MMHG | DIASTOLIC BLOOD PRESSURE: 60 MMHG | HEIGHT: 72 IN | HEART RATE: 67 BPM | WEIGHT: 229.8 LBS | BODY MASS INDEX: 31.13 KG/M2

## 2022-11-17 DIAGNOSIS — R60.0 LOWER EXTREMITY EDEMA: ICD-10-CM

## 2022-11-17 DIAGNOSIS — I10 ESSENTIAL HYPERTENSION: ICD-10-CM

## 2022-11-17 DIAGNOSIS — Z95.2 S/P TAVR (TRANSCATHETER AORTIC VALVE REPLACEMENT): ICD-10-CM

## 2022-11-17 DIAGNOSIS — I35.0 NONRHEUMATIC AORTIC VALVE STENOSIS: Primary | ICD-10-CM

## 2022-11-17 DIAGNOSIS — E78.2 MIXED HYPERLIPIDEMIA: ICD-10-CM

## 2022-11-17 PROCEDURE — 3078F DIAST BP <80 MM HG: CPT | Performed by: INTERNAL MEDICINE

## 2022-11-17 PROCEDURE — 1123F ACP DISCUSS/DSCN MKR DOCD: CPT | Performed by: INTERNAL MEDICINE

## 2022-11-17 PROCEDURE — 3074F SYST BP LT 130 MM HG: CPT | Performed by: INTERNAL MEDICINE

## 2022-11-17 PROCEDURE — 99214 OFFICE O/P EST MOD 30 MIN: CPT | Performed by: INTERNAL MEDICINE

## 2022-11-28 ENCOUNTER — OFFICE VISIT (OUTPATIENT)
Dept: VASCULAR SURGERY | Age: 77
End: 2022-11-28
Payer: COMMERCIAL

## 2022-11-28 ENCOUNTER — PROCEDURE VISIT (OUTPATIENT)
Dept: VASCULAR SURGERY | Age: 77
End: 2022-11-28

## 2022-11-28 VITALS
WEIGHT: 231 LBS | HEIGHT: 72 IN | DIASTOLIC BLOOD PRESSURE: 70 MMHG | SYSTOLIC BLOOD PRESSURE: 152 MMHG | BODY MASS INDEX: 31.29 KG/M2

## 2022-11-28 DIAGNOSIS — I70.213 ATHEROSCLEROSIS OF NATIVE ARTERIES OF EXTREMITIES WITH INTERMITTENT CLAUDICATION, BILATERAL LEGS (HCC): ICD-10-CM

## 2022-11-28 DIAGNOSIS — I65.23 CAROTID ATHEROSCLEROSIS, BILATERAL: Primary | ICD-10-CM

## 2022-11-28 DIAGNOSIS — I65.23 CAROTID ATHEROSCLEROSIS, BILATERAL: ICD-10-CM

## 2022-11-28 DIAGNOSIS — I73.9 PVD (PERIPHERAL VASCULAR DISEASE) WITH CLAUDICATION (HCC): Primary | ICD-10-CM

## 2022-11-28 PROCEDURE — 99213 OFFICE O/P EST LOW 20 MIN: CPT | Performed by: SURGERY

## 2022-11-28 PROCEDURE — 1123F ACP DISCUSS/DSCN MKR DOCD: CPT | Performed by: SURGERY

## 2022-11-28 PROCEDURE — 3074F SYST BP LT 130 MM HG: CPT | Performed by: SURGERY

## 2022-11-28 PROCEDURE — 3078F DIAST BP <80 MM HG: CPT | Performed by: SURGERY

## 2022-11-28 NOTE — LETTER
[facility]) - Vascular and Endovascular Surgeons  [address] [address]  Phone: [phone]  Fax: [phone]    Maren Gaxiola MD    November 28, 2022     Jonathan Grimm MD  [address] [address]    Patient: Main Levy   MR Number: [id number]   YOB: 1945   Date of Visit: 11/28/2022       Dear Jonathan Grimm:    Thank you for referring Donnie Awad to me for evaluation/treatment. Below are the relevant portions of my assessment and plan of care. If you have questions, please do not hesitate to call me. I look forward to following [employer] along with you.     Sincerely,      Maren Gaxiola MD

## 2022-11-28 NOTE — PROGRESS NOTES
[facility])   Vascular Surgery Followup    Referring Provider:  Hank Avila MD     No chief complaint on file. History of Present Illness:  72-year-old male here today for follow-up with history of aortobifemoral bypass in 2001. Patient presents today for carotid duplex and lower extremity duplex. He denies any new complaints. Continues to struggle with significant peripheral edema. States his legs feel painful because of the swelling and it limits his ability to walk or participate in therapy    Past Medical History:   has a past medical history of Allergic rhinitis, Aortic valve disorders, COPD (chronic obstructive pulmonary disease) ([address] Hydrocephalus ([address] Hyperlipidemia, Hypertension, Hypertrophy of prostate without urinary obstruction and other lower urinary tract symptoms (LUTS), Irritable bowel syndrome, Pancreatitis, Peptic ulcer, unspecified site, unspecified as acute or chronic, without mention of hemorrhage, perforation, or obstruction, and Peripheral vascular disease ([address] 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 Social History:   reports that he quit smoking about 20 years ago. His smoking use included cigarettes. He has a 45.00 pack-year smoking history. He has never used smokeless tobacco. He reports that he does not currently use alcohol after a past usage of about 2.0 standard drinks per week. He reports that he does not use drugs.      Family History:  family history includes Alcohol Abuse in his father; Cancer in his brother; Diabetes in his father; Heart Disease in his father; Other in his mother. Home Medications:  Current Outpatient Medications   Medication Sig Dispense Refill    torsemide (DEMADEX) 20 MG tablet Take 1 tablet by mouth daily OK to take an extra 20 mg in the afternoon for swelling, shortness of breath, or weight gain 30 tablet 3    tamsulosin (FLOMAX) 0.4 MG capsule Take 1 capsule by mouth daily 90 capsule 3    spironolactone (ALDACTONE) 25 MG tablet Take 0.5 tablets by mouth every other day (Patient taking differently: Take 12.5 mg by mouth every other day 1/2 tab 4 days a week.) 30 tablet 1    METAMUCIL FIBER PO Take by mouth Pt takes 2 capsules qd      Menthol-Zinc Oxide (REMEDY CALAZIME) 0.4-20.5 % PSTE Apply topically prn      Dimethicone 1.5 % CREA Apply topically prn      senna (SENOKOT) 8.6 MG tablet Take 2 tablets by mouth daily PRN      NONFORMULARY Sureprep no-sting barrier wipe miscellaneous(ostomy supplies)      Lift Chair MISC by Does not apply route 1 each 0    aspirin 81 MG EC tablet Take 1 tablet by mouth daily 30 tablet 3    atorvastatin (LIPITOR) 80 MG tablet TAKE ONE TABLET BY MOUTH DAILY 90 tablet 3     No current facility-administered medications for this visit. Allergies:  Patient has no known allergies. Review of Systems:   Constitutional: there has been no unanticipated weight loss. There's been no change in energy level, sleep pattern, or activity level. Eyes: No visual changes or diplopia. No scleral icterus. ENT: No Headaches, hearing loss or vertigo. No mouth sores or sore throat. Cardiovascular: Reviewed in HPI  Respiratory: No cough or wheezing, no sputum production. No hematemesis. Gastrointestinal: No abdominal pain, appetite loss, blood in stools. No change in bowel or bladder habits. Genitourinary: No dysuria, trouble voiding, or hematuria.   Musculoskeletal:  No gait disturbance, weakness or joint complaints. Integumentary: No rash or pruritis. Neurological: No headache, diplopia, change in muscle strength, numbness or tingling. No change in gait, balance, coordination, mood, affect, memory, mentation, behavior. Psychiatric: No anxiety, no depression. Endocrine: No malaise, fatigue or temperature intolerance. No excessive thirst, fluid intake, or urination. No tremor. Hematologic/Lymphatic: No abnormal bruising or bleeding, blood clots or swollen lymph nodes. Allergic/Immunologic: No nasal congestion or hives. Physical Examination:    There were no vitals filed for this visit. General appearance: alert, appears stated age, cooperative, and no distress  Head: Normocephalic, without obvious abnormality, atraumatic  Neck: no adenopathy, no carotid bruit, no JVD, supple, symmetrical, trachea midline, and thyroid: not enlarged, symmetric, no tenderness/mass/nodules  Lungs: clear to auscultation bilaterally  Heart: regular rate and rhythm, S1, S2 normal, no murmur, click, rub or gallop  Abdomen: soft, non-tender. Bowel sounds normal. No masses,  no organomegaly  Extremities:  2-3+ edema. Biphasic distal signal    MEDICAL DECISION MAKING/TESTING  I have reviewed the testing personally and my interpretation is below. Less than 50% bilateral internal carotid artery stenosis    Bilateral GISSELL 0.99.   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    Normal pressure hydrocephalus (HCC)    Ataxia    Mild cognitive impairment    Chronic idiopathic constipation    Encounter for follow-up for aortic valve replacement    GI hemorrhage    Nonrheumatic aortic valve stenosis    Anticoagulated    S/P TAVR (transcatheter aortic valve replacement)    Hyperglycemia    Abdominal pain    RADHA (acute kidney injury) ([facility] [address]    Severe malnutrition (HCC)    Bilateral leg weakness    Polyneuropathy, peripheral sensorimotor axonal    Central stenosis of spinal canal    Coronary artery disease involving native coronary artery of native heart without angina pectoris    Mixed hyperlipidemia    Lower extremity edema    Venous insufficiency    Other specified anemias    Syncope and collapse    Chronic diastolic heart failure ([facility] [address]       Plan:  1. Carotid atherosclerosis, bilateral  Stable asymptomatic less than 50% internal carotid artery stenosis. Continue current medical regimen. We will plan for repeat duplex imaging in 1 year  - VL DUP CAROTID BILATERAL; Future    2. Atherosclerosis of native arteries of extremities with intermittent claudication, bilateral legs (HCC)  Asymptomatic peripheral vascular disease with normal GISSELL and widely patent aortobifemoral bypass. Repeat in 1 year  - VL DUP LOWER EXTREMITY ARTERIES BILATERAL; Future    3. Swelling -patient with significant peripheral edema. Unable to tolerate compression at home. We will apply an Unna boot today to see the response. He will follow-up with me again in 1 week      Thank you for allowing me to participate in the care of this individual.  Please do not hesitate to contact me with any questions. Vandana Dowling M.D., FACS.  11/28/2022  9:47 AM

## 2022-12-06 ENCOUNTER — OFFICE VISIT (OUTPATIENT)
Dept: VASCULAR SURGERY | Age: 77
End: 2022-12-06
Payer: COMMERCIAL

## 2022-12-06 VITALS — HEIGHT: 72 IN | BODY MASS INDEX: 31.42 KG/M2 | WEIGHT: 232 LBS

## 2022-12-06 DIAGNOSIS — I73.9 PVD (PERIPHERAL VASCULAR DISEASE) WITH CLAUDICATION (HCC): Primary | ICD-10-CM

## 2022-12-06 PROCEDURE — 1123F ACP DISCUSS/DSCN MKR DOCD: CPT | Performed by: SURGERY

## 2022-12-06 PROCEDURE — 99212 OFFICE O/P EST SF 10 MIN: CPT | Performed by: SURGERY

## 2022-12-06 NOTE — LETTER
[facility]) - Vascular and Endovascular Surgeons  [facility] [place] [id number]  Phone: [phone]  Fax: [phone]           Mariza Nunn MD      December 6, 2022     Patient: Olivia Armijo   MR Number: [id number]   YOB: 1945   Date of Visit: 12/6/2022       Dear Dr. Mary Grace German: Thank you for referring Janette Chavira to me for evaluation/treatment. Below are the relevant portions of my assessment and plan of care. If you have questions, please do not hesitate to call me. I look forward to following Angelia Johnson along with you.     Sincerely,        Mariza Nunn MD    CC providers:  Harrel Soulier., MD  [facility] [address] [place] [id number]  [address] [place] [address]

## 2022-12-06 NOTE — PROGRESS NOTES
[facility])   Vascular Surgery Followup    Referring Provider:  Saturnino Alatorre MD     No chief complaint on file. History of Present Illness:  22-year-old male here today for follow-up with known history of carotid atherosclerosis and peripheral vascular disease with normal GISSELL and widely patent aortobifemoral bypass. At his last visit was noted to have significant swelling and unable to tolerate compression at home. [facility] and is here today for 1 week follow-up. He is seen marked improvement in the swelling over the last week. Very pleased with progress. Past Medical History:   has a past medical history of Allergic rhinitis, Aortic valve disorders, COPD (chronic obstructive pulmonary disease) ([address] Hydrocephalus ([address] Hyperlipidemia, Hypertension, Hypertrophy of prostate without urinary obstruction and other lower urinary tract symptoms (LUTS), Irritable bowel syndrome, Pancreatitis, Peptic ulcer, unspecified site, unspecified as acute or chronic, without mention of hemorrhage, perforation, or obstruction, and Peripheral vascular disease ([address] 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 Social History:   reports that he quit smoking about 20 years ago. His smoking use included cigarettes. He has a 45.00 pack-year smoking history.  He has never used smokeless tobacco. He reports that he does not currently use alcohol after a past usage of about 2.0 standard drinks per week. He reports that he does not use drugs. Family History:  family history includes Alcohol Abuse in his father; Cancer in his brother; Diabetes in his father; Heart Disease in his father; Other in his mother. Home Medications:  Current Outpatient Medications   Medication Sig Dispense Refill    torsemide (DEMADEX) 20 MG tablet Take 1 tablet by mouth daily OK to take an extra 20 mg in the afternoon for swelling, shortness of breath, or weight gain 30 tablet 3    tamsulosin (FLOMAX) 0.4 MG capsule Take 1 capsule by mouth daily 90 capsule 3    spironolactone (ALDACTONE) 25 MG tablet Take 0.5 tablets by mouth every other day (Patient taking differently: Take 12.5 mg by mouth every other day 1/2 tab 4 days a week.) 30 tablet 1    METAMUCIL FIBER PO Take by mouth Pt takes 2 capsules qd      Menthol-Zinc Oxide (REMEDY CALAZIME) 0.4-20.5 % PSTE Apply topically prn      Dimethicone 1.5 % CREA Apply topically prn      senna (SENOKOT) 8.6 MG tablet Take 2 tablets by mouth daily PRN      NONFORMULARY Sureprep no-sting barrier wipe miscellaneous(ostomy supplies)      Lift Chair MISC by Does not apply route 1 each 0    aspirin 81 MG EC tablet Take 1 tablet by mouth daily 30 tablet 3    atorvastatin (LIPITOR) 80 MG tablet TAKE ONE TABLET BY MOUTH DAILY 90 tablet 3     No current facility-administered medications for this visit. Allergies:  Patient has no known allergies. Review of Systems:   Constitutional: there has been no unanticipated weight loss. There's been no change in energy level, sleep pattern, or activity level. Eyes: No visual changes or diplopia. No scleral icterus. ENT: No Headaches, hearing loss or vertigo. No mouth sores or sore throat. Cardiovascular: Reviewed in HPI  Respiratory: No cough or wheezing, no sputum production. No hematemesis. Gastrointestinal: No abdominal pain, appetite loss, blood in stools. No change in bowel or bladder habits.   Genitourinary: No dysuria, trouble voiding, or hematuria. Musculoskeletal:  No gait disturbance, weakness or joint complaints. Integumentary: No rash or pruritis. Neurological: No headache, diplopia, change in muscle strength, numbness or tingling. No change in gait, balance, coordination, mood, affect, memory, mentation, behavior. Psychiatric: No anxiety, no depression. Endocrine: No malaise, fatigue or temperature intolerance. No excessive thirst, fluid intake, or urination. No tremor. Hematologic/Lymphatic: No abnormal bruising or bleeding, blood clots or swollen lymph nodes. Allergic/Immunologic: No nasal congestion or hives. Physical Examination:    There were no vitals filed for this visit. General appearance: alert, appears stated age, cooperative, and no distress  1+ edema.   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    Normal pressure hydrocephalus ([facility])    Ataxia    Mild cognitive impairment    Chronic idiopathic constipation    Encounter for follow-up for aortic valve replacement    GI hemorrhage    Nonrheumatic aortic valve stenosis    Anticoagulated    S/P TAVR (transcatheter aortic valve replacement)    Hyperglycemia    Abdominal pain    RADHA (acute kidney injury) ([facility] [facility]    Severe malnutrition (HCC)    Bilateral leg weakness    Polyneuropathy, peripheral sensorimotor axonal    Central stenosis of spinal canal    Coronary artery disease involving native coronary artery of native heart without angina pectoris    Mixed hyperlipidemia    Lower extremity edema    Venous insufficiency    Other specified anemias    Syncope and collapse    Chronic diastolic heart failure ([facility]       Plan: We will reapply Unna boot today. Anticipate transitioning to knee-high support stockings next week    Thank you for allowing me to participate in the care of this individual.  Please do not hesitate to contact me with any questions. Haley Landers M.D., FACS.   12/6/2022  9:44 AM

## 2022-12-13 ENCOUNTER — OFFICE VISIT (OUTPATIENT)
Dept: VASCULAR SURGERY | Age: 77
End: 2022-12-13
Payer: COMMERCIAL

## 2022-12-13 VITALS — BODY MASS INDEX: 31.42 KG/M2 | HEIGHT: 72 IN | WEIGHT: 232 LBS

## 2022-12-13 DIAGNOSIS — I70.213 ATHEROSCLEROSIS OF NATIVE ARTERIES OF EXTREMITIES WITH INTERMITTENT CLAUDICATION, BILATERAL LEGS (HCC): Primary | ICD-10-CM

## 2022-12-13 PROCEDURE — 99212 OFFICE O/P EST SF 10 MIN: CPT | Performed by: SURGERY

## 2022-12-13 PROCEDURE — 1123F ACP DISCUSS/DSCN MKR DOCD: CPT | Performed by: SURGERY

## 2022-12-13 NOTE — LETTER
[facility]) - Vascular and Endovascular Surgeons  [address] [address]  Phone: [phone]  Fax: [phone]    Stanislva Ledesma MD    December 13, 2022     Demetrio Milian MD  [address] [address]    Patient: Love Piper   MR Number: [id number]   YOB: 1945   Date of Visit: 12/13/2022       Dear Demetrio Milian:    Thank you for referring Velvet Llanos to me for evaluation/treatment. Below are the relevant portions of my assessment and plan of care. If you have questions, please do not hesitate to call me. I look forward to following Haether Montaño along with you.     Sincerely,      Stanislav Ledesma MD

## 2022-12-13 NOTE — PROGRESS NOTES
[facility])   Vascular Surgery Followup    Referring Provider:  Anastacia Smith MD     No chief complaint on file. History of Present Illness:  82-year-old male here today for follow-up after an Unna boot last week. Continues to be pleased with his progress. No complaints today    Past Medical History:   has a past medical history of Allergic rhinitis, Aortic valve disorders, COPD (chronic obstructive pulmonary disease) ([address] Hydrocephalus ([address] Hyperlipidemia, Hypertension, Hypertrophy of prostate without urinary obstruction and other lower urinary tract symptoms (LUTS), Irritable bowel syndrome, Pancreatitis, Peptic ulcer, unspecified site, unspecified as acute or chronic, without mention of hemorrhage, perforation, or obstruction, and Peripheral vascular disease ([address] 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 Social History:   reports that he quit smoking about 21 years ago. His smoking use included cigarettes. He has a 45.00 pack-year smoking history. He has never used smokeless tobacco. He reports that he does not currently use alcohol after a past usage of about 2.0 standard drinks per week. He reports that he does not use drugs. Family History:  family history includes Alcohol Abuse in his father; Cancer in his brother; Diabetes in his father; Heart Disease in his father; Other in his mother.      Home Medications:  Current Outpatient Medications   Medication Sig Dispense Refill    torsemide (DEMADEX) 20 MG tablet Take 1 tablet by mouth daily OK to take an extra 20 mg in the afternoon for swelling, shortness of breath, or weight gain 30 tablet 3    tamsulosin (FLOMAX) 0.4 MG capsule Take 1 capsule by mouth daily 90 capsule 3    spironolactone (ALDACTONE) 25 MG tablet Take 0.5 tablets by mouth every other day (Patient taking differently: Take 12.5 mg by mouth every other day 1/2 tab 4 days a week.) 30 tablet 1    METAMUCIL FIBER PO Take by mouth Pt takes 2 capsules qd      Menthol-Zinc Oxide (REMEDY CALAZIME) 0.4-20.5 % PSTE Apply topically prn      Dimethicone 1.5 % CREA Apply topically prn      senna (SENOKOT) 8.6 MG tablet Take 2 tablets by mouth daily PRN      NONFORMULARY Sureprep no-sting barrier wipe miscellaneous(ostomy supplies)      Lift Chair MISC by Does not apply route 1 each 0    aspirin 81 MG EC tablet Take 1 tablet by mouth daily 30 tablet 3    atorvastatin (LIPITOR) 80 MG tablet TAKE ONE TABLET BY MOUTH DAILY 90 tablet 3     No current facility-administered medications for this visit. Allergies:  Patient has no known allergies. Review of Systems:   Constitutional: there has been no unanticipated weight loss. There's been no change in energy level, sleep pattern, or activity level. Eyes: No visual changes or diplopia. No scleral icterus. ENT: No Headaches, hearing loss or vertigo. No mouth sores or sore throat. Cardiovascular: Reviewed in HPI  Respiratory: No cough or wheezing, no sputum production. No hematemesis. Gastrointestinal: No abdominal pain, appetite loss, blood in stools. No change in bowel or bladder habits. Genitourinary: No dysuria, trouble voiding, or hematuria. Musculoskeletal:  No gait disturbance, weakness or joint complaints. Integumentary: No rash or pruritis. Neurological: No headache, diplopia, change in muscle strength, numbness or tingling.  No change in gait, balance, coordination, mood, affect, memory, mentation, behavior. Psychiatric: No anxiety, no depression. Endocrine: No malaise, fatigue or temperature intolerance. No excessive thirst, fluid intake, or urination. No tremor. Hematologic/Lymphatic: No abnormal bruising or bleeding, blood clots or swollen lymph nodes. Allergic/Immunologic: No nasal congestion or hives. Physical Examination:    There were no vitals filed for this visit. General appearance: alert, appears stated age, cooperative, and no distress  Trace edema      Assessment:     Patient Active Problem List   Diagnosis    Hypercholesteremia    Essential hypertension    Nonrheumatic aortic valve disorder    Hypertrophy of prostate without urinary obstruction and other lower urinary tract symptoms (LUTS)    Peptic ulcer    PVD (peripheral vascular disease) ([facility])    Allergic rhinitis    COPD (chronic obstructive pulmonary disease) ([facility])    Peripheral edema    Hallux valgus with bunions    Carotid stenosis    Rosacea    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    Normal pressure hydrocephalus ([facility])    Ataxia    Mild cognitive impairment    Chronic idiopathic constipation    Encounter for follow-up for aortic valve replacement    GI hemorrhage    Nonrheumatic aortic valve stenosis    Anticoagulated    S/P TAVR (transcatheter aortic valve replacement)    Hyperglycemia    Abdominal pain    RADHA (acute kidney injury) ([address]    Severe malnutrition ([facility])    Bilateral leg weakness    Polyneuropathy, peripheral sensorimotor axonal    Central stenosis of spinal canal    Coronary artery disease involving native coronary artery of native heart without angina pectoris    Mixed hyperlipidemia    Lower extremity edema    Venous insufficiency    Other specified anemias    Syncope and collapse    Chronic diastolic heart failure ([address]       Plan: Will transition to knee-high 20 to 30 mmHg circ-aid.   He will follow-up with me again in 6-month    Thank you for allowing me to participate in the care of this individual.  Please do not hesitate to contact me with any questions. Juan Carlos Núñez M.D., FACS.   12/13/2022  10:56 AM

## 2022-12-14 ENCOUNTER — TELEPHONE (OUTPATIENT)
Dept: FAMILY MEDICINE CLINIC | Age: 77
End: 2022-12-14

## 2022-12-14 NOTE — TELEPHONE ENCOUNTER
If headache worsens may need to go to emergency room. If blood pressure is not improving he needs to follow-up here.

## 2022-12-14 NOTE — TELEPHONE ENCOUNTER
Rupal Flores from [facility] called today PT had a fall on the 11th, and he has an abrasion on his elbow and he has a headache. He hit his head and elbow. And today his BP is elevated to: 164/84 today.       [id number]

## 2022-12-27 ENCOUNTER — TELEPHONE (OUTPATIENT)
Dept: FAMILY MEDICINE CLINIC | Age: 77
End: 2022-12-27

## 2022-12-27 DIAGNOSIS — R22.43 LOCALIZED SWELLING OF BOTH LOWER LEGS: Primary | ICD-10-CM

## 2022-12-27 NOTE — TELEPHONE ENCOUNTER
Eleni, [facility] for Regan Early would like a verbal order for a Home Health Aid to be added for personal care. Pt's wife is a little apprehensive in getting him into the tub due to his balance issues. Would also like to see if he can be referred to   An Edema [facility] for the swelling and edema in his leg.       Eleni can be reached at [phone]

## 2022-12-28 NOTE — TELEPHONE ENCOUNTER
Spoke with [employer], she wants to know if you can put a referral in to the lymphedema clinic,  he has swelling in his legs. Diuretics are not really making a difference and when his diuretics are increased it sends him into kidney failure.   He did have unna  boots on for 2 weeks and this seemed to help with the constant compression , but as soon as they were taken off the swelling came back

## 2023-01-10 ENCOUNTER — HOSPITAL ENCOUNTER (OUTPATIENT)
Dept: PHYSICAL THERAPY | Age: 78
Setting detail: THERAPIES SERIES
Discharge: HOME | End: 2023-01-10

## 2023-01-10 NOTE — PROGRESS NOTES
Physical Therapy  Pt came to PT clinic seeking lymphedema eval and tx. PT explained to pt that he cannot participate in outpt PT until he is d/c form home health PT bc insurance won't pay for both and currently he is appropriate for home health PT. Advised pt to get new script and return to outpt once he is dc form home care. Pt signed release of info form for PT to communicate with Ingris DESIR at [facility] and gave pt Ingris's contact info re seeking a home lymph pump.    Phillip Vora, PT, DPT, OMT-C

## 2023-01-17 ENCOUNTER — OFFICE VISIT (OUTPATIENT)
Dept: FAMILY MEDICINE CLINIC | Age: 78
End: 2023-01-17
Payer: COMMERCIAL

## 2023-01-17 VITALS
HEART RATE: 66 BPM | SYSTOLIC BLOOD PRESSURE: 116 MMHG | WEIGHT: 235 LBS | OXYGEN SATURATION: 94 % | DIASTOLIC BLOOD PRESSURE: 62 MMHG | TEMPERATURE: 98.1 F | BODY MASS INDEX: 31.87 KG/M2

## 2023-01-17 DIAGNOSIS — H65.92 LEFT NON-SUPPURATIVE OTITIS MEDIA: ICD-10-CM

## 2023-01-17 DIAGNOSIS — H61.22 IMPACTED CERUMEN OF LEFT EAR: ICD-10-CM

## 2023-01-17 DIAGNOSIS — H81.10 BENIGN PAROXYSMAL POSITIONAL VERTIGO, UNSPECIFIED LATERALITY: Primary | ICD-10-CM

## 2023-01-17 PROCEDURE — 99213 OFFICE O/P EST LOW 20 MIN: CPT | Performed by: FAMILY MEDICINE

## 2023-01-17 PROCEDURE — 1123F ACP DISCUSS/DSCN MKR DOCD: CPT | Performed by: FAMILY MEDICINE

## 2023-01-17 PROCEDURE — 3074F SYST BP LT 130 MM HG: CPT | Performed by: FAMILY MEDICINE

## 2023-01-17 PROCEDURE — 3078F DIAST BP <80 MM HG: CPT | Performed by: FAMILY MEDICINE

## 2023-01-17 RX ORDER — AMOXICILLIN 500 MG/1
1000 CAPSULE ORAL 2 TIMES DAILY
Qty: 28 CAPSULE | Refills: 0 | Status: SHIPPED | OUTPATIENT
Start: 2023-01-17 | End: 2023-01-24

## 2023-01-17 NOTE — PROGRESS NOTES
Subjective:      Patient ID: Dorian Dunlap is a 77 y.o. male.  CC: Patient presents for acute medical problem-dizziness. Medical assistant notes reviewed.    HPI Patient presents with dizziness for the past 2-3 weeks. He told his wife when he lays in bed and rolls over and he immediately gets dizzy. He notices dizziness during the day when he turns his head to either.  Patient went to the emergency room in mid December for the dizziness episode and a fall.  Laboratory profiling demonstrated chronic renal failure with creatinine 1.3 and head CT did not demonstrate a stroke.    Review of Systems        No Known Allergies     Objective:   Physical Exam  Constitutional:       General: He is not in acute distress.  HENT:      Right Ear: Tympanic membrane and ear canal normal.      Left Ear: Ear canal normal. A middle ear effusion is present. There is impacted cerumen.      Ears:      Comments: After removal of the cerumen the tympanic membrane demonstrates middle ear effusion  Neck:      Vascular: No carotid bruit.   Cardiovascular:      Rate and Rhythm: Normal rate and regular rhythm.      Heart sounds: No murmur heard.  Musculoskeletal:      Cervical back: Neck supple.   Lymphadenopathy:      Cervical: No cervical adenopathy.   Neurological:      Mental Status: He is alert and oriented to person, place, and time.      Cranial Nerves: Cranial nerves 2-12 are intact.      Sensory: Sensation is intact.      Motor: Motor function is intact.      Comments: Patient is wheelchair-bound   Psychiatric:         Behavior: Behavior is cooperative.       Assessment:      Dorian was seen today for dizziness.    Diagnoses and all orders for this visit:    Benign paroxysmal positional vertigo, unspecified laterality    Impacted cerumen of left ear    Left non-suppurative otitis media    Other orders  -     amoxicillin (AMOXIL) 500 MG capsule; Take 2 capsules by mouth 2 times daily for 7 days          Plan:      Start antibiotic  therapy for otitis media and this may be the culprit causing his vertigo. In the meantime start motion sickness exercises    RTC as needed        Please note that this chart was generated using Dragon dictation software. Although every effort was made to ensure the accuracy of this automated transcription, some errors in transcription may have occurred.

## 2023-01-24 RX ORDER — CLOTRIMAZOLE AND BETAMETHASONE DIPROPIONATE 10; .64 MG/G; MG/G
CREAM TOPICAL
Qty: 1 EACH | Refills: 3 | Status: SHIPPED | OUTPATIENT
Start: 2023-01-24

## 2023-01-24 NOTE — TELEPHONE ENCOUNTER
Eleni called and reports that patient has a fungal infection on his scrotum, which has resulted in scratches/bleeding. She also reports that patient has irritation that looks like a rash on his legs. He had been in wound care and was wearing dixon boots, and she thinks it might be from that. She said she thinks it could be fungal cellulitis.    Please advise

## 2023-02-09 ENCOUNTER — HOSPITAL ENCOUNTER (OUTPATIENT)
Dept: PHYSICAL THERAPY | Age: 78
Setting detail: THERAPIES SERIES
Discharge: HOME | End: 2023-02-09

## 2023-02-09 NOTE — FLOWSHEET NOTE
[address]     Physical Therapy  Cancellation/No-show Note  Patient Name:  Digna Castaneda  :  1945   Date:  2023  Cancelled visits to date: 2  No-shows to date: 0    Patient status for today's appointment patient:  [x]  Cancelled 1/10/23, 23  []  Rescheduled appointment  []  No-show     Reason given by patient:  []  Patient ill  []  Conflicting appointment  []  No transportation    []  Conflict with work  []  No reason given  [x]  Other:     Comments:   pt and his wife Alysia Tirado came to PT clinic for a second time seeking care for lymphedema. Pt's wife states insurance company told her that lymphedema tx will be covered since HHPT can't do it. They do not have anything in writing to verify insurance has agreed to pay for outpt lymphedema tx while he cont with home health care. Pt and his wife do not want to sign ABN. Plan: They will call insurance again and request statement in writing that insurance will cover outpt lymphedema tx while pt cont with home health services. After obtaining verification of coverage, they will return to PT clinic for PT eval. Advised pt that he will need to sign ABN before eval. He and his wife are agreeable. Phone call information:   [x]   Phone call made today to patient at 414pm_ time at number provided:   [phone] [facility])   []  Patient answered, conversation as follows:    [x]   Patient did not answer, message left as follows: PT cx appt on 3/14. Will plan on seeing pt for eval on   at 315pm. Ask pt to call clinic if needs anything different. []  Phone call not made today  []  Phone call not needed - pt contacted us to cancel and provided reason for cancellation.      Electronically signed by:  Monisha Dimas, PT

## 2023-02-16 ASSESSMENT — ENCOUNTER SYMPTOMS
RESPIRATORY NEGATIVE: 1
GASTROINTESTINAL NEGATIVE: 1

## 2023-02-16 NOTE — PROGRESS NOTES
[address]   Congestive Heart Failure    PrimaryCare Doctor:  Karen Clayton MD      Chief Complaint:  edema    History of Present Illness:  Reji Tate is a 78 y.o. male with PMH AI, TAVR 2018, HLD, nonobs CAD who presents today for CHF f/u. Today he wants to see about stopping torsemide, he has already decreased dose to 10mg/day, he is worried about dehydration but his wt is stable within a 5lb fluctuation. He tells me edema is better and he is treating lymphedema. He denies CP, palpitations, SOB, orthopnea, or PND. Home wt: 229, fluctuates about 5lb    ER Visit: No  Recent Hospitalization: No    Baseline Weight: 230  Wt Readings from Last 3 Encounters:   02/20/23 231 lb (104.8 kg)   01/17/23 235 lb (106.6 kg)   12/13/22 232 lb (105.2 kg)        Cardiac Testing:   Echo: 5/19/22   Summary   *Technically difficult study due to poor acoustical window. Patient refused   Definity. Suboptimal image quality. *Left ventricle - normal size, thickness and function with EF of 55%   *Aortic valve - well seated TAVR valve, PV 1.9m/s, MG 8mmHg, no significant   regurgitation     Last Angiogram: 1/24/2018  Findings:                      LM       Short, normal              LAD     40% mid               Cx        20% distal              RCA     Nondominant, normal              LVG     Not performed              EDP     Not obtained  Intervention:  None  Recs: Will consult CTS - Dr. Carol Malcolm given insurance issues with [facility]                          Not sure how [facility] will affect candidacy       Activity: below baseline due to edema  Can you walk 1-2 blocks or do a moderate amount of house/yard work? No      NYHA Class: II       Sodium Restrictions: 3g  Fluid Restrictions: 48-64 oz/day  Sodium and fluid restriction compliance: over on sodium ok on fluids    Pt Education: The patient has received education on the following topics: dietary sodium restriction, heart failure medications, the importance of physical activity, symptom management and weight monitoring        Past Medical History:   has a past medical history of Allergic rhinitis, Aortic valve disorders, COPD (chronic obstructive pulmonary disease) ([facility] Hydrocephalus ([facility] Hyperlipidemia, Hypertension, Hypertrophy of prostate without urinary obstruction and other lower urinary tract symptoms (LUTS), Irritable bowel syndrome, Pancreatitis, Peptic ulcer, unspecified site, unspecified as acute or chronic, without mention of hemorrhage, perforation, or obstruction, and Peripheral vascular disease ([facility] 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 Social History:   reports that he quit smoking about 21 years ago. His smoking use included cigarettes. He has a 45.00 pack-year smoking history. He has never used smokeless tobacco. He reports that he does not currently use alcohol after a past usage of about 2.0 standard drinks per week. He reports that he does not use drugs. Family History:   Family History   Problem Relation Age of Onset    Heart Disease Father     Diabetes Father     Alcohol Abuse Father     Other Mother     Cancer Brother         liver       HomeMedications:  Prior to Admission medications    Medication Sig Start Date End Date Taking? Authorizing Provider   clotrimazole-betamethasone (LOTRISONE) 1-0.05 % cream Apply topically 2 times daily.  1/24/23   Martha Paige MD   torsemide (DEMADEX) 20 MG tablet Take 1 tablet by mouth daily OK to take an extra 20 mg in the afternoon for swelling, shortness of breath, or weight gain 10/18/22   ROSA MARIA Ellington CNP   tamsulosin [facility]) 0.4 MG capsule Take 1 capsule by mouth daily 9/30/22 12/29/22  Catherine Alejandro MD   spironolactone (ALDACTONE) 25 MG tablet Take 0.5 tablets by mouth every other day  Patient taking differently: Take 12.5 mg by mouth every other day 1/2 tab 4 days a week. 9/6/22   ROSA MARIA Ellington CNP   METAMUCIL FIBER PO Take by mouth Pt takes 2 capsules qd    Historical Provider, MD   Menthol-Zinc Oxide (REMEDY CALAZIME) 0.4-20.5 % PSTE Apply topically prn    Historical Provider, MD   Dimethicone 1.5 % CREA Apply topically prn    Historical Provider, MD   senna (SENOKOT) 8.6 MG tablet Take 2 tablets by mouth daily PRN    Historical Provider, MD   NONFORMULARY Sureprep no-sting barrier wipe miscellaneous(ostomy supplies)    Historical Provider, MD   Lift Chair MISC by Does not apply route 5/24/22   Catherine Alejandro MD   aspirin 81 MG EC tablet Take 1 tablet by mouth daily 4/18/22   Antwan Matamoros MD   atorvastatin (LIPITOR) 80 MG tablet TAKE ONE TABLET BY MOUTH DAILY 3/8/22   Catherine Alejandro MD        Allergies:  Patient has no known allergies. ROS:   Review of Systems   Constitutional: Negative. Respiratory: Negative. Cardiovascular:  Positive for leg swelling. Gastrointestinal: Negative. Genitourinary: Negative. Skin: Negative. Neurological: Negative. Hematological: Negative. Psychiatric/Behavioral: Negative. Physical Examination:    Vitals:    02/20/23 1147   BP: 132/60   Site: Right Upper Arm   Position: Sitting   Cuff Size: Medium Adult   Pulse: 61   SpO2: 95%   Weight: 231 lb (104.8 kg)   Height: 6' (1.829 m)           Physical Exam  Vitals reviewed. Constitutional:       Appearance: Normal appearance. He is normal weight.    HENT:      Head: Normocephalic and atraumatic. Eyes:      Extraocular Movements: Extraocular movements intact. Pupils: Pupils are equal, round, and reactive to light. Cardiovascular:      Rate and Rhythm: Normal rate and regular rhythm. Pulses: Normal pulses. Heart sounds: Normal heart sounds. Pulmonary:      Effort: Pulmonary effort is normal.      Breath sounds: Normal breath sounds. Abdominal:      Palpations: Abdomen is soft. Musculoskeletal:      Cervical back: Normal range of motion and neck supple. Right lower leg: Edema present. Left lower leg: Edema present. Comments: trace   Skin:     General: Skin is warm and dry. Neurological:      General: No focal deficit present. Mental Status: He is alert and oriented to person, place, and time. Mental status is at baseline. Psychiatric:         Mood and Affect: Mood normal.         Behavior: Behavior normal.         Thought Content:  Thought content normal.         Judgment: Judgment normal.       Lab Data:    CBC:   Lab Results   Component Value Date/Time    WBC 9.4 09/30/2022 08:37 AM    WBC 12.4 08/16/2022 12:41 PM    WBC 11.0 08/03/2022 05:29 AM    RBC 4.49 09/30/2022 08:37 AM    RBC 5.18 08/16/2022 12:41 PM    RBC 4.69 08/03/2022 05:29 AM    HGB 13.2 09/30/2022 08:37 AM    HGB 15.1 08/16/2022 12:41 PM    HGB 13.7 08/03/2022 05:29 AM    HCT 40.0 09/30/2022 08:37 AM    HCT 45.1 08/16/2022 12:41 PM    HCT 41.4 08/03/2022 05:29 AM    MCV 89.2 09/30/2022 08:37 AM    MCV 87.1 08/16/2022 12:41 PM    MCV 88.2 08/03/2022 05:29 AM    RDW 15.9 09/30/2022 08:37 AM    RDW 14.0 08/16/2022 12:41 PM    RDW 14.3 08/03/2022 05:29 AM     09/30/2022 08:37 AM     08/16/2022 12:41 PM     08/03/2022 05:29 AM     BMP:  Lab Results   Component Value Date/Time     09/30/2022 08:37 AM     09/16/2022 12:31 PM     09/02/2022 12:55 PM    K 4.5 09/30/2022 08:37 AM    K 4.3 09/16/2022 12:31 PM    K 4.1 09/02/2022 12:55 PM    K 3.5 07/28/2022 01:19 PM    K 3.8 04/12/2022 07:21 AM    K 3.7 04/11/2022 07:14 AM     09/30/2022 08:37 AM     09/16/2022 12:31 PM    CL 98 09/02/2022 12:55 PM    CO2 23 09/30/2022 08:37 AM    CO2 25 09/16/2022 12:31 PM    CO2 28 09/02/2022 12:55 PM    PHOS 3.0 05/24/2021 10:32 AM    PHOS 3.5 05/08/2020 09:50 AM    PHOS 2.9 09/20/2018 08:06 AM    BUN 17 09/30/2022 08:37 AM    BUN 14 09/16/2022 12:31 PM    BUN 13 09/02/2022 12:55 PM    CREATININE 1.2 09/30/2022 08:37 AM    CREATININE 1.0 09/16/2022 12:31 PM    CREATININE 1.0 09/02/2022 12:55 PM     BNP:   Lab Results   Component Value Date/Time    PROBNP 183 09/02/2022 12:55 PM    PROBNP 119 08/16/2022 12:41 PM    PROBNP 147 07/28/2022 01:19 PM     Iron Studies:  No components found for: FE,  TIBC,  FERRITIN        Assessment/Plan:    Encounter Diagnoses        Chronic diastolic heart failure (HCC) Compensated, continue lower dose torsemide, labs today, decrease sodium intake    Lower extremity edema improved    S/P TAVR (transcatheter aortic valve replacement) Stable, has echo and f/u in May           Instructions:   Medications: continue torsemide 10mg once a day and if your wt goes over the 5lb flutuation then take extra 10mg, continue other meds, decrease sodium in your diet  Labs:today  Lifestyle Recommendations: Weigh yourself every day in the morning after urination, Limit sodium to 2000mg/day and fluids to 2L or 64oz/day.    Follow up:4-8 weeks        [facility] Resource Line: [phone]            I appreciate the opportunity of cooperating in the care of this individual.    ROSA MARIA Graves - CNP, 2/16/2023,4:20 PM

## 2023-02-20 ENCOUNTER — HOSPITAL ENCOUNTER (OUTPATIENT)
Age: 78
Discharge: HOME | End: 2023-02-20
Payer: COMMERCIAL

## 2023-02-20 ENCOUNTER — OFFICE VISIT (OUTPATIENT)
Dept: CARDIOLOGY CLINIC | Age: 78
End: 2023-02-20

## 2023-02-20 VITALS
SYSTOLIC BLOOD PRESSURE: 132 MMHG | HEIGHT: 72 IN | WEIGHT: 231 LBS | BODY MASS INDEX: 31.29 KG/M2 | DIASTOLIC BLOOD PRESSURE: 60 MMHG | OXYGEN SATURATION: 95 % | HEART RATE: 61 BPM

## 2023-02-20 DIAGNOSIS — R60.0 LOWER EXTREMITY EDEMA: ICD-10-CM

## 2023-02-20 DIAGNOSIS — I50.32 CHRONIC DIASTOLIC HEART FAILURE (HCC): Primary | ICD-10-CM

## 2023-02-20 DIAGNOSIS — I50.32 CHRONIC DIASTOLIC HEART FAILURE (HCC): ICD-10-CM

## 2023-02-20 DIAGNOSIS — Z95.2 S/P TAVR (TRANSCATHETER AORTIC VALVE REPLACEMENT): ICD-10-CM

## 2023-02-20 LAB
ANION GAP SERPL CALCULATED.3IONS-SCNC: 11 MMOL/L (ref 3–16)
BUN BLDV-MCNC: 16 MG/DL (ref 7–20)
CALCIUM SERPL-MCNC: 9.5 MG/DL (ref 8.3–10.6)
CHLORIDE BLD-SCNC: 101 MMOL/L (ref 99–110)
CO2: 26 MMOL/L (ref 21–32)
CREAT SERPL-MCNC: 1 MG/DL (ref 0.8–1.3)
GFR SERPL CREATININE-BSD FRML MDRD: >60 ML/MIN/{1.73_M2}
GLUCOSE BLD-MCNC: 98 MG/DL (ref 70–99)
POTASSIUM SERPL-SCNC: 4 MMOL/L (ref 3.5–5.1)
PRO-BNP: 248 PG/ML (ref 0–449)
SODIUM BLD-SCNC: 138 MMOL/L (ref 136–145)

## 2023-02-20 PROCEDURE — 36415 COLL VENOUS BLD VENIPUNCTURE: CPT

## 2023-02-20 PROCEDURE — 83880 ASSAY OF NATRIURETIC PEPTIDE: CPT

## 2023-02-20 PROCEDURE — 80048 BASIC METABOLIC PNL TOTAL CA: CPT

## 2023-02-20 NOTE — PATIENT INSTRUCTIONS
Instructions:   Medications: continue torsemide 10mg once a day and if your wt goes over the 5lb flutuation then take extra 10mg, continue other meds, decrease sodium in your diet  Labs:today  Lifestyle Recommendations: Weigh yourself every day in the morning after urination, Limit sodium to 2000mg/day and fluids to 2L or 64oz/day.    Follow up:4-8 weeks        [facility] Resource Line: [phone]

## 2023-02-21 ENCOUNTER — TELEPHONE (OUTPATIENT)
Dept: CARDIOLOGY CLINIC | Age: 78
End: 2023-02-21

## 2023-02-21 NOTE — TELEPHONE ENCOUNTER
----- Message from ROSA MARIA Blakely CNP sent at 2/21/2023  8:58 AM EST -----  Labs look great, he should be fine on the lower dose torsemide with extra as needed.  Elliot Batista

## 2023-03-09 DIAGNOSIS — N40.1 BENIGN PROSTATIC HYPERPLASIA WITH URINARY FREQUENCY: ICD-10-CM

## 2023-03-09 DIAGNOSIS — R35.0 BENIGN PROSTATIC HYPERPLASIA WITH URINARY FREQUENCY: ICD-10-CM

## 2023-03-09 RX ORDER — TAMSULOSIN HYDROCHLORIDE 0.4 MG/1
0.4 CAPSULE ORAL DAILY
Qty: 90 CAPSULE | Refills: 1 | Status: SHIPPED | OUTPATIENT
Start: 2023-03-09 | End: 2023-06-07

## 2023-03-10 ENCOUNTER — TELEPHONE (OUTPATIENT)
Dept: FAMILY MEDICINE CLINIC | Age: 78
End: 2023-03-10

## 2023-03-10 NOTE — TELEPHONE ENCOUNTER
Pato from [employer] called and has requested the order for the Lymphedemia Pump be resent to  fax number [phone]. Please advise.

## 2023-03-30 ASSESSMENT — ENCOUNTER SYMPTOMS: GASTROINTESTINAL NEGATIVE: 1

## 2023-03-30 NOTE — PROGRESS NOTES
12:53 PM     09/30/2022 08:37 AM     09/16/2022 12:31 PM    K 4.0 02/20/2023 12:53 PM    K 4.5 09/30/2022 08:37 AM    K 4.3 09/16/2022 12:31 PM    K 3.5 07/28/2022 01:19 PM    K 3.8 04/12/2022 07:21 AM    K 3.7 04/11/2022 07:14 AM     02/20/2023 12:53 PM     09/30/2022 08:37 AM     09/16/2022 12:31 PM    CO2 26 02/20/2023 12:53 PM    CO2 23 09/30/2022 08:37 AM    CO2 25 09/16/2022 12:31 PM    PHOS 3.0 05/24/2021 10:32 AM    PHOS 3.5 05/08/2020 09:50 AM    PHOS 2.9 09/20/2018 08:06 AM    BUN 16 02/20/2023 12:53 PM    BUN 17 09/30/2022 08:37 AM    BUN 14 09/16/2022 12:31 PM    CREATININE 1.0 02/20/2023 12:53 PM    CREATININE 1.2 09/30/2022 08:37 AM    CREATININE 1.0 09/16/2022 12:31 PM     BNP:   Lab Results   Component Value Date/Time    PROBNP 248 02/20/2023 12:53 PM    PROBNP 183 09/02/2022 12:55 PM    PROBNP 119 08/16/2022 12:41 PM     Iron Studies:  No components found for: FE,  TIBC,  FERRITIN        Assessment/Plan:    Encounter Diagnoses        Chronic diastolic heart failure (HCC) Overloaded, increase torsemide to 10m daily with extra 10mg twice a week, labs today    Essential hypertension Elevated, diurese         Peripheral edema diurese    S/P TAVR (transcatheter aortic valve replacement) Echo and f/u in May             Instructions:   Medications: continue torsemide 10mg once a day and take 20mg twice a week, continue donna 1/2 every other day  Labs:today  Lifestyle Recommendations: Weigh yourself every day in the morning after urination, Limit sodium to 2000mg/day and fluids to 2L or 64oz/day.    Follow up:as scheduled with Dr. Misael Jenkins, 2-3months with Ernestina Zavala CHF Resource Line: [phone]            I appreciate the opportunity of cooperating in the care of this individual.    ROSA MARIA Anderson - CNP, 3/30/2023,4:02 PM

## 2023-04-03 ENCOUNTER — OFFICE VISIT (OUTPATIENT)
Dept: CARDIOLOGY CLINIC | Age: 78
End: 2023-04-03
Payer: COMMERCIAL

## 2023-04-03 ENCOUNTER — HOSPITAL ENCOUNTER (OUTPATIENT)
Age: 78
Discharge: HOME | End: 2023-04-03
Payer: COMMERCIAL

## 2023-04-03 VITALS
HEART RATE: 66 BPM | OXYGEN SATURATION: 98 % | WEIGHT: 237 LBS | DIASTOLIC BLOOD PRESSURE: 60 MMHG | HEIGHT: 72 IN | BODY MASS INDEX: 32.1 KG/M2 | SYSTOLIC BLOOD PRESSURE: 150 MMHG

## 2023-04-03 DIAGNOSIS — R60.9 PERIPHERAL EDEMA: ICD-10-CM

## 2023-04-03 DIAGNOSIS — I50.32 CHRONIC DIASTOLIC HEART FAILURE (HCC): Primary | ICD-10-CM

## 2023-04-03 DIAGNOSIS — I10 ESSENTIAL HYPERTENSION: ICD-10-CM

## 2023-04-03 DIAGNOSIS — I50.32 CHRONIC DIASTOLIC HEART FAILURE (HCC): ICD-10-CM

## 2023-04-03 DIAGNOSIS — Z95.2 S/P TAVR (TRANSCATHETER AORTIC VALVE REPLACEMENT): ICD-10-CM

## 2023-04-03 LAB
ANION GAP SERPL CALCULATED.3IONS-SCNC: 14 MMOL/L (ref 3–16)
BUN SERPL-MCNC: 14 MG/DL (ref 7–20)
CALCIUM SERPL-MCNC: 9.2 MG/DL (ref 8.3–10.6)
CHLORIDE SERPL-SCNC: 105 MMOL/L (ref 99–110)
CO2 SERPL-SCNC: 23 MMOL/L (ref 21–32)
CREAT SERPL-MCNC: 1 MG/DL (ref 0.8–1.3)
GFR SERPLBLD CREATININE-BSD FMLA CKD-EPI: >60 ML/MIN/{1.73_M2}
GLUCOSE SERPL-MCNC: 96 MG/DL (ref 70–99)
NT-PROBNP SERPL-MCNC: 212 PG/ML (ref 0–449)
POTASSIUM SERPL-SCNC: 4.1 MMOL/L (ref 3.5–5.1)
SODIUM SERPL-SCNC: 142 MMOL/L (ref 136–145)

## 2023-04-03 PROCEDURE — 83880 ASSAY OF NATRIURETIC PEPTIDE: CPT

## 2023-04-03 PROCEDURE — 99214 OFFICE O/P EST MOD 30 MIN: CPT | Performed by: NURSE PRACTITIONER

## 2023-04-03 PROCEDURE — 1123F ACP DISCUSS/DSCN MKR DOCD: CPT | Performed by: NURSE PRACTITIONER

## 2023-04-03 PROCEDURE — 80048 BASIC METABOLIC PNL TOTAL CA: CPT

## 2023-04-03 PROCEDURE — 3078F DIAST BP <80 MM HG: CPT | Performed by: NURSE PRACTITIONER

## 2023-04-03 PROCEDURE — 36415 COLL VENOUS BLD VENIPUNCTURE: CPT

## 2023-04-03 PROCEDURE — 3077F SYST BP >= 140 MM HG: CPT | Performed by: NURSE PRACTITIONER

## 2023-04-03 ASSESSMENT — ENCOUNTER SYMPTOMS: WHEEZING: 1

## 2023-04-03 NOTE — PATIENT INSTRUCTIONS
Instructions:   Medications: continue torsemide 10mg once a day and take 20mg twice a week, continue donna 1/2 every other day  Labs:today  Lifestyle Recommendations: Weigh yourself every day in the morning after urination, Limit sodium to 2000mg/day and fluids to 2L or 64oz/day.    Follow up:as scheduled with Dr. Christian Otoole, 2-3months with Miquel Sr CHF Resource Line: [phone]

## 2023-04-04 ENCOUNTER — TELEPHONE (OUTPATIENT)
Dept: CARDIOLOGY CLINIC | Age: 78
End: 2023-04-04

## 2023-04-04 NOTE — TELEPHONE ENCOUNTER
----- Message from ROSA MARIA Dubon CNP sent at 4/4/2023  8:38 AM EDT -----  Labs look ok no new orders.  Thomas Leonardo

## 2023-04-28 DIAGNOSIS — E78.00 HYPERCHOLESTEREMIA: Chronic | ICD-10-CM

## 2023-04-28 RX ORDER — ATORVASTATIN CALCIUM 80 MG/1
TABLET, FILM COATED ORAL
Qty: 90 TABLET | Refills: 0 | Status: SHIPPED | OUTPATIENT
Start: 2023-04-28

## 2023-05-08 NOTE — PROGRESS NOTES
[address]  H+P  Consult  OP Visit  FU Visit   CC/HPI   CC Followup visit for cardiac conditions detailed in assessment and plan below. Intervention TAVR   General Doing well. No new concerns. Cardiac Sx -CP, -SOB, -dizziness, -syncope, -orthopnea, -pnd, -fatigue, +edema   HISTORY/ALLERGY/ROS   MEDHx  has a past medical history of Allergic rhinitis, Aortic valve disorders, COPD (chronic obstructive pulmonary disease) ([facility] [address] Hydrocephalus ([facility] [address] Hyperlipidemia, Hypertension, Hypertrophy of prostate without urinary obstruction and other lower urinary tract symptoms (LUTS), Irritable bowel syndrome, Pancreatitis, Peptic ulcer, unspecified site, unspecified as acute or chronic, without mention of hemorrhage, perforation, or obstruction, and Peripheral vascular disease ([facility] [address] 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 SOCHx  reports that he quit smoking about 21 years ago. His smoking use included cigarettes. He has a 45.00 pack-year smoking history. He has never used smokeless tobacco. He reports that he does not currently use alcohol after a past usage of about 2.0 standard drinks per week. He reports that he does not use drugs. FAMHx family history includes Alcohol Abuse in his father; Cancer in his brother; Diabetes in his father; Heart Disease in his father; Other in his mother. ALLERG Patient has no known allergies.    ROS Full ROS obtained and negative except as mentioned in HPI

## 2023-05-11 ENCOUNTER — HOSPITAL ENCOUNTER (OUTPATIENT)
Dept: NON INVASIVE DIAGNOSTICS | Age: 78
Discharge: HOME | End: 2023-05-11
Payer: COMMERCIAL

## 2023-05-11 ENCOUNTER — OFFICE VISIT (OUTPATIENT)
Dept: CARDIOLOGY CLINIC | Age: 78
End: 2023-05-11
Payer: COMMERCIAL

## 2023-05-11 VITALS
OXYGEN SATURATION: 98 % | SYSTOLIC BLOOD PRESSURE: 130 MMHG | HEIGHT: 72 IN | DIASTOLIC BLOOD PRESSURE: 76 MMHG | WEIGHT: 240 LBS | HEART RATE: 62 BPM | BODY MASS INDEX: 32.51 KG/M2

## 2023-05-11 DIAGNOSIS — R60.9 PERIPHERAL EDEMA: ICD-10-CM

## 2023-05-11 DIAGNOSIS — I35.0 NONRHEUMATIC AORTIC VALVE STENOSIS: ICD-10-CM

## 2023-05-11 DIAGNOSIS — Z95.2 S/P TAVR (TRANSCATHETER AORTIC VALVE REPLACEMENT): ICD-10-CM

## 2023-05-11 DIAGNOSIS — E78.00 HYPERCHOLESTEROLEMIA: ICD-10-CM

## 2023-05-11 DIAGNOSIS — I35.0 AORTIC VALVE STENOSIS, NONRHEUMATIC: Primary | ICD-10-CM

## 2023-05-11 DIAGNOSIS — I10 ESSENTIAL HYPERTENSION: ICD-10-CM

## 2023-05-11 LAB
LV EF: 60 %
LVEF MODALITY: NORMAL

## 2023-05-11 PROCEDURE — 3075F SYST BP GE 130 - 139MM HG: CPT | Performed by: INTERNAL MEDICINE

## 2023-05-11 PROCEDURE — 99214 OFFICE O/P EST MOD 30 MIN: CPT | Performed by: INTERNAL MEDICINE

## 2023-05-11 PROCEDURE — 1123F ACP DISCUSS/DSCN MKR DOCD: CPT | Performed by: INTERNAL MEDICINE

## 2023-05-11 PROCEDURE — 93306 TTE W/DOPPLER COMPLETE: CPT

## 2023-05-11 PROCEDURE — 3078F DIAST BP <80 MM HG: CPT | Performed by: INTERNAL MEDICINE

## 2023-05-19 DIAGNOSIS — J20.9 ACUTE BRONCHITIS DUE TO INFECTION: ICD-10-CM

## 2023-05-19 RX ORDER — ALBUTEROL SULFATE 90 UG/1
2 AEROSOL, METERED RESPIRATORY (INHALATION) 4 TIMES DAILY PRN
Qty: 18 G | Refills: 3 | Status: SHIPPED | OUTPATIENT
Start: 2023-05-19

## 2023-05-24 ENCOUNTER — OFFICE VISIT (OUTPATIENT)
Dept: FAMILY MEDICINE CLINIC | Age: 78
End: 2023-05-24

## 2023-05-24 DIAGNOSIS — R35.0 URINARY FREQUENCY: ICD-10-CM

## 2023-05-24 DIAGNOSIS — R31.9 HEMATURIA, UNSPECIFIED TYPE: ICD-10-CM

## 2023-05-24 DIAGNOSIS — R41.82 ALTERED MENTAL STATUS, UNSPECIFIED ALTERED MENTAL STATUS TYPE: Primary | ICD-10-CM

## 2023-05-24 LAB
BACTERIA URINE, POC: NORMAL
BILIRUBIN URINE: 0 MG/DL
BLOOD, URINE: POSITIVE
CASTS URINE, POC: NORMAL
CLARITY: CLEAR
COLOR: YELLOW
CRYSTALS URINE, POC: NORMAL
EPI CELLS URINE, POC: NORMAL
GLUCOSE URINE: NEGATIVE
KETONES, URINE: NEGATIVE
LEUKOCYTE EST, POC: NEGATIVE
NITRITE, URINE: NEGATIVE
PH UA: 5.5 (ref 4.5–8)
PROTEIN UA: NEGATIVE
RBC URINE, POC: NORMAL
SPECIFIC GRAVITY UA: 1.01 (ref 1–1.03)
UROBILINOGEN, URINE: NORMAL
WBC URINE, POC: NORMAL
YEAST URINE, POC: NORMAL

## 2023-05-24 NOTE — PROGRESS NOTES
Patient C/O feeling unable to empty bladder completely, urinating frequently, and strong odor. Did urinalysis with microscope, appears negative with the exception of a scant trace of blood. Sending out for culture. Patient's wife advised. Also recommend if symptoms worsen he will need evaluation here in office or @ ED.

## 2023-05-26 LAB — BACTERIA UR CULT: NORMAL

## 2023-06-19 ENCOUNTER — TELEPHONE (OUTPATIENT)
Dept: FAMILY MEDICINE CLINIC | Age: 78
End: 2023-06-19

## 2023-06-19 NOTE — TELEPHONE ENCOUNTER
Leah from [employer] called and states she evaluated patient for OT today. She states she will be seeing him for 8 visits of OT, and just wanted to inform provider.     Please advise

## 2023-06-20 ENCOUNTER — TELEPHONE (OUTPATIENT)
Dept: FAMILY MEDICINE CLINIC | Age: 78
End: 2023-06-20

## 2023-06-20 NOTE — TELEPHONE ENCOUNTER
Uriah Lee Stated she started patients PT today and wanted Dr. Kathrin Oro to know they will be doping 12 visits.     Please Advise

## 2023-06-27 ENCOUNTER — TELEPHONE (OUTPATIENT)
Dept: NEUROLOGY | Age: 78
End: 2023-06-27

## 2023-06-29 ENCOUNTER — TELEPHONE (OUTPATIENT)
Dept: FAMILY MEDICINE CLINIC | Age: 78
End: 2023-06-29

## 2023-06-29 ENCOUNTER — OFFICE VISIT (OUTPATIENT)
Dept: FAMILY MEDICINE CLINIC | Age: 78
End: 2023-06-29
Payer: COMMERCIAL

## 2023-06-29 DIAGNOSIS — R31.9 HEMATURIA, UNSPECIFIED TYPE: Primary | ICD-10-CM

## 2023-06-29 LAB
BACTERIA URINE, POC: 0
BILIRUBIN URINE: 1 MG/DL
BLOOD, URINE: NEGATIVE
CASTS URINE, POC: 0
CLARITY: CLEAR
COLOR: ABNORMAL
CRYSTALS URINE, POC: 0
EPI CELLS URINE, POC: 2
GLUCOSE URINE: ABNORMAL
KETONES, URINE: NEGATIVE
LEUKOCYTE EST, POC: ABNORMAL
NITRITE, URINE: NEGATIVE
PH UA: 5.5 (ref 4.5–8)
PROTEIN UA: ABNORMAL
RBC URINE, POC: 0
SPECIFIC GRAVITY UA: 1.02 (ref 1–1.03)
UROBILINOGEN, URINE: NORMAL
WBC URINE, POC: 0
YEAST URINE, POC: 0

## 2023-06-29 PROCEDURE — 99211 OFF/OP EST MAY X REQ PHY/QHP: CPT | Performed by: FAMILY MEDICINE

## 2023-06-29 PROCEDURE — 81000 URINALYSIS NONAUTO W/SCOPE: CPT | Performed by: FAMILY MEDICINE

## 2023-06-30 ENCOUNTER — TELEPHONE (OUTPATIENT)
Dept: FAMILY MEDICINE CLINIC | Age: 78
End: 2023-06-30

## 2023-06-30 LAB — BACTERIA UR CULT: NORMAL

## 2023-07-05 ENCOUNTER — TELEPHONE (OUTPATIENT)
Dept: VASCULAR SURGERY | Age: 78
End: 2023-07-05

## 2023-07-05 NOTE — TELEPHONE ENCOUNTER
Daughter called regarding patients medical condition. Daughter states that her father has not been wanting to eat or drink. 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 I did send message to Dr Cathryn Huber as well had films sent to [facility] [place] of the ct scan.  Moni

## 2023-07-11 ENCOUNTER — TELEPHONE (OUTPATIENT)
Dept: VASCULAR SURGERY | Age: 78
End: 2023-07-11

## 2023-07-13 ENCOUNTER — TELEPHONE (OUTPATIENT)
Dept: VASCULAR SURGERY | Age: 78
End: 2023-07-13

## 2023-07-13 ENCOUNTER — PREP FOR PROCEDURE (OUTPATIENT)
Dept: VASCULAR SURGERY | Age: 78
End: 2023-07-13

## 2023-07-17 RX ORDER — SODIUM CHLORIDE 9 MG/ML
INJECTION, SOLUTION INTRAVENOUS PRN
Status: CANCELLED | OUTPATIENT
Start: 2023-07-17

## 2023-07-17 RX ORDER — SODIUM CHLORIDE 0.9 % (FLUSH) 0.9 %
5-40 SYRINGE (ML) INJECTION EVERY 12 HOURS SCHEDULED
Status: CANCELLED | OUTPATIENT
Start: 2023-07-17

## 2023-07-17 RX ORDER — SODIUM CHLORIDE 0.9 % (FLUSH) 0.9 %
5-40 SYRINGE (ML) INJECTION PRN
Status: CANCELLED | OUTPATIENT
Start: 2023-07-17

## 2023-07-27 ENCOUNTER — TELEPHONE (OUTPATIENT)
Dept: FAMILY MEDICINE CLINIC | Age: 78
End: 2023-07-27

## 2023-07-27 NOTE — TELEPHONE ENCOUNTER
Yogi Loja called to let Dr. Noah Nieto know she saw patient for his 1st PT visit yesterday. Plan is to see patient for 4 more visits then reevaluate. If you have any questions Yogi Loja can be reached at [phone]. Please advise.

## 2023-07-28 ENCOUNTER — TELEPHONE (OUTPATIENT)
Dept: FAMILY MEDICINE CLINIC | Age: 78
End: 2023-07-28

## 2023-07-28 NOTE — TELEPHONE ENCOUNTER
Rosa Isela from [address] [place] [address] called to resumed patients SN, PT, OT care as of 7/25/23 after hospital stay. Yoly Menon is requesting a call back to verify you are willing to follow with the POC. Please give her a call to verify. Her call back number is [phone]. Please advise.

## 2023-08-11 ENCOUNTER — TELEPHONE (OUTPATIENT)
Dept: FAMILY MEDICINE CLINIC | Age: 78
End: 2023-08-11

## 2023-08-11 NOTE — TELEPHONE ENCOUNTER
Myrtle Tatum from [address] [place] [address] called and wants to report that the patient had a fall on 8/8/23. States he is experiencing some soreness on his left lower back, but no other injuries. She states they are using ice on it currently, and it has been improving daily.      Please advise    Phone [phone]

## 2023-08-18 ENCOUNTER — HOSPITAL ENCOUNTER (OUTPATIENT)
Dept: CARDIAC CATH/INVASIVE PROCEDURES | Age: 78
Discharge: HOME | End: 2023-08-18
Attending: SURGERY | Admitting: SURGERY
Payer: COMMERCIAL

## 2023-08-18 VITALS
SYSTOLIC BLOOD PRESSURE: 159 MMHG | BODY MASS INDEX: 32.51 KG/M2 | DIASTOLIC BLOOD PRESSURE: 71 MMHG | HEIGHT: 72 IN | HEART RATE: 61 BPM | RESPIRATION RATE: 16 BRPM | WEIGHT: 240 LBS

## 2023-08-18 PROBLEM — K55.1 CHRONIC MESENTERIC ISCHEMIA (HCC): Status: ACTIVE | Noted: 2023-08-18

## 2023-08-18 LAB
ANION GAP SERPL CALCULATED.3IONS-SCNC: 9 MMOL/L (ref 3–16)
BUN SERPL-MCNC: 10 MG/DL (ref 7–20)
CALCIUM SERPL-MCNC: 9.2 MG/DL (ref 8.3–10.6)
CHLORIDE SERPL-SCNC: 107 MMOL/L (ref 99–110)
CO2 SERPL-SCNC: 25 MMOL/L (ref 21–32)
CREAT SERPL-MCNC: 0.8 MG/DL (ref 0.8–1.3)
DEPRECATED RDW RBC AUTO: 15.2 % (ref 12.4–15.4)
GFR SERPLBLD CREATININE-BSD FMLA CKD-EPI: >60 ML/MIN/{1.73_M2}
GLUCOSE SERPL-MCNC: 108 MG/DL (ref 70–99)
HCT VFR BLD AUTO: 41.5 % (ref 40.5–52.5)
HGB BLD-MCNC: 13.6 G/DL (ref 13.5–17.5)
MCH RBC QN AUTO: 29.6 PG (ref 26–34)
MCHC RBC AUTO-ENTMCNC: 32.8 G/DL (ref 31–36)
MCV RBC AUTO: 90.1 FL (ref 80–100)
PLATELET # BLD AUTO: 313 K/UL (ref 135–450)
PMV BLD AUTO: 9.2 FL (ref 5–10.5)
POC ACT LR: 303 SEC
POTASSIUM SERPL-SCNC: 4.2 MMOL/L (ref 3.5–5.1)
RBC # BLD AUTO: 4.61 M/UL (ref 4.2–5.9)
SODIUM SERPL-SCNC: 141 MMOL/L (ref 136–145)
WBC # BLD AUTO: 9.6 K/UL (ref 4–11)

## 2023-08-18 PROCEDURE — 85027 COMPLETE CBC AUTOMATED: CPT

## 2023-08-18 PROCEDURE — C1887 CATHETER, GUIDING: HCPCS

## 2023-08-18 PROCEDURE — 99153 MOD SED SAME PHYS/QHP EA: CPT

## 2023-08-18 PROCEDURE — 80048 BASIC METABOLIC PNL TOTAL CA: CPT

## 2023-08-18 PROCEDURE — 36245 INS CATH ABD/L-EXT ART 1ST: CPT

## 2023-08-18 PROCEDURE — C1725 CATH, TRANSLUMIN NON-LASER: HCPCS

## 2023-08-18 PROCEDURE — 85347 COAGULATION TIME ACTIVATED: CPT

## 2023-08-18 PROCEDURE — C1769 GUIDE WIRE: HCPCS

## 2023-08-18 PROCEDURE — 99152 MOD SED SAME PHYS/QHP 5/>YRS: CPT

## 2023-08-18 PROCEDURE — 6360000002 HC RX W HCPCS

## 2023-08-18 PROCEDURE — C1894 INTRO/SHEATH, NON-LASER: HCPCS

## 2023-08-18 PROCEDURE — 36415 COLL VENOUS BLD VENIPUNCTURE: CPT

## 2023-08-18 PROCEDURE — 2500000003 HC RX 250 WO HCPCS

## 2023-08-18 PROCEDURE — 75625 CONTRAST EXAM ABDOMINL AORTA: CPT

## 2023-08-18 RX ORDER — IODIXANOL 320 MG/ML
40 INJECTION, SOLUTION INTRAVASCULAR ONCE
Status: DISCONTINUED | OUTPATIENT
Start: 2023-08-18 | End: 2023-08-18 | Stop reason: HOSPADM

## 2023-08-18 RX ORDER — SODIUM CHLORIDE 0.9 % (FLUSH) 0.9 %
5-40 SYRINGE (ML) INJECTION PRN
Status: DISCONTINUED | OUTPATIENT
Start: 2023-08-18 | End: 2023-08-18 | Stop reason: HOSPADM

## 2023-08-18 RX ORDER — SODIUM CHLORIDE 9 MG/ML
INJECTION, SOLUTION INTRAVENOUS PRN
Status: DISCONTINUED | OUTPATIENT
Start: 2023-08-18 | End: 2023-08-18 | Stop reason: HOSPADM

## 2023-08-18 RX ORDER — SODIUM CHLORIDE 0.9 % (FLUSH) 0.9 %
5-40 SYRINGE (ML) INJECTION EVERY 12 HOURS SCHEDULED
Status: DISCONTINUED | OUTPATIENT
Start: 2023-08-18 | End: 2023-08-18 | Stop reason: HOSPADM

## 2023-08-18 NOTE — H&P
Results   Component Value Date/Time    WBC 10.4 06/16/2023 12:30 PM    HGB 14.3 06/16/2023 12:30 PM    HCT 43.0 06/16/2023 12:30 PM    MCV 88.4 06/16/2023 12:30 PM     06/16/2023 12:30 PM      Coagulation:   Lab Results   Component Value Date/Time    INR 1.03 07/28/2022 01:19 PM    APTT 41.3 04/04/2022 10:25 AM     Cardiac markers:   Lab Results   Component Value Date/Time    TROPONINI 0.03 07/28/2022 09:20 PM     Lab Results   Component Value Date/Time    LABA1C 6.1 09/30/2022 08:37 AM    No results found for: ALB    Lab Results   Component Value Date/Time    BILITOT 0.9 06/16/2023 12:30 PM    BILIDIR 0.5 04/06/2022 03:11 PM    AST 15 06/16/2023 12:30 PM    ALT 13 06/16/2023 12:30 PM    ALKPHOS 89 06/16/2023 12:30 PM      Lab Results   Component Value Date/Time    CHOL 166 09/05/2017 09:42 AM    HDL 57 09/30/2022 08:37 AM    LDLCALC 87 09/30/2022 08:37 AM    TRIG 97 09/05/2017 09:42 AM          Diagnosis:  Patient Active Problem List   Diagnosis    Hypercholesteremia    Essential hypertension    Nonrheumatic aortic valve disorder    Hypertrophy of prostate without urinary obstruction and other lower urinary tract symptoms (LUTS)    Peptic ulcer    PVD (peripheral vascular disease) ([facility])    Allergic rhinitis    COPD (chronic obstructive pulmonary disease) ([facility])    Peripheral edema    Hallux valgus with bunions    Carotid stenosis    Rosacea    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    Normal pressure hydrocephalus ([facility])    Ataxia    Mild cognitive impairment    Chronic idiopathic constipation    Encounter for follow-up for aortic valve replacement    GI hemorrhage    Nonrheumatic aortic valve stenosis    Anticoagulated    S/P TAVR (transcatheter aortic valve replacement)    Hyperglycemia    Abdominal pain    RADHA (acute kidney injury) ([address])    Severe malnutrition (HCC)    Bilateral leg weakness    Polyneuropathy, peripheral sensorimotor axonal    Central stenosis of spinal canal    Coronary artery disease involving native coronary artery of native heart without angina pectoris    Mixed hyperlipidemia    Lower extremity edema    Venous insufficiency    Other specified anemias    Syncope and collapse    Chronic diastolic heart failure ([address])           Plan:  angiogram with possible visceral intervention        Omi Nino M.D., FACS.   8/18/2023  12:10 PM

## 2023-08-18 NOTE — DISCHARGE INSTRUCTIONS
PERIPHERAL ANGIOGRAM    Care of your puncture site:  Remove bandage 24 hours after the procedure. May shower in 24 hours but do not sit in a bathtub/pool of water for 5 days or until the wound is healed. Inspect the site daily and gently clean using soap and water while standing in the shower. Dry thoroughly and apply a Band-Aid that covers the entire site. Do not apply powder or lotion. Normal Observations:  Soreness or tenderness which may last one week. Mild oozing from the incision site. Possible bruising that could last 2 weeks. Activity:  You may resume driving 24 hours following the procedure. You may resume normal activity in 5 days or after the wound heals. Avoid lifting more than 10 pounds for 5 days or until the wound heals. Avoid strenuous exercise or activity for 1 week. Nutrition:  Regular diet. Drink at least 8 to 10 glasses of decaffeinated, non-alcoholic fluid for the next 24 hours to flush the x-ray dye used for your angiogram out of your body. Call your doctor immediately if your condition worsens, for any other concerns, for a follow-up appointment or if you experience any of the following:  Significant bleeding that does not stop after 10 minutes of applying firm pressure on the puncture site. Increased swelling on the groin or leg. Unusual pain, numbness, or tingling of the groin or down the leg. Any signs of infection such as: redness, yellow drainage at the site, swelling or pain.

## 2023-08-18 NOTE — OP NOTE
Procedure Note 8/18/2023    Thony Aly  YOB: 1945  [id number]    Pre-procedure Diagnosis:   Chronic mesenteric ischemia  Post-procedure Diagnosis: Same    Procedure: 1) Ultrasound guided access to the left brachial artery  2)  abdominal angiogram      Surgeons/Assistants: Monserrat Russo MD, MD SUMNER    Estimated Blood Loss: Minimal    Complications: none    Specimens: none    Indications and consent: This is a 78y.o. year old male with vague signs of chronic mesenteric ischemia. 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 Treatment options were discussed. Angiography was recommended to evaluate and possibly intervene to improve symptoms. Risks, benefits, and alternatives were discussed prior to the procedure. Questions from the patient and family were answered and appropriate signed informed consent was obtained. Procedure: After witnessed informed consent was obtained patient brought to the Cath Lab were under my supervision Versed and fentanyl were administered for intravenous sedation. Pulse oximetry, heart rate, and blood pressure were monitored by an independent trained observer that was present. I spent 42 minutes of face-to-face sedation time with the patient. The left arm was carefully prepped and draped. Ultrasound was used to identify the left brachial artery which was noted to be patent and image was saved to the patient's permanent medical record and direct visualization was accessed with a 4 [place] micropuncture needle and a 4 5 slender was placed. A Glidewire and angled glide cath were used to traverse the aortic arch. A pigtail catheter was inserted level celiac artery and an abdominal aortogram was performed. 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   The

## 2023-08-21 LAB — POC ACT LR: 150 SEC

## 2023-09-12 ENCOUNTER — TELEPHONE (OUTPATIENT)
Dept: CARDIOLOGY CLINIC | Age: 78
End: 2023-09-12

## 2023-09-12 DIAGNOSIS — E78.00 HYPERCHOLESTEREMIA: Chronic | ICD-10-CM

## 2023-09-12 RX ORDER — ATORVASTATIN CALCIUM 80 MG/1
TABLET, FILM COATED ORAL
Qty: 90 TABLET | Refills: 0 | OUTPATIENT
Start: 2023-09-12

## 2023-09-12 NOTE — TELEPHONE ENCOUNTER
Pt wife states that pt no longer has swelling in the legs and his blood work looked good last time. She is asking if he should still be taking Torsemide. Please call to advise.

## 2023-09-12 NOTE — TELEPHONE ENCOUNTER
Spoke with pt's wife. He has had a lot of recent hospitalizations this summer. One at [facility] and also a surgery with Dr. Jena Tamayo    All due to the arteries going to his stomach being blocked. He has lost about 25# over the summer    He also has hydrocephalus causing a disturbance to his legs making him unable to walk at this time. He is not drinking a lot and has just started eating a little. Spouse needs to hire a transport to get him to any appointments at this time. He has [facility] coming in and they can do blood work. Medication list has been updated to what he is currently taking.

## 2023-09-12 NOTE — TELEPHONE ENCOUNTER
I would have to see him in office to know for sure if he should decrease or stop the diuretic.  Mona Jensen

## 2023-09-13 NOTE — TELEPHONE ENCOUNTER
Oh my goodness I am so sorry. Labs would be good - CBC, BMP and BNP. Also please make sure our med list is up to date with his.  Richard Hernandez

## 2023-09-18 DIAGNOSIS — E78.00 HYPERCHOLESTEREMIA: Chronic | ICD-10-CM

## 2023-09-18 RX ORDER — ATORVASTATIN CALCIUM 80 MG/1
80 TABLET, FILM COATED ORAL DAILY
Qty: 90 TABLET | Refills: 0 | OUTPATIENT
Start: 2023-09-18

## 2023-09-21 DIAGNOSIS — E78.00 HYPERCHOLESTEREMIA: Chronic | ICD-10-CM

## 2023-09-21 RX ORDER — ATORVASTATIN CALCIUM 80 MG/1
80 TABLET, FILM COATED ORAL DAILY
Qty: 30 TABLET | Refills: 0 | Status: SHIPPED | OUTPATIENT
Start: 2023-09-21

## 2023-09-21 RX ORDER — ATORVASTATIN CALCIUM 80 MG/1
TABLET, FILM COATED ORAL
Qty: 90 TABLET | Refills: 0 | OUTPATIENT
Start: 2023-09-21

## 2023-09-21 NOTE — TELEPHONE ENCOUNTER
Medication:   Requested Prescriptions     Pending Prescriptions Disp Refills    atorvastatin (LIPITOR) 80 MG tablet [Pharmacy Med Name: ATORVASTATIN 80 MG TABLET] 90 tablet 0     Sig: TAKE ONE TABLET BY MOUTH DAILY      Last Filled:      Patient Phone Number: [phone] (home)     Last appt: 6/29/2023   Next appt: Visit date not found    Last OARRS:        No data to display              PDMP Monitoring:    Last PDMP Disha Araya as Reviewed [facility]):  Review User Review Instant Review Result          Preferred Pharmacy:   [facility] [address], [address] [place] [address] [phone] [facility] [phone]  [address] [place] [address]  Phone: [phone] Fax: [phone]

## 2023-09-21 NOTE — TELEPHONE ENCOUNTER
atorvastatin (LIPITOR) 80 MG tablet     [facility] [address] [facility], [address], [address] [place] [address]   Phone:  [phone]  Fax:  [phone]    Pt completely out of med's, notes said needs an appt before next refill. Made an clemente for 10/16. Please Advise. Kevon Snider

## 2023-09-27 ENCOUNTER — TELEPHONE (OUTPATIENT)
Dept: CARDIOLOGY CLINIC | Age: 78
End: 2023-09-27

## 2023-09-27 RX ORDER — TORSEMIDE 20 MG/1
10 TABLET ORAL PRN
Qty: 30 TABLET | Refills: 3 | Status: SHIPPED | OUTPATIENT
Start: 2023-09-27

## 2023-09-27 NOTE — TELEPHONE ENCOUNTER
Wife states pt had labs at [facility] about 2 weeks ago and is asking for a call back for results

## 2023-09-27 NOTE — TELEPHONE ENCOUNTER
Tried to reach patient's spouse LMOM for her to return our call. Spoke to patient's spouse he cant walk he has Normal Pressure Hydrocephalus  and this causes incontinence. They did a lumbar drain. She would like to stop the  torsemide 10 mg daily. Is this ok to discontinue?

## 2023-10-16 ENCOUNTER — TELEMEDICINE (OUTPATIENT)
Dept: FAMILY MEDICINE CLINIC | Age: 78
End: 2023-10-16

## 2023-10-16 ENCOUNTER — TELEPHONE (OUTPATIENT)
Dept: FAMILY MEDICINE CLINIC | Age: 78
End: 2023-10-16

## 2023-10-16 DIAGNOSIS — J44.9 CHRONIC OBSTRUCTIVE PULMONARY DISEASE, UNSPECIFIED COPD TYPE (HCC): ICD-10-CM

## 2023-10-16 DIAGNOSIS — G91.2 NORMAL PRESSURE HYDROCEPHALUS (HCC): ICD-10-CM

## 2023-10-16 DIAGNOSIS — E78.00 HYPERCHOLESTEREMIA: Primary | Chronic | ICD-10-CM

## 2023-10-16 PROBLEM — E43 SEVERE MALNUTRITION (HCC): Chronic | Status: RESOLVED | Noted: 2022-04-07 | Resolved: 2023-10-16

## 2023-10-16 PROBLEM — E78.2 MIXED HYPERLIPIDEMIA: Status: RESOLVED | Noted: 2022-06-14 | Resolved: 2023-10-16

## 2023-10-16 RX ORDER — ATORVASTATIN CALCIUM 80 MG/1
80 TABLET, FILM COATED ORAL DAILY
Qty: 90 TABLET | Refills: 1 | Status: SHIPPED | OUTPATIENT
Start: 2023-10-16

## 2023-10-16 SDOH — ECONOMIC STABILITY: FOOD INSECURITY: WITHIN THE PAST 12 MONTHS, THE FOOD YOU BOUGHT JUST DIDN'T LAST AND YOU DIDN'T HAVE MONEY TO GET MORE.: NEVER TRUE

## 2023-10-16 SDOH — ECONOMIC STABILITY: INCOME INSECURITY: HOW HARD IS IT FOR YOU TO PAY FOR THE VERY BASICS LIKE FOOD, HOUSING, MEDICAL CARE, AND HEATING?: NOT HARD AT ALL

## 2023-10-16 SDOH — ECONOMIC STABILITY: FOOD INSECURITY: WITHIN THE PAST 12 MONTHS, YOU WORRIED THAT YOUR FOOD WOULD RUN OUT BEFORE YOU GOT MONEY TO BUY MORE.: NEVER TRUE

## 2023-10-16 ASSESSMENT — PATIENT HEALTH QUESTIONNAIRE - PHQ9
SUM OF ALL RESPONSES TO PHQ QUESTIONS 1-9: 0
1. LITTLE INTEREST OR PLEASURE IN DOING THINGS: 0
2. FEELING DOWN, DEPRESSED OR HOPELESS: 0
SUM OF ALL RESPONSES TO PHQ9 QUESTIONS 1 & 2: 0

## 2023-10-16 ASSESSMENT — ENCOUNTER SYMPTOMS
COUGH: 1
RHINORRHEA: 1
BACK PAIN: 0
DIARRHEA: 1
SHORTNESS OF BREATH: 1

## 2023-10-16 ASSESSMENT — COPD QUESTIONNAIRES: COPD: 1

## 2023-10-16 NOTE — TELEPHONE ENCOUNTER
Spoke with Genie ding @ [facility] ([facility]) [phone] verbal order given for lab work to be drawn CMP, fasting Lipid panel, & TSH with reflex. In regards to the immunizations patient needs, home health can only give if we send to pharmacy as a RX and pharmacy can fill for patient to . [facility] where patient gets prescriptions and they stated they can't do this because it has to be administered to patient @  their pharmacy.  Please advise

## 2023-10-16 NOTE — PROGRESS NOTES
Mabel Barros, was evaluated through a synchronous (real-time) audio-video encounter. The patient (or guardian if applicable) is aware that this is a billable service, which includes applicable co-pays. This Virtual Visit was conducted with patient's (and/or legal guardian's) consent. Patient identification was verified, and a caregiver was present when appropriate. The patient was located at Home: [address] [facility] [address]  Provider was located at [facility] (Appt Dept): [address] [facility],  [address]      Mabel Barros (:  1945) is a Established patient, presenting virtually for evaluation of the following:    Assessment & Plan   Below is the assessment and plan developed based on review of pertinent history, physical exam, labs, studies, and medications. 1. Hypercholesteremia  -     atorvastatin (LIPITOR) 80 MG tablet; Take 1 tablet by mouth daily, Disp-90 tablet, R-1Normal  CMP, lipid profile, and TSH with reflex soon. 2. Normal pressure hydrocephalus (HCC)  Continued worsening of his neurological status. 3. Chronic obstructive pulmonary disease, unspecified COPD type ([address])  URI has caused some worsening of his cough and congestion. Occasionally using his albuterol metered-dose inhaler. Return if symptoms worsen or fail to improve. We will continue to have home health follow we will see if home health can draw labs to include CMP lipid profile and thyroid we will have them give him a flu shot and a possible COVID booster. Subjective   Hyperlipidemia  This is a chronic problem. The current episode started more than 1 year ago. Condition status: Recent vascular studies revealed atherosclerosis. Lipid results: Patient's last lipid profile was done in September of last year and his total cholesterol was 170, triglycerides 128, HDL 57, LDL 87. Associated symptoms include shortness of breath. Pertinent negatives include no chest pain.  Current antihyperlipidemic

## 2023-10-17 LAB
ALBUMIN SERPL-MCNC: 3.5 G/DL (ref 3.5–5.7)
ALP BLD-CCNC: 110 IU/L (ref 35–135)
ALT SERPL-CCNC: 12 IU/L (ref 10–60)
ANION GAP SERPL CALCULATED.3IONS-SCNC: 5 MMOL/L (ref 4–16)
AST SERPL-CCNC: 16 IU/L (ref 10–40)
BILIRUB SERPL-MCNC: 1.1 MG/DL (ref 0–1.2)
BUN BLDV-MCNC: 12 MG/DL (ref 8–26)
CALCIUM SERPL-MCNC: 8.7 MG/DL (ref 8.5–10.4)
CHLORIDE BLD-SCNC: 105 MEQ/L (ref 98–111)
CHOLESTEROL, TOTAL: 108 MG/DL
CO2: 29 MMOL/L (ref 21–31)
CREAT SERPL-MCNC: 0.88 MG/DL (ref 0.7–1.3)
EGFR (CKD-EPI): 90 ML/MIN/1.73 M2
GLUCOSE BLD-MCNC: 93 MG/DL (ref 70–99)
HDLC SERPL-MCNC: 38 MG/DL
LDL CHOLESTEROL CALCULATED: 54 MG/DL
NONHDLC SERPL-MCNC: 70 MG/DL
POTASSIUM SERPL-SCNC: 4.6 MEQ/L (ref 3.6–5.1)
SODIUM BLD-SCNC: 139 MEQ/L (ref 135–145)
TOTAL PROTEIN: 5.8 G/DL (ref 6–8)
TRIGL SERPL-MCNC: 78 MG/DL

## 2023-10-18 LAB — TSH ULTRASENSITIVE: 2.06 MCIU/ML (ref 0.27–4.2)

## 2023-11-14 RX ORDER — TORSEMIDE 20 MG/1
10 TABLET ORAL PRN
Qty: 30 TABLET | Refills: 3 | OUTPATIENT
Start: 2023-11-14

## 2023-11-16 NOTE — TELEPHONE ENCOUNTER
Wife would prefer a VV as his movement is limited and needs a wheel chair van. Spoke to his wife Daina. He gets home PT. She states she does not need any torsemide now.

## 2023-11-28 ENCOUNTER — TELEPHONE (OUTPATIENT)
Dept: CARDIOLOGY CLINIC | Age: 78
End: 2023-11-28

## 2023-11-28 NOTE — TELEPHONE ENCOUNTER
Spoke with pt wife, she says its hard for the pt to get around and has been doing fine. They saw Brea Rose in may, and prefer to wait to schedule fup with [facility] at this time.

## 2023-12-01 ENCOUNTER — TELEPHONE (OUTPATIENT)
Dept: CARDIOLOGY CLINIC | Age: 78
End: 2023-12-01

## 2023-12-28 ENCOUNTER — TELEPHONE (OUTPATIENT)
Dept: CARDIOLOGY | Age: 78
End: 2023-12-28

## 2023-12-28 DIAGNOSIS — Z95.2 S/P TAVR (TRANSCATHETER AORTIC VALVE REPLACEMENT): Primary | ICD-10-CM

## 2024-04-25 NOTE — TELEPHONE ENCOUNTER
Patient sent to ER
Patient's wife called concerning her  is still not eating. She tried all the suggestions.   [phone]    Please advise
See below.
urinary catheter complications

## 2024-05-06 DIAGNOSIS — E78.00 HYPERCHOLESTEREMIA: Chronic | ICD-10-CM

## 2024-05-06 RX ORDER — ATORVASTATIN CALCIUM 80 MG/1
80 TABLET, FILM COATED ORAL DAILY
Qty: 90 TABLET | Refills: 1 | Status: SHIPPED | OUTPATIENT
Start: 2024-05-06

## 2024-05-07 ENCOUNTER — TELEPHONE (OUTPATIENT)
Dept: CARDIOLOGY CLINIC | Age: 79
End: 2024-05-07

## 2024-05-07 NOTE — TELEPHONE ENCOUNTER
Pt wife called to cancel ECHO and appt scheduled 5/10, pt unable to walk will CB once they figure out how to navigate how to get pt around to different appt.    Encounter Complete

## 2024-06-07 ENCOUNTER — TELEPHONE (OUTPATIENT)
Dept: FAMILY MEDICINE CLINIC | Age: 79
End: 2024-06-07

## 2024-06-07 NOTE — TELEPHONE ENCOUNTER
Zo with [facility] went out to see patient to discharge form PT. Found sore on coccyx. She would like to know if you would want to order nursing visits to monitor the sore. If so please send an order to [facility] at fax # [phone] Attention: Cierra.     Please advise.

## 2024-06-20 RX ORDER — CLOTRIMAZOLE AND BETAMETHASONE DIPROPIONATE 10; .64 MG/G; MG/G
CREAM TOPICAL
Qty: 1 EACH | Refills: 3 | Status: SHIPPED | OUTPATIENT
Start: 2024-06-20

## 2024-08-25 DIAGNOSIS — E78.00 HYPERCHOLESTEREMIA: Chronic | ICD-10-CM

## 2024-08-26 RX ORDER — ATORVASTATIN CALCIUM 80 MG/1
80 TABLET, FILM COATED ORAL DAILY
Qty: 90 TABLET | Refills: 0 | Status: SHIPPED | OUTPATIENT
Start: 2024-08-26

## 2024-09-09 ENCOUNTER — TELEPHONE (OUTPATIENT)
Dept: FAMILY MEDICINE CLINIC | Age: 79
End: 2024-09-09

## 2024-09-09 DIAGNOSIS — I65.23 OBSTRUCTION OF CAROTID ARTERY ON BOTH SIDES: ICD-10-CM

## 2024-09-09 DIAGNOSIS — G31.84 MILD COGNITIVE IMPAIRMENT: Primary | ICD-10-CM

## 2024-09-11 ENCOUNTER — TELEPHONE (OUTPATIENT)
Dept: FAMILY MEDICINE CLINIC | Age: 79
End: 2024-09-11

## 2024-09-11 DIAGNOSIS — G31.84 MILD COGNITIVE IMPAIRMENT: Primary | ICD-10-CM

## 2024-09-11 DIAGNOSIS — G91.2 NORMAL PRESSURE HYDROCEPHALUS (HCC): ICD-10-CM

## 2024-09-11 DIAGNOSIS — R63.0 LACK OF APPETITE: ICD-10-CM

## 2024-09-11 DIAGNOSIS — I65.23 OBSTRUCTION OF CAROTID ARTERY ON BOTH SIDES: ICD-10-CM

## 2024-09-11 DIAGNOSIS — R53.1 GENERAL WEAKNESS: ICD-10-CM

## 2024-10-03 ENCOUNTER — CLINICAL DOCUMENTATION (OUTPATIENT)
Dept: SPIRITUAL SERVICES | Age: 79
End: 2024-10-03

## 2024-10-03 NOTE — ACP (ADVANCE CARE PLANNING)
Advance Care Planning   Ambulatory ACP Specialist Patient Outreach    Date:  10/3/2024    ACP Specialist:  TOMEKA Rizzo    Outreach call to patient in follow-up to ACP Specialist referral from:Omi Ramirez Jr., MD    [] PCP  [] Provider   [] Ambulatory Care Management [x] Other  (request from pt spouse    For:                  [x] Advance Directive Assistance              [] Complete Portable DNR order              [] Complete POST/POLST/MOST              [] Code Status Discussion             [] Discuss Goals of Care             [] Early ACP Decision-Making              [] Other (Specify)    Date Referral Received:10/02/2024    Next Step:   [x] ACP scheduled conversation  [] Outreach again in one week               [x] Email / Mail ACP Info Sheets  [x] Email / Mail Advance Directive   [] Closing referral.  Routing closure to referring provider/staff and to ACP Specialist .    [] Closure letter mailed to patient with invitation to contact ACP Specialist if / when ready.   [] Other (Specify here):       [x] At this time, Healthcare Decision Maker Is:         [] Primary agent named in scanned advance directive.    [x] Legal Next of Kin.     [] Unable to determine legal decision maker at this time.    Outreaches:          [x]  Additional Outreach -  Date:  10/03/2024   (Specify Dates & special circumstances):    Outcomes: During scheduled ACP visit with pt spouse a request was made to meet with Mr Dunlap to offer ACP support. All coordinated with spouse Keilasue Dunlap (ROSENDO on file). Scheduled for Mon Oct 7 at 1pm.         Thank you for this referral.

## 2024-10-07 ENCOUNTER — CLINICAL DOCUMENTATION (OUTPATIENT)
Dept: SPIRITUAL SERVICES | Age: 79
End: 2024-10-07

## 2024-10-07 NOTE — ACP (ADVANCE CARE PLANNING)
Advance Care Planning     Advance Care Planning Clinical Specialist  Conversation Note      Date of ACP Conversation: 10/7/2024    Conversation Conducted with: Patient with Decision Making Capacity and Legal next of kin    ACP Clinical Specialist: TOMEKA Rizzo        Healthcare Decision Maker:     Current Designated Healthcare Decision Maker:     Primary Decision Maker: Chandni Dunlap - Spouse - [phone]    Secondary Decision Maker: Nura Dunlap - Child - [phone]    Supplemental (Other) Decision Maker: Diana Dunlap - Child - [phone]    Today we assisted pt with completing HCPOA; which aligns with his legal next of kin.     Care Preferences    Ventilation:  \"If you were in your present state of health and suddenly became very ill and were unable to breathe on your own, what would your preference be about the use of a ventilator (breathing machine) if it were available to you?\"      If the patient would desire the use of ventilator (breathing machine), answer \"yes\".  If not, \"no\": yes    \"If your health worsens and it becomes clear that your chance of recovery is unlikely, what would your preference be about the use of a ventilator (breathing machine) if it were available to you?\"     Would the patient desire the use of ventilator (breathing machine)?: No-pt said \"nothing permanent\"      Resuscitation  \"CPR works best to restart the heart when there is a sudden event, like a heart attack, in someone who is otherwise healthy. Unfortunately, CPR does not typically restart the heart for people who have serious health conditions or who are very sick.\"    \"In the event your heart stopped as a result of an underlying serious health condition, would you want attempts to be made to restart your heart (answer \"yes\" for attempt to resuscitate) or would you prefer a natural death (answer \"no\" for do not attempt to resuscitate)?\"  Pt remains FULL CODE at this time. Pt and wife are completing their living

## 2024-10-19 NOTE — PROGRESS NOTES
Mental Status: He is alert.   Psychiatric:         Behavior: Behavior normal.         ASSESSMENT/PLAN:    Dorian was seen today for follow-up.    Diagnoses and all orders for this visit:    Normal pressure hydrocephalus (HCC)  Patient is basically bedridden.  He has generalized weakness and when attempting to stand has significant trouble with balance.        -     Non BS - External Referral To Physical Therapy  -     Antolin Burr MD, Neurology, [facility]    Essential hypertension  Blood pressures have been in control.  He is routinely checked by home health.    Hypercholesteremia  Will have home health draw basic labs as we have not gotten any in the past year.  -     Comprehensive Metabolic Panel; Future  -     CBC with Auto Differential; Future  -     Lipid, Fasting; Future  -     TSH with Reflex; Future    Chronic obstructive pulmonary disease, unspecified COPD type (HCC)  Symptoms are stable he does have an albuterol inhaler that is rarely used.    S/P TAVR (transcatheter aortic valve replacement)  Patient has been unable to follow-up with cardiology.    Hyperglycemia  -     Hemoglobin A1C; Future        Return in about 6 months (around 4/21/2025) for VV.          Services were provided through a video synchronous discussion virtually to substitute for in-person clinic visit.  This  virtual visit was conducted with patient's (and/or legal guardian's) consent.  Patient's identification was verified, and a caregiver was present when appropriate.  The patient was located in the state where the provider was licensed to provide care.  Patient was instructed that the AVS is available on My Chart or was emailed to the patient if not on My Chart. Lab orders were emailed to patient if they do not use a [facility] lab. Any work notes were sent to patient through My Chart or email.     Please note portions of this note were completed with a voicerecognition program.  Efforts were made to edit the dictations but

## 2024-10-21 ENCOUNTER — TELEMEDICINE (OUTPATIENT)
Dept: FAMILY MEDICINE CLINIC | Age: 79
End: 2024-10-21
Payer: COMMERCIAL

## 2024-10-21 ENCOUNTER — TELEPHONE (OUTPATIENT)
Dept: FAMILY MEDICINE CLINIC | Age: 79
End: 2024-10-21

## 2024-10-21 DIAGNOSIS — I10 ESSENTIAL HYPERTENSION: ICD-10-CM

## 2024-10-21 DIAGNOSIS — J44.9 CHRONIC OBSTRUCTIVE PULMONARY DISEASE, UNSPECIFIED COPD TYPE (HCC): ICD-10-CM

## 2024-10-21 DIAGNOSIS — E78.00 HYPERCHOLESTEREMIA: Chronic | ICD-10-CM

## 2024-10-21 DIAGNOSIS — Z95.2 S/P TAVR (TRANSCATHETER AORTIC VALVE REPLACEMENT): ICD-10-CM

## 2024-10-21 DIAGNOSIS — R73.9 HYPERGLYCEMIA: ICD-10-CM

## 2024-10-21 DIAGNOSIS — G91.2 NORMAL PRESSURE HYDROCEPHALUS (HCC): Primary | ICD-10-CM

## 2024-10-21 PROCEDURE — 99214 OFFICE O/P EST MOD 30 MIN: CPT | Performed by: FAMILY MEDICINE

## 2024-10-21 PROCEDURE — 1123F ACP DISCUSS/DSCN MKR DOCD: CPT | Performed by: FAMILY MEDICINE

## 2024-10-21 RX ORDER — DIPHENHYDRAMINE HCL 25 MG
25 TABLET ORAL NIGHTLY PRN
COMMUNITY

## 2024-10-21 SDOH — ECONOMIC STABILITY: FOOD INSECURITY: WITHIN THE PAST 12 MONTHS, YOU WORRIED THAT YOUR FOOD WOULD RUN OUT BEFORE YOU GOT MONEY TO BUY MORE.: NEVER TRUE

## 2024-10-21 SDOH — ECONOMIC STABILITY: INCOME INSECURITY: HOW HARD IS IT FOR YOU TO PAY FOR THE VERY BASICS LIKE FOOD, HOUSING, MEDICAL CARE, AND HEATING?: NOT HARD AT ALL

## 2024-10-21 SDOH — ECONOMIC STABILITY: FOOD INSECURITY: WITHIN THE PAST 12 MONTHS, THE FOOD YOU BOUGHT JUST DIDN'T LAST AND YOU DIDN'T HAVE MONEY TO GET MORE.: NEVER TRUE

## 2024-10-21 ASSESSMENT — ENCOUNTER SYMPTOMS
SHORTNESS OF BREATH: 0
RHINORRHEA: 0
RECTAL PAIN: 1
CONSTIPATION: 1
COUGH: 1
BACK PAIN: 0
DIARRHEA: 1
SINUS PAIN: 0

## 2024-10-21 ASSESSMENT — PATIENT HEALTH QUESTIONNAIRE - PHQ9
SUM OF ALL RESPONSES TO PHQ QUESTIONS 1-9: 0
SUM OF ALL RESPONSES TO PHQ QUESTIONS 1-9: 0
SUM OF ALL RESPONSES TO PHQ9 QUESTIONS 1 & 2: 0
SUM OF ALL RESPONSES TO PHQ QUESTIONS 1-9: 0
1. LITTLE INTEREST OR PLEASURE IN DOING THINGS: NOT AT ALL
SUM OF ALL RESPONSES TO PHQ QUESTIONS 1-9: 0
2. FEELING DOWN, DEPRESSED OR HOPELESS: NOT AT ALL

## 2024-11-04 LAB
ALBUMIN: 3.1 G/DL (ref 3.5–5.7)
ALP BLD-CCNC: 104 IU/L (ref 35–135)
ALT SERPL-CCNC: 18 IU/L (ref 10–60)
ANION GAP SERPL CALCULATED.3IONS-SCNC: 4 MMOL/L (ref 4–16)
AST SERPL-CCNC: 19 IU/L (ref 10–40)
BASOPHILS ABSOLUTE: 0.1 THOU/MCL (ref 0–0.2)
BASOPHILS ABSOLUTE: 1 %
BILIRUB SERPL-MCNC: 0.9 MG/DL (ref 0–1.2)
BUN BLDV-MCNC: 7 MG/DL (ref 8–26)
CALCIUM SERPL-MCNC: 8.5 MG/DL (ref 8.5–10.4)
CHLORIDE BLD-SCNC: 104 MMOL/L (ref 98–111)
CHOLESTEROL, TOTAL: 101 MG/DL
CO2: 29 MMOL/L (ref 21–31)
CREAT SERPL-MCNC: 0.76 MG/DL (ref 0.7–1.3)
EGFR (CKD-EPI): 93 ML/MIN/1.73 M2
EOSINOPHILS ABSOLUTE: 0.8 THOU/MCL (ref 0.03–0.45)
EOSINOPHILS RELATIVE PERCENT: 9 %
ESTIMATED AVERAGE GLUCOSE: 128 MG/DL
GLUCOSE BLD-MCNC: 94 MG/DL (ref 70–99)
HBA1C MFR BLD: 6.1 % (ref 4.2–5.6)
HCT VFR BLD CALC: 41.7 % (ref 40–50)
HDLC SERPL-MCNC: 43 MG/DL
HEMOGLOBIN: 13.9 G/DL (ref 13.5–16.5)
LDL CHOLESTEROL: 44 MG/DL
LYMPHOCYTES ABSOLUTE: 2 THOU/MCL (ref 1–4)
LYMPHOCYTES RELATIVE PERCENT: 22 %
MCH RBC QN AUTO: 29.5 PG (ref 27–33)
MCHC RBC AUTO-ENTMCNC: 33.4 G/DL (ref 32–36)
MCV RBC AUTO: 88.4 FL (ref 82–97)
MONOCYTES ABSOLUTE: 0.5 THOU/MCL (ref 0.2–0.9)
MONOCYTES RELATIVE PERCENT: 6 %
NEUTROPHILS ABSOLUTE: 5.7 THOU/MCL (ref 1.8–7.7)
NONHDLC SERPL-MCNC: 58 MG/DL
PDW BLD-RTO: 14.5 % (ref 12.3–17)
PLATELET # BLD: 247 THOU/MCL (ref 140–375)
PMV BLD AUTO: 9 FL (ref 7.4–11.5)
POTASSIUM SERPL-SCNC: 4.6 MMOL/L (ref 3.6–5.1)
RBC # BLD: 4.72 MIL/MCL (ref 4.4–5.8)
SEG NEUTROPHILS: 62 %
SODIUM BLD-SCNC: 137 MMOL/L (ref 135–145)
TOTAL PROTEIN: 5.2 G/DL (ref 6–8)
TRIGL SERPL-MCNC: 72 MG/DL
TSH ULTRASENSITIVE: 2.36 MCIU/ML (ref 0.27–4.2)
WBC # BLD: 9.1 THOU/MCL (ref 3.6–10.5)

## 2024-12-03 DIAGNOSIS — E78.00 HYPERCHOLESTEREMIA: Chronic | ICD-10-CM

## 2024-12-03 RX ORDER — ATORVASTATIN CALCIUM 80 MG/1
80 TABLET, FILM COATED ORAL DAILY
Qty: 90 TABLET | Refills: 0 | Status: SHIPPED | OUTPATIENT
Start: 2024-12-03

## 2024-12-03 NOTE — TELEPHONE ENCOUNTER
Medication:   Requested Prescriptions     Pending Prescriptions Disp Refills    atorvastatin (LIPITOR) 80 MG tablet 90 tablet 0     Sig: Take 1 tablet by mouth daily          Patient Phone Number: [phone] (home)     Last appt: 10/21/2024   Next appt: Visit date not found    Last OARRS:        No data to display              PDMP Monitoring:    Last PDMP Ben as Reviewed ([place]):  Review User Review Instant Review Result          Preferred Pharmacy:   [facility] [id number] - [place], [place] - [address] [phone] - F [phone]  [address] [address]  Phone: [phone] Fax: [phone]

## 2025-03-01 DIAGNOSIS — E78.00 HYPERCHOLESTEREMIA: Chronic | ICD-10-CM

## 2025-03-03 RX ORDER — ATORVASTATIN CALCIUM 80 MG/1
80 TABLET, FILM COATED ORAL DAILY
Qty: 90 TABLET | Refills: 0 | Status: SHIPPED | OUTPATIENT
Start: 2025-03-03

## 2025-03-03 NOTE — TELEPHONE ENCOUNTER
Medication:   Requested Prescriptions     Pending Prescriptions Disp Refills    atorvastatin (LIPITOR) 80 MG tablet [Pharmacy Med Name: ATORVASTATIN 80 MG TABLET] 90 tablet 0     Sig: TAKE 1 TABLET BY MOUTH DAILY      Provider out of office.     Patient Phone Number: [phone] (home)     Last appt: 10/21/2024   Next appt: Visit date not found    Last OARRS:        No data to display              PDMP Monitoring:    Last PDMP Ben as Reviewed ([place]):  Review User Review Instant Review Result          Preferred Pharmacy:   [facility] [id number] - [place], [place] - [address] [phone] - F [phone]  [address] [address]  Phone: [phone] Fax: [phone]

## 2025-05-28 DIAGNOSIS — R41.82 ALTERED MENTAL STATUS, UNSPECIFIED ALTERED MENTAL STATUS TYPE: ICD-10-CM

## 2025-05-28 DIAGNOSIS — L25.9 CONTACT DERMATITIS, UNSPECIFIED CONTACT DERMATITIS TYPE, UNSPECIFIED TRIGGER: Primary | ICD-10-CM

## 2025-05-28 DIAGNOSIS — R73.9 HYPERGLYCEMIA: ICD-10-CM

## 2025-05-28 RX ORDER — PREDNISONE 10 MG/1
TABLET ORAL
Qty: 30 TABLET | Refills: 0 | Status: SHIPPED | OUTPATIENT
Start: 2025-05-28

## 2025-05-28 NOTE — PROGRESS NOTES
Contacted patient's wife today after she had called stating that patient has significant itching in spite of trying Atarax, Lotrisone cream, and doxepin.  Lab work was supposed to been ordered by palliative care but did apparently was never done.  Will obtain CMP CBC and hemoglobin A1c.  Orders were written and sent to [facility] to have it drawn.  A course of prednisone is sent to their pharmacy.

## 2025-06-20 ENCOUNTER — TELEPHONE (OUTPATIENT)
Dept: FAMILY MEDICINE CLINIC | Age: 80
End: 2025-06-20

## 2025-06-20 DIAGNOSIS — R53.1 GENERAL WEAKNESS: ICD-10-CM

## 2025-06-20 DIAGNOSIS — R29.898 BILATERAL LEG WEAKNESS: ICD-10-CM

## 2025-06-20 DIAGNOSIS — G31.84 MILD COGNITIVE IMPAIRMENT: ICD-10-CM

## 2025-06-20 DIAGNOSIS — I65.23 OBSTRUCTION OF CAROTID ARTERY ON BOTH SIDES: Primary | ICD-10-CM

## 2025-06-20 NOTE — TELEPHONE ENCOUNTER
Patient was supposed to have lab order for his ongoing itching.  [facility] comes to home to do PT, but they do not have nursing orders so they will not draw lab.  Wife is asking if this can be added on so they will come out and draw lab, pt is unable to leave home.

## 2025-06-23 LAB
ALBUMIN: 3.4 G/DL (ref 3.5–5.7)
ALP BLD-CCNC: 98 IU/L (ref 35–135)
ALT SERPL-CCNC: 11 IU/L (ref 10–60)
ANION GAP SERPL CALCULATED.3IONS-SCNC: 5 MMOL/L (ref 4–16)
AST SERPL-CCNC: 13 IU/L (ref 10–40)
BASOPHILS ABSOLUTE: 0.1 THOU/MCL (ref 0–0.2)
BASOPHILS ABSOLUTE: 1 %
BILIRUB SERPL-MCNC: 0.9 MG/DL (ref 0–1.2)
BUN BLDV-MCNC: 14 MG/DL (ref 8–26)
CALCIUM SERPL-MCNC: 8.9 MG/DL (ref 8.5–10.4)
CHLORIDE BLD-SCNC: 106 MMOL/L (ref 98–111)
CO2: 28 MMOL/L (ref 21–31)
CREAT SERPL-MCNC: 0.89 MG/DL (ref 0.7–1.3)
EGFR (CKD-EPI): 88 ML/MIN/1.73 M2
EOSINOPHILS ABSOLUTE: 0.6 THOU/MCL (ref 0.03–0.45)
EOSINOPHILS RELATIVE PERCENT: 7 %
GLUCOSE BLD-MCNC: 115 MG/DL (ref 70–99)
HCT VFR BLD CALC: 44.6 % (ref 40–50)
HEMOGLOBIN: 14.9 G/DL (ref 13.5–16.5)
LYMPHOCYTES ABSOLUTE: 1.5 THOU/MCL (ref 1–4)
LYMPHOCYTES RELATIVE PERCENT: 19 %
MCH RBC QN AUTO: 29.6 PG (ref 27–33)
MCHC RBC AUTO-ENTMCNC: 33.5 G/DL (ref 32–36)
MCV RBC AUTO: 88.6 FL (ref 82–97)
MONOCYTES ABSOLUTE: 0.6 THOU/MCL (ref 0.2–0.9)
MONOCYTES RELATIVE PERCENT: 7 %
NEUTROPHILS ABSOLUTE: 5.2 THOU/MCL (ref 1.8–7.7)
PDW BLD-RTO: 14.4 % (ref 12.3–17)
PLATELET # BLD: 157 THOU/MCL (ref 140–375)
PMV BLD AUTO: 9.5 FL (ref 7–11.5)
POTASSIUM SERPL-SCNC: 4.1 MMOL/L (ref 3.6–5.1)
RBC # BLD: 5.04 MIL/MCL (ref 4.4–5.8)
SEG NEUTROPHILS: 66 %
SODIUM BLD-SCNC: 139 MMOL/L (ref 135–145)
TOTAL PROTEIN: 5.9 G/DL (ref 6–8)
WBC # BLD: 7.9 THOU/MCL (ref 3.6–10.5)

## 2025-06-25 ENCOUNTER — RESULTS FOLLOW-UP (OUTPATIENT)
Dept: FAMILY MEDICINE CLINIC | Age: 80
End: 2025-06-25

## 2025-06-25 DIAGNOSIS — G31.84 MILD COGNITIVE IMPAIRMENT: Primary | ICD-10-CM

## 2025-06-25 DIAGNOSIS — R53.1 GENERAL WEAKNESS: ICD-10-CM

## 2025-06-25 DIAGNOSIS — J44.9 CHRONIC OBSTRUCTIVE PULMONARY DISEASE, UNSPECIFIED COPD TYPE (HCC): ICD-10-CM

## 2025-06-25 DIAGNOSIS — I65.23 OBSTRUCTION OF CAROTID ARTERY ON BOTH SIDES: ICD-10-CM

## 2025-06-25 DIAGNOSIS — N17.9 AKI (ACUTE KIDNEY INJURY): ICD-10-CM

## 2025-07-22 LAB
ALBUMIN: 3.1 G/DL (ref 3.5–5.7)
ALP BLD-CCNC: 90 IU/L (ref 35–135)
ALT SERPL-CCNC: 10 IU/L (ref 10–60)
ANION GAP SERPL CALCULATED.3IONS-SCNC: 4 MMOL/L (ref 4–16)
AST SERPL-CCNC: 14 IU/L (ref 10–40)
BASOPHILS ABSOLUTE: 0.1 THOU/MCL (ref 0–0.2)
BASOPHILS ABSOLUTE: 1 %
BILIRUB SERPL-MCNC: 0.6 MG/DL (ref 0–1.2)
BUN BLDV-MCNC: 8 MG/DL (ref 8–26)
CALCIUM SERPL-MCNC: 8.3 MG/DL (ref 8.5–10.4)
CHLORIDE BLD-SCNC: 105 MMOL/L (ref 98–111)
CO2: 31 MMOL/L (ref 21–31)
CREAT SERPL-MCNC: 0.85 MG/DL (ref 0.7–1.3)
EGFR (CKD-EPI): 89 ML/MIN/1.73 M2
EOSINOPHILS ABSOLUTE: 0.7 THOU/MCL (ref 0.03–0.45)
EOSINOPHILS RELATIVE PERCENT: 8 %
GLUCOSE BLD-MCNC: 125 MG/DL (ref 70–99)
HCT VFR BLD CALC: 43.4 % (ref 40–50)
HEMOGLOBIN: 14.7 G/DL (ref 13.5–16.5)
LYMPHOCYTES ABSOLUTE: 1.7 THOU/MCL (ref 1–4)
LYMPHOCYTES RELATIVE PERCENT: 21 %
MCH RBC QN AUTO: 29.9 PG (ref 27–33)
MCHC RBC AUTO-ENTMCNC: 33.8 G/DL (ref 32–36)
MCV RBC AUTO: 88.3 FL (ref 82–97)
MONOCYTES ABSOLUTE: 0.6 THOU/MCL (ref 0.2–0.9)
MONOCYTES RELATIVE PERCENT: 7 %
NEUTROPHILS ABSOLUTE: 5.3 THOU/MCL (ref 1.8–7.7)
PDW BLD-RTO: 14.5 % (ref 12.3–17)
PLATELET # BLD: 159 THOU/MCL (ref 140–375)
PMV BLD AUTO: 9.3 FL (ref 7–11.5)
POTASSIUM SERPL-SCNC: 3.8 MMOL/L (ref 3.6–5.1)
RBC # BLD: 4.92 MIL/MCL (ref 4.4–5.8)
SEG NEUTROPHILS: 63 %
SODIUM BLD-SCNC: 140 MMOL/L (ref 135–145)
TOTAL PROTEIN: 5.3 G/DL (ref 6–8)
WBC # BLD: 8.3 THOU/MCL (ref 3.6–10.5)

## 2025-08-21 LAB
ALBUMIN: 3.6 G/DL (ref 3.5–5.7)
ALP BLD-CCNC: 93 IU/L (ref 35–135)
ALT SERPL-CCNC: 10 IU/L (ref 10–60)
ANION GAP SERPL CALCULATED.3IONS-SCNC: 3 MMOL/L (ref 4–16)
AST SERPL-CCNC: 16 IU/L (ref 10–40)
BASOPHILS ABSOLUTE: 0.1 THOU/MCL (ref 0–0.2)
BASOPHILS ABSOLUTE: 1 %
BILIRUB SERPL-MCNC: 0.8 MG/DL (ref 0–1.2)
BUN BLDV-MCNC: 11 MG/DL (ref 8–26)
CALCIUM SERPL-MCNC: 8.9 MG/DL (ref 8.5–10.4)
CHLORIDE BLD-SCNC: 105 MMOL/L (ref 98–111)
CO2: 31 MMOL/L (ref 21–31)
CREAT SERPL-MCNC: 0.94 MG/DL (ref 0.7–1.3)
EGFR (CKD-EPI): 83 ML/MIN/1.73 M2
EOSINOPHILS ABSOLUTE: 0.8 THOU/MCL (ref 0.03–0.45)
EOSINOPHILS RELATIVE PERCENT: 8 %
GLUCOSE BLD-MCNC: 91 MG/DL (ref 70–99)
HCT VFR BLD CALC: 46.8 % (ref 40–50)
HEMOGLOBIN: 15.9 G/DL (ref 13.5–16.5)
LYMPHOCYTES ABSOLUTE: 1.5 THOU/MCL (ref 1–4)
LYMPHOCYTES RELATIVE PERCENT: 16 %
MCH RBC QN AUTO: 30.3 PG (ref 27–33)
MCHC RBC AUTO-ENTMCNC: 34 G/DL (ref 32–36)
MCV RBC AUTO: 89.2 FL (ref 82–97)
MONOCYTES ABSOLUTE: 0.7 THOU/MCL (ref 0.2–0.9)
MONOCYTES RELATIVE PERCENT: 7 %
NEUTROPHILS ABSOLUTE: 6.4 THOU/MCL (ref 1.8–7.7)
PDW BLD-RTO: 14.5 % (ref 12.3–17)
PLATELET # BLD: 197 THOU/MCL (ref 140–375)
PMV BLD AUTO: 10.4 FL (ref 7–11.5)
POTASSIUM SERPL-SCNC: 4.1 MMOL/L (ref 3.6–5.1)
RBC # BLD: 5.25 MIL/MCL (ref 4.4–5.8)
SEG NEUTROPHILS: 68 %
SODIUM BLD-SCNC: 139 MMOL/L (ref 135–145)
TOTAL PROTEIN: 6.2 G/DL (ref 6–8)
WBC # BLD: 9.5 THOU/MCL (ref 3.6–10.5)

## (undated) DEVICE — BW-412T DISP COMBO CLEANING BRUSH: Brand: SINGLE USE COMBINATION CLEANING BRUSH

## (undated) DEVICE — FORCEPS BX L240CM JAW DIA2.4MM ORNG L CAP W/ NDL DISP RAD

## (undated) DEVICE — TRAP SPEC RETRV CLR PLAS POLYP IN LN SUCT QUIK CTCH

## (undated) DEVICE — SET VLV 3 PC AWS DISPOSABLE GRDIAN SCOPEVALET

## (undated) DEVICE — CANNULA SAMP CO2 AD GRN 7FT CO2 AND 7FT O2 TBNG UNIV CONN

## (undated) DEVICE — SYRINGE MED 50ML LUERLOCK TIP

## (undated) DEVICE — Device: Brand: DISPOSABLE ELECTROSURGICAL SNARE

## (undated) DEVICE — MOUTHPIECE ENDOSCP L CTRL OPN AND SIDE PORTS DISP

## (undated) DEVICE — SOLUTION IV IRRIG WATER 500ML POUR BRL ST 2F7113

## (undated) DEVICE — GOWN AURORA NONREINF LG: Brand: MEDLINE INDUSTRIES, INC.

## (undated) DEVICE — PROCEDURE KIT ENDOSCP CUST